# Patient Record
Sex: FEMALE | Race: WHITE | NOT HISPANIC OR LATINO | Employment: OTHER | URBAN - NONMETROPOLITAN AREA
[De-identification: names, ages, dates, MRNs, and addresses within clinical notes are randomized per-mention and may not be internally consistent; named-entity substitution may affect disease eponyms.]

---

## 2017-04-10 ENCOUNTER — HOSPITAL ENCOUNTER (EMERGENCY)
Facility: HOSPITAL | Age: 82
End: 2017-04-10

## 2017-04-10 ENCOUNTER — TRANSCRIBE ORDERS (OUTPATIENT)
Dept: ADMINISTRATIVE | Facility: HOSPITAL | Age: 82
End: 2017-04-10

## 2017-04-10 DIAGNOSIS — N63.10 BREAST MASS, RIGHT: Primary | ICD-10-CM

## 2017-04-14 ENCOUNTER — HOSPITAL ENCOUNTER (OUTPATIENT)
Dept: MAMMOGRAPHY | Facility: HOSPITAL | Age: 82
Discharge: HOME | End: 2017-04-14
Attending: SPECIALIST

## 2017-04-14 ENCOUNTER — HOSPITAL ENCOUNTER (OUTPATIENT)
Dept: ULTRASOUND IMAGING | Facility: HOSPITAL | Age: 82
Discharge: HOME | End: 2017-04-14
Attending: SPECIALIST | Admitting: SPECIALIST

## 2017-04-14 ENCOUNTER — LAB (OUTPATIENT)
Dept: LAB | Facility: HOSPITAL | Age: 82
End: 2017-04-14
Attending: SPECIALIST

## 2017-04-14 ENCOUNTER — TRANSCRIBE ORDERS (OUTPATIENT)
Dept: ADMINISTRATIVE | Facility: HOSPITAL | Age: 82
End: 2017-04-14

## 2017-04-14 DIAGNOSIS — N63.10 BREAST MASS, RIGHT: ICD-10-CM

## 2017-04-14 DIAGNOSIS — N63.10 BREAST MASS, RIGHT: Primary | ICD-10-CM

## 2017-04-14 LAB
INR PPP: 1 (ref 0.91–1.09)
PROTHROMBIN TIME: 11.9 SECONDS (ref 10–13.8)

## 2017-04-14 PROCEDURE — 85610 PROTHROMBIN TIME: CPT

## 2017-04-14 PROCEDURE — 88305 TISSUE EXAM BY PATHOLOGIST: CPT | Performed by: SPECIALIST

## 2017-04-14 PROCEDURE — 76642 ULTRASOUND BREAST LIMITED: CPT

## 2017-04-14 RX ORDER — LIDOCAINE HYDROCHLORIDE AND EPINEPHRINE 10; 10 MG/ML; UG/ML
10 INJECTION, SOLUTION INFILTRATION; PERINEURAL ONCE
Status: DISCONTINUED | OUTPATIENT
Start: 2017-04-14 | End: 2020-04-03

## 2017-04-14 RX ORDER — LIDOCAINE HYDROCHLORIDE 10 MG/ML
10 INJECTION, SOLUTION INFILTRATION; PERINEURAL ONCE
Status: DISCONTINUED | OUTPATIENT
Start: 2017-04-14 | End: 2020-04-03

## 2017-04-17 LAB
CYTO UR: NORMAL
LAB AP CASE REPORT: NORMAL
LAB AP CLINICAL INFORMATION: NORMAL
Lab: NORMAL
PATH REPORT.FINAL DX SPEC: NORMAL
PATH REPORT.GROSS SPEC: NORMAL

## 2018-06-22 ENCOUNTER — TRANSCRIBE ORDERS (OUTPATIENT)
Dept: ADMINISTRATIVE | Facility: HOSPITAL | Age: 83
End: 2018-06-22

## 2018-06-22 DIAGNOSIS — I38 HEART VALVE DISORDER: Primary | ICD-10-CM

## 2018-06-22 DIAGNOSIS — I65.23 BILATERAL CAROTID ARTERY OCCLUSION: ICD-10-CM

## 2018-06-22 DIAGNOSIS — R41.3 MEMORY LOSS: ICD-10-CM

## 2018-07-03 ENCOUNTER — HOSPITAL ENCOUNTER (OUTPATIENT)
Dept: CARDIOLOGY | Facility: HOSPITAL | Age: 83
Discharge: HOME | End: 2018-07-03
Attending: INTERNAL MEDICINE | Admitting: INTERNAL MEDICINE

## 2018-07-03 ENCOUNTER — HOSPITAL ENCOUNTER (OUTPATIENT)
Dept: MRI IMAGING | Facility: HOSPITAL | Age: 83
Discharge: HOME | End: 2018-07-03
Attending: INTERNAL MEDICINE

## 2018-07-03 ENCOUNTER — HOSPITAL ENCOUNTER (OUTPATIENT)
Dept: ULTRASOUND IMAGING | Facility: HOSPITAL | Age: 83
Discharge: HOME | End: 2018-07-03
Attending: INTERNAL MEDICINE

## 2018-07-03 VITALS
BODY MASS INDEX: 27.6 KG/M2 | HEIGHT: 62 IN | SYSTOLIC BLOOD PRESSURE: 148 MMHG | DIASTOLIC BLOOD PRESSURE: 84 MMHG | WEIGHT: 150 LBS

## 2018-07-03 DIAGNOSIS — R41.3 MEMORY LOSS: ICD-10-CM

## 2018-07-03 DIAGNOSIS — I65.23 BILATERAL CAROTID ARTERY OCCLUSION: ICD-10-CM

## 2018-07-03 DIAGNOSIS — I38 HEART VALVE DISORDER: ICD-10-CM

## 2018-07-03 PROCEDURE — 93880 EXTRACRANIAL BILAT STUDY: CPT

## 2018-07-03 PROCEDURE — 93306 TTE W/DOPPLER COMPLETE: CPT

## 2018-07-03 PROCEDURE — 93880 EXTRACRANIAL BILAT STUDY: CPT | Performed by: SURGERY

## 2018-07-03 PROCEDURE — 70551 MRI BRAIN STEM W/O DYE: CPT

## 2018-07-03 PROCEDURE — 93306 TTE W/DOPPLER COMPLETE: CPT | Performed by: INTERNAL MEDICINE

## 2018-07-05 ENCOUNTER — TRANSCRIBE ORDERS (OUTPATIENT)
Dept: ADMINISTRATIVE | Facility: HOSPITAL | Age: 83
End: 2018-07-05

## 2018-07-05 DIAGNOSIS — G93.89 BRAIN MASS: Primary | ICD-10-CM

## 2018-07-05 LAB
BH CV ECHO MEAS - AO MAX PG (FULL): 6.3 MMHG
BH CV ECHO MEAS - AO MAX PG: 14.9 MMHG
BH CV ECHO MEAS - AO MEAN PG (FULL): 4 MMHG
BH CV ECHO MEAS - AO MEAN PG: 9 MMHG
BH CV ECHO MEAS - AO ROOT AREA (BSA CORRECTED): 1.7
BH CV ECHO MEAS - AO ROOT AREA: 6.6 CM^2
BH CV ECHO MEAS - AO ROOT DIAM: 2.9 CM
BH CV ECHO MEAS - AO V2 MAX: 193 CM/SEC
BH CV ECHO MEAS - AO V2 MEAN: 137 CM/SEC
BH CV ECHO MEAS - AO V2 VTI: 50.3 CM
BH CV ECHO MEAS - AVA(I,A): 2.3 CM^2
BH CV ECHO MEAS - AVA(I,D): 2.3 CM^2
BH CV ECHO MEAS - AVA(V,A): 2.4 CM^2
BH CV ECHO MEAS - AVA(V,D): 2.4 CM^2
BH CV ECHO MEAS - BSA(HAYCOCK): 1.7 M^2
BH CV ECHO MEAS - BSA: 1.7 M^2
BH CV ECHO MEAS - BZI_BMI: 27.4 KILOGRAMS/M^2
BH CV ECHO MEAS - BZI_METRIC_HEIGHT: 157.5 CM
BH CV ECHO MEAS - BZI_METRIC_WEIGHT: 68 KG
BH CV ECHO MEAS - CONTRAST EF 4CH: 58.8 ML/M^2
BH CV ECHO MEAS - EDV(CUBED): 61.2 ML
BH CV ECHO MEAS - EDV(MOD-SP4): 47.3 ML
BH CV ECHO MEAS - EDV(TEICH): 67.5 ML
BH CV ECHO MEAS - EF(CUBED): 80.4 %
BH CV ECHO MEAS - EF(MOD-SP4): 58.8 %
BH CV ECHO MEAS - EF(TEICH): 73.5 %
BH CV ECHO MEAS - ESV(CUBED): 12 ML
BH CV ECHO MEAS - ESV(MOD-SP4): 19.5 ML
BH CV ECHO MEAS - ESV(TEICH): 17.9 ML
BH CV ECHO MEAS - FS: 41.9 %
BH CV ECHO MEAS - IVS/LVPW: 1.2
BH CV ECHO MEAS - IVSD: 1.1 CM
BH CV ECHO MEAS - LA DIMENSION: 3.7 CM
BH CV ECHO MEAS - LA/AO: 1.3
BH CV ECHO MEAS - LAT PEAK E' VEL: 8.6 CM/SEC
BH CV ECHO MEAS - LV DIASTOLIC VOL/BSA (35-75): 28 ML/M^2
BH CV ECHO MEAS - LV MASS(C)D: 125.9 GRAMS
BH CV ECHO MEAS - LV MASS(C)DI: 74.5 GRAMS/M^2
BH CV ECHO MEAS - LV MAX PG: 8.6 MMHG
BH CV ECHO MEAS - LV MEAN PG: 5 MMHG
BH CV ECHO MEAS - LV SYSTOLIC VOL/BSA (12-30): 11.5 ML/M^2
BH CV ECHO MEAS - LV V1 MAX: 147 CM/SEC
BH CV ECHO MEAS - LV V1 MEAN: 111 CM/SEC
BH CV ECHO MEAS - LV V1 VTI: 37.5 CM
BH CV ECHO MEAS - LVIDD: 3.9 CM
BH CV ECHO MEAS - LVIDS: 2.3 CM
BH CV ECHO MEAS - LVLD AP4: 7.6 CM
BH CV ECHO MEAS - LVLS AP4: 6.1 CM
BH CV ECHO MEAS - LVOT AREA (M): 3.1 CM^2
BH CV ECHO MEAS - LVOT AREA: 3.1 CM^2
BH CV ECHO MEAS - LVOT DIAM: 2 CM
BH CV ECHO MEAS - LVPWD: 0.92 CM
BH CV ECHO MEAS - MED PEAK E' VEL: 5.77 CM/SEC
BH CV ECHO MEAS - MV A MAX VEL: 83.4 CM/SEC
BH CV ECHO MEAS - MV DEC TIME: 0.2 SEC
BH CV ECHO MEAS - MV E MAX VEL: 93.6 CM/SEC
BH CV ECHO MEAS - MV E/A: 1.1
BH CV ECHO MEAS - PA MAX PG: 1.9 MMHG
BH CV ECHO MEAS - PA V2 MAX: 68.9 CM/SEC
BH CV ECHO MEAS - RAP SYSTOLE: 5 MMHG
BH CV ECHO MEAS - RVSP: 35.9 MMHG
BH CV ECHO MEAS - SI(AO): 196.4 ML/M^2
BH CV ECHO MEAS - SI(CUBED): 29.1 ML/M^2
BH CV ECHO MEAS - SI(LVOT): 69.6 ML/M^2
BH CV ECHO MEAS - SI(MOD-SP4): 16.4 ML/M^2
BH CV ECHO MEAS - SI(TEICH): 29.3 ML/M^2
BH CV ECHO MEAS - SV(AO): 332.2 ML
BH CV ECHO MEAS - SV(CUBED): 49.2 ML
BH CV ECHO MEAS - SV(LVOT): 117.8 ML
BH CV ECHO MEAS - SV(MOD-SP4): 27.8 ML
BH CV ECHO MEAS - SV(TEICH): 49.6 ML
BH CV ECHO MEAS - TR MAX VEL: 278 CM/SEC
BH CV ECHO MEASUREMENTS AVERAGE E/E' RATIO: 13.03
LEFT ATRIUM VOLUME INDEX: 42 ML/M2
LEFT ATRIUM VOLUME: 71 CM3
MAXIMAL PREDICTED HEART RATE: 137 BPM
STRESS TARGET HR: 116 BPM

## 2018-07-06 ENCOUNTER — APPOINTMENT (OUTPATIENT)
Dept: MRI IMAGING | Facility: HOSPITAL | Age: 83
End: 2018-07-06
Attending: INTERNAL MEDICINE

## 2018-07-09 ENCOUNTER — HOSPITAL ENCOUNTER (OUTPATIENT)
Dept: MRI IMAGING | Facility: HOSPITAL | Age: 83
Discharge: HOME | End: 2018-07-09
Attending: INTERNAL MEDICINE | Admitting: INTERNAL MEDICINE

## 2018-07-09 DIAGNOSIS — G93.89 BRAIN MASS: ICD-10-CM

## 2018-07-09 LAB — CREAT BLDA-MCNC: 1.2 MG/DL (ref 0.6–1.3)

## 2018-07-09 PROCEDURE — 70552 MRI BRAIN STEM W/DYE: CPT

## 2018-07-09 PROCEDURE — A9577 INJ MULTIHANCE: HCPCS | Performed by: INTERNAL MEDICINE

## 2018-07-09 PROCEDURE — 82565 ASSAY OF CREATININE: CPT

## 2018-07-09 PROCEDURE — 0 GADOBENATE DIMEGLUMINE 529 MG/ML SOLUTION: Performed by: INTERNAL MEDICINE

## 2018-07-09 RX ADMIN — GADOBENATE DIMEGLUMINE 14 ML: 529 INJECTION, SOLUTION INTRAVENOUS at 12:49

## 2018-07-23 ENCOUNTER — HOSPITAL ENCOUNTER (OUTPATIENT)
Dept: CT IMAGING | Facility: HOSPITAL | Age: 83
Discharge: HOME | End: 2018-07-23
Attending: INTERNAL MEDICINE

## 2018-07-23 ENCOUNTER — TRANSCRIBE ORDERS (OUTPATIENT)
Dept: LAB | Facility: HOSPITAL | Age: 83
End: 2018-07-23

## 2018-07-23 ENCOUNTER — HOSPITAL ENCOUNTER (OUTPATIENT)
Dept: CT IMAGING | Facility: HOSPITAL | Age: 83
Discharge: HOME | End: 2018-07-23
Attending: INTERNAL MEDICINE | Admitting: INTERNAL MEDICINE

## 2018-07-23 DIAGNOSIS — G93.89 MASS, BRAIN: Primary | ICD-10-CM

## 2018-07-23 DIAGNOSIS — G93.89 MASS, BRAIN: ICD-10-CM

## 2018-07-23 LAB — CREAT BLDA-MCNC: 1.4 MG/DL (ref 0.6–1.3)

## 2018-07-23 PROCEDURE — 74176 CT ABD & PELVIS W/O CONTRAST: CPT

## 2018-07-23 PROCEDURE — 82565 ASSAY OF CREATININE: CPT

## 2018-07-23 PROCEDURE — 71250 CT THORAX DX C-: CPT

## 2018-07-25 ENCOUNTER — TRANSCRIBE ORDERS (OUTPATIENT)
Dept: ADMINISTRATIVE | Facility: HOSPITAL | Age: 83
End: 2018-07-25

## 2018-07-25 ENCOUNTER — HOSPITAL ENCOUNTER (OUTPATIENT)
Dept: ULTRASOUND IMAGING | Facility: HOSPITAL | Age: 83
Discharge: HOME | End: 2018-07-25
Attending: INTERNAL MEDICINE | Admitting: INTERNAL MEDICINE

## 2018-07-25 DIAGNOSIS — N28.89 RENAL MASS: Primary | ICD-10-CM

## 2018-07-25 DIAGNOSIS — N28.89 RENAL MASS: ICD-10-CM

## 2018-07-25 PROCEDURE — 76775 US EXAM ABDO BACK WALL LIM: CPT

## 2018-07-26 NOTE — PROGRESS NOTES
Subjective    Ms. Perales is 83 y.o. female    Chief Complaint: Renal lesion    History of Present Illness     Renal Mass  Patient here for evaluation of renal mass.  The renal mass was found incidentally.  The location of the mass is the right kidney.  The mass has been present for1 week.  The mass is described as cystic.  The size of the mass is 7cm.  It would be classifed as a Bosniak I.  The mass was found on evaluation of memory loss.  Associated symptoms include denies flank pain/hematuria.    The following portions of the patient's history were reviewed and updated as appropriate: allergies, current medications, past family history, past medical history, past social history, past surgical history and problem list.    Review of Systems   Constitutional: Negative for appetite change, diaphoresis and fever.   HENT: Negative for facial swelling and sore throat.    Eyes: Negative for discharge and visual disturbance.   Respiratory: Negative for cough and shortness of breath.    Cardiovascular: Negative for chest pain and leg swelling.   Gastrointestinal: Negative for anal bleeding and vomiting.   Endocrine: Negative for cold intolerance and heat intolerance.   Genitourinary: Positive for frequency. Negative for decreased urine volume, difficulty urinating, dyspareunia, dysuria, enuresis, flank pain, genital sores, hematuria, menstrual problem, pelvic pain, urgency, vaginal bleeding, vaginal discharge and vaginal pain.   Musculoskeletal: Negative for back pain and gait problem.   Skin: Negative for pallor and rash.   Allergic/Immunologic: Negative for food allergies and immunocompromised state.   Neurological: Negative for seizures and headaches.   Hematological: Negative for adenopathy. Does not bruise/bleed easily.   Psychiatric/Behavioral: Negative for dysphoric mood, self-injury and suicidal ideas.         Current Outpatient Prescriptions:   •  aspirin 81 MG EC tablet, Take 81 mg by mouth., Disp: , Rfl:   •   "azelastine (ASTELIN) 0.1 % nasal spray, into the nostril(s) as directed by provider., Disp: , Rfl:     Current Facility-Administered Medications:   •  lidocaine (XYLOCAINE) 1 % injection 10 mL, 10 mL, Subcutaneous, Once, Christelle Winston MD  •  lidocaine-EPINEPHrine (XYLOCAINE W/EPI) 1 %-1:770435 injection 10 mL, 10 mL, Injection, Once, Christelle Winston MD  •  sodium bicarbonate injection 2.5 mEq, 5 mL, Injection, Once, Christelle Winston MD    Past Medical History:   Diagnosis Date   • Diabetes mellitus (CMS/HCC)    • High cholesterol    • Hypertension        Past Surgical History:   Procedure Laterality Date   • HYSTERECTOMY     • OOPHORECTOMY     • TONSILLECTOMY         Social History     Social History   • Marital status:      Social History Main Topics   • Smoking status: Never Smoker   • Smokeless tobacco: Never Used   • Alcohol use No   • Drug use: No     Other Topics Concern   • Not on file       Family History   Problem Relation Age of Onset   • No Known Problems Father    • Hypertension Mother        Objective    BP (!) 190/80   Temp 97.9 °F (36.6 °C)   Ht 157.5 cm (62\")   Wt 70.5 kg (155 lb 6.4 oz)   BMI 28.42 kg/m²     Physical Exam   Constitutional: She is oriented to person, place, and time. She appears well-developed and well-nourished. No distress.   HENT:   Head: Normocephalic and atraumatic.   Right Ear: External ear and ear canal normal.   Left Ear: External ear and ear canal normal.   Nose: No nasal deformity. No epistaxis.   Mouth/Throat: Oropharynx is clear and moist. Mucous membranes are not pale, not dry and not cyanotic. Normal dentition. No oropharyngeal exudate.   Neck: Trachea normal. No tracheal tenderness present. No tracheal deviation present. No thyroid mass and no thyromegaly present.   Pulmonary/Chest: Effort normal. No accessory muscle usage. No respiratory distress. Chest wall is not dull to percussion (No flatness or hyperresonance). She exhibits no mass and no " tenderness.   On palpation, no tactile fremitus. All movements are symmetric. No intercostal retraction noted.    Abdominal: Soft. Normal appearance. She exhibits no distension and no mass. There is no hepatosplenomegaly. There is no tenderness. No hernia.   Rectal examination or stool specimen is not indicated.    Musculoskeletal:   Normal gait and station. The spine, ribs, and pelvis are examined. No obvious misalignment or asymmetry. ROM is reasonable for age. No instability. No obvious atrophy, flaccidity or spasticity.    Lymphadenopathy:     She has no cervical adenopathy.        Right: No inguinal adenopathy present.        Left: No inguinal adenopathy present.   Neurological: She is alert and oriented to person, place, and time.   Skin: Skin is warm, dry and intact. No lesion and no rash noted. She is not diaphoretic. No cyanosis. No pallor. Nails show no clubbing.   On palpation, there were no induration, subcutaneous nodules, or tightening   Psychiatric: Her speech is normal and behavior is normal. Judgment and thought content normal. Her mood appears not anxious. Her affect is not labile. She does not exhibit a depressed mood.   Vitals reviewed.    CT independent review  The CT scan of the abdomen/pelvis done without contrast is available for me to review.  Treatment recommendations require an independent review.  First I scanned the liver, spleen, and bowel pattern.  The retroperitoneum including the major vessels and lymphatic packages are briefly reviewed.  This film as been reviewed by the radiologist to determine any non urologic abnormalities that are present.  The kidneys are closely inspected for size, symmetry, contour, parenchymal thickness, perinephric reaction, presence of calcifications, and intrarenal dilation of the collecting system.  The ureters are inspected for their course, caliber, and any calcifications.  The bladder is inspected for its thickness, size, and presence of any  calcifications.  This scan shows:    The right kidney appears 7 cm renal cyst    The left kidney appears normal on this non-contrasted CT scan.  The renal parenchymal is norml in thickness.  There are no solid masses or cysts.  There is no hydronephrosis.  There are no stones.      The bladder appears normal on this non-contrasted CT scan.  The bladder appears normal in thickness.  There no masses or stones seen on this exam.      Renal ultrasound independent review    The renal ultrasound is available for me to review.  Treatment recommendations require an independent review.  This film has been reviewed by the radiologist to determine any non urologic abnormalities that are presents.  However, I very closely inspected the kidneys for size, symmetry, contour, parenchymal thickness, perinephric reaction, presence of calcifications, and intrarenal dilation of the collecting system.       The right kidney appears 7 cm simple cyst    The left kidney appears normal on this ultrasound.  The renal parenchymal is norml in thickness.  There are no solid masses or cysts.  There is no hydronephrosis.  There are no stones.      The bladder appears normal on thisultrsaound.  The bladder appears normal in thickness.  There no masses or stones seen on this exam.       Results for orders placed or performed in visit on 07/27/18   POC Urinalysis Dipstick, Multipro   Result Value Ref Range    Color Yellow Yellow, Straw, Dark Yellow, Tiff    Clarity, UA Clear Clear    Glucose, UA >=1000 mg/dL (3+) (A) Negative, 1000 mg/dL (3+) mg/dL    Bilirubin Negative Negative    Ketones, UA Negative Negative    Specific Gravity  1.025 1.005 - 1.030    Blood, UA Negative Negative    pH, Urine 5.5 5.0 - 8.0    Protein,  mg/dL (A) Negative mg/dL    Urobilinogen, UA Normal Normal    Nitrite, UA Negative Negative    Leukocytes Trace (A) Negative     Assessment and Plan    Diagnoses and all orders for this visit:    Renal cyst  -     POC  Urinalysis Dipstick, Multipro          I reviewed her CT scan as well as renal ultrasound with the findings mentioned above.  This is a simple renal cystic Bosniak one lesion.  It is somewhat larger however is not causing any symptoms I would not recommend any therapy.  She is going to follow-up with me on a when necessary basis.

## 2018-07-27 ENCOUNTER — OFFICE VISIT (OUTPATIENT)
Dept: UROLOGY | Facility: CLINIC | Age: 83
End: 2018-07-27

## 2018-07-27 VITALS
BODY MASS INDEX: 28.6 KG/M2 | DIASTOLIC BLOOD PRESSURE: 80 MMHG | HEIGHT: 62 IN | WEIGHT: 155.4 LBS | SYSTOLIC BLOOD PRESSURE: 190 MMHG | TEMPERATURE: 97.9 F

## 2018-07-27 DIAGNOSIS — N28.1 RENAL CYST: Primary | ICD-10-CM

## 2018-07-27 LAB
BILIRUB BLD-MCNC: NEGATIVE MG/DL
CLARITY, POC: CLEAR
COLOR UR: YELLOW
GLUCOSE UR STRIP-MCNC: ABNORMAL MG/DL
KETONES UR QL: NEGATIVE
LEUKOCYTE EST, POC: ABNORMAL
NITRITE UR-MCNC: NEGATIVE MG/ML
PH UR: 5.5 [PH] (ref 5–8)
PROT UR STRIP-MCNC: ABNORMAL MG/DL
RBC # UR STRIP: NEGATIVE /UL
SP GR UR: 1.02 (ref 1–1.03)
UROBILINOGEN UR QL: NORMAL

## 2018-07-27 PROCEDURE — 99201 PR OFFICE OUTPATIENT NEW 10 MINUTES: CPT | Performed by: UROLOGY

## 2018-07-27 PROCEDURE — 81001 URINALYSIS AUTO W/SCOPE: CPT | Performed by: UROLOGY

## 2018-07-27 RX ORDER — ASPIRIN 81 MG/1
81 TABLET ORAL
COMMUNITY
End: 2020-04-03 | Stop reason: DRUGHIGH

## 2018-07-27 RX ORDER — AZELASTINE 1 MG/ML
SPRAY, METERED NASAL
COMMUNITY
End: 2020-06-24

## 2018-08-10 ENCOUNTER — TRANSCRIBE ORDERS (OUTPATIENT)
Dept: ADMINISTRATIVE | Facility: HOSPITAL | Age: 83
End: 2018-08-10

## 2018-08-10 DIAGNOSIS — G93.9 BRAIN LESION: Primary | ICD-10-CM

## 2018-08-17 ENCOUNTER — HOSPITAL ENCOUNTER (OUTPATIENT)
Dept: MRI IMAGING | Facility: HOSPITAL | Age: 83
Discharge: HOME | End: 2018-08-17
Admitting: PSYCHIATRY & NEUROLOGY

## 2018-08-17 DIAGNOSIS — G93.9 BRAIN LESION: ICD-10-CM

## 2018-08-17 LAB — CREAT BLDA-MCNC: 1.3 MG/DL (ref 0.6–1.3)

## 2018-08-17 PROCEDURE — 82565 ASSAY OF CREATININE: CPT

## 2018-08-17 PROCEDURE — 0 GADOBENATE DIMEGLUMINE 529 MG/ML SOLUTION: Performed by: INTERNAL MEDICINE

## 2018-08-17 PROCEDURE — 70553 MRI BRAIN STEM W/O & W/DYE: CPT

## 2018-08-17 PROCEDURE — A9577 INJ MULTIHANCE: HCPCS | Performed by: INTERNAL MEDICINE

## 2018-08-17 RX ADMIN — GADOBENATE DIMEGLUMINE 10 ML: 529 INJECTION, SOLUTION INTRAVENOUS at 13:35

## 2018-11-14 ENCOUNTER — TRANSCRIBE ORDERS (OUTPATIENT)
Dept: ADMINISTRATIVE | Facility: HOSPITAL | Age: 83
End: 2018-11-14

## 2018-11-14 DIAGNOSIS — I10 MALIGNANT HYPERTENSION: Primary | ICD-10-CM

## 2018-11-16 ENCOUNTER — HOSPITAL ENCOUNTER (OUTPATIENT)
Dept: CT IMAGING | Facility: HOSPITAL | Age: 83
Discharge: HOME | End: 2018-11-16
Attending: INTERNAL MEDICINE | Admitting: INTERNAL MEDICINE

## 2018-11-16 DIAGNOSIS — I10 MALIGNANT HYPERTENSION: ICD-10-CM

## 2018-11-16 LAB
CREAT BLD-MCNC: 1.15 MG/DL (ref 0.5–1.4)
CREAT BLDA-MCNC: 1.3 MG/DL (ref 0.6–1.3)
GFR SERPL CREATININE-BSD FRML MDRD: 45 ML/MIN/1.73

## 2018-11-16 PROCEDURE — 74175 CTA ABDOMEN W/CONTRAST: CPT

## 2018-11-16 PROCEDURE — 0 IOPAMIDOL PER 1 ML: Performed by: INTERNAL MEDICINE

## 2018-11-16 PROCEDURE — 82565 ASSAY OF CREATININE: CPT | Performed by: INTERNAL MEDICINE

## 2018-11-16 PROCEDURE — 82565 ASSAY OF CREATININE: CPT

## 2018-11-16 RX ADMIN — IOPAMIDOL 100 ML: 755 INJECTION, SOLUTION INTRAVENOUS at 10:42

## 2018-11-30 ENCOUNTER — TRANSCRIBE ORDERS (OUTPATIENT)
Dept: ADMINISTRATIVE | Facility: HOSPITAL | Age: 83
End: 2018-11-30

## 2018-11-30 DIAGNOSIS — G93.89 BRAIN MASS: Primary | ICD-10-CM

## 2018-12-04 ENCOUNTER — HOSPITAL ENCOUNTER (OUTPATIENT)
Dept: MRI IMAGING | Facility: HOSPITAL | Age: 83
Discharge: HOME | End: 2018-12-04
Attending: INTERNAL MEDICINE | Admitting: INTERNAL MEDICINE

## 2018-12-04 DIAGNOSIS — G93.89 BRAIN MASS: ICD-10-CM

## 2018-12-04 LAB
CREAT BLD-MCNC: 1.5 MG/DL (ref 0.5–1.4)
CREAT BLDA-MCNC: 1.7 MG/DL (ref 0.6–1.3)
GFR SERPL CREATININE-BSD FRML MDRD: 33 ML/MIN/1.73

## 2018-12-04 PROCEDURE — 82565 ASSAY OF CREATININE: CPT

## 2018-12-04 PROCEDURE — 70553 MRI BRAIN STEM W/O & W/DYE: CPT

## 2018-12-04 PROCEDURE — 0 GADOBENATE DIMEGLUMINE 529 MG/ML SOLUTION: Performed by: INTERNAL MEDICINE

## 2018-12-04 PROCEDURE — 82565 ASSAY OF CREATININE: CPT | Performed by: INTERNAL MEDICINE

## 2018-12-04 PROCEDURE — A9577 INJ MULTIHANCE: HCPCS | Performed by: INTERNAL MEDICINE

## 2018-12-04 RX ADMIN — GADOBENATE DIMEGLUMINE 13 ML: 529 INJECTION, SOLUTION INTRAVENOUS at 10:08

## 2019-03-21 ENCOUNTER — TRANSCRIBE ORDERS (OUTPATIENT)
Dept: ADMINISTRATIVE | Facility: HOSPITAL | Age: 84
End: 2019-03-21

## 2019-03-21 DIAGNOSIS — I63.9 CEREBRAL INFARCTION, UNSPECIFIED MECHANISM (HCC): ICD-10-CM

## 2019-03-21 DIAGNOSIS — G93.89 BRAIN MASS: Primary | ICD-10-CM

## 2019-03-29 ENCOUNTER — HOSPITAL ENCOUNTER (OUTPATIENT)
Dept: MRI IMAGING | Facility: HOSPITAL | Age: 84
Discharge: HOME | End: 2019-03-29
Admitting: INTERNAL MEDICINE

## 2019-03-29 DIAGNOSIS — I63.9 CEREBRAL INFARCTION, UNSPECIFIED MECHANISM (HCC): ICD-10-CM

## 2019-03-29 DIAGNOSIS — G93.89 BRAIN MASS: ICD-10-CM

## 2019-03-29 LAB
CREAT BLD-MCNC: 1.25 MG/DL (ref 0.5–1.4)
CREAT BLDA-MCNC: 1.4 MG/DL (ref 0.6–1.3)
GFR SERPL CREATININE-BSD FRML MDRD: 41 ML/MIN/1.73

## 2019-03-29 PROCEDURE — 70553 MRI BRAIN STEM W/O & W/DYE: CPT

## 2019-03-29 PROCEDURE — A9577 INJ MULTIHANCE: HCPCS

## 2019-03-29 PROCEDURE — 82565 ASSAY OF CREATININE: CPT

## 2019-03-29 PROCEDURE — 82565 ASSAY OF CREATININE: CPT | Performed by: INTERNAL MEDICINE

## 2019-03-29 PROCEDURE — 0 GADOBENATE DIMEGLUMINE 529 MG/ML SOLUTION

## 2020-01-22 RX ORDER — SIMVASTATIN 40 MG
1 TABLET ORAL NIGHTLY
COMMUNITY
End: 2020-01-22 | Stop reason: SDUPTHER

## 2020-01-22 RX ORDER — NEBIVOLOL 20 MG/1
20 TABLET ORAL DAILY
Qty: 30 TABLET | Refills: 11 | Status: SHIPPED | OUTPATIENT
Start: 2020-01-22 | End: 2020-02-24 | Stop reason: CLARIF

## 2020-01-22 RX ORDER — SIMVASTATIN 40 MG
40 TABLET ORAL NIGHTLY
Qty: 30 TABLET | Refills: 11 | Status: SHIPPED | OUTPATIENT
Start: 2020-01-22 | End: 2021-02-03

## 2020-01-22 RX ORDER — NEBIVOLOL 10 MG/1
TABLET ORAL
COMMUNITY
End: 2020-01-22 | Stop reason: DRUGHIGH

## 2020-01-22 NOTE — TELEPHONE ENCOUNTER
[facility] called to request refill      Zocor  Bystolic    [facility]    Per pharm meds were requested last week

## 2020-01-29 DIAGNOSIS — E11.9 TYPE 2 DIABETES MELLITUS WITHOUT COMPLICATION, UNSPECIFIED WHETHER LONG TERM INSULIN USE (HCC): Primary | ICD-10-CM

## 2020-02-18 ENCOUNTER — TELEPHONE (OUTPATIENT)
Dept: INTERNAL MEDICINE | Facility: CLINIC | Age: 85
End: 2020-02-18

## 2020-02-18 RX ORDER — LOSARTAN POTASSIUM 100 MG/1
100 TABLET ORAL DAILY
Qty: 90 TABLET | Refills: 1 | Status: SHIPPED | OUTPATIENT
Start: 2020-02-18 | End: 2020-08-18

## 2020-02-18 NOTE — TELEPHONE ENCOUNTER
Zev with [facility] called to see if Bystolic 20MG can be changed to generic Toprol XL. Caller stated with insurance pt can save $40 a month.     Please inform  [phone]

## 2020-02-19 NOTE — TELEPHONE ENCOUNTER
Looks like she has been on Bystolic for a long time and I don't see that she has been on Toprol in [employer] or that she was labeled as having a problem with it.

## 2020-02-19 NOTE — TELEPHONE ENCOUNTER
We can try her on 25 mg Toprol-XL and stop the Bystolic however she will need to come in and be seen 1 to 2 weeks later to make sure that she is tolerating that

## 2020-02-21 NOTE — TELEPHONE ENCOUNTER
Asha Brothers at [facility].  Left message informing patient of the medication change.  Patient is to call back Monday and make an appt.

## 2020-02-24 RX ORDER — METOPROLOL SUCCINATE 25 MG/1
25 TABLET, EXTENDED RELEASE ORAL DAILY
Qty: 30 TABLET | Refills: 2
Start: 2020-02-24 | End: 2020-07-01

## 2020-02-24 NOTE — TELEPHONE ENCOUNTER
It doesn't look like her medicine was changed in her chart.  Please make changes and then sign off.

## 2020-03-04 ENCOUNTER — TELEPHONE (OUTPATIENT)
Dept: INTERNAL MEDICINE | Facility: CLINIC | Age: 85
End: 2020-03-04

## 2020-03-04 NOTE — TELEPHONE ENCOUNTER
Son is requesting that a letter to be sent for his mother stating that she isn't able to participate in jury duty in [facility].

## 2020-03-10 ENCOUNTER — OFFICE VISIT (OUTPATIENT)
Dept: INTERNAL MEDICINE | Facility: CLINIC | Age: 85
End: 2020-03-10

## 2020-03-10 VITALS
SYSTOLIC BLOOD PRESSURE: 150 MMHG | DIASTOLIC BLOOD PRESSURE: 88 MMHG | HEART RATE: 74 BPM | HEIGHT: 62 IN | BODY MASS INDEX: 27.6 KG/M2 | WEIGHT: 150 LBS

## 2020-03-10 DIAGNOSIS — I63.531 CEREBRAL INFARCTION INVOLVING RIGHT POSTERIOR CEREBRAL ARTERY (HCC): ICD-10-CM

## 2020-03-10 DIAGNOSIS — I10 BENIGN ESSENTIAL HTN: ICD-10-CM

## 2020-03-10 DIAGNOSIS — E11.9 TYPE 2 DIABETES MELLITUS WITHOUT COMPLICATION, UNSPECIFIED WHETHER LONG TERM INSULIN USE (HCC): Primary | ICD-10-CM

## 2020-03-10 PROBLEM — E78.5 HYPERLIPIDEMIA: Status: ACTIVE | Noted: 2020-03-10

## 2020-03-10 PROBLEM — M17.0 PRIMARY OSTEOARTHRITIS OF BOTH KNEES: Status: ACTIVE | Noted: 2020-03-10

## 2020-03-10 PROBLEM — Z86.79 ATRIAL FIBRILLATION, CURRENTLY IN SINUS RHYTHM: Status: ACTIVE | Noted: 2020-03-10

## 2020-03-10 LAB — HBA1C MFR BLD: 9.4 %

## 2020-03-10 PROCEDURE — 83036 HEMOGLOBIN GLYCOSYLATED A1C: CPT | Performed by: INTERNAL MEDICINE

## 2020-03-10 PROCEDURE — 99213 OFFICE O/P EST LOW 20 MIN: CPT | Performed by: INTERNAL MEDICINE

## 2020-03-10 NOTE — PROGRESS NOTES
Subjective   Libia Perales is a 85 y.o. female.   Chief Complaint   Patient presents with   • Diabetes     3 mos. FU - A1C is 9.4.  Wanting a jury duty excuse       Patient is here for follow-up of diabetes and hypertension.  She continues to consume sugars despite multiple attempts to get them to discontinue those items.  She also is consuming significant amounts of caffeinated beverages or chocolates which have xanthine present.       The following portions of the patient's history were reviewed and updated as appropriate: allergies, current medications, past family history, past medical history, past social history, past surgical history and problem list.    Review of Systems   Constitutional: Negative for activity change, appetite change, fatigue, fever, unexpected weight gain and unexpected weight loss.   HENT: Negative for swollen glands, trouble swallowing and voice change.    Eyes: Negative for blurred vision and visual disturbance.   Respiratory: Negative for cough and shortness of breath.    Cardiovascular: Negative for chest pain, palpitations and leg swelling.   Gastrointestinal: Negative for abdominal pain, constipation, diarrhea, nausea, vomiting and indigestion.   Endocrine: Negative for cold intolerance, heat intolerance, polydipsia and polyphagia.   Genitourinary: Negative for dysuria and frequency.   Musculoskeletal: Negative for arthralgias, back pain, joint swelling and neck pain.   Skin: Negative for color change, rash and skin lesions.   Neurological: Negative for dizziness, weakness, headache, memory problem and confusion.   Hematological: Does not bruise/bleed easily.   Psychiatric/Behavioral: Negative for agitation, hallucinations and suicidal ideas. The patient is not nervous/anxious.        Objective   Past Medical History:   Diagnosis Date   • Diabetes mellitus (CMS/HCC)    • High cholesterol    • Hypertension       Past Surgical History:   Procedure Laterality Date   • HYSTERECTOMY      • OOPHORECTOMY     • TONSILLECTOMY          Current Outpatient Medications:   •  aspirin 81 MG EC tablet, Take 81 mg by mouth., Disp: , Rfl:   •  azelastine (ASTELIN) 0.1 % nasal spray, into the nostril(s) as directed by provider., Disp: , Rfl:   •  glucose blood test strip, Use as instructed, Disp: 100 each, Rfl: 12  •  losartan (COZAAR) 100 MG tablet, Take 1 tablet by mouth Daily., Disp: 90 tablet, Rfl: 1  •  metoprolol succinate XL (TOPROL XL) 25 MG 24 hr tablet, Take 1 tablet by mouth Daily., Disp: 30 tablet, Rfl: 2  •  simvastatin (ZOCOR) 40 MG tablet, Take 1 tablet by mouth Every Night., Disp: 30 tablet, Rfl: 11    Current Facility-Administered Medications:   •  lidocaine (XYLOCAINE) 1 % injection 10 mL, 10 mL, Subcutaneous, Once, Christelle Winston MD  •  lidocaine-EPINEPHrine (XYLOCAINE W/EPI) 1 %-1:930425 injection 10 mL, 10 mL, Injection, Once, Christelle Winston MD  •  sodium bicarbonate injection 2.5 mEq, 5 mL, Injection, Once, Christelle Winston MD     Vitals:    03/10/20 1450   BP: 150/88   Pulse: 74         03/10/20  1450   Weight: 68 kg (150 lb)     Patient's Body mass index is 27.44 kg/m². BMI is above normal parameters. Recommendations include: nutrition counseling.      Physical Exam   Constitutional: She is oriented to person, place, and time. She appears well-developed and well-nourished.   HENT:   Head: Normocephalic and atraumatic.   Right Ear: External ear normal.   Left Ear: External ear normal.   Nose: Nose normal.   Mouth/Throat: Oropharynx is clear and moist.   Eyes: Pupils are equal, round, and reactive to light. Conjunctivae and EOM are normal.   Neck: Normal range of motion. Neck supple. No thyromegaly present.   Cardiovascular: Normal rate, regular rhythm, normal heart sounds and intact distal pulses.   Pulmonary/Chest: Effort normal and breath sounds normal.   Abdominal: Soft. Bowel sounds are normal.   Lymphadenopathy:     She has no cervical adenopathy.   Neurological: She is alert  and oriented to person, place, and time.   Skin: Skin is warm and dry.   Psychiatric: She has a normal mood and affect. Her behavior is normal. Thought content normal.   Nursing note and vitals reviewed.            Assessment/Plan   Diagnoses and all orders for this visit:    1. Type 2 diabetes mellitus without complication, unspecified whether long term insulin use (CMS/HCC) (Primary)  -     POCT glycated hemoglobin, total    2. Benign essential HTN    3. 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

## 2020-03-25 RX ORDER — OMEPRAZOLE 20 MG/1
20 CAPSULE, DELAYED RELEASE ORAL DAILY
Qty: 30 CAPSULE | Refills: 11 | Status: SHIPPED | OUTPATIENT
Start: 2020-03-25 | End: 2020-11-10 | Stop reason: DRUGHIGH

## 2020-04-03 ENCOUNTER — OFFICE VISIT (OUTPATIENT)
Dept: INTERNAL MEDICINE | Facility: CLINIC | Age: 85
End: 2020-04-03

## 2020-04-03 VITALS — WEIGHT: 144 LBS | HEIGHT: 62 IN | BODY MASS INDEX: 26.5 KG/M2

## 2020-04-03 DIAGNOSIS — E11.9 TYPE 2 DIABETES MELLITUS WITHOUT COMPLICATION, UNSPECIFIED WHETHER LONG TERM INSULIN USE (HCC): Primary | ICD-10-CM

## 2020-04-03 PROCEDURE — 99213 OFFICE O/P EST LOW 20 MIN: CPT | Performed by: NURSE PRACTITIONER

## 2020-04-03 RX ORDER — POLYETHYLENE GLYCOL 3350 17 G/17G
POWDER, FOR SOLUTION ORAL
COMMUNITY
End: 2020-11-10 | Stop reason: ALTCHOICE

## 2020-04-03 RX ORDER — ASPIRIN 325 MG
TABLET ORAL DAILY
COMMUNITY
End: 2020-09-21

## 2020-04-03 RX ORDER — GLIPIZIDE 10 MG/1
10 TABLET ORAL 2 TIMES DAILY
COMMUNITY
Start: 2020-03-25 | End: 2020-10-27

## 2020-04-03 NOTE — PROGRESS NOTES
Subjective   Libia Perales is a 85 y.o. female.   Chief Complaint   Patient presents with   • Diabetes     elevated bs-does not remember number     Libia is present via telephone visit per her 's request to discuss her elevated A 1C level from her previous office visit.  On 3/10/20 her A1C in office was 9.4.  She currently takes Metformin and Glipizide.  Patient is unsure of what doses she takes but based on [employer] documentation they are listed as Metformin 100mg bid and Glipizide 10mg bid.  She admits to not following a diabetic diet, especially since moving her a few months ago.  She states she consumes starchy foods and sweets.  She is aware that the next step in blood sugar control is Insulin and states Dr. Machuca has mentioned this multiple times.     History of Present Illness     The following portions of the patient's history were reviewed and updated as appropriate: allergies, current medications, past family history, past medical history, past social history, past surgical history and problem list.    Review of Systems   Constitutional: Negative for activity change, appetite change, fatigue, fever, unexpected weight gain and unexpected weight loss.   HENT: Negative for swollen glands, trouble swallowing and voice change.    Eyes: Negative for blurred vision and visual disturbance.   Respiratory: Negative for cough and shortness of breath.    Cardiovascular: Negative for chest pain, palpitations and leg swelling.   Gastrointestinal: Negative for abdominal pain, constipation, diarrhea, nausea, vomiting and indigestion.   Endocrine: Negative for cold intolerance, heat intolerance, polydipsia and polyphagia.   Genitourinary: Negative for dysuria and frequency.   Musculoskeletal: Negative for arthralgias, back pain, joint swelling and neck pain.   Skin: Negative for color change, rash and skin lesions.   Neurological: Negative for dizziness, weakness, headache, memory problem and confusion.    Hematological: Does not bruise/bleed easily.   Psychiatric/Behavioral: Negative for agitation, hallucinations and suicidal ideas. The patient is not nervous/anxious.        Objective   Past Medical History:   Diagnosis Date   • Diabetes mellitus (CMS/HCC)    • High cholesterol    • Hypertension       Past Surgical History:   Procedure Laterality Date   • HYSTERECTOMY     • OOPHORECTOMY     • TONSILLECTOMY          Current Outpatient Medications:   •  aspirin 81 MG EC tablet, Take 81 mg by mouth., Disp: , Rfl:   •  glucose blood test strip, Use as instructed, Disp: 100 each, Rfl: 12  •  losartan (COZAAR) 100 MG tablet, Take 1 tablet by mouth Daily., Disp: 90 tablet, Rfl: 1  •  metoprolol succinate XL (TOPROL XL) 25 MG 24 hr tablet, Take 1 tablet by mouth Daily., Disp: 30 tablet, Rfl: 2  •  simvastatin (ZOCOR) 40 MG tablet, Take 1 tablet by mouth Every Night., Disp: 30 tablet, Rfl: 11  •  aspirin 325 MG tablet, Take  by mouth Daily., Disp: , Rfl:   •  azelastine (ASTELIN) 0.1 % nasal spray, into the nostril(s) as directed by provider., Disp: , Rfl:   •  glipizide (GLUCOTROL) 10 MG tablet, , Disp: , Rfl:   •  metFORMIN (GLUCOPHAGE) 1000 MG tablet, , Disp: , Rfl:   •  omeprazole (priLOSEC) 20 MG capsule, Take 1 capsule by mouth Daily., Disp: 30 capsule, Rfl: 11  •  polyethylene glycol (MiraLax) powder, Take  by mouth., Disp: , Rfl:     Current Facility-Administered Medications:   •  lidocaine (XYLOCAINE) 1 % injection 10 mL, 10 mL, Subcutaneous, Once, Christelle Winston MD  •  lidocaine-EPINEPHrine (XYLOCAINE W/EPI) 1 %-1:573844 injection 10 mL, 10 mL, Injection, Once, Christelle Winston MD  •  sodium bicarbonate injection 2.5 mEq, 5 mL, Injection, Once, Christelle Winston MD     There were no vitals filed for this visit.      04/03/20  1433   Weight: 65.3 kg (144 lb)           Physical Exam    No physical exam- Telephone visit    Assessment/Plan   Diagnoses and all orders for this visit:    1. Type 2 diabetes mellitus  "without complication, unspecified whether long term insulin use (CMS/[facility]) (Primary)    This visit has been rescheduled as a phone visit to comply with patient safety concerns in accordance with CDC recommendations. Total time of discussion was 11-20 minutes.      Patient is aware she needs to improve her diet. We discussed diet changes in detail.  She has met with a nutritionist before and states she remembers what she is supposed to be doing, \"Its just a matter of me actually doing it.\"  She needs to keep her follow up in June (pending improvement with COVID-19 pandemic) to see if A1C has improved.  I discussed with her risk factors to her health allowing her blood sugars to be elevated.  She verbalized understanding.        "

## 2020-04-06 RX ORDER — HYDROCHLOROTHIAZIDE 12.5 MG/1
12.5 CAPSULE, GELATIN COATED ORAL DAILY
COMMUNITY
Start: 2020-03-25 | End: 2020-12-23

## 2020-04-06 RX ORDER — FUROSEMIDE 20 MG/1
20 TABLET ORAL DAILY
COMMUNITY
Start: 2020-03-17 | End: 2020-11-19

## 2020-04-06 RX ORDER — DIGOXIN 125 MCG
125 TABLET ORAL EVERY OTHER DAY
COMMUNITY
Start: 2020-03-25 | End: 2020-05-18

## 2020-04-06 RX ORDER — FENOFIBRATE 145 MG/1
145 TABLET, COATED ORAL DAILY
COMMUNITY
Start: 2020-03-25 | End: 2020-06-17

## 2020-04-06 RX ORDER — HYDRALAZINE HYDROCHLORIDE 100 MG/1
100 TABLET, FILM COATED ORAL 3 TIMES DAILY
COMMUNITY
Start: 2020-03-31 | End: 2020-10-06

## 2020-04-06 RX ORDER — APIXABAN 5 MG/1
5 TABLET, FILM COATED ORAL 2 TIMES DAILY
COMMUNITY
Start: 2020-03-17 | End: 2020-06-17

## 2020-05-04 ENCOUNTER — TELEPHONE (OUTPATIENT)
Dept: INTERNAL MEDICINE | Facility: CLINIC | Age: 85
End: 2020-05-04

## 2020-05-04 NOTE — TELEPHONE ENCOUNTER
Patient had called the pharmacy requesting a cough medication be prescribed and sent to the [facility], [facility] - [address]

## 2020-05-05 NOTE — TELEPHONE ENCOUNTER
Pt called back and I informed her that she would need to be seen to evaluate the cough.  She states that she is fine she just coughs a little at bedtime and it was her  that thought she needed the medication.  She says they recently moved and maybe she would fine the [employer] that she has there at home.  Patient declined the appointment.

## 2020-05-06 ENCOUNTER — TELEPHONE (OUTPATIENT)
Dept: INTERNAL MEDICINE | Facility: CLINIC | Age: 85
End: 2020-05-06

## 2020-05-18 RX ORDER — DIGOXIN 125 MCG
TABLET ORAL
Qty: 16 TABLET | Refills: 2 | Status: SHIPPED | OUTPATIENT
Start: 2020-05-18 | End: 2020-07-22

## 2020-06-17 RX ORDER — APIXABAN 5 MG/1
TABLET, FILM COATED ORAL
Qty: 60 TABLET | Refills: 5 | Status: SHIPPED | OUTPATIENT
Start: 2020-06-17 | End: 2020-09-17

## 2020-06-17 RX ORDER — FENOFIBRATE 145 MG/1
TABLET, COATED ORAL
Qty: 30 TABLET | Refills: 5 | Status: SHIPPED | OUTPATIENT
Start: 2020-06-17 | End: 2020-12-23

## 2020-06-24 ENCOUNTER — OFFICE VISIT (OUTPATIENT)
Dept: INTERNAL MEDICINE | Facility: CLINIC | Age: 85
End: 2020-06-24

## 2020-06-24 VITALS
WEIGHT: 143.5 LBS | RESPIRATION RATE: 18 BRPM | SYSTOLIC BLOOD PRESSURE: 160 MMHG | BODY MASS INDEX: 26.41 KG/M2 | HEART RATE: 53 BPM | HEIGHT: 62 IN | DIASTOLIC BLOOD PRESSURE: 78 MMHG | OXYGEN SATURATION: 99 % | TEMPERATURE: 98.5 F

## 2020-06-24 DIAGNOSIS — E11.9 DIABETES MELLITUS TYPE 2, NONINSULIN DEPENDENT (HCC): Primary | ICD-10-CM

## 2020-06-24 DIAGNOSIS — Z86.79 ATRIAL FIBRILLATION, CURRENTLY IN SINUS RHYTHM: ICD-10-CM

## 2020-06-24 DIAGNOSIS — I10 BENIGN ESSENTIAL HTN: ICD-10-CM

## 2020-06-24 DIAGNOSIS — E11.9 TYPE 2 DIABETES MELLITUS WITHOUT COMPLICATION, UNSPECIFIED WHETHER LONG TERM INSULIN USE (HCC): ICD-10-CM

## 2020-06-24 PROBLEM — N28.89 RIGHT RENAL MASS: Status: ACTIVE | Noted: 2020-06-24

## 2020-06-24 PROBLEM — E83.42 HYPOMAGNESEMIA: Status: ACTIVE | Noted: 2020-06-24

## 2020-06-24 PROBLEM — N60.19 FIBROCYSTIC BREAST DISEASE: Status: ACTIVE | Noted: 2020-06-24

## 2020-06-24 PROBLEM — H81.10 VERTIGO, BENIGN PAROXYSMAL: Status: ACTIVE | Noted: 2020-06-24

## 2020-06-24 PROBLEM — E83.52 HIGH CALCIUM LEVELS: Status: ACTIVE | Noted: 2020-06-24

## 2020-06-24 PROBLEM — J45.909 ASTHMA: Status: ACTIVE | Noted: 2020-06-24

## 2020-06-24 PROBLEM — K21.9 GASTRO-ESOPHAGEAL REFLUX: Status: ACTIVE | Noted: 2020-06-24

## 2020-06-24 PROBLEM — M19.079 OA (OSTEOARTHRITIS) OF ANKLE: Status: ACTIVE | Noted: 2020-06-24

## 2020-06-24 PROBLEM — M19.90 OSTEOARTHRITIS: Status: ACTIVE | Noted: 2020-06-24

## 2020-06-24 LAB — HBA1C MFR BLD: 8.1 %

## 2020-06-24 PROCEDURE — 99213 OFFICE O/P EST LOW 20 MIN: CPT | Performed by: INTERNAL MEDICINE

## 2020-06-24 PROCEDURE — 83036 HEMOGLOBIN GLYCOSYLATED A1C: CPT | Performed by: INTERNAL MEDICINE

## 2020-06-24 NOTE — PROGRESS NOTES
Subjective   Libia Perales is a 85 y.o. female.   Chief Complaint   Patient presents with   • Diabetes     3 month follow-up  A1C - 8.1   • Rectal Bleeding     X1 week       Patient is here for follow-up of her diabetes she continues to be noncompliant with diet.  She understands the need for better diet in regards to control of her diabetes she is resistant.       The following portions of the patient's history were reviewed and updated as appropriate: allergies, current medications, past family history, past medical history, past social history, past surgical history and problem list.    Review of Systems   Constitutional: Negative for activity change, appetite change, fatigue, fever, unexpected weight gain and unexpected weight loss.   HENT: Negative for swollen glands, trouble swallowing and voice change.    Eyes: Negative for blurred vision and visual disturbance.   Respiratory: Negative for cough and shortness of breath.    Cardiovascular: Negative for chest pain, palpitations and leg swelling.   Gastrointestinal: Negative for abdominal pain, constipation, diarrhea, nausea, vomiting and indigestion.   Endocrine: Negative for cold intolerance, heat intolerance, polydipsia and polyphagia.   Genitourinary: Negative for dysuria and frequency.   Musculoskeletal: Negative for arthralgias, back pain, joint swelling and neck pain.   Skin: Negative for color change, rash and skin lesions.   Neurological: Negative for dizziness, weakness, headache, memory problem and confusion.   Hematological: Does not bruise/bleed easily.   Psychiatric/Behavioral: Negative for agitation, hallucinations and suicidal ideas. The patient is not nervous/anxious.        Objective   Past Medical History:   Diagnosis Date   • Diabetes mellitus (CMS/HCC)    • High cholesterol    • Hypertension       Past Surgical History:   Procedure Laterality Date   • HYSTERECTOMY     • OOPHORECTOMY     • TONSILLECTOMY          Current Outpatient  Medications:   •  aspirin 325 MG tablet, Take  by mouth Daily., Disp: , Rfl:   •  digoxin (LANOXIN) 125 MCG tablet, TAKE 1 TABLET BY MOUTH EVERY OTHER DAY (SUN,TUES,THU,SAT), Disp: 16 tablet, Rfl: 2  •  ELIQUIS 5 MG tablet tablet, TAKE 1 TABLET BY MOUTH TWICE DAILY, Disp: 60 tablet, Rfl: 5  •  fenofibrate (TRICOR) 145 MG tablet, TAKE 1 TABLET BY MOUTH EVERY DAY, Disp: 30 tablet, Rfl: 5  •  furosemide (LASIX) 20 MG tablet, Take 20 mg by mouth Daily. With HCTZ, Disp: , Rfl:   •  glipizide (GLUCOTROL) 10 MG tablet, 10 mg 2 (Two) Times a Day., Disp: , Rfl:   •  glucose blood test strip, Use as instructed, Disp: 100 each, Rfl: 12  •  hydrALAZINE (APRESOLINE) 100 MG tablet, Take 100 mg by mouth 3 (Three) Times a Day., Disp: , Rfl:   •  hydroCHLOROthiazide (MICROZIDE) 12.5 MG capsule, Take 12.5 mg by mouth Daily., Disp: , Rfl:   •  losartan (COZAAR) 100 MG tablet, Take 1 tablet by mouth Daily., Disp: 90 tablet, Rfl: 1  •  metFORMIN (GLUCOPHAGE) 1000 MG tablet, TAKE 1 TABLET BY MOUTH TWICE DAILY WITH FOOD, Disp: 60 tablet, Rfl: 5  •  metoprolol succinate XL (TOPROL XL) 25 MG 24 hr tablet, Take 1 tablet by mouth Daily., Disp: 30 tablet, Rfl: 2  •  omeprazole (priLOSEC) 20 MG capsule, Take 1 capsule by mouth Daily., Disp: 30 capsule, Rfl: 11  •  polyethylene glycol (MiraLax) powder, Take  by mouth., Disp: , Rfl:   •  simvastatin (ZOCOR) 40 MG tablet, Take 1 tablet by mouth Every Night., Disp: 30 tablet, Rfl: 11     Vitals:    06/24/20 1444   BP: 160/78   Pulse: 53   Resp: 18   Temp: 98.5 °F (36.9 °C)   SpO2: 99%         06/24/20  1444   Weight: 65.1 kg (143 lb 8 oz)     Patient's Body mass index is 26.25 kg/m². BMI is above normal parameters. Recommendations include: nutrition counseling.      Physical Exam   Constitutional: She is oriented to person, place, and time. She appears well-developed and well-nourished.   HENT:   Head: Normocephalic and atraumatic.   Right Ear: External ear normal.   Left Ear: External ear normal.    Nose: Nose normal.   Mouth/Throat: Oropharynx is clear and moist.   Eyes: Pupils are equal, round, and reactive to light. Conjunctivae and EOM are normal.   Neck: Normal range of motion. Neck supple. No thyromegaly present.   Cardiovascular: Normal rate, normal heart sounds and intact distal pulses. An irregularly irregular rhythm present.   Pulmonary/Chest: Effort normal and breath sounds normal.   Abdominal: Soft. Bowel sounds are normal.   Lymphadenopathy:     She has no cervical adenopathy.   Neurological: She is alert and oriented to person, place, and time.   Skin: Skin is warm and dry.   Psychiatric: She has a normal mood and affect. Her behavior is normal. Thought content normal.   Nursing note and vitals reviewed.            Assessment/Plan   Diagnoses and all orders for this visit:    1. Diabetes mellitus type 2, noninsulin dependent (CMS/HCC) (Primary)  -     POC Glycosylated Hemoglobin (Hb A1C)    2. Type 2 diabetes mellitus without complication, unspecified whether long term insulin use (CMS/HCC)  -     POC Glycosylated Hemoglobin (Hb A1C)    3. Benign essential HTN    4. Atrial fibrillation, currently in sinus rhythm      At this point we spent time talking about diet and the need for better diabetes control.  Her systolic blood pressure is 160 which is not acceptable however given her past history I am not changing her medication or simply asking for better diet including reduction of sodium in her diet.

## 2020-07-01 RX ORDER — METOPROLOL SUCCINATE 25 MG/1
TABLET, EXTENDED RELEASE ORAL
Qty: 30 TABLET | Refills: 5 | Status: SHIPPED | OUTPATIENT
Start: 2020-07-01 | End: 2021-01-04

## 2020-07-22 RX ORDER — DIGOXIN 125 MCG
TABLET ORAL
Qty: 16 TABLET | Refills: 2 | Status: SHIPPED | OUTPATIENT
Start: 2020-07-22 | End: 2020-10-27

## 2020-08-18 ENCOUNTER — TELEPHONE (OUTPATIENT)
Dept: INTERNAL MEDICINE | Facility: CLINIC | Age: 85
End: 2020-08-18

## 2020-08-18 RX ORDER — BENZONATATE 100 MG/1
100 CAPSULE ORAL 3 TIMES DAILY PRN
Qty: 30 CAPSULE | Refills: 1 | Status: SHIPPED | OUTPATIENT
Start: 2020-08-18 | End: 2020-11-10 | Stop reason: SDUPTHER

## 2020-08-18 RX ORDER — LOSARTAN POTASSIUM 100 MG/1
100 TABLET ORAL DAILY
Qty: 90 TABLET | Refills: 1 | Status: SHIPPED | OUTPATIENT
Start: 2020-08-18 | End: 2021-02-25

## 2020-08-18 NOTE — TELEPHONE ENCOUNTER
Wife here today with , asking for refill on Tessalon Perles.  Dr. Machuca ok'd refill and was sent to pharmacy.

## 2020-09-02 ENCOUNTER — TELEPHONE (OUTPATIENT)
Dept: INTERNAL MEDICINE | Facility: CLINIC | Age: 85
End: 2020-09-02

## 2020-09-02 ENCOUNTER — OFFICE VISIT (OUTPATIENT)
Dept: INTERNAL MEDICINE | Facility: CLINIC | Age: 85
End: 2020-09-02

## 2020-09-02 VITALS
HEART RATE: 65 BPM | HEIGHT: 62 IN | SYSTOLIC BLOOD PRESSURE: 140 MMHG | DIASTOLIC BLOOD PRESSURE: 90 MMHG | OXYGEN SATURATION: 99 % | BODY MASS INDEX: 26.72 KG/M2 | TEMPERATURE: 97.8 F | WEIGHT: 145.2 LBS

## 2020-09-02 DIAGNOSIS — I48.0 PAROXYSMAL ATRIAL FIBRILLATION (HCC): ICD-10-CM

## 2020-09-02 DIAGNOSIS — I10 BENIGN ESSENTIAL HTN: ICD-10-CM

## 2020-09-02 DIAGNOSIS — E11.9 TYPE 2 DIABETES MELLITUS WITHOUT COMPLICATION, UNSPECIFIED WHETHER LONG TERM INSULIN USE (HCC): Primary | ICD-10-CM

## 2020-09-02 LAB — HBA1C MFR BLD: 8.8 %

## 2020-09-02 PROCEDURE — 83036 HEMOGLOBIN GLYCOSYLATED A1C: CPT | Performed by: INTERNAL MEDICINE

## 2020-09-02 PROCEDURE — 99214 OFFICE O/P EST MOD 30 MIN: CPT | Performed by: INTERNAL MEDICINE

## 2020-09-02 NOTE — TELEPHONE ENCOUNTER
Called in to request referral to Cardiologist.     Would prefer seeing Dr. Coello    Callback Number confirmed - [phone]    Please Advise
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
## 2020-09-03 DIAGNOSIS — I48.91 ATRIAL FIBRILLATION, UNSPECIFIED TYPE (HCC): Primary | ICD-10-CM

## 2020-09-09 ENCOUNTER — TELEPHONE (OUTPATIENT)
Dept: INTERNAL MEDICINE | Facility: CLINIC | Age: 85
End: 2020-09-09

## 2020-09-09 NOTE — TELEPHONE ENCOUNTER
Cannot give Tussionex as it is controlled substance via phone.  I would suggest over-the-counter nonsugar-containing cough syrups

## 2020-09-09 NOTE — TELEPHONE ENCOUNTER
PATIENT IS STATING SHE COUGHS A LOT AT NIGHT AND IS REQUESTING TUSSINEX       GOOD CONTACT NUMBER   [phone] (H)        VERIFIED PHARMACY   [facility] - [address] - [phone]  - [phone] FX

## 2020-09-09 NOTE — TELEPHONE ENCOUNTER
Was given Tessalon Perles on 8/18, when she called asking for refill.  Has been seen in office since, for DM FU.  Has known A. Fib.  Was given Tussionex in 2018.  Unsure if etiology of cough has been determined.

## 2020-09-10 NOTE — TELEPHONE ENCOUNTER
Pt informed and says OTC syrups do not help and he has given her one bottle of Tussionex per year.  Explained will no longer give this Rx.

## 2020-09-16 DIAGNOSIS — R05.3 CHRONIC COUGH: Primary | ICD-10-CM

## 2020-09-17 ENCOUNTER — TELEPHONE (OUTPATIENT)
Dept: INTERNAL MEDICINE | Facility: CLINIC | Age: 85
End: 2020-09-17

## 2020-09-17 NOTE — TELEPHONE ENCOUNTER
Discussed with Dr. Machuca and he says her Eliquis dose should be 5mg once daily.  Communicated this to son, Speedy, as he sent a [facility] message, and have corrected Rx in her chart.

## 2020-09-17 NOTE — TELEPHONE ENCOUNTER
Pt is saying that when you started her on Trulicity recently, you told her to decrease Eliquis from 5mg bid to 5mg once daily.  You did note that her A. Fib seemed stable, but no notations about decreasing her dose.  Please advise.

## 2020-09-17 NOTE — TELEPHONE ENCOUNTER
PATIENTS STEP DAUGHTER CALLED ON PATIENTS BEHALF QUESTIONING HER ELIQUIS 5 MG tablet tablet.    PLEASE CALL BACK AND ADVISE  [phone]

## 2020-09-21 ENCOUNTER — OFFICE VISIT (OUTPATIENT)
Dept: CARDIOLOGY | Facility: CLINIC | Age: 85
End: 2020-09-21

## 2020-09-21 VITALS
BODY MASS INDEX: 25.76 KG/M2 | WEIGHT: 140 LBS | DIASTOLIC BLOOD PRESSURE: 78 MMHG | OXYGEN SATURATION: 99 % | HEIGHT: 62 IN | SYSTOLIC BLOOD PRESSURE: 160 MMHG | HEART RATE: 76 BPM

## 2020-09-21 DIAGNOSIS — I10 BENIGN ESSENTIAL HTN: ICD-10-CM

## 2020-09-21 DIAGNOSIS — I48.19 PERSISTENT ATRIAL FIBRILLATION (HCC): ICD-10-CM

## 2020-09-21 DIAGNOSIS — R06.09 DOE (DYSPNEA ON EXERTION): Primary | ICD-10-CM

## 2020-09-21 DIAGNOSIS — R53.82 CHRONIC FATIGUE: ICD-10-CM

## 2020-09-21 PROCEDURE — 93000 ELECTROCARDIOGRAM COMPLETE: CPT | Performed by: INTERNAL MEDICINE

## 2020-09-21 PROCEDURE — 99204 OFFICE O/P NEW MOD 45 MIN: CPT | Performed by: INTERNAL MEDICINE

## 2020-09-21 NOTE — PROGRESS NOTES
Subjective:     Encounter Date:09/21/2020      Patient ID: Libia Perales is a 85 y.o. female with a history of atrial fibrillation, hypertension, hyperlipidemia, type 2 diabetes mellitus who presents today to establish care.    Referring Provider: Darrion Machuca MD  Reason for Referral: Atrial fibrillation    Chief Complaint: Here to establish care - atrial fibrillation    Shortness of Breath  This is a chronic problem. The problem occurs daily. The problem has been gradually worsening. Pertinent negatives include no abdominal pain, chest pain, claudication, fever, headaches, hemoptysis, leg swelling, neck pain, orthopnea, PND, rash, syncope, vomiting or wheezing. The symptoms are aggravated by any activity. The patient has no known risk factors for DVT/PE. She has tried nothing for the symptoms. There is no history of CAD.   Atrial Fibrillation  Presents for initial visit. Symptoms include palpitations and shortness of breath. Symptoms are negative for chest pain, dizziness, hemodynamic instability and syncope. The symptoms have been stable. Past treatments include rate control and anticoagulant. Compliance with prior treatments has been good. Past medical history includes atrial fibrillation, HTN and hyperlipidemia. There is no history of CAD.      This is an 85-year-old female who presents today to establish care.  She has a history of atrial fibrillation and has been maintained on anticoagulation for quite some time.  She is tolerating this well.  She does have daily palpitations however these do not interfere with her activities of daily living.  No significant symptoms of atrial fibrillation other than palpitations.  She says that to her knowledge her heart rate has been well controlled.  She does have intermittent lightheadedness and dizziness but not related anything in particular.  No syncopal episodes.  She denies having any chest pain but does have shortness of breath and dyspnea on exertion as  well as chronic fatigue that have gotten progressively worse.  She says that after almost any exertion she is having to stop and sit down due to fatigue and shortness of breath.  This has gotten progressively worse over the past year.  She denies having any associated symptoms otherwise such as chest discomfort.  She denies orthopnea, PND or edema.  Her blood pressure waxes and wanes.  She has not had any problems with any of her medications.  She reports being on statin therapy and that her lipids have been well controlled in the past.  No side effects from her statin therapy.    The following portions of the patient's history were reviewed and updated as appropriate: allergies, current medications, past family history, past medical history, past social history, past surgical history and problem list.     Past Medical History:   Diagnosis Date   • Atrial fibrillation (CMS/HCC)    • Diabetes mellitus (CMS/HCC)    • GERD (gastroesophageal reflux disease)    • High cholesterol    • Hypertension    • OA (osteoarthritis)      Past Surgical History:   Procedure Laterality Date   • HYSTERECTOMY     • OOPHORECTOMY     • TONSILLECTOMY         Current Outpatient Medications:   •  apixaban (Eliquis) 5 MG tablet tablet, Take 1 tablet by mouth Every 12 (Twelve) Hours., Disp: 180 tablet, Rfl: 3  •  benzonatate (Tessalon Perles) 100 MG capsule, Take 1 capsule by mouth 3 (Three) Times a Day As Needed for Cough., Disp: 30 capsule, Rfl: 1  •  digoxin (LANOXIN) 125 MCG tablet, TAKE 1 TABLET BY MOUTH EVERY OTHER DAY (SUN,TUES,THU,SAT), Disp: 16 tablet, Rfl: 2  •  Dulaglutide 0.75 MG/0.5ML solution pen-injector, Inject 0.75 mg under the skin into the appropriate area as directed 1 (One) Time Per Week., Disp: 12 pen, Rfl: 3  •  fenofibrate (TRICOR) 145 MG tablet, TAKE 1 TABLET BY MOUTH EVERY DAY, Disp: 30 tablet, Rfl: 5  •  furosemide (LASIX) 20 MG tablet, Take 20 mg by mouth Daily. With HCTZ, Disp: , Rfl:   •  glipizide (GLUCOTROL) 10  MG tablet, 10 mg 2 (Two) Times a Day., Disp: , Rfl:   •  glucose blood test strip, Use as instructed, Disp: 100 each, Rfl: 12  •  hydrALAZINE (APRESOLINE) 100 MG tablet, Take 100 mg by mouth 3 (Three) Times a Day., Disp: , Rfl:   •  hydroCHLOROthiazide (MICROZIDE) 12.5 MG capsule, Take 12.5 mg by mouth Daily., Disp: , Rfl:   •  hydrocortisone 2.5 % cream, Apply  topically to the appropriate area as directed 2 (Two) Times a Day., Disp: 28 g, Rfl: 1  •  losartan (COZAAR) 100 MG tablet, TAKE 1 TABLET BY MOUTH DAILY. , Disp: 90 tablet, Rfl: 1  •  metFORMIN (GLUCOPHAGE) 1000 MG tablet, TAKE 1 TABLET BY MOUTH TWICE DAILY WITH FOOD, Disp: 60 tablet, Rfl: 5  •  metoprolol succinate XL (TOPROL-XL) 25 MG 24 hr tablet, TAKE 1 TABLET BY MOUTH EVERY DAY, Disp: 30 tablet, Rfl: 5  •  omeprazole (priLOSEC) 20 MG capsule, Take 1 capsule by mouth Daily., Disp: 30 capsule, Rfl: 11  •  polyethylene glycol (MiraLax) powder, Take  by mouth., Disp: , Rfl:   •  simvastatin (ZOCOR) 40 MG tablet, Take 1 tablet by mouth Every Night., Disp: 30 tablet, Rfl: 11    Allergies   Allergen Reactions   • Ace Inhibitors Cough     Social History     Tobacco Use   • Smoking status: Never Smoker   • Smokeless tobacco: Never Used   Substance Use Topics   • Alcohol use: No     Family History   Problem Relation Age of Onset   • No Known Problems Father    • Hypertension Mother    • Colon cancer Neg Hx      Review of Systems   Constitution: Positive for malaise/fatigue. Negative for chills, fever, night sweats and weight loss.   HENT: Negative for congestion and hearing loss.    Eyes: Negative for blurred vision and pain.   Cardiovascular: Positive for dyspnea on exertion and palpitations. Negative for chest pain, claudication, leg swelling, orthopnea, paroxysmal nocturnal dyspnea and syncope.   Respiratory: Positive for shortness of breath. Negative for cough, hemoptysis and wheezing.    Endocrine: Negative for cold intolerance and heat intolerance.    Hematologic/Lymphatic: Negative for adenopathy and bleeding problem. Does not bruise/bleed easily.   Skin: Negative for color change, poor wound healing and rash.   Musculoskeletal: Positive for arthritis. Negative for myalgias and neck pain.   Gastrointestinal: Negative for abdominal pain, change in bowel habit, constipation, diarrhea, heartburn, hematochezia, melena, nausea and vomiting.   Genitourinary: Negative for dysuria, frequency, hematuria and nocturia.   Neurological: Negative for dizziness, focal weakness, headaches, light-headedness, loss of balance and numbness.   Psychiatric/Behavioral: Negative for altered mental status and memory loss.   Allergic/Immunologic: Negative for hives and persistent infections.       ECG 12 Lead    Date/Time: 9/21/2020 9:11 AM  Performed by: Tone Coello MD  Authorized by: Tone Coello MD   Comparison: compared with previous ECG from 2/5/2016  Comparison to previous ECG: Atrial fibrillation has replaced sinus rhythm  Rhythm: atrial fibrillation  Rate: normal  BPM: 75  Conduction: non-specific intraventricular conduction delay  QRS axis: left  Other findings: non-specific ST-T wave changes, left ventricular hypertrophy and poor R wave progression    Clinical impression: abnormal EKG             Objective:     Vitals signs reviewed.   Constitutional:       General: Not in acute distress.     Appearance: Normal and healthy appearance. Well-developed. Not toxic-appearing or diaphoretic.   Eyes:      General: Lids are normal.      Extraocular Movements: Extraocular movements intact.      Pupils: Pupils are equal, round, and reactive to light.   HENT:      Head: Normocephalic and atraumatic.      Right Ear: External ear normal.      Left Ear: External ear normal.      Nose: Nose normal.    Mouth/Throat:      Mouth: Mucous membranes are not pale, not dry and not cyanotic.      Pharynx: Uvula midline. No oropharyngeal exudate.   Neck:      Musculoskeletal:  "Normal range of motion and neck supple. No edema.      Thyroid: No thyroid mass or thyromegaly.      Vascular: No carotid bruit, hepatojugular reflux or JVD.      Trachea: No tracheal deviation.      Lymphadenopathy: No cervical adenopathy.   Pulmonary:      Effort: Pulmonary effort is normal. No accessory muscle usage or respiratory distress.      Breath sounds: Normal breath sounds. No wheezing. No rhonchi. No rales.   Chest:      Chest wall: Not tender to palpatation.   Cardiovascular:      Normal rate. Irregularly irregular rhythm.      Murmurs: There is a high frequency blowing holosystolic murmur at the apex.      No gallop.   Pulses:     Intact distal pulses.   Edema:     Peripheral edema absent.   Abdominal:      General: Bowel sounds are normal. There is no distension or abdominal bruit.      Palpations: Abdomen is soft. There is no pulsatile midline mass.      Tenderness: There is no abdominal tenderness.   Musculoskeletal: Normal range of motion.         General: No tenderness or deformity.      Extremities: No clubbing present.  Skin:     General: Skin is warm and dry. There is no cyanosis.      Findings: No erythema or rash.   Neurological:      General: No focal deficit present.      Mental Status: Oriented to person, place, and time and oriented to person, place and time.      Cranial Nerves: No cranial nerve deficit.   Psychiatric:         Attention and Perception: Attention normal.         Mood and Affect: Mood normal.         Speech: Speech normal.         Behavior: Behavior normal. Behavior is cooperative.         Thought Content: Thought content normal.       /78   Pulse 76   Ht 157.5 cm (62\")   Wt 63.5 kg (140 lb)   SpO2 99%   BMI 25.61 kg/m²     Data/Lab Review:     Echocardiogram 7/3/18:  · Left ventricular systolic function is normal. Estimated EF appears to be in the range of 56 - 60%.  · Left ventricular diastolic dysfunction.  · Trace aortic valve regurgitation is " present.  · Trace mitral valve regurgitation is present.  · Trace tricuspid valve regurgitation is present. Estimated right ventricular systolic pressure from tricuspid regurgitation is mildly elevated (35-45 mmHg).      Assessment:          Diagnosis Plan   1. HARDING (dyspnea on exertion)  Stress Test With Myocardial Perfusion (1 Day)   2. Chronic fatigue  Stress Test With Myocardial Perfusion (1 Day)   3. Persistent atrial fibrillation (CMS/HCC)  apixaban (Eliquis) 5 MG tablet tablet    ECG 12 Lead   4. Benign essential HTN            Plan:       1.  Dyspnea on exertion: Patient describes chronic shortness of breath, dyspnea exertion and fatigue that have gotten progressively worse.  While this may be a product of age, atrial fibrillation, amongst other factors, I do think it is reasonable to further investigate for ischemia.  We will order a nuclear stress test to further evaluate.    2.  Chronic fatigue: As stated, the patient has fatigue that has been progressively worse and is associated with shortness of breath and has been on exertion, now happening with minimal activities.  Further work-up with a stress test as noted above.    3.  Persistent atrial fibrillation: The patient is rate controlled and essentially asymptomatic with exception of her complaint of intermittent palpitations.  I did ask about whether palpitations are particularly bothersome to the patient and she says that they are not.  Therefore, with the patient being rate controlled and otherwise stable, I see no reason for attempting anything like a cardioversion.  The patient has been on aspirin 325 mg daily for a number of years.  The fact that she is anticoagulated makes aspirin less necessary, therefore I would recommend that she discontinue this medication.  Also, she is on 5 mg daily of Eliquis.  Currently, she is over 80 years of age but not less than 60 kg or with a creatinine greater than 1.5, therefore I will increase her dose to 5 mg  twice daily.  She is currently tolerating anticoagulation well.    4.  Hyperlipidemia: The patient's blood pressure seems to wax and wane according to her report.  Today's reading is somewhat elevated but given her advanced age this may be reasonable if this is as high as the blood pressure gets.  Recommend to continue monitor blood pressure and changes may be made over time if needed.    Patient's Body mass index is 25.61 kg/m². BMI is above normal parameters. Recommendations include: exercise counseling and nutrition counseling.    Follow-up will be pending the results of the patient's stress test.

## 2020-10-01 ENCOUNTER — OFFICE VISIT (OUTPATIENT)
Dept: INTERNAL MEDICINE | Facility: CLINIC | Age: 85
End: 2020-10-01

## 2020-10-01 VITALS
OXYGEN SATURATION: 99 % | SYSTOLIC BLOOD PRESSURE: 156 MMHG | HEIGHT: 62 IN | BODY MASS INDEX: 25.95 KG/M2 | TEMPERATURE: 97.5 F | WEIGHT: 141 LBS | DIASTOLIC BLOOD PRESSURE: 84 MMHG | HEART RATE: 71 BPM

## 2020-10-01 DIAGNOSIS — I10 BENIGN ESSENTIAL HTN: ICD-10-CM

## 2020-10-01 DIAGNOSIS — E11.9 DIABETES MELLITUS TYPE 2, NONINSULIN DEPENDENT (HCC): ICD-10-CM

## 2020-10-01 DIAGNOSIS — I48.19 PERSISTENT ATRIAL FIBRILLATION (HCC): ICD-10-CM

## 2020-10-01 DIAGNOSIS — M94.0 COSTOCHONDRITIS: Primary | ICD-10-CM

## 2020-10-01 PROCEDURE — 99213 OFFICE O/P EST LOW 20 MIN: CPT | Performed by: INTERNAL MEDICINE

## 2020-10-01 NOTE — PROGRESS NOTES
Subjective   Libia Perales is a 85 y.o. female.   Chief Complaint   Patient presents with   • Chest Pain     rib pain, right under left breast. heard a pop last friday evening when truing over in bed        Patient presents with pain in her left chest/epigastric area.  She apparently was  rolling over in bed and felt a pop in her lower chest area.  The area corresponds to her costochondral region.  There are no other complaints or symptoms she is a known diabetic with heart disease specifically atrial fibrillation.       The following portions of the patient's history were reviewed and updated as appropriate: allergies, current medications, past family history, past medical history, past social history, past surgical history and problem list.    Review of Systems   Constitutional: Negative for activity change, appetite change, fatigue, fever, unexpected weight gain and unexpected weight loss.   HENT: Negative for swollen glands, trouble swallowing and voice change.    Eyes: Negative for blurred vision and visual disturbance.   Respiratory: Negative for cough and shortness of breath.    Cardiovascular: Negative for chest pain, palpitations and leg swelling.   Gastrointestinal: Negative for abdominal pain, constipation, diarrhea, nausea, vomiting and indigestion.   Endocrine: Negative for cold intolerance, heat intolerance, polydipsia and polyphagia.   Genitourinary: Negative for dysuria and frequency.   Musculoskeletal: Negative for arthralgias, back pain, joint swelling and neck pain.   Skin: Negative for color change, rash and skin lesions.   Neurological: Negative for dizziness, weakness, headache, memory problem and confusion.   Hematological: Does not bruise/bleed easily.   Psychiatric/Behavioral: Negative for agitation, hallucinations and suicidal ideas. The patient is not nervous/anxious.        Objective   Past Medical History:   Diagnosis Date   • Atrial fibrillation (CMS/HCC)    • Diabetes mellitus  (CMS/HCC)    • GERD (gastroesophageal reflux disease)    • High cholesterol    • Hypertension    • OA (osteoarthritis)       Past Surgical History:   Procedure Laterality Date   • HYSTERECTOMY     • OOPHORECTOMY     • TONSILLECTOMY          Current Outpatient Medications:   •  apixaban (Eliquis) 5 MG tablet tablet, Take 1 tablet by mouth Every 12 (Twelve) Hours., Disp: 180 tablet, Rfl: 3  •  benzonatate (Tessalon Perles) 100 MG capsule, Take 1 capsule by mouth 3 (Three) Times a Day As Needed for Cough., Disp: 30 capsule, Rfl: 1  •  digoxin (LANOXIN) 125 MCG tablet, TAKE 1 TABLET BY MOUTH EVERY OTHER DAY (SUN,TUES,THU,SAT), Disp: 16 tablet, Rfl: 2  •  Dulaglutide 0.75 MG/0.5ML solution pen-injector, Inject 0.75 mg under the skin into the appropriate area as directed 1 (One) Time Per Week., Disp: 12 pen, Rfl: 3  •  fenofibrate (TRICOR) 145 MG tablet, TAKE 1 TABLET BY MOUTH EVERY DAY, Disp: 30 tablet, Rfl: 5  •  furosemide (LASIX) 20 MG tablet, Take 20 mg by mouth Daily. With HCTZ, Disp: , Rfl:   •  glipizide (GLUCOTROL) 10 MG tablet, 10 mg 2 (Two) Times a Day., Disp: , Rfl:   •  glucose blood test strip, Use as instructed, Disp: 100 each, Rfl: 12  •  hydrALAZINE (APRESOLINE) 100 MG tablet, Take 100 mg by mouth 3 (Three) Times a Day., Disp: , Rfl:   •  hydroCHLOROthiazide (MICROZIDE) 12.5 MG capsule, Take 12.5 mg by mouth Daily., Disp: , Rfl:   •  hydrocortisone 2.5 % cream, Apply  topically to the appropriate area as directed 2 (Two) Times a Day., Disp: 28 g, Rfl: 1  •  losartan (COZAAR) 100 MG tablet, TAKE 1 TABLET BY MOUTH DAILY. , Disp: 90 tablet, Rfl: 1  •  metFORMIN (GLUCOPHAGE) 1000 MG tablet, TAKE 1 TABLET BY MOUTH TWICE DAILY WITH FOOD, Disp: 60 tablet, Rfl: 5  •  metoprolol succinate XL (TOPROL-XL) 25 MG 24 hr tablet, TAKE 1 TABLET BY MOUTH EVERY DAY, Disp: 30 tablet, Rfl: 5  •  omeprazole (priLOSEC) 20 MG capsule, Take 1 capsule by mouth Daily., Disp: 30 capsule, Rfl: 11  •  polyethylene glycol (MiraLax)  powder, Take  by mouth., Disp: , Rfl:   •  simvastatin (ZOCOR) 40 MG tablet, Take 1 tablet by mouth Every Night., Disp: 30 tablet, Rfl: 11     Vitals:    10/01/20 0902   BP: 156/84   Pulse: 71   Temp: 97.5 °F (36.4 °C)   SpO2: 99%         10/01/20  0902   Weight: 64 kg (141 lb)     Patient's Body mass index is 25.79 kg/m². BMI is .      Physical Exam  Constitutional:       Appearance: Normal appearance. She is well-developed.   HENT:      Head: Normocephalic and atraumatic.      Right Ear: External ear normal.      Left Ear: External ear normal.   Eyes:      Extraocular Movements: Extraocular movements intact.      Conjunctiva/sclera: Conjunctivae normal.      Pupils: Pupils are equal, round, and reactive to light.   Neck:      Musculoskeletal: Normal range of motion and neck supple. No neck rigidity.      Vascular: No carotid bruit.   Cardiovascular:      Rate and Rhythm: Normal rate and regular rhythm.      Pulses: Normal pulses.      Heart sounds: Normal heart sounds. No murmur. No gallop.    Pulmonary:      Effort: Pulmonary effort is normal.      Breath sounds: Normal breath sounds.   Abdominal:      General: Bowel sounds are normal.      Palpations: Abdomen is soft.   Musculoskeletal: Normal range of motion.         General: No swelling or tenderness.   Skin:     General: Skin is warm and dry.   Neurological:      General: No focal deficit present.      Mental Status: She is alert and oriented to person, place, and time.   Psychiatric:         Mood and Affect: Mood normal.         Behavior: Behavior normal.               Assessment/Plan   Diagnoses and all orders for this visit:    1. Costochondritis (Primary)    2. Benign essential HTN    3. Persistent atrial fibrillation (CMS/HCC)    4. Diabetes mellitus type 2, noninsulin dependent (CMS/HCC)        At this point her primary complaint centers around the chest discomfort which is likely movement of the costochondral area of her left rib cage.  I doubt see any  thing else going on her lungs are clear her abdomen soft I think she simply moved a rib joint and heard a pop she is not particular tender at this point no further treatment is warranted.

## 2020-10-06 RX ORDER — HYDRALAZINE HYDROCHLORIDE 100 MG/1
TABLET, FILM COATED ORAL
Qty: 90 TABLET | Refills: 11 | Status: SHIPPED | OUTPATIENT
Start: 2020-10-06 | End: 2021-09-23

## 2020-10-13 ENCOUNTER — HOSPITAL ENCOUNTER (OUTPATIENT)
Dept: CARDIOLOGY | Facility: HOSPITAL | Age: 85
Discharge: HOME | End: 2020-10-13

## 2020-10-13 VITALS
BODY MASS INDEX: 25.96 KG/M2 | WEIGHT: 141.09 LBS | DIASTOLIC BLOOD PRESSURE: 96 MMHG | HEIGHT: 62 IN | HEART RATE: 73 BPM | SYSTOLIC BLOOD PRESSURE: 166 MMHG

## 2020-10-13 DIAGNOSIS — R53.82 CHRONIC FATIGUE: ICD-10-CM

## 2020-10-13 DIAGNOSIS — R06.09 DOE (DYSPNEA ON EXERTION): ICD-10-CM

## 2020-10-13 PROCEDURE — 25010000002 AMINOPHYLLINE PER 250 MG: Performed by: INTERNAL MEDICINE

## 2020-10-13 PROCEDURE — A9500 TC99M SESTAMIBI: HCPCS | Performed by: INTERNAL MEDICINE

## 2020-10-13 PROCEDURE — 25010000002 REGADENOSON 0.4 MG/5ML SOLUTION: Performed by: INTERNAL MEDICINE

## 2020-10-13 PROCEDURE — 93018 CV STRESS TEST I&R ONLY: CPT | Performed by: INTERNAL MEDICINE

## 2020-10-13 PROCEDURE — 0 TECHNETIUM SESTAMIBI: Performed by: INTERNAL MEDICINE

## 2020-10-13 PROCEDURE — 78452 HT MUSCLE IMAGE SPECT MULT: CPT | Performed by: INTERNAL MEDICINE

## 2020-10-13 PROCEDURE — 78452 HT MUSCLE IMAGE SPECT MULT: CPT

## 2020-10-13 PROCEDURE — 93017 CV STRESS TEST TRACING ONLY: CPT

## 2020-10-13 RX ORDER — AMINOPHYLLINE DIHYDRATE 25 MG/ML
100 INJECTION, SOLUTION INTRAVENOUS ONCE
Status: COMPLETED | OUTPATIENT
Start: 2020-10-13 | End: 2020-10-13

## 2020-10-13 RX ADMIN — TECHNETIUM TC 99M SESTAMIBI 1 DOSE: 1 INJECTION INTRAVENOUS at 10:20

## 2020-10-13 RX ADMIN — REGADENOSON 0.4 MG: 0.08 INJECTION, SOLUTION INTRAVENOUS at 11:57

## 2020-10-13 RX ADMIN — AMINOPHYLLINE 100 MG: 25 INJECTION, SOLUTION INTRAVENOUS at 12:06

## 2020-10-13 RX ADMIN — TECHNETIUM TC 99M SESTAMIBI 1 DOSE: 1 INJECTION INTRAVENOUS at 12:10

## 2020-10-19 LAB
BH CV STRESS BP STAGE 1: NORMAL
BH CV STRESS COMMENTS STAGE 1: NORMAL
BH CV STRESS DOSE REGADENOSON STAGE 1: 0.4
BH CV STRESS DURATION MIN STAGE 1: 0
BH CV STRESS DURATION SEC STAGE 1: 10
BH CV STRESS HR STAGE 1: 75
BH CV STRESS PROTOCOL 1: NORMAL
BH CV STRESS RECOVERY BP: NORMAL MMHG
BH CV STRESS RECOVERY HR: 72 BPM
BH CV STRESS STAGE 1: 1
LV EF NUC BP: 82 %
MAXIMAL PREDICTED HEART RATE: 135 BPM
PERCENT MAX PREDICTED HR: 55.56 %
STRESS BASELINE BP: NORMAL MMHG
STRESS BASELINE HR: 73 BPM
STRESS PERCENT HR: 65 %
STRESS POST EXERCISE DUR SEC: 10 SEC
STRESS POST PEAK BP: NORMAL MMHG
STRESS POST PEAK HR: 75 BPM
STRESS TARGET HR: 115 BPM

## 2020-10-26 ENCOUNTER — TRANSCRIBE ORDERS (OUTPATIENT)
Dept: ADMINISTRATIVE | Facility: HOSPITAL | Age: 85
End: 2020-10-26

## 2020-10-26 DIAGNOSIS — Z01.818 PREOPERATIVE TESTING: Primary | ICD-10-CM

## 2020-10-27 RX ORDER — DIGOXIN 125 MCG
TABLET ORAL
Qty: 16 TABLET | Refills: 5 | Status: SHIPPED | OUTPATIENT
Start: 2020-10-27 | End: 2021-04-28

## 2020-10-27 RX ORDER — GLIPIZIDE 10 MG/1
TABLET ORAL
Qty: 60 TABLET | Refills: 11 | Status: SHIPPED | OUTPATIENT
Start: 2020-10-27 | End: 2020-11-24

## 2020-10-30 ENCOUNTER — LAB (OUTPATIENT)
Dept: LAB | Facility: HOSPITAL | Age: 85
End: 2020-10-30

## 2020-10-30 DIAGNOSIS — Z01.818 PREOPERATIVE TESTING: ICD-10-CM

## 2020-10-30 PROCEDURE — C9803 HOPD COVID-19 SPEC COLLECT: HCPCS

## 2020-10-30 PROCEDURE — U0003 INFECTIOUS AGENT DETECTION BY NUCLEIC ACID (DNA OR RNA); SEVERE ACUTE RESPIRATORY SYNDROME CORONAVIRUS 2 (SARS-COV-2) (CORONAVIRUS DISEASE [COVID-19]), AMPLIFIED PROBE TECHNIQUE, MAKING USE OF HIGH THROUGHPUT TECHNOLOGIES AS DESCRIBED BY CMS-2020-01-R: HCPCS | Performed by: INTERNAL MEDICINE

## 2020-10-31 LAB
COVID LABCORP PRIORITY: NORMAL
SARS-COV-2 RNA RESP QL NAA+PROBE: NOT DETECTED

## 2020-11-02 ENCOUNTER — HOSPITAL ENCOUNTER (OUTPATIENT)
Dept: PULMONOLOGY | Facility: HOSPITAL | Age: 85
Discharge: HOME | End: 2020-11-02
Admitting: INTERNAL MEDICINE

## 2020-11-02 DIAGNOSIS — R05.3 CHRONIC COUGH: ICD-10-CM

## 2020-11-02 LAB
ARTERIAL PATENCY WRIST A: ABNORMAL
ATMOSPHERIC PRESS: 764 MMHG
BASE EXCESS BLDA CALC-SCNC: -0.8 MMOL/L (ref 0–2)
BDY SITE: ABNORMAL
BODY TEMPERATURE: 37 C
HCO3 BLDA-SCNC: 23.8 MMOL/L (ref 20–26)
Lab: ABNORMAL
MODALITY: ABNORMAL
PCO2 BLDA: 37.8 MM HG (ref 35–45)
PCO2 TEMP ADJ BLD: 37.8 MM HG (ref 35–45)
PH BLDA: 7.41 PH UNITS (ref 7.35–7.45)
PH, TEMP CORRECTED: 7.41 PH UNITS (ref 7.35–7.45)
PO2 BLDA: 99.9 MM HG (ref 83–108)
PO2 TEMP ADJ BLD: 99.9 MM HG (ref 83–108)
SAO2 % BLDCOA: 98.5 % (ref 94–99)
VENTILATOR MODE: ABNORMAL

## 2020-11-02 PROCEDURE — 94726 PLETHYSMOGRAPHY LUNG VOLUMES: CPT

## 2020-11-02 PROCEDURE — 94060 EVALUATION OF WHEEZING: CPT

## 2020-11-02 PROCEDURE — 94060 EVALUATION OF WHEEZING: CPT | Performed by: INTERNAL MEDICINE

## 2020-11-02 PROCEDURE — 94729 DIFFUSING CAPACITY: CPT | Performed by: INTERNAL MEDICINE

## 2020-11-02 PROCEDURE — 82803 BLOOD GASES ANY COMBINATION: CPT

## 2020-11-02 PROCEDURE — 94729 DIFFUSING CAPACITY: CPT

## 2020-11-02 PROCEDURE — 36600 WITHDRAWAL OF ARTERIAL BLOOD: CPT

## 2020-11-02 PROCEDURE — 94726 PLETHYSMOGRAPHY LUNG VOLUMES: CPT | Performed by: INTERNAL MEDICINE

## 2020-11-02 RX ORDER — ALBUTEROL SULFATE 2.5 MG/3ML
2.5 SOLUTION RESPIRATORY (INHALATION) ONCE
Status: COMPLETED | OUTPATIENT
Start: 2020-11-02 | End: 2020-11-02

## 2020-11-02 RX ADMIN — ALBUTEROL SULFATE 2.5 MG: 2.5 SOLUTION RESPIRATORY (INHALATION) at 09:56

## 2020-11-10 ENCOUNTER — TRANSCRIBE ORDERS (OUTPATIENT)
Dept: ADMINISTRATIVE | Facility: HOSPITAL | Age: 85
End: 2020-11-10

## 2020-11-10 ENCOUNTER — LAB (OUTPATIENT)
Dept: LAB | Facility: HOSPITAL | Age: 85
End: 2020-11-10

## 2020-11-10 ENCOUNTER — HOSPITAL ENCOUNTER (OUTPATIENT)
Dept: GENERAL RADIOLOGY | Facility: HOSPITAL | Age: 85
Discharge: HOME | End: 2020-11-10

## 2020-11-10 ENCOUNTER — OFFICE VISIT (OUTPATIENT)
Dept: PULMONOLOGY | Facility: CLINIC | Age: 85
End: 2020-11-10

## 2020-11-10 VITALS
SYSTOLIC BLOOD PRESSURE: 158 MMHG | TEMPERATURE: 99 F | WEIGHT: 139 LBS | BODY MASS INDEX: 25.58 KG/M2 | HEIGHT: 62 IN | OXYGEN SATURATION: 98 % | HEART RATE: 64 BPM | DIASTOLIC BLOOD PRESSURE: 88 MMHG

## 2020-11-10 DIAGNOSIS — R05.3 CHRONIC COUGH: Primary | ICD-10-CM

## 2020-11-10 DIAGNOSIS — J30.9 ALLERGIC RHINITIS, UNSPECIFIED SEASONALITY, UNSPECIFIED TRIGGER: ICD-10-CM

## 2020-11-10 DIAGNOSIS — J30.9 ALLERGIC RHINITIS, UNSPECIFIED SEASONALITY, UNSPECIFIED TRIGGER: Primary | ICD-10-CM

## 2020-11-10 DIAGNOSIS — R05.3 CHRONIC COUGH: ICD-10-CM

## 2020-11-10 DIAGNOSIS — K21.9 GERD WITHOUT ESOPHAGITIS: ICD-10-CM

## 2020-11-10 PROCEDURE — 86003 ALLG SPEC IGE CRUDE XTRC EA: CPT

## 2020-11-10 PROCEDURE — 71046 X-RAY EXAM CHEST 2 VIEWS: CPT

## 2020-11-10 PROCEDURE — 36415 COLL VENOUS BLD VENIPUNCTURE: CPT

## 2020-11-10 PROCEDURE — 82785 ASSAY OF IGE: CPT

## 2020-11-10 PROCEDURE — 99214 OFFICE O/P EST MOD 30 MIN: CPT | Performed by: NURSE PRACTITIONER

## 2020-11-10 RX ORDER — OMEPRAZOLE 40 MG/1
40 CAPSULE, DELAYED RELEASE ORAL DAILY
Qty: 90 CAPSULE | Refills: 0 | Status: SHIPPED | OUTPATIENT
Start: 2020-11-10 | End: 2021-01-06

## 2020-11-10 RX ORDER — BENZONATATE 100 MG/1
200 CAPSULE ORAL 3 TIMES DAILY PRN
Qty: 30 CAPSULE | Refills: 1 | Status: SHIPPED | OUTPATIENT
Start: 2020-11-10 | End: 2021-03-22 | Stop reason: SDUPTHER

## 2020-11-10 NOTE — PROGRESS NOTES
YOBANY Yang  [facility]   [facility]  [address] [place] [address]  Phone: [phone]  Fax: [phone]     Libia Perales is a 85 y.o. female.   CC:   Chief Complaint   Patient presents with   • Cough     No hospitalizations; no exposure; tested and negative        HPI: This is a new patient referral from Dr. Neo Machuca with a chief complaint of cough that is worse at night.  The patient and her son report that she has had this problem for years but it has gotten worse over the last few months.  She had pulmonary function testing done at [facility] earlier this month that revealed normal spirometry and lung volumes with a moderate diffusion impairment.  She had a negative stress test in September.  She has a history of GERD and has been on 20 mg of omeprazole for many years.  She denies any history of sleep apnea or sleep problems.  She does have osteoarthritis but denies any history of rheumatoid arthritis.  She does not recall having a recent chest x-ray but is agreeable to get one today.  She does report issues with seasonal allergies and I recommend that we do labs to check IgE level and allergens. She has already had a flu shot for the season.    The following portions of the patient's history were reviewed and updated as appropriate: allergies, current medications, past family history, past medical history, past social history, past surgical history and problem list.    Past Medical History:   Diagnosis Date   • Atrial fibrillation (CMS/HCC)    • Diabetes mellitus (CMS/HCC)    • GERD (gastroesophageal reflux disease)    • High cholesterol    • Hypertension    • OA (osteoarthritis)        Family History   Problem Relation Age of Onset   • No Known Problems Father    • Hypertension Mother    • Colon cancer Neg Hx        Social History     Socioeconomic History   • Marital status:      Spouse name: Not on file   • Number of children: Not on  "file   • Years of education: Not on file   • Highest education level: Not on file   Tobacco Use   • Smoking status: Never Smoker   • Smokeless tobacco: Never Used   Substance and Sexual Activity   • Alcohol use: No   • Drug use: No   • Sexual activity: Defer       Review of Systems   Constitutional: Negative for appetite change, chills, fatigue and fever.   HENT: Negative for swollen glands, trouble swallowing and voice change.    Eyes: Negative for visual disturbance.   Respiratory: Positive for cough (Worse at night). Negative for wheezing.    Cardiovascular: Negative for chest pain and leg swelling.   Gastrointestinal: Negative for abdominal distention, abdominal pain, nausea and vomiting.   Genitourinary: Negative.    Musculoskeletal: Negative for gait problem and myalgias.   Skin: Negative.    Neurological: Negative for weakness.   Hematological: Negative.    Psychiatric/Behavioral: Negative for sleep disturbance. The patient is not nervous/anxious.        /88   Pulse 64   Temp 99 °F (37.2 °C)   Ht 157.5 cm (62\")   Wt 63 kg (139 lb)   SpO2 98% Comment: RA  Breastfeeding No   BMI 25.42 kg/m²     Physical Exam  Vitals signs reviewed.   Constitutional:       General: She is not in acute distress.     Appearance: Normal appearance.   HENT:      Head: Normocephalic and atraumatic.      Comments: Wearing a mask  Eyes:      Pupils: Pupils are equal, round, and reactive to light.   Neck:      Musculoskeletal: Normal range of motion.   Cardiovascular:      Rate and Rhythm: Normal rate and regular rhythm.   Pulmonary:      Effort: Pulmonary effort is normal.      Breath sounds: Normal breath sounds.   Abdominal:      General: Bowel sounds are normal.   Musculoskeletal:         General: Deformity (Arthritic changes to hands and fingers) present.   Skin:     General: Skin is warm and dry.   Neurological:      Mental Status: She is alert and oriented to person, place, and time.   Psychiatric:         Mood and " Affect: Mood normal.         Behavior: Behavior normal.           Diagnoses and all orders for this visit:    1. Chronic cough (Primary)  -     benzonatate (Tessalon Perles) 100 MG capsule; Take 2 capsules by mouth 3 (Three) Times a Day As Needed for Cough.  Dispense: 30 capsule; Refill: 1  -     XR Chest 2 View; Future    2. GERD without esophagitis  -     omeprazole (priLOSEC) 40 MG capsule; Take 1 capsule by mouth Daily.  Dispense: 90 capsule; Refill: 0    3. Allergic rhinitis, unspecified seasonality, unspecified trigger  -     Cancel: IgE + Allergens (22)  -     IgE + Allergens (22); Future      Patient's Body mass index is 25.42 kg/m². BMI is within normal parameters. No follow-up required..      Follow-up/Plan: Increase omeprazole to 40 mg daily.  Increase Tessalon to 200 mg 3 times daily as needed (the patient is currently only utilizing at night).  Get a [facility] at [facility] today.  We will check IgE and allergens and call the patient with those results.  We will plan for a 3-month follow-up; sooner as needed depending on her response to increasing the PPI.    Redd Dumont, YOBANY  11/10/2020  13:20 CST

## 2020-11-10 NOTE — PATIENT INSTRUCTIONS
Increase Prilosec to 40 mg daily. Increase Tessalon to 200 mg three times a day as needed for cough. We will call you with the results of your CXR and blood work. Return to clinic in 3 months; sooner if needed.         Gastroesophageal Reflux Disease, Adult  Gastroesophageal reflux (DUNG) happens when acid from the stomach flows up into the tube that connects the mouth and the stomach (esophagus). Normally, food travels down the esophagus and stays in the stomach to be digested. With DUNG, food and stomach acid sometimes move back up into the esophagus. You may have a disease called gastroesophageal reflux disease (GERD) if the reflux:  · Happens often.  · Causes frequent or very bad symptoms.  · Causes problems such as damage to the esophagus.  When this happens, the esophagus becomes sore and swollen (inflamed). Over time, GERD can make small holes (ulcers) in the lining of the esophagus.  What are the causes?  This condition is caused by a problem with the muscle between the esophagus and the stomach. When this muscle is weak or not normal, it does not close properly to keep food and acid from coming back up from the stomach. The muscle can be weak because of:  · Tobacco use.  · Pregnancy.  · Having a certain type of hernia (hiatal hernia).  · Alcohol use.  · Certain foods and drinks, such as coffee, chocolate, onions, and peppermint.  What increases the risk?  You are more likely to develop this condition if you:  · Are overweight.  · Have a disease that affects your connective tissue.  · Use NSAID medicines.  What are the signs or symptoms?  Symptoms of this condition include:  · Heartburn.  · Difficult or painful swallowing.  · The feeling of having a lump in the throat.  · A bitter taste in the mouth.  · Bad breath.  · Having a lot of saliva.  · Having an upset or bloated stomach.  · Belching.  · Chest pain. Different conditions can cause chest pain. Make sure you see your doctor if you have chest  pain.  · Shortness of breath or noisy breathing (wheezing).  · Ongoing (chronic) cough or a cough at night.  · Wearing away of the surface of teeth (tooth enamel).  · Weight loss.  How is this treated?  Treatment will depend on how bad your symptoms are. Your doctor may suggest:  · Changes to your diet.  · Medicine.  · Surgery.  Follow these instructions at home:  Eating and drinking    · Follow a diet as told by your doctor. You may need to avoid foods and drinks such as:  ? Coffee and tea (with or without caffeine).  ? Drinks that contain alcohol.  ? Energy drinks and sports drinks.  ? Bubbly (carbonated) drinks or sodas.  ? Chocolate and cocoa.  ? Peppermint and mint flavorings.  ? Garlic and onions.  ? Horseradish.  ? Spicy and acidic foods. These include peppers, chili powder, chandra powder, vinegar, hot sauces, and BBQ sauce.  ? Citrus fruit juices and citrus fruits, such as oranges, argenis, and limes.  ? Tomato-based foods. These include red sauce, chili, salsa, and pizza with red sauce.  ? Fried and fatty foods. These include donuts, french fries, potato chips, and high-fat dressings.  ? High-fat meats. These include hot dogs, rib eye steak, sausage, ham, and chiu.  ? High-fat dairy items, such as whole milk, butter, and cream cheese.  · Eat small meals often. Avoid eating large meals.  · Avoid drinking large amounts of liquid with your meals.  · Avoid eating meals during the 2-3 hours before bedtime.  · Avoid lying down right after you eat.  · Do not exercise right after you eat.  Lifestyle    · Do not use any products that contain nicotine or tobacco. These include cigarettes, e-cigarettes, and chewing tobacco. If you need help quitting, ask your doctor.  · Try to lower your stress. If you need help doing this, ask your doctor.  · If you are overweight, lose an amount of weight that is healthy for you. Ask your doctor about a safe weight loss goal.  General instructions  · Pay attention to any changes in  your symptoms.  · Take over-the-counter and prescription medicines only as told by your doctor. Do not take aspirin, ibuprofen, or other NSAIDs unless your doctor says it is okay.  · Wear loose clothes. Do not wear anything tight around your waist.  · Raise (elevate) the head of your bed about 6 inches (15 cm).  · Avoid bending over if this makes your symptoms worse.  · Keep all follow-up visits as told by your doctor. This is important.  Contact a doctor if:  · You have new symptoms.  · You lose weight and you do not know why.  · You have trouble swallowing or it hurts to swallow.  · You have wheezing or a cough that keeps happening.  · Your symptoms do not get better with treatment.  · You have a hoarse voice.  Get help right away if:  · You have pain in your arms, neck, jaw, teeth, or back.  · You feel sweaty, dizzy, or light-headed.  · You have chest pain or shortness of breath.  · You throw up (vomit) and your throw-up looks like blood or coffee grounds.  · You pass out (faint).  · Your poop (stool) is bloody or black.  · You cannot swallow, drink, or eat.  Summary  · If a person has gastroesophageal reflux disease (GERD), food and stomach acid move back up into the esophagus and cause symptoms or problems such as damage to the esophagus.  · Treatment will depend on how bad your symptoms are.  · Follow a diet as told by your doctor.  · Take all medicines only as told by your doctor.  This information is not intended to replace advice given to you by your health care provider. Make sure you discuss any questions you have with your health care provider.  Document Released: 06/05/2009 Document Revised: 06/26/2019 Document Reviewed: 06/26/2019  Elsevier Patient Education © 2020 [employer]

## 2020-11-11 ENCOUNTER — TELEPHONE (OUTPATIENT)
Dept: PULMONOLOGY | Facility: CLINIC | Age: 85
End: 2020-11-11

## 2020-11-11 DIAGNOSIS — R05.3 CHRONIC COUGH: ICD-10-CM

## 2020-11-11 RX ORDER — BENZONATATE 200 MG/1
200 CAPSULE ORAL 3 TIMES DAILY PRN
Qty: 90 CAPSULE | Refills: 3 | Status: SHIPPED | OUTPATIENT
Start: 2020-11-11 | End: 2021-09-29

## 2020-11-11 RX ORDER — CEFDINIR 300 MG/1
300 CAPSULE ORAL 2 TIMES DAILY
Qty: 20 CAPSULE | Refills: 0 | Status: SHIPPED | OUTPATIENT
Start: 2020-11-11 | End: 2020-11-21

## 2020-11-11 NOTE — PROGRESS NOTES
Spoke with patient and relayed test results. Patient voiced understanding. She states that she has not been on any antibiotics, will send message to [facility].

## 2020-11-11 NOTE — TELEPHONE ENCOUNTER
----- Message from YOBANY Yang sent at 11/10/2020  1:35 PM CST -----  Please call the patient and tell her that I reviewed her chest x-ray and it shows that she likely has some bronchitis.  Ask her if Dr. Machuca has had her on any recent antibiotics.  Also, her son was with her for today's appointment and I do not see him listed on her chart as somebody we would be allowed to talk to.  Please tell her that in case she wants to make changes.  She has her  listed and she told me that he is very ill and she basically takes care of him so maybe her son would be a better choice?  Thank you.

## 2020-11-18 ENCOUNTER — LAB (OUTPATIENT)
Dept: LAB | Facility: HOSPITAL | Age: 85
End: 2020-11-18

## 2020-11-18 PROCEDURE — 86003 ALLG SPEC IGE CRUDE XTRC EA: CPT

## 2020-11-18 PROCEDURE — 36415 COLL VENOUS BLD VENIPUNCTURE: CPT

## 2020-11-18 PROCEDURE — 82785 ASSAY OF IGE: CPT

## 2020-11-19 RX ORDER — FUROSEMIDE 20 MG/1
TABLET ORAL
Qty: 90 TABLET | Refills: 3 | Status: SHIPPED | OUTPATIENT
Start: 2020-11-19 | End: 2021-12-01

## 2020-11-20 LAB
A ALTERNATA IGE QN: <0.1 KU/L
A FUMIGATUS IGE QN: <0.1 KU/L
BERMUDA GRASS IGE QN: <0.1 KU/L
BOXELDER IGE QN: <0.1 KU/L
C HERBARUM IGE QN: <0.1 KU/L
CAT DANDER IGE QN: <0.1 KU/L
COMMON RAGWEED IGE QN: 1.22 KU/L
CONV CLASS DESCRIPTION: ABNORMAL
COTTONWOOD IGE QN: <0.1 KU/L
D FARINAE IGE QN: 0.52 KU/L
D PTERONYSS IGE QN: 0.38 KU/L
DOG DANDER IGE QN: <0.1 KU/L
IGE SERPL-ACNC: 52 IU/ML (ref 6–495)
MT JUNIPER IGE QN: 0.12 KU/L
NETTLE IGE QN: <0.1 KU/L
P NOTATUM IGE QN: <0.1 KU/L
ROACH IGE QN: <0.1 KU/L
SALTWORT IGE QN: <0.1 KU/L
SHEEP SORREL IGE QN: <0.1 KU/L
TIMOTHY IGE QN: <0.1 KU/L
WHITE ASH IGE QN: <0.1 KU/L
WHITE ELM IGE QN: <0.1 KU/L
WHITE MULBERRY IGE QN: <0.1 KU/L
WHITE OAK IGE QN: <0.1 KU/L

## 2020-11-24 RX ORDER — GLIPIZIDE 10 MG/1
TABLET ORAL
Qty: 60 TABLET | Refills: 11 | Status: SHIPPED | OUTPATIENT
Start: 2020-11-24 | End: 2021-12-01

## 2020-12-23 RX ORDER — FENOFIBRATE 145 MG/1
TABLET, COATED ORAL
Qty: 30 TABLET | Refills: 5 | Status: SHIPPED | OUTPATIENT
Start: 2020-12-23 | End: 2021-06-23

## 2020-12-23 RX ORDER — HYDROCHLOROTHIAZIDE 12.5 MG/1
CAPSULE, GELATIN COATED ORAL
Qty: 30 CAPSULE | Refills: 5 | Status: SHIPPED | OUTPATIENT
Start: 2020-12-23 | End: 2021-06-23

## 2020-12-30 DIAGNOSIS — I48.19 PERSISTENT ATRIAL FIBRILLATION (HCC): ICD-10-CM

## 2021-01-04 ENCOUNTER — LAB (OUTPATIENT)
Dept: INTERNAL MEDICINE | Facility: CLINIC | Age: 86
End: 2021-01-04

## 2021-01-04 DIAGNOSIS — E11.9 TYPE 2 DIABETES MELLITUS WITHOUT COMPLICATION, UNSPECIFIED WHETHER LONG TERM INSULIN USE (HCC): Primary | ICD-10-CM

## 2021-01-04 DIAGNOSIS — E11.9 DIABETES MELLITUS TYPE 2, NONINSULIN DEPENDENT (HCC): ICD-10-CM

## 2021-01-04 DIAGNOSIS — E78.5 HYPERLIPIDEMIA, UNSPECIFIED HYPERLIPIDEMIA TYPE: ICD-10-CM

## 2021-01-04 DIAGNOSIS — I10 BENIGN ESSENTIAL HTN: Primary | ICD-10-CM

## 2021-01-04 DIAGNOSIS — I10 BENIGN ESSENTIAL HTN: ICD-10-CM

## 2021-01-04 RX ORDER — METOPROLOL SUCCINATE 25 MG/1
TABLET, EXTENDED RELEASE ORAL
Qty: 30 TABLET | Refills: 5 | Status: SHIPPED | OUTPATIENT
Start: 2021-01-04 | End: 2021-06-23

## 2021-01-04 RX ORDER — APIXABAN 5 MG/1
TABLET, FILM COATED ORAL
Qty: 60 TABLET | Refills: 5 | Status: SHIPPED | OUTPATIENT
Start: 2021-01-04 | End: 2021-06-23

## 2021-01-05 LAB
ALBUMIN SERPL-MCNC: 4.1 G/DL (ref 3.5–5.2)
ALBUMIN/GLOB SERPL: 1.7 G/DL
ALP SERPL-CCNC: 39 U/L (ref 39–117)
ALT SERPL-CCNC: 10 U/L (ref 1–33)
APPEARANCE UR: CLEAR
AST SERPL-CCNC: 14 U/L (ref 1–32)
BACTERIA #/AREA URNS HPF: NORMAL /HPF
BASOPHILS # BLD AUTO: 0.04 10*3/MM3 (ref 0–0.2)
BASOPHILS NFR BLD AUTO: 0.7 % (ref 0–1.5)
BILIRUB SERPL-MCNC: 0.3 MG/DL (ref 0–1.2)
BILIRUB UR QL STRIP: NEGATIVE
BUN SERPL-MCNC: 37 MG/DL (ref 8–23)
BUN/CREAT SERPL: 27 (ref 7–25)
CALCIUM SERPL-MCNC: 9.3 MG/DL (ref 8.6–10.5)
CASTS URNS MICRO: NORMAL
CHLORIDE SERPL-SCNC: 100 MMOL/L (ref 98–107)
CHOLEST SERPL-MCNC: 181 MG/DL (ref 0–200)
CO2 SERPL-SCNC: 26.8 MMOL/L (ref 22–29)
COLOR UR: YELLOW
CREAT SERPL-MCNC: 1.37 MG/DL (ref 0.57–1)
EOSINOPHIL # BLD AUTO: 0.25 10*3/MM3 (ref 0–0.4)
EOSINOPHIL NFR BLD AUTO: 4.3 % (ref 0.3–6.2)
EPI CELLS #/AREA URNS HPF: NORMAL /HPF
ERYTHROCYTE [DISTWIDTH] IN BLOOD BY AUTOMATED COUNT: 13 % (ref 12.3–15.4)
GLOBULIN SER CALC-MCNC: 2.4 GM/DL
GLUCOSE SERPL-MCNC: 110 MG/DL (ref 65–99)
GLUCOSE UR QL: NEGATIVE
HBA1C MFR BLD: 7.3 % (ref 4.8–5.6)
HCT VFR BLD AUTO: 33.4 % (ref 34–46.6)
HDLC SERPL-MCNC: 38 MG/DL (ref 40–60)
HGB BLD-MCNC: 10.5 G/DL (ref 12–15.9)
HGB UR QL STRIP: NEGATIVE
IMM GRANULOCYTES # BLD AUTO: 0.03 10*3/MM3 (ref 0–0.05)
IMM GRANULOCYTES NFR BLD AUTO: 0.5 % (ref 0–0.5)
KETONES UR QL STRIP: NEGATIVE
LDLC SERPL CALC-MCNC: 104 MG/DL (ref 0–100)
LEUKOCYTE ESTERASE UR QL STRIP: NEGATIVE
LYMPHOCYTES # BLD AUTO: 1.24 10*3/MM3 (ref 0.7–3.1)
LYMPHOCYTES NFR BLD AUTO: 21.5 % (ref 19.6–45.3)
MCH RBC QN AUTO: 27.1 PG (ref 26.6–33)
MCHC RBC AUTO-ENTMCNC: 31.4 G/DL (ref 31.5–35.7)
MCV RBC AUTO: 86.3 FL (ref 79–97)
MICROALBUMIN UR-MCNC: 896.9 UG/ML
MONOCYTES # BLD AUTO: 0.44 10*3/MM3 (ref 0.1–0.9)
MONOCYTES NFR BLD AUTO: 7.6 % (ref 5–12)
NEUTROPHILS # BLD AUTO: 3.77 10*3/MM3 (ref 1.7–7)
NEUTROPHILS NFR BLD AUTO: 65.4 % (ref 42.7–76)
NITRITE UR QL STRIP: NEGATIVE
NRBC BLD AUTO-RTO: 0 /100 WBC (ref 0–0.2)
PH UR STRIP: 6.5 [PH] (ref 5–8)
PLATELET # BLD AUTO: 275 10*3/MM3 (ref 140–450)
POTASSIUM SERPL-SCNC: 4.1 MMOL/L (ref 3.5–5.2)
PROT SERPL-MCNC: 6.5 G/DL (ref 6–8.5)
PROT UR QL STRIP: ABNORMAL
RBC # BLD AUTO: 3.87 10*6/MM3 (ref 3.77–5.28)
RBC #/AREA URNS HPF: NORMAL /HPF
SODIUM SERPL-SCNC: 138 MMOL/L (ref 136–145)
SP GR UR: 1.02 (ref 1–1.03)
TRIGL SERPL-MCNC: 228 MG/DL (ref 0–150)
TSH SERPL DL<=0.005 MIU/L-ACNC: 3.65 UIU/ML (ref 0.27–4.2)
URATE SERPL-MCNC: 6.8 MG/DL (ref 2.4–5.7)
UROBILINOGEN UR STRIP-MCNC: ABNORMAL MG/DL
VLDLC SERPL CALC-MCNC: 39 MG/DL (ref 5–40)
WBC # BLD AUTO: 5.77 10*3/MM3 (ref 3.4–10.8)
WBC #/AREA URNS HPF: NORMAL /HPF

## 2021-01-06 DIAGNOSIS — K21.9 GERD WITHOUT ESOPHAGITIS: ICD-10-CM

## 2021-01-06 RX ORDER — OMEPRAZOLE 40 MG/1
CAPSULE, DELAYED RELEASE ORAL
Qty: 90 CAPSULE | Refills: 5 | Status: SHIPPED | OUTPATIENT
Start: 2021-01-06 | End: 2022-01-17

## 2021-01-06 NOTE — TELEPHONE ENCOUNTER
Pharmacy sent a request for refills on Omeprazole. Patient was last seen on 11/10/2020 and scheduled to come back on 2/16/21.  Refill request sent to [facility].

## 2021-01-28 ENCOUNTER — OFFICE VISIT (OUTPATIENT)
Dept: INTERNAL MEDICINE | Facility: CLINIC | Age: 86
End: 2021-01-28

## 2021-01-28 VITALS
BODY MASS INDEX: 26.17 KG/M2 | HEIGHT: 62 IN | SYSTOLIC BLOOD PRESSURE: 126 MMHG | WEIGHT: 142.2 LBS | DIASTOLIC BLOOD PRESSURE: 68 MMHG | HEART RATE: 70 BPM | OXYGEN SATURATION: 98 % | TEMPERATURE: 97.1 F

## 2021-01-28 DIAGNOSIS — I10 BENIGN ESSENTIAL HTN: ICD-10-CM

## 2021-01-28 DIAGNOSIS — I48.19 PERSISTENT ATRIAL FIBRILLATION (HCC): ICD-10-CM

## 2021-01-28 DIAGNOSIS — Z78.0 POSTMENOPAUSE: ICD-10-CM

## 2021-01-28 DIAGNOSIS — Z00.00 INITIAL MEDICARE ANNUAL WELLNESS VISIT: Primary | ICD-10-CM

## 2021-01-28 DIAGNOSIS — E78.5 HYPERLIPIDEMIA, UNSPECIFIED HYPERLIPIDEMIA TYPE: ICD-10-CM

## 2021-01-28 DIAGNOSIS — I65.22 STENOSIS OF LEFT CAROTID ARTERY: ICD-10-CM

## 2021-01-28 DIAGNOSIS — Z12.31 BREAST CANCER SCREENING BY MAMMOGRAM: ICD-10-CM

## 2021-01-28 PROCEDURE — G0438 PPPS, INITIAL VISIT: HCPCS | Performed by: NURSE PRACTITIONER

## 2021-01-28 NOTE — PROGRESS NOTES
The ABCs of the Annual Wellness Visit  Initial Medicare Wellness Visit    Chief Complaint   Patient presents with   • Medicare Wellness-Initial Visit       Subjective   History of Present Illness:  Libia Perales is a 86 y.o. female who presents for an Initial Medicare Wellness Visit.    Libia is due for her annual physical.  At her previous visit she was started on Trulicity in September 2020 which she is tolerating well.  She does not check blood sugars regularly at home.  A1C in her labs drawn January 4th 2021 is 7.3 which is a significant improvement from her previous check of 8.8.  She states she is not following a diabetic diet but knows what she should be eating.      She has a history of afib and continues with cardiology for follow ups.   She continues on Eliquis and is rate controlled.     She denies concerns to address today and is due for her Mammogram and Dexascan.     HEALTH RISK ASSESSMENT    Recent Hospitalizations:  No hospitalization(s) within the last year.    Current Medical Providers:  Patient Care Team:  Darrion Machuca MD as PCP - General  Darrion Machuca MD as PCP - Family Medicine  Tone Ceballos MD as Consulting Physician (Urology)  Tone Coello MD as Cardiologist (Cardiology)  Darrion Machuca MD as Referring Physician (Internal Medicine)    Smoking Status:  Social History     Tobacco Use   Smoking Status Never Smoker   Smokeless Tobacco Never Used       Alcohol Consumption:  Social History     Substance and Sexual Activity   Alcohol Use No       Depression Screen:   PHQ-2/PHQ-9 Depression Screening 1/28/2021   Little interest or pleasure in doing things 0   Feeling down, depressed, or hopeless 0   Total Score 0       Fall Risk Screen:  STEADI Fall Risk Assessment was completed, and patient is at LOW risk for falls.Assessment completed on:1/28/2021    Health Habits and Functional and Cognitive Screening:  Functional & Cognitive Status 1/28/2021   Do you have  difficulty preparing food and eating? No   Do you have difficulty bathing yourself, getting dressed or grooming yourself? No   Do you have difficulty using the toilet? No   Do you have difficulty moving around from place to place? No   Do you have trouble with steps or getting out of a bed or a chair? No   Current Diet Well Balanced Diet   Dental Exam Up to date   Eye Exam Up to date   Exercise (times per week) 0 times per week   Current Exercise Activities Include None   Do you need help using the phone?  No   Are you deaf or do you have serious difficulty hearing?  No   Do you need help with transportation? No   Do you need help shopping? No   Do you need help preparing meals?  No   Do you need help with housework?  No   Do you need help with laundry? No   Do you need help taking your medications? No   Do you need help managing money? No   Do you ever drive or ride in a car without wearing a seat belt? No   Have you felt unusual stress, anger or loneliness in the last month? No   Who do you live with? Alone   If you need help, do you have trouble finding someone available to you? No   Have you been bothered in the last four weeks by sexual problems? No   Do you have difficulty concentrating, remembering or making decisions? Yes         Does the patient have evidence of cognitive impairment? No    Asprin use counseling:Contraindicated from taking ASA    Age-appropriate Screening Schedule:  Refer to the list below for future screening recommendations based on patient's age, sex and/or medical conditions. Orders for these recommended tests are listed in the plan section. The patient has been provided with a written plan.    Health Maintenance   Topic Date Due   • ZOSTER VACCINE (2 of 3) 12/01/2015   • DIABETIC EYE EXAM  02/15/2021   • HEMOGLOBIN A1C  07/04/2021   • LIPID PANEL  01/04/2022   • URINE MICROALBUMIN  01/04/2022   • TDAP/TD VACCINES (2 - Td) 11/11/2026   • INFLUENZA VACCINE  Completed          The  following portions of the patient's history were reviewed and updated as appropriate: allergies, current medications, past family history, past medical history, past social history, past surgical history and problem list.    Outpatient Medications Prior to Visit   Medication Sig Dispense Refill   • benzonatate (Tessalon Perles) 100 MG capsule Take 2 capsules by mouth 3 (Three) Times a Day As Needed for Cough. 30 capsule 1   • benzonatate (TESSALON) 200 MG capsule Take 1 capsule by mouth 3 (Three) Times a Day As Needed for Cough. 90 capsule 3   • digoxin (LANOXIN) 125 MCG tablet TAKE 1 TABLET BY MOUTH EVERY OTHER DAY (SUN TUES THU SAT)  16 tablet 5   • Dulaglutide 0.75 MG/0.5ML solution pen-injector Inject 0.75 mg under the skin into the appropriate area as directed 1 (One) Time Per Week. 12 pen 3   • Eliquis 5 MG tablet tablet TAKE 1 TABLET BY MOUTH TWICE DAILY  60 tablet 5   • fenofibrate (TRICOR) 145 MG tablet TAKE 1 TABLET BY MOUTH EVERY DAY  30 tablet 5   • furosemide (LASIX) 20 MG tablet TAKE 1 TABLET BY MOUTH EVERY DAY WITH HCTZ  90 tablet 3   • glipizide (GLUCOTROL) 10 MG tablet TAKE 1 TABLET BY MOUTH TWICE DAILY WITH FOOD  60 tablet 11   • glucose blood test strip Use as instructed 100 each 12   • hydrALAZINE (APRESOLINE) 100 MG tablet TAKE 1 TABLET BY MOUTH THREE TIMES A DAY  90 tablet 11   • hydroCHLOROthiazide (MICROZIDE) 12.5 MG capsule TAKE 1 CAPSULE BY MOUTH EVERY DAY  30 capsule 5   • losartan (COZAAR) 100 MG tablet TAKE 1 TABLET BY MOUTH DAILY.  90 tablet 1   • metFORMIN (GLUCOPHAGE) 1000 MG tablet TAKE 1 TABLET BY MOUTH TWICE DAILY WITH FOOD  60 tablet 5   • metoprolol succinate XL (TOPROL-XL) 25 MG 24 hr tablet TAKE 1 TABLET BY MOUTH EVERY DAY  30 tablet 5   • omeprazole (priLOSEC) 40 MG capsule TAKE 1 CAPSULE BY MOUTH EVERY DAY  90 capsule 5   • simvastatin (ZOCOR) 40 MG tablet Take 1 tablet by mouth Every Night. 30 tablet 11   • Diclofenac Sodium (VOLTAREN) 1 % gel gel Apply 2 g topically to the  appropriate area as directed Daily As Needed.     • hydrocortisone 2.5 % cream Apply  topically to the appropriate area as directed 2 (Two) Times a Day. 28 g 1     No facility-administered medications prior to visit.        Patient Active Problem List   Diagnosis   • Type 2 diabetes mellitus without complication (CMS/HCC)   • Diabetes mellitus type 2, noninsulin dependent (CMS/HCC)   • Primary osteoarthritis of both knees   • Hyperlipidemia   • Cerebral infarction involving right posterior cerebral artery (CMS/HCC)   • Benign essential HTN   • Persistent atrial fibrillation (CMS/HCC)   • Accelerated hypertension   • Asthma   • Fibrocystic breast disease   • Gastro-esophageal reflux   • High calcium levels   • Hypomagnesemia   • OA (osteoarthritis) of ankle   • Osteoarthritis   • Right renal mass   • Uncontrolled hypertension   • Vertigo, benign paroxysmal   • Chronic fatigue   • HARDING (dyspnea on exertion)       Advanced Care Planning:  ACP discussion was held with the patient during this visit. Patient has an advance directive in EMR which is still valid.     Review of Systems   Constitutional: Negative for activity change, appetite change, fatigue, fever and unexpected weight change.   HENT: Negative for trouble swallowing and voice change.    Eyes: Negative for visual disturbance.   Respiratory: Negative for cough and shortness of breath.    Cardiovascular: Negative for chest pain, palpitations and leg swelling.   Gastrointestinal: Negative for abdominal pain, constipation, diarrhea, nausea and vomiting.   Endocrine: Negative for cold intolerance, heat intolerance, polydipsia and polyphagia.   Genitourinary: Negative for dysuria and frequency.   Musculoskeletal: Negative for arthralgias, back pain, joint swelling and neck pain.   Skin: Negative for color change and rash.   Neurological: Negative for dizziness and headaches.   Hematological: Does not bruise/bleed easily.   Psychiatric/Behavioral: Negative for  "agitation, hallucinations and suicidal ideas. The patient is not nervous/anxious.        Compared to one year ago, the patient feels her physical health is the same.  Compared to one year ago, the patient feels her mental health is the same.    Reviewed chart for potential of high risk medication in the elderly: yes  Reviewed chart for potential of harmful drug interactions in the elderly:yes    Objective         Vitals:    01/28/21 1000   BP: 126/68   BP Location: Right arm   Patient Position: Sitting   Cuff Size: Adult   Pulse: 70   Temp: 97.1 °F (36.2 °C)   TempSrc: Temporal   SpO2: 98%   Weight: 64.5 kg (142 lb 3.2 oz)   Height: 157.5 cm (62\")   PainSc: 0-No pain       Body mass index is 26.01 kg/m².  Discussed the patient's BMI with her. The BMI is above average; BMI management plan is completed.    Physical Exam  Constitutional:       Appearance: She is well-developed.   HENT:      Head: Normocephalic.      Right Ear: Tympanic membrane and external ear normal.      Left Ear: Tympanic membrane and external ear normal.      Nose: Nose normal.      Mouth/Throat:      Mouth: Mucous membranes are moist. No oral lesions.      Pharynx: Oropharynx is clear.   Eyes:      Conjunctiva/sclera: Conjunctivae normal.      Pupils: Pupils are equal, round, and reactive to light.   Neck:      Musculoskeletal: Normal range of motion.      Thyroid: No thyromegaly.      Vascular: Carotid bruit present.      Comments: Bilateral bruits possibly radiating murmur  Cardiovascular:      Rate and Rhythm: Normal rate. Rhythm irregular.      Pulses: Normal pulses.           Radial pulses are 2+ on the right side and 2+ on the left side.      Heart sounds: Murmur present. Systolic murmur present with a grade of 2/6.   Pulmonary:      Effort: Pulmonary effort is normal.      Breath sounds: Normal breath sounds.   Abdominal:      General: Bowel sounds are normal.      Palpations: Abdomen is soft.      Tenderness: There is no abdominal " tenderness.   Musculoskeletal:      Right lower leg: No edema.      Left lower leg: No edema.   Skin:     General: Skin is warm and dry.   Neurological:      Mental Status: She is alert and oriented to person, place, and time.      Gait: Gait normal.   Psychiatric:         Mood and Affect: Mood normal.         Speech: Speech normal.         Behavior: Behavior normal.         Thought Content: Thought content normal.         Lab Results   Component Value Date     (H) 01/04/2021    CHLPL 181 01/04/2021    TRIG 228 (H) 01/04/2021    HDL 38 (L) 01/04/2021     (H) 01/04/2021    VLDL 39 01/04/2021    HGBA1C 7.30 (H) 01/04/2021        Assessment/Plan   Medicare Risks and Personalized Health Plan  CMS Preventative Services Quick Reference  Advance Directive Discussion  Breast Cancer/Mammogram Screening  Cardiovascular risk  Chronic Pain   Dementia/Memory   Depression/Dysphoria  Diabetic Lab Screening   Fall Risk  Immunizations Discussed/Encouraged (specific immunizations; Influenza, Pneumococcal 23, Prevnar and Shingrix )  Inadequate Social Support, Isolation, Loneliness, Lack of Transportation, Financial Difficulties, or Caregiver Stress   Inactivity/Sedentary  Osteoprorosis Risk  Urinary Incontinence    The above risks/problems have been discussed with the patient.  Pertinent information has been shared with the patient in the After Visit Summary.  Follow up plans and orders are seen below in the Assessment/Plan Section.    Diagnoses and all orders for this visit:    1. Initial Medicare annual wellness visit (Primary)    2. Benign essential HTN    3. Hyperlipidemia, unspecified hyperlipidemia type    4. Persistent atrial fibrillation (CMS/[facility])  -     Digoxin level    5. Breast cancer screening by mammogram  -     Mammo Screening Digital Tomosynthesis Bilateral With CAD; Future    6. Postmenopause  -     DEXA Bone Density Axial; Future      Follow Up:  Return in about 3 months (around 4/28/2021) for Recheck  A1C with Dr. Machuca.     An After Visit Summary and PPPS were given to the patient.     Orders for mammogram and Dexa scan have been placed in chart for scheduling.  She declines breast exam today.   She is UTD on immunizations.  We did discuss Shingrix today and she will discuss this with her insurance.   We have spent a lot of time talking about diet changes and ways she can better reduce her A1C and prevent additional medication.  We will check A1C in 3 months and may need to adjust Trulicity if appropriate.   I cannot see a recent Digoxin level.  Will get this checked today.  I do not see Carotid U/S since 2018. I do not see any upcoming appointment with vascular.  Will recheck this for stability.

## 2021-01-29 ENCOUNTER — TELEPHONE (OUTPATIENT)
Dept: INTERNAL MEDICINE | Facility: CLINIC | Age: 86
End: 2021-01-29

## 2021-01-29 LAB — DIGOXIN SERPL-MCNC: 1.5 NG/ML (ref 0.6–1.2)

## 2021-01-29 NOTE — PROGRESS NOTES
Patient takes every other day.  Took dose prior to her lab draw.  Will have her return to the office on Tuesday morning to recheck.

## 2021-02-02 ENCOUNTER — LAB (OUTPATIENT)
Dept: INTERNAL MEDICINE | Facility: CLINIC | Age: 86
End: 2021-02-02

## 2021-02-02 DIAGNOSIS — I48.19 PERSISTENT ATRIAL FIBRILLATION (HCC): Primary | ICD-10-CM

## 2021-02-03 LAB — DIGOXIN SERPL-MCNC: 1.6 NG/ML (ref 0.6–1.2)

## 2021-02-03 RX ORDER — SIMVASTATIN 40 MG
40 TABLET ORAL NIGHTLY
Qty: 30 TABLET | Refills: 11 | Status: SHIPPED | OUTPATIENT
Start: 2021-02-03 | End: 2022-02-21

## 2021-02-03 NOTE — PROGRESS NOTES
Please call her son, who manages her medication and verify again that she is only taking her Dig 125mcg every other day.  If so, will need to start taking every 3rd day and recheck in 1 week after taking her dose the previous day.

## 2021-02-04 DIAGNOSIS — I48.19 PERSISTENT ATRIAL FIBRILLATION (HCC): Primary | ICD-10-CM

## 2021-02-04 NOTE — PROGRESS NOTES
If that is true, then she may have had her medicine that Tuesday morning before her lab draw. Please see if she took her medicine that day as this will change how we adjust her DIG.

## 2021-02-22 ENCOUNTER — IMMUNIZATION (OUTPATIENT)
Dept: VACCINE CLINIC | Facility: HOSPITAL | Age: 86
End: 2021-02-22

## 2021-02-22 PROCEDURE — 91301 HC SARSCO02 VAC 100MCG/0.5ML IM: CPT | Performed by: OBSTETRICS & GYNECOLOGY

## 2021-02-22 PROCEDURE — 0011A: CPT | Performed by: OBSTETRICS & GYNECOLOGY

## 2021-02-23 ENCOUNTER — HOSPITAL ENCOUNTER (OUTPATIENT)
Dept: BONE DENSITY | Facility: HOSPITAL | Age: 86
Discharge: HOME | End: 2021-02-23

## 2021-02-23 ENCOUNTER — HOSPITAL ENCOUNTER (OUTPATIENT)
Dept: MAMMOGRAPHY | Facility: HOSPITAL | Age: 86
Discharge: HOME | End: 2021-02-23

## 2021-02-23 ENCOUNTER — HOSPITAL ENCOUNTER (OUTPATIENT)
Dept: ULTRASOUND IMAGING | Facility: HOSPITAL | Age: 86
Discharge: HOME | End: 2021-02-23

## 2021-02-23 DIAGNOSIS — Z78.0 POSTMENOPAUSE: ICD-10-CM

## 2021-02-23 DIAGNOSIS — I65.22 STENOSIS OF LEFT CAROTID ARTERY: ICD-10-CM

## 2021-02-23 DIAGNOSIS — Z12.31 BREAST CANCER SCREENING BY MAMMOGRAM: ICD-10-CM

## 2021-02-23 PROCEDURE — 77080 DXA BONE DENSITY AXIAL: CPT

## 2021-02-23 PROCEDURE — 93880 EXTRACRANIAL BILAT STUDY: CPT

## 2021-02-23 PROCEDURE — 93880 EXTRACRANIAL BILAT STUDY: CPT | Performed by: SURGERY

## 2021-02-25 RX ORDER — LOSARTAN POTASSIUM 100 MG/1
100 TABLET ORAL DAILY
Qty: 90 TABLET | Refills: 3 | Status: SHIPPED | OUTPATIENT
Start: 2021-02-25 | End: 2022-02-21

## 2021-03-01 ENCOUNTER — NURSE TRIAGE (OUTPATIENT)
Dept: CALL CENTER | Facility: HOSPITAL | Age: 86
End: 2021-03-01

## 2021-03-01 ENCOUNTER — HOSPITAL ENCOUNTER (EMERGENCY)
Facility: HOSPITAL | Age: 86
Discharge: HOME | End: 2021-03-02
Attending: INTERNAL MEDICINE | Admitting: INTERNAL MEDICINE

## 2021-03-01 ENCOUNTER — APPOINTMENT (OUTPATIENT)
Dept: CT IMAGING | Facility: HOSPITAL | Age: 86
End: 2021-03-01

## 2021-03-01 DIAGNOSIS — S00.03XA CONTUSION OF LEFT TEMPOROFRONTAL SCALP, INITIAL ENCOUNTER: Primary | ICD-10-CM

## 2021-03-01 PROCEDURE — 70450 CT HEAD/BRAIN W/O DYE: CPT

## 2021-03-01 PROCEDURE — 72125 CT NECK SPINE W/O DYE: CPT

## 2021-03-01 PROCEDURE — 99283 EMERGENCY DEPT VISIT LOW MDM: CPT

## 2021-03-02 VITALS
WEIGHT: 148 LBS | SYSTOLIC BLOOD PRESSURE: 151 MMHG | TEMPERATURE: 98.9 F | RESPIRATION RATE: 16 BRPM | BODY MASS INDEX: 27.23 KG/M2 | HEIGHT: 62 IN | DIASTOLIC BLOOD PRESSURE: 83 MMHG | HEART RATE: 76 BPM | OXYGEN SATURATION: 98 %

## 2021-03-02 NOTE — ED PROVIDER NOTES
Subjective   Ms. Plata is a very pleasant 86-year-old female who states that after she got out of the shower she was trying to draw off at which point time she bent over to draw off her feet and fell forward hitting her left frontal scalp on the floor she states that she did not pass out blackout or lose consciousness but she immediately had head pain and neck pain for which she became concerned given the fact that she states that she is on blood thinners.  She states that she has been compliant with her medications and has no reason to think that the results of very but this gave her extra concern related to the head injury.          Review of Systems   Constitutional: Negative for chills, fatigue and fever.   HENT: Negative for congestion and facial swelling.    Eyes: Negative for photophobia, discharge and visual disturbance.   Respiratory: Negative for cough, shortness of breath and wheezing.    Cardiovascular: Negative for chest pain, palpitations and leg swelling.   Gastrointestinal: Negative for abdominal pain, diarrhea, nausea and vomiting.   Endocrine: Negative for cold intolerance and heat intolerance.   Genitourinary: Negative for difficulty urinating and urgency.   Musculoskeletal: Positive for neck pain. Negative for arthralgias, joint swelling and myalgias.   Skin: Negative for color change and pallor.   Neurological: Positive for headaches. Negative for dizziness and light-headedness.   Hematological: Negative for adenopathy. Does not bruise/bleed easily.   Psychiatric/Behavioral: Negative for agitation, behavioral problems and confusion.       Past Medical History:   Diagnosis Date   • Atrial fibrillation (CMS/HCC)    • Diabetes mellitus (CMS/HCC)    • GERD (gastroesophageal reflux disease)    • High cholesterol    • Hypertension    • OA (osteoarthritis)        Allergies   Allergen Reactions   • Ace Inhibitors Cough       Past Surgical History:   Procedure Laterality Date   • BREAST CYST ASPIRATION      • HYSTERECTOMY     • OOPHORECTOMY     • TONSILLECTOMY         Family History   Problem Relation Age of Onset   • No Known Problems Father    • Hypertension Mother    • No Known Problems Sister    • No Known Problems Brother    • No Known Problems Daughter    • No Known Problems Son    • No Known Problems Maternal Grandmother    • No Known Problems Paternal Grandmother    • No Known Problems Maternal Aunt    • No Known Problems Paternal Aunt    • No Known Problems Other    • Colon cancer Neg Hx    • BRCA 1/2 Neg Hx    • Breast cancer Neg Hx    • Endometrial cancer Neg Hx    • Ovarian cancer Neg Hx        Social History     Socioeconomic History   • Marital status:      Spouse name: Not on file   • Number of children: Not on file   • Years of education: Not on file   • Highest education level: Not on file   Tobacco Use   • Smoking status: Never Smoker   • Smokeless tobacco: Never Used   Substance and Sexual Activity   • Alcohol use: No   • Drug use: No   • Sexual activity: Defer           Objective   Physical Exam  Vitals signs and nursing note reviewed.   Constitutional:       Appearance: Normal appearance. She is well-developed.   HENT:      Head: Normocephalic and atraumatic.   Eyes:      Extraocular Movements: Extraocular movements intact.      Conjunctiva/sclera: Conjunctivae normal.      Pupils: Pupils are equal, round, and reactive to light.   Neck:      Musculoskeletal: Normal range of motion and neck supple.   Cardiovascular:      Rate and Rhythm: Normal rate and regular rhythm.      Heart sounds: Normal heart sounds.   Pulmonary:      Effort: Pulmonary effort is normal.      Breath sounds: Normal breath sounds.   Abdominal:      General: Bowel sounds are normal. There is no distension.      Palpations: Abdomen is soft.   Musculoskeletal: Normal range of motion.   Skin:     General: Skin is warm and dry.      Comments: Pain in her forehead upon palpation along with some ecchymosis noted more on the  left than on the right side of her frontal scalp.   Neurological:      General: No focal deficit present.      Mental Status: She is alert and oriented to person, place, and time.      Cranial Nerves: No cranial nerve deficit.   Psychiatric:         Behavior: Behavior normal.         Thought Content: Thought content normal.         Procedures           ED Course  ED Course as of Mar 02 0415   Tue Mar 02, 2021   0412 Patient states that she is doing well and that she can tolerate conservative therapies and stay at home follow-up with her regular doctor.    [RW]      ED Course User Index  [RW] Nikos Machuca MD                                           [facility]    Final diagnoses:   Contusion of left temporofrontal scalp, initial encounter            Nikos Machuca MD  03/02/21 6780

## 2021-03-02 NOTE — TELEPHONE ENCOUNTER
"States she fell getting out of the shower and hit her forehead on hard tile. States she scraped forehead. States she had a pump knot approximately half dollar sized and put ice on it and it went down. Denies any pain, headache, dizziness, blurred vision, neck pain, weakness. She takes eliquis BID. States she is eating, walking, etc.     Reason for Disposition  • [1] Age over 65 years AND [2] swelling or bruise  • Taking Coumadin (warfarin) or other strong blood thinner, or known bleeding disorder (e.g., thrombocytopenia)    Additional Information  • Negative: [1] ACUTE NEURO SYMPTOM AND [2] present now  (DEFINITION: difficult to awaken OR confused thinking and talking OR slurred speech OR weakness of arms OR unsteady walking)  • Negative: Knocked out (unconscious) > 1 minute  • Negative: Seizure (convulsion) occurred  (Exception: prior history of seizures and now alert and without Acute Neuro Symptoms)  • Negative: Penetrating head injury (e.g., knife, gun shot wound, metal object)  • Negative: [1] Major bleeding (e.g., actively dripping or spurting) AND [2] can't be stopped  • Negative: [1] Dangerous mechanism of injury (e.g., MVA, diving, trampoline, contact sports, fall > 10 feet or 3 meters) AND [2] NECK pain AND [3] began < 1 hour after injury  • Negative: Sounds like a life-threatening emergency to the triager  • Negative: [1] Diagnosed with concussion AND [2] within last 14 days  • Negative: [1] Traumatic brain injury (mTBI; concussion) AND [2] more than 14 days since head injury  • Negative: Can't remember what happened (amnesia)  • Negative: Vomiting once or more  • Negative: [1] Loss of vision or double vision AND [2] present now  • Negative: Watery or blood-tinged fluid dripping from the NOSE or EARS now  (Exception: tears from crying or nosebleed from nasal trauma)  • Negative: [1] One or two \"black eyes\" (bruising, purple color of eyelids) AND [2] onset within 24 hours of head injury  • Negative: Large " "swelling or bruise > 2 inches (5 cm)  • Negative: Skin is split open or gaping  (or length > 1/2 inch or 12 mm)  • Negative: [1] Bleeding AND [2] won't stop after 10 minutes of direct pressure (using correct technique)  • Negative: Sounds like a serious injury to the triager  • Negative: [1] ACUTE NEURO SYMPTOM AND [2] now fine  (DEFINITION: difficult to awaken OR confused thinking and talking OR slurred speech OR weakness of arms OR unsteady walking)  • Negative: [1] Knocked out (unconscious) < 1 minute AND [2] now fine  • Negative: [1] SEVERE headache AND [2] not improved 2 hours after pain medicine/ice packs  • Negative: Dangerous injury (e.g., MVA, diving, trampoline, contact sports, fall > 10 feet or 3 meters) or severe blow from hard object (e.g., golf club or baseball bat)  • Negative: Suspicious history for the injury  • Negative: Patient is confused or is an unreliable provider of information (e.g., dementia, severe intellectual disability, alcohol intoxication)    Answer Assessment - Initial Assessment Questions  1. MECHANISM: \"How did the injury happen?\" For falls, ask: \"What height did you fall from?\" and \"What surface did you fall against?\"       standing  2. ONSET: \"When did the injury happen?\" (Minutes or hours ago)       Hour ago  3. NEUROLOGIC SYMPTOMS: \"Was there any loss of consciousness?\" \"Are there any other neurological symptoms?\"       no  4. MENTAL STATUS: \"Does the person know who he is, who you are, and where he is?\"       WNL  5. LOCATION: \"What part of the head was hit?\"       forehead  6. SCALP APPEARANCE: \"What does the scalp look like? Is it bleeding now?\" If so, ask: \"Is it difficult to stop?\"       Scrap but no bleeding  7. SIZE: For cuts, bruises, or swelling, ask: \"How large is it?\" (e.g., inches or centimeters)       See note  8. PAIN: \"Is there any pain?\" If so, ask: \"How bad is it?\"  (e.g., Scale 1-10; or mild, moderate, severe)      none  9. TETANUS: For any breaks in the skin, " "ask: \"When was the last tetanus booster?\"      unsure  10. OTHER SYMPTOMS: \"Do you have any other symptoms?\" (e.g., neck pain, vomiting)        no  11. PREGNANCY: \"Is there any chance you are pregnant?\" \"When was your last menstrual period?\"        n/a    Protocols used: HEAD INJURY-ADULT-AH      "

## 2021-03-04 ENCOUNTER — OFFICE VISIT (OUTPATIENT)
Dept: PULMONOLOGY | Facility: CLINIC | Age: 86
End: 2021-03-04

## 2021-03-04 ENCOUNTER — OFFICE VISIT (OUTPATIENT)
Dept: INTERNAL MEDICINE | Facility: CLINIC | Age: 86
End: 2021-03-04

## 2021-03-04 VITALS
OXYGEN SATURATION: 98 % | DIASTOLIC BLOOD PRESSURE: 88 MMHG | HEART RATE: 70 BPM | HEIGHT: 62 IN | WEIGHT: 146 LBS | SYSTOLIC BLOOD PRESSURE: 148 MMHG | TEMPERATURE: 97.7 F | BODY MASS INDEX: 26.87 KG/M2

## 2021-03-04 VITALS
HEART RATE: 72 BPM | BODY MASS INDEX: 26.68 KG/M2 | SYSTOLIC BLOOD PRESSURE: 154 MMHG | TEMPERATURE: 98.7 F | DIASTOLIC BLOOD PRESSURE: 92 MMHG | OXYGEN SATURATION: 98 % | HEIGHT: 62 IN | WEIGHT: 145 LBS

## 2021-03-04 DIAGNOSIS — E78.5 HYPERLIPIDEMIA, UNSPECIFIED HYPERLIPIDEMIA TYPE: ICD-10-CM

## 2021-03-04 DIAGNOSIS — R05.3 CHRONIC COUGH: Primary | Chronic | ICD-10-CM

## 2021-03-04 DIAGNOSIS — K21.9 GERD WITHOUT ESOPHAGITIS: Chronic | ICD-10-CM

## 2021-03-04 DIAGNOSIS — F07.81 POSTCONCUSSION SYNDROME: Primary | ICD-10-CM

## 2021-03-04 DIAGNOSIS — I10 BENIGN ESSENTIAL HTN: ICD-10-CM

## 2021-03-04 DIAGNOSIS — J30.9 ALLERGIC RHINITIS, UNSPECIFIED SEASONALITY, UNSPECIFIED TRIGGER: ICD-10-CM

## 2021-03-04 PROCEDURE — 99214 OFFICE O/P EST MOD 30 MIN: CPT | Performed by: INTERNAL MEDICINE

## 2021-03-04 PROCEDURE — 99214 OFFICE O/P EST MOD 30 MIN: CPT | Performed by: NURSE PRACTITIONER

## 2021-03-04 RX ORDER — FEXOFENADINE HYDROCHLORIDE 60 MG/1
60 TABLET, FILM COATED ORAL DAILY
COMMUNITY
End: 2022-04-04

## 2021-03-04 RX ORDER — MONTELUKAST SODIUM 10 MG/1
10 TABLET ORAL NIGHTLY
Qty: 90 TABLET | Refills: 3 | Status: SHIPPED | OUTPATIENT
Start: 2021-03-04 | End: 2021-03-22

## 2021-03-04 NOTE — PROGRESS NOTES
"Arian Perales is a 86 y.o. female.   Chief Complaint   Patient presents with   • Fall     Went to  ER on 3/1.   • Eye Problem     Patient states \"having a funny feeling\" in her head since falling.        History of Present Illness this is a follow-up visit from a recent trip to the ER where patient had fallen and hit her head.  She apparently had a scalp hematoma there is no evidence of brain bleeds or strokes.  Since that fall she has been having a funny feeling in her head.  She tells me that she did not lose consciousness and remembers the event.    The following portions of the patient's history were reviewed and updated as appropriate: allergies, current medications, past family history, past medical history, past social history, past surgical history and problem list.    Review of Systems   Constitutional: Negative for activity change, appetite change, fatigue, fever, unexpected weight gain and unexpected weight loss.   HENT: Negative for swollen glands, trouble swallowing and voice change.    Eyes: Negative for blurred vision and visual disturbance.   Respiratory: Negative for cough and shortness of breath.    Cardiovascular: Negative for chest pain, palpitations and leg swelling.   Gastrointestinal: Negative for abdominal pain, constipation, diarrhea, nausea, vomiting and indigestion.   Endocrine: Negative for cold intolerance, heat intolerance, polydipsia and polyphagia.   Genitourinary: Negative for dysuria and frequency.   Musculoskeletal: Negative for arthralgias, back pain, joint swelling and neck pain.   Skin: Negative for color change, rash and skin lesions.   Neurological: Negative for dizziness, weakness, headache, memory problem and confusion.   Hematological: Does not bruise/bleed easily.   Psychiatric/Behavioral: Negative for agitation, hallucinations and suicidal ideas. The patient is not nervous/anxious.        Objective   Past Medical History:   Diagnosis Date   • Atrial " fibrillation (CMS/HCC)    • Diabetes mellitus (CMS/HCC)    • GERD (gastroesophageal reflux disease)    • High cholesterol    • Hypertension    • OA (osteoarthritis)       Past Surgical History:   Procedure Laterality Date   • BREAST CYST ASPIRATION     • HYSTERECTOMY     • OOPHORECTOMY     • TONSILLECTOMY          Current Outpatient Medications:   •  benzonatate (TESSALON) 200 MG capsule, Take 1 capsule by mouth 3 (Three) Times a Day As Needed for Cough., Disp: 90 capsule, Rfl: 3  •  Diclofenac Sodium (VOLTAREN) 1 % gel gel, Apply 2 g topically to the appropriate area as directed Daily As Needed., Disp: , Rfl:   •  digoxin (LANOXIN) 125 MCG tablet, TAKE 1 TABLET BY MOUTH EVERY OTHER DAY (SUN TUES THU SAT) , Disp: 16 tablet, Rfl: 5  •  Dulaglutide 0.75 MG/0.5ML solution pen-injector, Inject 0.75 mg under the skin into the appropriate area as directed 1 (One) Time Per Week., Disp: 12 pen, Rfl: 3  •  Eliquis 5 MG tablet tablet, TAKE 1 TABLET BY MOUTH TWICE DAILY , Disp: 60 tablet, Rfl: 5  •  fenofibrate (TRICOR) 145 MG tablet, TAKE 1 TABLET BY MOUTH EVERY DAY , Disp: 30 tablet, Rfl: 5  •  fexofenadine (ALLEGRA) 60 MG tablet, Take 60 mg by mouth Daily., Disp: , Rfl:   •  furosemide (LASIX) 20 MG tablet, TAKE 1 TABLET BY MOUTH EVERY DAY WITH HCTZ , Disp: 90 tablet, Rfl: 3  •  glipizide (GLUCOTROL) 10 MG tablet, TAKE 1 TABLET BY MOUTH TWICE DAILY WITH FOOD , Disp: 60 tablet, Rfl: 11  •  glucose blood test strip, Use as instructed, Disp: 100 each, Rfl: 12  •  hydrALAZINE (APRESOLINE) 100 MG tablet, TAKE 1 TABLET BY MOUTH THREE TIMES A DAY , Disp: 90 tablet, Rfl: 11  •  hydroCHLOROthiazide (MICROZIDE) 12.5 MG capsule, TAKE 1 CAPSULE BY MOUTH EVERY DAY , Disp: 30 capsule, Rfl: 5  •  hydrocortisone 2.5 % cream, Apply  topically to the appropriate area as directed 2 (Two) Times a Day., Disp: 28 g, Rfl: 1  •  losartan (COZAAR) 100 MG tablet, TAKE 1 TABLET BY MOUTH DAILY. , Disp: 90 tablet, Rfl: 3  •  metFORMIN (GLUCOPHAGE) 1000  MG tablet, TAKE 1 TABLET BY MOUTH TWICE DAILY WITH FOOD , Disp: 60 tablet, Rfl: 5  •  metoprolol succinate XL (TOPROL-XL) 25 MG 24 hr tablet, TAKE 1 TABLET BY MOUTH EVERY DAY , Disp: 30 tablet, Rfl: 5  •  montelukast (SINGULAIR) 10 MG tablet, Take 1 tablet by mouth Every Night., Disp: 90 tablet, Rfl: 3  •  omeprazole (priLOSEC) 40 MG capsule, TAKE 1 CAPSULE BY MOUTH EVERY DAY , Disp: 90 capsule, Rfl: 5  •  simvastatin (ZOCOR) 40 MG tablet, TAKE 1 TABLET BY MOUTH EVERY NIGHT. , Disp: 30 tablet, Rfl: 11  •  benzonatate (Tessalon Perles) 100 MG capsule, Take 2 capsules by mouth 3 (Three) Times a Day As Needed for Cough., Disp: 30 capsule, Rfl: 1     Vitals:    03/04/21 1526   BP: 148/88   Pulse:    Temp:    SpO2:          03/04/21  1509   Weight: 66.2 kg (146 lb)     Patient's Body mass index is 26.7 kg/m². BMI is .      Physical Exam  Constitutional:       Appearance: Normal appearance. She is well-developed.   HENT:      Head: Normocephalic and atraumatic.      Right Ear: External ear normal.      Left Ear: External ear normal.   Eyes:      Extraocular Movements: Extraocular movements intact.      Conjunctiva/sclera: Conjunctivae normal.      Pupils: Pupils are equal, round, and reactive to light.   Neck:      Musculoskeletal: Normal range of motion and neck supple. No neck rigidity.      Vascular: No carotid bruit.   Cardiovascular:      Rate and Rhythm: Normal rate and regular rhythm.      Pulses: Normal pulses.      Heart sounds: Normal heart sounds. No murmur. No gallop.    Pulmonary:      Effort: Pulmonary effort is normal.      Breath sounds: Normal breath sounds.   Musculoskeletal: Normal range of motion.         General: No swelling or tenderness.   Skin:     General: Skin is warm and dry.   Neurological:      General: No focal deficit present.      Mental Status: She is alert and oriented to person, place, and time.   Psychiatric:         Mood and Affect: Mood normal.         Behavior: Behavior normal.                Assessment/Plan   Diagnoses and all orders for this visit:    1. Postconcussion syndrome (Primary)    2. Hyperlipidemia, unspecified hyperlipidemia type    3. Benign essential HTN      This point I think patient likely had a concussion and that she is experiencing some postconcussion syndrome.  Its been long enough at this point I do not think any further restrictions are warranted.  In regard to her high blood pressure today's blood pressure is slightly elevated but a lot of her problem with blood pressure is that she has very rigid arteries and we will tend to get a higher systolic reading I think the reading today is going to have to be acceptable at this time.  I did review patient's CAT scan as well as her carotid studies done in the ER.

## 2021-03-04 NOTE — PROGRESS NOTES
"Chief Complaint  Cough (NO HOSPITALIZATIONS OTHER THAN THE ER; NO EXPOSURE; TESTED AND NEGATIVE)    Subjective    History of Present Illness      Libia Perales presents to [facility] for:    4-month follow-up with chronic cough, GERD and allergic rhinitis.  Allergy testing last fall showed sensitivity to dust mites cedar and ragweed.  The patient is on daily Allegra.  She reports that her cough is \"somewhat better\".  She continues to use Tessalon at night which she finds helpful.  She continues on full-strength Prilosec to control GERD.  I recommend that she start on Singulair and she is agreeable.  She is up-to-date on flu and pneumonia vaccines and received her first dose of Covid vaccine recently.       Objective   Vital Signs:   /92   Pulse 72   Temp 98.7 °F (37.1 °C)   Ht 157.5 cm (62\")   Wt 65.8 kg (145 lb)   SpO2 98% Comment: RA  BMI 26.52 kg/m²     Physical Exam  Vitals signs reviewed.   Constitutional:       General: She is not in acute distress.     Appearance: Normal appearance.   HENT:      Head: Normocephalic and atraumatic.      Comments: Wearing a mask  Neck:      Musculoskeletal: Normal range of motion.   Cardiovascular:      Rate and Rhythm: Normal rate and regular rhythm.   Pulmonary:      Effort: Pulmonary effort is normal.      Breath sounds: Normal breath sounds.   Skin:     General: Skin is warm and dry.   Neurological:      Mental Status: She is alert and oriented to person, place, and time.      Comments: In a wheelchair   Psychiatric:         Mood and Affect: Mood normal.         Behavior: Behavior normal.        Result Review :   The following data was reviewed by: YOBANY Yang on 03/04/2021:  IgE + Allergens (22) (11/18/2020 14:31)        Results for orders placed during the hospital encounter of 11/02/20   Full Pulmonary Function Test With Bronchodilator & ABG    N[facility]ative [facility] " [facility]    [facility]  [place]  [place]  [address]  [phone]    Patient : Libia Perales   MRN : [id number]  YOB: 1934   :  Donis Terrell MD   Date : 11/5/2020    ______________________________________________________________________    Interpretation :  1. Spirometry is normal  2. Following bronchodilator there is no change  3. Mid flow is normal  4. Lung volume measurements are normal  5. Diffusion capacity is reduced and when corrected for alveolar volume is   lower limits of normal  6. Airways resistance is mildly increased and conductance is decreased      Donis Terrell MD  11/05/20 20:52 CST                                Assessment and Plan      Diagnoses and all orders for this visit:    1. Chronic cough (Primary)  Comments:  Cough is stable with some improvement.  Continue on reflux medication.  Use Tessalon at night and as needed during the day.    2. Allergic rhinitis, unspecified seasonality, unspecified trigger  Comments:  Continue on antihistamine with Allegra.  Add Singulair 1 pill at night.  Orders:  -     montelukast (SINGULAIR) 10 MG tablet; Take 1 tablet by mouth Every Night.  Dispense: 90 tablet; Refill: 3    3. GERD without esophagitis  Comments:  Continue on Prilosec.        Patient's Body mass index is 26.52 kg/m². BMI is above normal parameters. Recommendations include: referral to primary care.        YOBANY Yang  3/4/2021  12:04 CST    Follow Up   Return in about 1 year (around 3/4/2022).    Patient was given instructions and counseling regarding her condition or for health maintenance advice. Please see specific information pulled into the AVS if appropriate.

## 2021-03-22 ENCOUNTER — IMMUNIZATION (OUTPATIENT)
Dept: VACCINE CLINIC | Facility: HOSPITAL | Age: 86
End: 2021-03-22

## 2021-03-22 ENCOUNTER — OFFICE VISIT (OUTPATIENT)
Dept: CARDIOLOGY | Facility: CLINIC | Age: 86
End: 2021-03-22

## 2021-03-22 VITALS
HEART RATE: 72 BPM | WEIGHT: 145 LBS | DIASTOLIC BLOOD PRESSURE: 74 MMHG | BODY MASS INDEX: 26.68 KG/M2 | SYSTOLIC BLOOD PRESSURE: 170 MMHG | OXYGEN SATURATION: 99 % | HEIGHT: 62 IN
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
## 2021-03-22 DIAGNOSIS — I48.19 PERSISTENT ATRIAL FIBRILLATION (HCC): Primary | ICD-10-CM

## 2021-03-22 DIAGNOSIS — I10 BENIGN ESSENTIAL HTN: ICD-10-CM

## 2021-03-22 PROCEDURE — 0012A: CPT | Performed by: OBSTETRICS & GYNECOLOGY

## 2021-03-22 PROCEDURE — 91301 HC SARSCO02 VAC 100MCG/0.5ML IM: CPT | Performed by: OBSTETRICS & GYNECOLOGY

## 2021-03-22 PROCEDURE — 93000 ELECTROCARDIOGRAM COMPLETE: CPT | Performed by: INTERNAL MEDICINE

## 2021-03-22 PROCEDURE — 99214 OFFICE O/P EST MOD 30 MIN: CPT | Performed by: INTERNAL MEDICINE

## 2021-03-22 NOTE — PROGRESS NOTES
Subjective:     Encounter Date:03/22/2021      Patient ID: Libia Perales is a 86 y.o. female with a history of atrial fibrillation, hypertension, hyperlipidemia, type 2 diabetes mellitus who presents today for routine follow-up.    Chief Complaint: Routine follow-up    This patient presents today for routine follow-up.  She has a history of persistent atrial fibrillation and is chronically anticoagulated.  She did fall recently and still has some residual effects.  It was thought that she might have had a concussion, however she did not have an intracranial bleed.  She has not fallen down since then but says that some days she does feel foggy headed.  She has not experienced any significant bleeding difficulties on her blood thinner at this time.  As stated, she has only had one significant fall.  She denies any lightheadedness or dizziness.  She says that she does not recall being lightheaded or dizzy prior to her fall but simply bent over and could not stop and fell onto her face.  She has not noticed any change in baseline shortness of breath and dyspnea on exertion.  No significant edema, orthopnea, PND.  No chest discomfort.  Blood pressure waxes and wanes.  Today's reading is higher than baseline readings.  No side effects from medications.    Atrial Fibrillation  Presents for follow-up visit. Symptoms include shortness of breath. Symptoms are negative for chest pain, dizziness, hemodynamic instability, palpitations, syncope and weakness. The symptoms have been stable. Past medical history includes atrial fibrillation. There are no medication compliance problems.       Current Outpatient Medications:   •  benzonatate (TESSALON) 200 MG capsule, Take 1 capsule by mouth 3 (Three) Times a Day As Needed for Cough., Disp: 90 capsule, Rfl: 3  •  Diclofenac Sodium (VOLTAREN) 1 % gel gel, Apply 2 g topically to the appropriate area as directed Daily As Needed., Disp: , Rfl:   •  digoxin (LANOXIN) 125 MCG tablet,  TAKE 1 TABLET BY MOUTH EVERY OTHER DAY (SUN TUES THU SAT) , Disp: 16 tablet, Rfl: 5  •  Dulaglutide 0.75 MG/0.5ML solution pen-injector, Inject 0.75 mg under the skin into the appropriate area as directed 1 (One) Time Per Week., Disp: 12 pen, Rfl: 3  •  Eliquis 5 MG tablet tablet, TAKE 1 TABLET BY MOUTH TWICE DAILY , Disp: 60 tablet, Rfl: 5  •  fenofibrate (TRICOR) 145 MG tablet, TAKE 1 TABLET BY MOUTH EVERY DAY , Disp: 30 tablet, Rfl: 5  •  fexofenadine (ALLEGRA) 60 MG tablet, Take 60 mg by mouth Daily., Disp: , Rfl:   •  furosemide (LASIX) 20 MG tablet, TAKE 1 TABLET BY MOUTH EVERY DAY WITH HCTZ , Disp: 90 tablet, Rfl: 3  •  glipizide (GLUCOTROL) 10 MG tablet, TAKE 1 TABLET BY MOUTH TWICE DAILY WITH FOOD , Disp: 60 tablet, Rfl: 11  •  glucose blood test strip, Use as instructed, Disp: 100 each, Rfl: 12  •  hydrALAZINE (APRESOLINE) 100 MG tablet, TAKE 1 TABLET BY MOUTH THREE TIMES A DAY , Disp: 90 tablet, Rfl: 11  •  hydroCHLOROthiazide (MICROZIDE) 12.5 MG capsule, TAKE 1 CAPSULE BY MOUTH EVERY DAY , Disp: 30 capsule, Rfl: 5  •  hydrocortisone 2.5 % cream, Apply  topically to the appropriate area as directed 2 (Two) Times a Day., Disp: 28 g, Rfl: 1  •  losartan (COZAAR) 100 MG tablet, TAKE 1 TABLET BY MOUTH DAILY. , Disp: 90 tablet, Rfl: 3  •  metFORMIN (GLUCOPHAGE) 1000 MG tablet, TAKE 1 TABLET BY MOUTH TWICE DAILY WITH FOOD , Disp: 60 tablet, Rfl: 5  •  metoprolol succinate XL (TOPROL-XL) 25 MG 24 hr tablet, TAKE 1 TABLET BY MOUTH EVERY DAY , Disp: 30 tablet, Rfl: 5  •  omeprazole (priLOSEC) 40 MG capsule, TAKE 1 CAPSULE BY MOUTH EVERY DAY  (Patient taking differently: Take 20 mg by mouth Daily.), Disp: 90 capsule, Rfl: 5  •  simvastatin (ZOCOR) 40 MG tablet, TAKE 1 TABLET BY MOUTH EVERY NIGHT. , Disp: 30 tablet, Rfl: 11    Allergies   Allergen Reactions   • Singulair [Montelukast] Cough   • Ace Inhibitors Cough     Social History     Tobacco Use   • Smoking status: Never Smoker   • Smokeless tobacco: Never Used    Substance Use Topics   • Alcohol use: No     Review of Systems   Constitutional: Negative for fever and weight loss.   Cardiovascular: Positive for dyspnea on exertion. Negative for chest pain, leg swelling, orthopnea, palpitations, paroxysmal nocturnal dyspnea and syncope.   Respiratory: Positive for shortness of breath. Negative for cough and wheezing.    Hematologic/Lymphatic: Negative for bleeding problem. Does not bruise/bleed easily.   Musculoskeletal: Positive for arthritis and falls (once recently).   Gastrointestinal: Negative for abdominal pain, hematemesis, hematochezia, melena, nausea and vomiting.   Neurological: Negative for dizziness, headaches, loss of balance and weakness.       ECG 12 Lead    Date/Time: 3/22/2021 10:02 AM  Performed by: Tone Coello MD  Authorized by: Tone Coello MD   Comparison: compared with previous ECG from 9/21/2020  Rhythm: atrial fibrillation  Rate: normal  BPM: 77  Conduction: incomplete right bundle branch block  QRS axis: left  Other findings: non-specific ST-T wave changes, left ventricular hypertrophy and poor R wave progression    Clinical impression: abnormal EKG             Objective:     Vitals reviewed.   Constitutional:       General: Not in acute distress.     Appearance: Healthy appearance. Well-developed.   Eyes:      Extraocular Movements: Extraocular movements intact.      Pupils: Pupils are equal, round, and reactive to light.   HENT:      Head: Normocephalic and atraumatic.   Neck:      Thyroid: No thyroid mass.      Vascular: No JVD.   Pulmonary:      Effort: Pulmonary effort is normal.      Breath sounds: Normal breath sounds. No wheezing. No rhonchi. No rales.   Cardiovascular:      Normal rate. Irregularly irregular rhythm.      Murmurs: There is no murmur.      No gallop.   Pulses:     Intact distal pulses.   Edema:     Peripheral edema absent.   Abdominal:      General: Bowel sounds are normal. There is no distension.       "Palpations: Abdomen is soft.      Tenderness: There is no abdominal tenderness.   Musculoskeletal: Normal range of motion.      Extremities: No clubbing present.     Cervical back: Normal range of motion and neck supple. Skin:     General: Skin is warm and dry. There is no cyanosis.      Findings: No erythema or rash.   Neurological:      Mental Status: Alert and oriented to person, place, and time.      Cranial Nerves: No cranial nerve deficit.       /74   Pulse 72   Ht 157.5 cm (62\")   Wt 65.8 kg (145 lb)   SpO2 99%   BMI 26.52 kg/m²     Data/Lab Review:     Lab Results   Component Value Date    WBC 5.77 01/04/2021    HGB 10.5 (L) 01/04/2021    HCT 33.4 (L) 01/04/2021    MCV 86.3 01/04/2021     01/04/2021     Lab Results   Component Value Date    GLUCOSE 266 (H) 02/05/2016    CALCIUM 9.3 01/04/2021     01/04/2021    K 4.1 01/04/2021    CO2 26.8 01/04/2021     01/04/2021    BUN 37 (H) 01/04/2021    CREATININE 1.37 (H) 01/04/2021    EGFRIFAFRI 44 (L) 01/04/2021    EGFRIFNONA 37 (L) 01/04/2021    BCR 27.0 (H) 01/04/2021    ANIONGAP 10 02/05/2016     Lab Results   Component Value Date    CHLPL 181 01/04/2021    TRIG 228 (H) 01/04/2021    HDL 38 (L) 01/04/2021     (H) 01/04/2021     Lab Results   Component Value Date    DIGOXIN 0.40 (L) 02/05/2021     CT head on 3/2/2021:  1. Mild cerebral and cerebellar volume loss with chronic microvascular  disease but no evidence of acute intracranial process.  2. Left frontal scalp hematoma with no associated calvarial injury.  3. Remote right PCA territory infarct.    ================================================    Nuclear stress 10/13/20:  · Myocardial perfusion imaging indicates a normal myocardial perfusion study with no evidence of ischemia.  · Left ventricular ejection fraction is hyperdynamic (Calculated EF > 70%). .  · Impressions are consistent with a low risk study.    Echocardiogram  7/3/18:  · Left ventricular systolic function " is normal. Estimated EF appears to be in the range of 56 - 60%.  · Left ventricular diastolic dysfunction.  · Trace aortic valve regurgitation is present.  · Trace mitral valve regurgitation is present.  · Trace tricuspid valve regurgitation is present. Estimated right ventricular systolic pressure from tricuspid regurgitation is mildly elevated (35-45 mmHg).        Assessment:          Diagnosis Plan   1. Persistent atrial fibrillation (CMS/HCC)  ECG 12 Lead   2. Benign essential HTN     3. Fall, initial encounter            Plan:       1.  Persistent atrial fibrillation: The patient remains in atrial fibrillation at this time but is asymptomatic with heart rate well controlled.  She does remain on chronic anticoagulation.  She is a fall risk and has fallen once recently for unexplained reasons.  Fortunately for her, she did not sustain any long-term issues arising from her fall, however she may be at an increased risk of adverse outcomes on long-term use of anticoagulant therapy.  For this reason, we did discuss left atrial appendage exclusion today.  At this point, she will discuss further with her son, as we did discuss this in the presence of her daughter today.  She will call us if she has any further questions and we will otherwise plan to discuss this again at her next visit.     2.  Hypertension: The patient's blood pressure is elevated today, higher than normal readings.  Given her advanced age, most of her readings, which were described to me today, would be fairly reasonable, in an effort to avoid dropping her blood pressure too low and potentially having adverse events such as lightheadedness, dizziness or falls related to low blood pressure.  Continue current antihypertensives at this time.     3.  Fall: The patient had a recent fall for unexplained reasons.  She does not describe lightheadedness or dizziness.  She also does not feel that she tripped over anything.  She says that she simply bend over  and could not stop herself from falling.  We discussed the fall today and the implications of falls on chronic anticoagulant therapy.  For this reason, we did discuss left atrial appendage exclusion and she will think further about this and let us know if she would like to proceed.    Patient's Body mass index is 26.52 kg/m². BMI is above normal parameters. Recommendations include: exercise counseling and nutrition counseling.    The patient will call us back if she is interested in pursuing left atrial appendage exclusion, otherwise we will plan to see her back in 6 months unless otherwise needed sooner

## 2021-04-28 RX ORDER — DIGOXIN 125 MCG
TABLET ORAL
Qty: 16 TABLET | Refills: 5 | Status: SHIPPED | OUTPATIENT
Start: 2021-04-28 | End: 2021-11-11

## 2021-04-29 ENCOUNTER — OFFICE VISIT (OUTPATIENT)
Dept: INTERNAL MEDICINE | Facility: CLINIC | Age: 86
End: 2021-04-29

## 2021-04-29 VITALS
BODY MASS INDEX: 26.31 KG/M2 | HEART RATE: 54 BPM | TEMPERATURE: 97.8 F | WEIGHT: 143 LBS | HEIGHT: 62 IN | OXYGEN SATURATION: 100 % | DIASTOLIC BLOOD PRESSURE: 72 MMHG | SYSTOLIC BLOOD PRESSURE: 164 MMHG

## 2021-04-29 DIAGNOSIS — E11.9 ENCOUNTER FOR DIABETIC FOOT EXAM (HCC): Primary | ICD-10-CM

## 2021-04-29 DIAGNOSIS — E11.9 TYPE 2 DIABETES MELLITUS WITHOUT COMPLICATION, WITHOUT LONG-TERM CURRENT USE OF INSULIN (HCC): ICD-10-CM

## 2021-04-29 DIAGNOSIS — I10 BENIGN ESSENTIAL HTN: ICD-10-CM

## 2021-04-29 LAB — HBA1C MFR BLD: 7.3 %

## 2021-04-29 PROCEDURE — 99213 OFFICE O/P EST LOW 20 MIN: CPT | Performed by: INTERNAL MEDICINE

## 2021-04-29 PROCEDURE — 83036 HEMOGLOBIN GLYCOSYLATED A1C: CPT | Performed by: INTERNAL MEDICINE

## 2021-04-29 NOTE — PROGRESS NOTES
Subjective   Libia Perales is a 86 y.o. female.   Chief Complaint   Patient presents with   • Hypertension     3 month follow up    • Diabetes     a1c: 7.3       History of Present Illness this is patient's 3-month follow-up for diabetes    The following portions of the patient's history were reviewed and updated as appropriate: allergies, current medications, past family history, past medical history, past social history, past surgical history and problem list.    Review of Systems   Constitutional: Negative for activity change, appetite change, fatigue, fever, unexpected weight gain and unexpected weight loss.   HENT: Negative for swollen glands, trouble swallowing and voice change.    Eyes: Negative for blurred vision and visual disturbance.   Respiratory: Negative for cough and shortness of breath.    Cardiovascular: Negative for chest pain, palpitations and leg swelling.   Gastrointestinal: Negative for abdominal pain, constipation, diarrhea, nausea, vomiting and indigestion.   Endocrine: Negative for cold intolerance, heat intolerance, polydipsia and polyphagia.   Genitourinary: Negative for dysuria and frequency.   Musculoskeletal: Negative for arthralgias, back pain, joint swelling and neck pain.   Skin: Negative for color change, rash and skin lesions.   Neurological: Negative for dizziness, weakness, headache, memory problem and confusion.   Hematological: Does not bruise/bleed easily.   Psychiatric/Behavioral: Negative for agitation, hallucinations and suicidal ideas. The patient is not nervous/anxious.        Objective   Past Medical History:   Diagnosis Date   • Atrial fibrillation (CMS/HCC)    • Diabetes mellitus (CMS/HCC)    • GERD (gastroesophageal reflux disease)    • High cholesterol    • Hypertension    • OA (osteoarthritis)       Past Surgical History:   Procedure Laterality Date   • BREAST CYST ASPIRATION     • HYSTERECTOMY     • OOPHORECTOMY     • TONSILLECTOMY          Current Outpatient  Medications:   •  benzonatate (TESSALON) 200 MG capsule, Take 1 capsule by mouth 3 (Three) Times a Day As Needed for Cough., Disp: 90 capsule, Rfl: 3  •  digoxin (LANOXIN) 125 MCG tablet, TAKE 1 TABLET BY MOUTH EVERY OTHER DAY (SUN TUES THU SAT) , Disp: 16 tablet, Rfl: 5  •  Dulaglutide 0.75 MG/0.5ML solution pen-injector, Inject 0.75 mg under the skin into the appropriate area as directed 1 (One) Time Per Week., Disp: 12 pen, Rfl: 3  •  Eliquis 5 MG tablet tablet, TAKE 1 TABLET BY MOUTH TWICE DAILY , Disp: 60 tablet, Rfl: 5  •  fenofibrate (TRICOR) 145 MG tablet, TAKE 1 TABLET BY MOUTH EVERY DAY , Disp: 30 tablet, Rfl: 5  •  fexofenadine (ALLEGRA) 60 MG tablet, Take 60 mg by mouth Daily., Disp: , Rfl:   •  furosemide (LASIX) 20 MG tablet, TAKE 1 TABLET BY MOUTH EVERY DAY WITH HCTZ , Disp: 90 tablet, Rfl: 3  •  glipizide (GLUCOTROL) 10 MG tablet, TAKE 1 TABLET BY MOUTH TWICE DAILY WITH FOOD , Disp: 60 tablet, Rfl: 11  •  glucose blood test strip, Use as instructed, Disp: 100 each, Rfl: 12  •  hydrALAZINE (APRESOLINE) 100 MG tablet, TAKE 1 TABLET BY MOUTH THREE TIMES A DAY , Disp: 90 tablet, Rfl: 11  •  hydroCHLOROthiazide (MICROZIDE) 12.5 MG capsule, TAKE 1 CAPSULE BY MOUTH EVERY DAY , Disp: 30 capsule, Rfl: 5  •  losartan (COZAAR) 100 MG tablet, TAKE 1 TABLET BY MOUTH DAILY. , Disp: 90 tablet, Rfl: 3  •  metFORMIN (GLUCOPHAGE) 1000 MG tablet, TAKE 1 TABLET BY MOUTH TWICE DAILY WITH FOOD , Disp: 60 tablet, Rfl: 5  •  metoprolol succinate XL (TOPROL-XL) 25 MG 24 hr tablet, TAKE 1 TABLET BY MOUTH EVERY DAY , Disp: 30 tablet, Rfl: 5  •  omeprazole (priLOSEC) 40 MG capsule, TAKE 1 CAPSULE BY MOUTH EVERY DAY  (Patient taking differently: Take 20 mg by mouth Daily.), Disp: 90 capsule, Rfl: 5  •  simvastatin (ZOCOR) 40 MG tablet, TAKE 1 TABLET BY MOUTH EVERY NIGHT. , Disp: 30 tablet, Rfl: 11     Vitals:    04/29/21 1016   BP: 164/72   Pulse: 54   Temp: 97.8 °F (36.6 °C)   SpO2: 100%         04/29/21  1016   Weight: 64.9 kg  (143 lb)     Patient's Body mass index is 26.16 kg/m². BMI is .      Physical Exam  Constitutional:       Appearance: Normal appearance. She is well-developed.   HENT:      Head: Normocephalic and atraumatic.      Right Ear: External ear normal.      Left Ear: External ear normal.   Eyes:      Extraocular Movements: Extraocular movements intact.      Conjunctiva/sclera: Conjunctivae normal.      Pupils: Pupils are equal, round, and reactive to light.   Neck:      Vascular: No carotid bruit.   Cardiovascular:      Rate and Rhythm: Normal rate and regular rhythm.      Pulses: Normal pulses.           Dorsalis pedis pulses are 2+ on the right side and 2+ on the left side.        Posterior tibial pulses are 2+ on the right side and 2+ on the left side.      Heart sounds: Normal heart sounds. No murmur heard.   No gallop.    Pulmonary:      Effort: Pulmonary effort is normal.      Breath sounds: Normal breath sounds.   Musculoskeletal:         General: No swelling or tenderness. Normal range of motion.      Cervical back: Normal range of motion and neck supple. No rigidity.   Feet:      Right foot:      Protective Sensation: 2 sites tested. 2 sites sensed.      Left foot:      Protective Sensation: 2 sites tested. 2 sites sensed.   Skin:     General: Skin is warm and dry.   Neurological:      General: No focal deficit present.      Mental Status: She is alert and oriented to person, place, and time.   Psychiatric:         Mood and Affect: Mood normal.         Behavior: Behavior normal.               Assessment/Plan   Diagnoses and all orders for this visit:    1. Encounter for diabetic foot exam (CMS/HCC) (Primary)  -     POC Glycosylated Hemoglobin (Hb A1C)    2. Type 2 diabetes mellitus without complication, without long-term current use of insulin (CMS/[facility])    3. Benign essential HTN      Patient has reasonably well-controlled diabetes at this point blood pressure is high from a systolic standpoint we are going to  check her back in 6 months for her routine follow-up no changes in medications were made today.

## 2021-05-04 ENCOUNTER — HOSPITAL ENCOUNTER (OUTPATIENT)
Dept: MAMMOGRAPHY | Facility: HOSPITAL | Age: 86
Discharge: HOME | End: 2021-05-04
Admitting: NURSE PRACTITIONER

## 2021-05-04 PROCEDURE — 77063 BREAST TOMOSYNTHESIS BI: CPT

## 2021-05-04 PROCEDURE — 77067 SCR MAMMO BI INCL CAD: CPT

## 2021-06-23 DIAGNOSIS — I48.19 PERSISTENT ATRIAL FIBRILLATION (HCC): ICD-10-CM

## 2021-06-23 RX ORDER — METOPROLOL SUCCINATE 25 MG/1
TABLET, EXTENDED RELEASE ORAL
Qty: 90 TABLET | Refills: 3 | Status: SHIPPED | OUTPATIENT
Start: 2021-06-23 | End: 2022-03-08

## 2021-06-23 RX ORDER — HYDROCHLOROTHIAZIDE 12.5 MG/1
CAPSULE, GELATIN COATED ORAL
Qty: 30 CAPSULE | Refills: 5 | Status: CANCELLED | OUTPATIENT
Start: 2021-06-23

## 2021-06-23 RX ORDER — HYDROCHLOROTHIAZIDE 12.5 MG/1
CAPSULE, GELATIN COATED ORAL
Qty: 90 CAPSULE | Refills: 3 | Status: SHIPPED | OUTPATIENT
Start: 2021-06-23 | End: 2022-07-05

## 2021-06-23 RX ORDER — APIXABAN 5 MG/1
TABLET, FILM COATED ORAL
Qty: 180 TABLET | Refills: 3 | Status: SHIPPED | OUTPATIENT
Start: 2021-06-23 | End: 2022-07-20

## 2021-06-23 RX ORDER — FENOFIBRATE 145 MG/1
TABLET, COATED ORAL
Qty: 90 TABLET | Refills: 3 | Status: SHIPPED | OUTPATIENT
Start: 2021-06-23 | End: 2022-07-05

## 2021-07-01 RX ORDER — DULAGLUTIDE 0.75 MG/.5ML
INJECTION, SOLUTION SUBCUTANEOUS
Qty: 6 ML | Refills: 5 | Status: SHIPPED | OUTPATIENT
Start: 2021-07-01 | End: 2021-11-03 | Stop reason: DRUGHIGH

## 2021-09-23 ENCOUNTER — OFFICE VISIT (OUTPATIENT)
Dept: CARDIOLOGY | Facility: CLINIC | Age: 86
End: 2021-09-23

## 2021-09-23 VITALS
OXYGEN SATURATION: 99 % | SYSTOLIC BLOOD PRESSURE: 134 MMHG | WEIGHT: 144 LBS | DIASTOLIC BLOOD PRESSURE: 70 MMHG | BODY MASS INDEX: 26.5 KG/M2 | HEART RATE: 80 BPM | HEIGHT: 62 IN

## 2021-09-23 DIAGNOSIS — I10 BENIGN ESSENTIAL HTN: ICD-10-CM

## 2021-09-23 DIAGNOSIS — E78.2 MIXED HYPERLIPIDEMIA: ICD-10-CM

## 2021-09-23 DIAGNOSIS — I48.19 PERSISTENT ATRIAL FIBRILLATION (HCC): Primary | ICD-10-CM

## 2021-09-23 PROCEDURE — 93000 ELECTROCARDIOGRAM COMPLETE: CPT | Performed by: INTERNAL MEDICINE

## 2021-09-23 PROCEDURE — 99214 OFFICE O/P EST MOD 30 MIN: CPT | Performed by: INTERNAL MEDICINE

## 2021-09-23 RX ORDER — HYDRALAZINE HYDROCHLORIDE 100 MG/1
TABLET, FILM COATED ORAL
Qty: 90 TABLET | Refills: 11 | Status: SHIPPED | OUTPATIENT
Start: 2021-09-23 | End: 2022-10-19 | Stop reason: SDUPTHER

## 2021-09-23 NOTE — PROGRESS NOTES
Subjective:     Encounter Date:09/23/2021      Patient ID: Libia Perales is a 86 y.o. female with a history of persistent atrial fibrillation, hypertension, hyperlipidemia, type 2 diabetes mellitus who presents today for routine follow-up.    Chief Complaint: Here today for routine here today for routine follow-up    This patient presents today change in palpitations.  She experiences some palpitations occasionally.  Generally, symptoms are short-lived.  She denies having any associated symptoms such as lightheadedness, dizziness or syncope, chest pain, shortness of breath.  The patient says that she has somewhat of an unstable gait but uses a cane for extra support.  She did fall twice earlier this year and did not injure herself necessarily.  She has not had any recurrent falls since using her cane more regularly.  She denies having any bleeding issues on Eliquis.  No side effects from any of her other medications.  She reports good control of her blood pressure on her current medications.  No lower extremity edema, orthopnea, PND.  Weight has been stable.  Overall, she says that she is doing well from a cardiac standpoint.    Atrial Fibrillation  Presents for follow-up visit. Symptoms include palpitations (occasionally). Symptoms are negative for chest pain, dizziness, hemodynamic instability, shortness of breath and syncope. The symptoms have been stable. Past medical history includes atrial fibrillation. There are no medication compliance problems.   Hypertension  This is a chronic problem. The problem is unchanged. The problem is controlled. Associated symptoms include malaise/fatigue and palpitations (occasionally). Pertinent negatives include no chest pain, headaches, orthopnea, peripheral edema, PND or shortness of breath. There are no associated agents to hypertension. Risk factors for coronary artery disease include dyslipidemia, diabetes mellitus and post-menopausal state. Current antihypertension  treatment includes diuretics, direct vasodilators, beta blockers and angiotensin blockers. The current treatment provides significant improvement. There are no compliance problems.        Current Outpatient Medications:   •  benzonatate (TESSALON) 200 MG capsule, Take 1 capsule by mouth 3 (Three) Times a Day As Needed for Cough., Disp: 90 capsule, Rfl: 3  •  digoxin (LANOXIN) 125 MCG tablet, TAKE 1 TABLET BY MOUTH EVERY OTHER DAY (SUN TUES THU SAT) , Disp: 16 tablet, Rfl: 5  •  Eliquis 5 MG tablet tablet, TAKE 1 TABLET BY MOUTH TWICE DAILY  , Disp: 180 tablet, Rfl: 3  •  fenofibrate (TRICOR) 145 MG tablet, TAKE 1 TABLET BY MOUTH EVERY DAY , Disp: 90 tablet, Rfl: 3  •  fexofenadine (ALLEGRA) 60 MG tablet, Take 60 mg by mouth Daily., Disp: , Rfl:   •  furosemide (LASIX) 20 MG tablet, TAKE 1 TABLET BY MOUTH EVERY DAY WITH HCTZ , Disp: 90 tablet, Rfl: 3  •  glipizide (GLUCOTROL) 10 MG tablet, TAKE 1 TABLET BY MOUTH TWICE DAILY WITH FOOD , Disp: 60 tablet, Rfl: 11  •  glucose blood test strip, Use as instructed, Disp: 100 each, Rfl: 12  •  hydrALAZINE (APRESOLINE) 100 MG tablet, TAKE 1 TABLET BY MOUTH THREE TIMES A DAY , Disp: 90 tablet, Rfl: 11  •  hydroCHLOROthiazide (MICROZIDE) 12.5 MG capsule, TAKE 1 CAPSULE BY MOUTH EVERY DAY , Disp: 90 capsule, Rfl: 3  •  losartan (COZAAR) 100 MG tablet, TAKE 1 TABLET BY MOUTH DAILY. , Disp: 90 tablet, Rfl: 3  •  metFORMIN (GLUCOPHAGE) 1000 MG tablet, TAKE 1 TABLET BY MOUTH TWICE DAILY WITH FOOD , Disp: 180 tablet, Rfl: 3  •  metoprolol succinate XL (TOPROL-XL) 25 MG 24 hr tablet, TAKE 1 TABLET BY MOUTH EVERY DAY , Disp: 90 tablet, Rfl: 3  •  omeprazole (priLOSEC) 40 MG capsule, TAKE 1 CAPSULE BY MOUTH EVERY DAY  (Patient taking differently: Take 20 mg by mouth Daily.), Disp: 90 capsule, Rfl: 5  •  simvastatin (ZOCOR) 40 MG tablet, TAKE 1 TABLET BY MOUTH EVERY NIGHT. , Disp: 30 tablet, Rfl: 11  •  Trulicity 0.75 MG/0.5ML solution pen-injector, INJECT 0.75 MG UNDER THE SKIN INTO THE  APPROPRIATE AREA AS DIRECTED 1 (ONE) TIME PER WEEK. , Disp: 6 mL, Rfl: 5    Allergies   Allergen Reactions   • Singulair [Montelukast] Cough   • Ace Inhibitors Cough     Social History     Tobacco Use   • Smoking status: Never Smoker   • Smokeless tobacco: Never Used   Substance Use Topics   • Alcohol use: No     Review of Systems   Constitutional: Positive for malaise/fatigue. Negative for fever and weight loss.   Cardiovascular: Positive for palpitations (occasionally). Negative for chest pain, dyspnea on exertion, leg swelling, orthopnea, paroxysmal nocturnal dyspnea and syncope.   Respiratory: Negative for cough, shortness of breath and wheezing.    Hematologic/Lymphatic: Negative for adenopathy and bleeding problem.   Musculoskeletal: Positive for arthritis, falls (none recently but a few in the past year) and joint pain.   Gastrointestinal: Negative for abdominal pain, hematemesis, hematochezia, melena, nausea and vomiting.   Neurological: Negative for dizziness, headaches and loss of balance.       ECG 12 Lead    Date/Time: 9/23/2021 10:32 AM  Performed by: Tone Coello MD  Authorized by: Tone Coello MD   Comparison: compared with previous ECG from 3/22/2022  Similar to previous ECG  Rhythm: atrial fibrillation  Rate: normal  BPM: 76  Conduction: left anterior fascicular block  QRS axis: left  Other findings: non-specific ST-T wave changes and left ventricular hypertrophy    Clinical impression: abnormal EKG               Objective:     Vitals reviewed.   Constitutional:       General: Not in acute distress.     Appearance: Well-developed. Chronically ill-appearing.   Eyes:      Extraocular Movements: Extraocular movements intact.      Pupils: Pupils are equal, round, and reactive to light.   HENT:      Head: Normocephalic and atraumatic.    Mouth/Throat:      Pharynx: Oropharynx is clear.   Neck:      Thyroid: No thyroid mass.      Vascular: No JVD.   Pulmonary:      Effort: Pulmonary  "effort is normal.      Breath sounds: Normal breath sounds. No wheezing. No rhonchi. No rales.   Cardiovascular:      Normal rate. Irregularly irregular rhythm.      Murmurs: There is no murmur.      No gallop.   Pulses:     Intact distal pulses.   Edema:     Peripheral edema absent.   Abdominal:      General: Bowel sounds are normal. There is no distension.      Palpations: Abdomen is soft.      Tenderness: There is no abdominal tenderness.   Musculoskeletal: Normal range of motion.      Extremities: No clubbing present.     Cervical back: Normal range of motion and neck supple. Skin:     General: Skin is warm and dry. There is no cyanosis.      Findings: No erythema or rash.   Neurological:      Mental Status: Alert and oriented to person, place, and time.      Cranial Nerves: No cranial nerve deficit.       /70   Pulse 80   Ht 157.5 cm (62\")   Wt 65.3 kg (144 lb)   SpO2 99%   BMI 26.34 kg/m²     Data/Lab Review:     Lab Results   Component Value Date    GLUCOSE 266 (H) 02/05/2016    CALCIUM 9.3 01/04/2021     01/04/2021    K 4.1 01/04/2021    CO2 26.8 01/04/2021     01/04/2021    BUN 37 (H) 01/04/2021    CREATININE 1.37 (H) 01/04/2021    EGFRIFAFRI 44 (L) 01/04/2021    EGFRIFNONA 37 (L) 01/04/2021    BCR 27.0 (H) 01/04/2021    ANIONGAP 10 02/05/2016     Lab Results   Component Value Date    WBC 5.77 01/04/2021    HGB 10.5 (L) 01/04/2021    HCT 33.4 (L) 01/04/2021    MCV 86.3 01/04/2021     01/04/2021     Lab Results   Component Value Date    DIGOXIN 0.40 (L) 02/05/2021     Lab Results   Component Value Date    CHLPL 181 01/04/2021    TRIG 228 (H) 01/04/2021    HDL 38 (L) 01/04/2021     (H) 01/04/2021     Nuclear stress test on 10/13/2020: Low risk for ischemia with normal systolic function noted.        Assessment:          Diagnosis Plan   1. Persistent atrial fibrillation (CMS/HCC)  ECG 12 Lead   2. Benign essential HTN     3. Mixed hyperlipidemia            Plan:       1.  " Persistent atrial fibrillation: Heart rate is well controlled and the patient is minimally symptomatic at this time.  She remains on digoxin with most recent digoxin level noted above.  She also remains chronically anticoagulated.  While she is greater than 80 years of age, her weight is over 60 kg and her creatinine is less than 1.5 on the last check, therefore 5 mg twice daily would be the appropriate dose of Eliquis for now.  We did discuss that this may change at some point in future if her weight changes or creatinine increases.  Otherwise, continue current medications.    2.  Essential hypertension: Blood pressure remains well controlled.  Continue current medications.    3.  Mixed hyperlipidemia: LDL cholesterol is reasonably well controlled, especially given this patient's advanced age.  She is tolerating statin therapy well.  Would not recommend any significant changes at this time.    We will see the patient back in 12 months unless otherwise needed sooner.

## 2021-09-29 RX ORDER — BENZONATATE 200 MG/1
CAPSULE ORAL
Qty: 90 CAPSULE | Refills: 3 | Status: SHIPPED | OUTPATIENT
Start: 2021-09-29 | End: 2022-02-21

## 2021-09-29 NOTE — TELEPHONE ENCOUNTER
Rx Refill Note  Requested Prescriptions     Pending Prescriptions Disp Refills   • benzonatate (TESSALON) 200 MG capsule [Pharmacy Med Name: BENZONATATE 200MG CAPSULE] 90 capsule 3     Sig: TAKE 1 CAPSULE BY MOUTH THREE TIMES A DAY AS NEEDED FOR COUGH      Last office visit with prescribing clinician: 3/4/2021      Next office visit with prescribing clinician: 3/10/2022            Maren Dunn [facility] Rep  09/29/21, 13:55 CDT

## 2021-11-03 ENCOUNTER — OFFICE VISIT (OUTPATIENT)
Dept: INTERNAL MEDICINE | Facility: CLINIC | Age: 86
End: 2021-11-03

## 2021-11-03 VITALS
TEMPERATURE: 97.9 F | DIASTOLIC BLOOD PRESSURE: 80 MMHG | HEIGHT: 62 IN | SYSTOLIC BLOOD PRESSURE: 140 MMHG | BODY MASS INDEX: 26.5 KG/M2 | HEART RATE: 67 BPM | WEIGHT: 144 LBS | OXYGEN SATURATION: 99 %

## 2021-11-03 DIAGNOSIS — Z23 FLU VACCINE NEED: ICD-10-CM

## 2021-11-03 DIAGNOSIS — I10 BENIGN ESSENTIAL HTN: ICD-10-CM

## 2021-11-03 DIAGNOSIS — I48.19 PERSISTENT ATRIAL FIBRILLATION (HCC): ICD-10-CM

## 2021-11-03 DIAGNOSIS — E11.65 TYPE 2 DIABETES MELLITUS WITH HYPERGLYCEMIA, WITHOUT LONG-TERM CURRENT USE OF INSULIN (HCC): ICD-10-CM

## 2021-11-03 DIAGNOSIS — E78.2 MIXED HYPERLIPIDEMIA: ICD-10-CM

## 2021-11-03 LAB — HBA1C MFR BLD: 9.1 %

## 2021-11-03 PROCEDURE — 83036 HEMOGLOBIN GLYCOSYLATED A1C: CPT | Performed by: INTERNAL MEDICINE

## 2021-11-03 PROCEDURE — 99214 OFFICE O/P EST MOD 30 MIN: CPT | Performed by: INTERNAL MEDICINE

## 2021-11-03 PROCEDURE — 3046F HEMOGLOBIN A1C LEVEL >9.0%: CPT | Performed by: INTERNAL MEDICINE

## 2021-11-03 PROCEDURE — 90662 IIV NO PRSV INCREASED AG IM: CPT | Performed by: INTERNAL MEDICINE

## 2021-11-03 PROCEDURE — G0008 ADMIN INFLUENZA VIRUS VAC: HCPCS | Performed by: INTERNAL MEDICINE

## 2021-11-03 RX ORDER — DULAGLUTIDE 1.5 MG/.5ML
1.5 INJECTION, SOLUTION SUBCUTANEOUS WEEKLY
Qty: 4 PEN | Refills: 2 | Status: SHIPPED | OUTPATIENT
Start: 2021-11-03 | End: 2022-06-06

## 2021-11-03 NOTE — PROGRESS NOTES
Subjective   Libia Perales is a 86 y.o. female.   Chief Complaint   Patient presents with   • Hypertension   • Diabetes     a1c: 9.1       History of Present Illness this is a follow-up for hypertension and diabetes.  Patient admits to not adhering to diabetic diet she brings in readings that are excellent on 1 week and then the next week should be running in the 200s.  She has not had any of her medications this morning.    The following portions of the patient's history were reviewed and updated as appropriate: allergies, current medications, past family history, past medical history, past social history, past surgical history and problem list.    Review of Systems   Constitutional: Negative for activity change, appetite change, fatigue, fever, unexpected weight gain and unexpected weight loss.   HENT: Negative for swollen glands, trouble swallowing and voice change.    Eyes: Negative for blurred vision and visual disturbance.   Respiratory: Negative for cough and shortness of breath.    Cardiovascular: Negative for chest pain, palpitations and leg swelling.   Gastrointestinal: Negative for abdominal pain, constipation, diarrhea, nausea, vomiting and indigestion.   Endocrine: Negative for cold intolerance, heat intolerance, polydipsia and polyphagia.   Genitourinary: Negative for dysuria and frequency.   Musculoskeletal: Positive for arthralgias. Negative for back pain, joint swelling and neck pain.   Skin: Negative for color change, rash and skin lesions.   Neurological: Negative for dizziness, weakness, headache, memory problem and confusion.   Hematological: Does not bruise/bleed easily.   Psychiatric/Behavioral: Negative for agitation, hallucinations and suicidal ideas. The patient is not nervous/anxious.        Objective   Past Medical History:   Diagnosis Date   • Atrial fibrillation (HCC)    • Diabetes mellitus (HCC)    • GERD (gastroesophageal reflux disease)    • High cholesterol    • Hypertension    •  OA (osteoarthritis)       Past Surgical History:   Procedure Laterality Date   • BREAST BIOPSY Right    • BREAST CYST ASPIRATION     • HYSTERECTOMY     • OOPHORECTOMY     • TONSILLECTOMY          Current Outpatient Medications:   •  benzonatate (TESSALON) 200 MG capsule, TAKE 1 CAPSULE BY MOUTH THREE TIMES A DAY AS NEEDED FOR COUGH , Disp: 90 capsule, Rfl: 3  •  digoxin (LANOXIN) 125 MCG tablet, TAKE 1 TABLET BY MOUTH EVERY OTHER DAY (SUN TUES THU SAT) , Disp: 16 tablet, Rfl: 5  •  Eliquis 5 MG tablet tablet, TAKE 1 TABLET BY MOUTH TWICE DAILY  , Disp: 180 tablet, Rfl: 3  •  fenofibrate (TRICOR) 145 MG tablet, TAKE 1 TABLET BY MOUTH EVERY DAY , Disp: 90 tablet, Rfl: 3  •  fexofenadine (ALLEGRA) 60 MG tablet, Take 60 mg by mouth Daily., Disp: , Rfl:   •  furosemide (LASIX) 20 MG tablet, TAKE 1 TABLET BY MOUTH EVERY DAY WITH HCTZ , Disp: 90 tablet, Rfl: 3  •  glipizide (GLUCOTROL) 10 MG tablet, TAKE 1 TABLET BY MOUTH TWICE DAILY WITH FOOD , Disp: 60 tablet, Rfl: 11  •  glucose blood test strip, Use as instructed, Disp: 100 each, Rfl: 12  •  hydrALAZINE (APRESOLINE) 100 MG tablet, TAKE 1 TABLET BY MOUTH THREE TIMES A DAY , Disp: 90 tablet, Rfl: 11  •  hydroCHLOROthiazide (MICROZIDE) 12.5 MG capsule, TAKE 1 CAPSULE BY MOUTH EVERY DAY , Disp: 90 capsule, Rfl: 3  •  losartan (COZAAR) 100 MG tablet, TAKE 1 TABLET BY MOUTH DAILY. , Disp: 90 tablet, Rfl: 3  •  metFORMIN (GLUCOPHAGE) 1000 MG tablet, TAKE 1 TABLET BY MOUTH TWICE DAILY WITH FOOD , Disp: 180 tablet, Rfl: 3  •  metoprolol succinate XL (TOPROL-XL) 25 MG 24 hr tablet, TAKE 1 TABLET BY MOUTH EVERY DAY , Disp: 90 tablet, Rfl: 3  •  omeprazole (priLOSEC) 40 MG capsule, TAKE 1 CAPSULE BY MOUTH EVERY DAY  (Patient taking differently: Take 20 mg by mouth Daily.), Disp: 90 capsule, Rfl: 5  •  simvastatin (ZOCOR) 40 MG tablet, TAKE 1 TABLET BY MOUTH EVERY NIGHT. , Disp: 30 tablet, Rfl: 11  •  Trulicity 0.75 MG/0.5ML solution pen-injector, INJECT 0.75 MG UNDER THE SKIN INTO  THE APPROPRIATE AREA AS DIRECTED 1 (ONE) TIME PER WEEK. , Disp: 6 mL, Rfl: 5      Vitals:    11/03/21 1019   BP: 140/80   Pulse:    Temp:    SpO2:          11/03/21  1007   Weight: 65.3 kg (144 lb)       Body mass index is 26.34 kg/m².    Physical Exam  Constitutional:       Appearance: She is well-developed.   HENT:      Head: Normocephalic and atraumatic.   Eyes:      Pupils: Pupils are equal, round, and reactive to light.   Cardiovascular:      Rate and Rhythm: Normal rate. Rhythm irregular.   Pulmonary:      Effort: Pulmonary effort is normal.      Breath sounds: Normal breath sounds.   Abdominal:      General: Bowel sounds are normal.      Palpations: Abdomen is soft.   Musculoskeletal:         General: Normal range of motion.      Cervical back: Normal range of motion and neck supple.   Skin:     General: Skin is warm and dry.   Neurological:      Mental Status: She is alert and oriented to person, place, and time.   Psychiatric:         Behavior: Behavior normal.               Assessment/Plan   Diagnoses and all orders for this visit:    1. Mixed hyperlipidemia (Primary)  -     ALT  -     AST  -     Lipid Panel    2. Benign essential HTN  -     Basic Metabolic Panel    3. Persistent atrial fibrillation (CMS/HCC)  -     Digoxin level; Future    4. Type 2 diabetes mellitus with hyperglycemia, without long-term current use of insulin (HCC)  -     POC Glycated Hemoglobin, Total      Patient is here for follow-up of hypertension diabetes.  She has persistent atrial fibrillation and needs a dig level.  Patient A1c indicates poor control of her diabetes related to dietary inconsistencies.  In addition to that we are going to go ahead and increase her Trulicity to 1.5.

## 2021-11-04 LAB
ALT SERPL-CCNC: 10 U/L (ref 1–33)
AST SERPL-CCNC: 17 U/L (ref 1–32)
BUN SERPL-MCNC: 32 MG/DL (ref 8–23)
BUN/CREAT SERPL: 22.2 (ref 7–25)
CALCIUM SERPL-MCNC: 9.8 MG/DL (ref 8.6–10.5)
CHLORIDE SERPL-SCNC: 99 MMOL/L (ref 98–107)
CHOLEST SERPL-MCNC: 185 MG/DL (ref 0–200)
CO2 SERPL-SCNC: 25.9 MMOL/L (ref 22–29)
CREAT SERPL-MCNC: 1.44 MG/DL (ref 0.57–1)
GLUCOSE SERPL-MCNC: 197 MG/DL (ref 65–99)
HDLC SERPL-MCNC: 34 MG/DL (ref 40–60)
LDLC SERPL CALC-MCNC: 100 MG/DL (ref 0–100)
POTASSIUM SERPL-SCNC: 4.2 MMOL/L (ref 3.5–5.2)
SODIUM SERPL-SCNC: 134 MMOL/L (ref 136–145)
TRIGL SERPL-MCNC: 300 MG/DL (ref 0–150)
VLDLC SERPL CALC-MCNC: 51 MG/DL (ref 5–40)

## 2021-11-11 RX ORDER — DIGOXIN 125 MCG
TABLET ORAL
Qty: 16 TABLET | Refills: 5 | Status: SHIPPED | OUTPATIENT
Start: 2021-11-11 | End: 2022-05-11

## 2021-12-01 RX ORDER — GLIPIZIDE 10 MG/1
TABLET ORAL
Qty: 60 TABLET | Refills: 2 | Status: SHIPPED | OUTPATIENT
Start: 2021-12-01 | End: 2022-02-21

## 2021-12-01 RX ORDER — FUROSEMIDE 20 MG/1
TABLET ORAL
Qty: 30 TABLET | Refills: 2 | Status: SHIPPED | OUTPATIENT
Start: 2021-12-01 | End: 2022-02-21

## 2021-12-15 ENCOUNTER — OFFICE VISIT (OUTPATIENT)
Dept: INTERNAL MEDICINE | Facility: CLINIC | Age: 86
End: 2021-12-15

## 2021-12-15 ENCOUNTER — HOSPITAL ENCOUNTER (OUTPATIENT)
Dept: ULTRASOUND IMAGING | Facility: HOSPITAL | Age: 86
Discharge: HOME | End: 2021-12-15
Admitting: INTERNAL MEDICINE

## 2021-12-15 VITALS
BODY MASS INDEX: 26.31 KG/M2 | OXYGEN SATURATION: 98 % | HEART RATE: 68 BPM | DIASTOLIC BLOOD PRESSURE: 72 MMHG | TEMPERATURE: 96.8 F | SYSTOLIC BLOOD PRESSURE: 150 MMHG | WEIGHT: 143 LBS | HEIGHT: 62 IN

## 2021-12-15 DIAGNOSIS — F01.50 VASCULAR DEMENTIA WITHOUT BEHAVIORAL DISTURBANCE (HCC): ICD-10-CM

## 2021-12-15 DIAGNOSIS — R09.89 OTHER SPECIFIED SYMPTOMS AND SIGNS INVOLVING THE CIRCULATORY AND RESPIRATORY SYSTEMS: ICD-10-CM

## 2021-12-15 DIAGNOSIS — F01.50 VASCULAR DEMENTIA WITHOUT BEHAVIORAL DISTURBANCE (HCC): Primary | ICD-10-CM

## 2021-12-15 DIAGNOSIS — E78.5 HYPERLIPIDEMIA, UNSPECIFIED HYPERLIPIDEMIA TYPE: ICD-10-CM

## 2021-12-15 DIAGNOSIS — E11.65 TYPE 2 DIABETES MELLITUS WITH HYPERGLYCEMIA, WITHOUT LONG-TERM CURRENT USE OF INSULIN (HCC): ICD-10-CM

## 2021-12-15 DIAGNOSIS — F03.90 DEMENTIA WITHOUT BEHAVIORAL DISTURBANCE, UNSPECIFIED DEMENTIA TYPE: ICD-10-CM

## 2021-12-15 LAB — HBA1C MFR BLD: 8.8 %

## 2021-12-15 PROCEDURE — 93880 EXTRACRANIAL BILAT STUDY: CPT

## 2021-12-15 PROCEDURE — 99214 OFFICE O/P EST MOD 30 MIN: CPT | Performed by: INTERNAL MEDICINE

## 2021-12-15 PROCEDURE — 83036 HEMOGLOBIN GLYCOSYLATED A1C: CPT | Performed by: INTERNAL MEDICINE

## 2021-12-15 PROCEDURE — 3052F HG A1C>EQUAL 8.0%<EQUAL 9.0%: CPT | Performed by: INTERNAL MEDICINE

## 2021-12-15 NOTE — PROGRESS NOTES
Subjective   Libia Perales is a 86 y.o. female.   Chief Complaint   Patient presents with   • Memory Loss     son states he has noticed in the last 3 weeks pt memory seems to have decreased . things that she has been doing for an extened period of time seem to be diffuclt for her to remeber as occasion. as well as someitmes conversations that were had a couple days befrore are not remembered.    • Diabetes     A1C - 8.8       History of Present Illness patient is here with her son for follow-up son is giving me a typewritten message about memory disorder which seems to be progressively worse.  She is now forgetting how the TV works specifically she uses [employer] and cannot remember how that works.  In addition to that the son tells me that they find candy scattered throughout the house in addition to that she has quite a bit of sugary fruits.    The following portions of the patient's history were reviewed and updated as appropriate: allergies, current medications, past family history, past medical history, past social history, past surgical history and problem list.    Review of Systems   Constitutional: Negative for activity change, appetite change, fatigue, fever, unexpected weight gain and unexpected weight loss.   HENT: Negative for swollen glands, trouble swallowing and voice change.    Eyes: Negative for blurred vision and visual disturbance.   Respiratory: Negative for cough and shortness of breath.    Cardiovascular: Negative for chest pain, palpitations and leg swelling.   Gastrointestinal: Negative for abdominal pain, constipation, diarrhea, nausea, vomiting and indigestion.   Endocrine: Negative for cold intolerance, heat intolerance, polydipsia and polyphagia.   Genitourinary: Negative for dysuria and frequency.   Musculoskeletal: Negative for arthralgias, back pain, joint swelling and neck pain.   Skin: Negative for color change, rash and skin lesions.   Neurological: Positive for memory problem and  confusion. Negative for dizziness, weakness and headache.   Hematological: Does not bruise/bleed easily.   Psychiatric/Behavioral: Negative for agitation, hallucinations and suicidal ideas. The patient is not nervous/anxious.        Objective   Past Medical History:   Diagnosis Date   • Atrial fibrillation (HCC)    • Diabetes mellitus (HCC)    • GERD (gastroesophageal reflux disease)    • High cholesterol    • Hypertension    • OA (osteoarthritis)       Past Surgical History:   Procedure Laterality Date   • BREAST BIOPSY Right    • BREAST CYST ASPIRATION     • HYSTERECTOMY     • OOPHORECTOMY     • TONSILLECTOMY          Current Outpatient Medications:   •  benzonatate (TESSALON) 200 MG capsule, TAKE 1 CAPSULE BY MOUTH THREE TIMES A DAY AS NEEDED FOR COUGH , Disp: 90 capsule, Rfl: 3  •  digoxin (LANOXIN) 125 MCG tablet, TAKE 1 TABLET BY MOUTH EVERY OTHER DAY (SUN TUES THU SAT), Disp: 16 tablet, Rfl: 5  •  Dulaglutide (Trulicity) 1.5 MG/0.5ML solution pen-injector, Inject 1.5 mg under the skin into the appropriate area as directed 1 (One) Time Per Week., Disp: 4 pen, Rfl: 2  •  Eliquis 5 MG tablet tablet, TAKE 1 TABLET BY MOUTH TWICE DAILY  , Disp: 180 tablet, Rfl: 3  •  fenofibrate (TRICOR) 145 MG tablet, TAKE 1 TABLET BY MOUTH EVERY DAY , Disp: 90 tablet, Rfl: 3  •  fexofenadine (ALLEGRA) 60 MG tablet, Take 60 mg by mouth Daily., Disp: , Rfl:   •  furosemide (LASIX) 20 MG tablet, TAKE 1 TABLET BY MOUTH EVERY DAY WITH HCTZ, Disp: 30 tablet, Rfl: 2  •  glipizide (GLUCOTROL) 10 MG tablet, TAKE 1 TABLET BY MOUTH TWICE DAILY WITH FOOD, Disp: 60 tablet, Rfl: 2  •  glucose blood test strip, Use as instructed, Disp: 100 each, Rfl: 12  •  hydrALAZINE (APRESOLINE) 100 MG tablet, TAKE 1 TABLET BY MOUTH THREE TIMES A DAY , Disp: 90 tablet, Rfl: 11  •  hydroCHLOROthiazide (MICROZIDE) 12.5 MG capsule, TAKE 1 CAPSULE BY MOUTH EVERY DAY , Disp: 90 capsule, Rfl: 3  •  losartan (COZAAR) 100 MG tablet, TAKE 1 TABLET BY MOUTH DAILY. ,  Disp: 90 tablet, Rfl: 3  •  metFORMIN (GLUCOPHAGE) 1000 MG tablet, TAKE 1 TABLET BY MOUTH TWICE DAILY WITH FOOD , Disp: 180 tablet, Rfl: 3  •  metoprolol succinate XL (TOPROL-XL) 25 MG 24 hr tablet, TAKE 1 TABLET BY MOUTH EVERY DAY , Disp: 90 tablet, Rfl: 3  •  omeprazole (priLOSEC) 40 MG capsule, TAKE 1 CAPSULE BY MOUTH EVERY DAY  (Patient taking differently: Take 20 mg by mouth Daily.), Disp: 90 capsule, Rfl: 5  •  simvastatin (ZOCOR) 40 MG tablet, TAKE 1 TABLET BY MOUTH EVERY NIGHT. , Disp: 30 tablet, Rfl: 11      Vitals:    12/15/21 0843   BP: 150/72   Pulse: 68   Temp: 96.8 °F (36 °C)   SpO2: 98%         12/15/21  0843   Weight: 64.9 kg (143 lb)       Body mass index is 26.16 kg/m².    Physical Exam  Constitutional:       Appearance: She is well-developed.   HENT:      Head: Normocephalic and atraumatic.   Eyes:      Pupils: Pupils are equal, round, and reactive to light.   Cardiovascular:      Rate and Rhythm: Normal rate and regular rhythm.   Pulmonary:      Effort: Pulmonary effort is normal.      Breath sounds: Normal breath sounds.   Abdominal:      General: Bowel sounds are normal.      Palpations: Abdomen is soft.   Musculoskeletal:         General: Normal range of motion.      Cervical back: Normal range of motion and neck supple.   Skin:     General: Skin is warm and dry.   Neurological:      Mental Status: She is alert and oriented to person, place, and time.   Psychiatric:         Behavior: Behavior normal.               Assessment/Plan   Diagnoses and all orders for this visit:    1. Vascular dementia without behavioral disturbance (HCC) (Primary)  -     Vitamin B12  -     TSH  -     T4, Free  -     T3, Free  -     MRI Brain With & Without Contrast; Future  -     US Carotid Bilateral; Future    2. Hyperlipidemia, unspecified hyperlipidemia type   -     TSH    3. Dementia without behavioral disturbance, unspecified dementia type (HCC)   -     T4, Free    4. Other specified symptoms and signs  involving the circulatory and respiratory systems   -     US Carotid Bilateral; Future    5. Type 2 diabetes mellitus with hyperglycemia, without long-term current use of insulin (HCC)      Patient has poorly controlled diabetes largely due to noncompliance with dietary regimen.  Son is concerned there is something new going on he seen at change in her memory recently.  Also she is planning on a small trip she has been to her sons before I did caution them about change in environment can sometimes exacerbate memory disorder particularly in people with chronic dementia.  Patient's diabetes is not under good control I am concerned about adding more medication at this point due to her dementia think severe hypoglycemic event with the more catastrophic I have encouraged the son to remove all candies sugar-containing objects from the house I have encouraged him to put fruits and cut carrots and celery in the refrigerator so she has those to much on.

## 2021-12-16 LAB
DIGOXIN SERPL-MCNC: 1.1 NG/ML (ref 0.6–1.2)
T3FREE SERPL-MCNC: 2.2 PG/ML (ref 2–4.4)
T4 FREE SERPL-MCNC: 1.45 NG/DL (ref 0.93–1.7)
TSH SERPL DL<=0.005 MIU/L-ACNC: 2.75 UIU/ML (ref 0.27–4.2)
VIT B12 SERPL-MCNC: 230 PG/ML (ref 211–946)

## 2021-12-17 ENCOUNTER — TELEPHONE (OUTPATIENT)
Dept: INTERNAL MEDICINE | Facility: CLINIC | Age: 86
End: 2021-12-17

## 2021-12-17 NOTE — TELEPHONE ENCOUNTER
----- Message from Darrion Machuca MD sent at 12/16/2021  7:42 AM CST -----  B12 is low start weekly B12 injections x3 and then monthly this could be contributing to some of her change in memory.

## 2021-12-20 DIAGNOSIS — D51.0 PERNICIOUS ANEMIA: Primary | ICD-10-CM

## 2021-12-20 RX ORDER — SYRINGE W-NEEDLE,DISPOSAB,3 ML 25GX5/8"
SYRINGE, EMPTY DISPOSABLE MISCELLANEOUS
Qty: 15 EACH | Refills: 0 | Status: SHIPPED | OUTPATIENT
Start: 2021-12-20

## 2021-12-20 RX ORDER — CYANOCOBALAMIN 1000 UG/ML
INJECTION, SOLUTION INTRAMUSCULAR; SUBCUTANEOUS
Qty: 10 ML | Refills: 1 | Status: SHIPPED | OUTPATIENT
Start: 2021-12-20 | End: 2023-01-16

## 2021-12-29 ENCOUNTER — TELEPHONE (OUTPATIENT)
Dept: INTERNAL MEDICINE | Facility: CLINIC | Age: 86
End: 2021-12-29

## 2021-12-29 DIAGNOSIS — F41.8 TEST ANXIETY: Primary | ICD-10-CM

## 2022-01-04 RX ORDER — ALPRAZOLAM 0.25 MG/1
TABLET ORAL
Qty: 1 TABLET | Refills: 0 | Status: SHIPPED | OUTPATIENT
Start: 2022-01-04 | End: 2022-01-17

## 2022-01-07 ENCOUNTER — HOSPITAL ENCOUNTER (OUTPATIENT)
Dept: MRI IMAGING | Facility: HOSPITAL | Age: 87
Discharge: HOME | End: 2022-01-07
Admitting: INTERNAL MEDICINE

## 2022-01-07 DIAGNOSIS — F01.50 VASCULAR DEMENTIA WITHOUT BEHAVIORAL DISTURBANCE: ICD-10-CM

## 2022-01-07 LAB — CREAT BLDA-MCNC: 1.6 MG/DL (ref 0.6–1.3)

## 2022-01-07 PROCEDURE — A9577 INJ MULTIHANCE: HCPCS | Performed by: INTERNAL MEDICINE

## 2022-01-07 PROCEDURE — 0 GADOBENATE DIMEGLUMINE 529 MG/ML SOLUTION: Performed by: INTERNAL MEDICINE

## 2022-01-07 PROCEDURE — 82565 ASSAY OF CREATININE: CPT

## 2022-01-07 PROCEDURE — 70553 MRI BRAIN STEM W/O & W/DYE: CPT

## 2022-01-07 RX ADMIN — GADOBENATE DIMEGLUMINE 13 ML: 529 INJECTION, SOLUTION INTRAVENOUS at 13:51

## 2022-01-17 ENCOUNTER — OFFICE VISIT (OUTPATIENT)
Dept: INTERNAL MEDICINE | Facility: CLINIC | Age: 87
End: 2022-01-17

## 2022-01-17 VITALS
WEIGHT: 144 LBS | OXYGEN SATURATION: 99 % | DIASTOLIC BLOOD PRESSURE: 74 MMHG | BODY MASS INDEX: 26.5 KG/M2 | HEART RATE: 74 BPM | TEMPERATURE: 96.9 F | SYSTOLIC BLOOD PRESSURE: 160 MMHG | HEIGHT: 62 IN

## 2022-01-17 DIAGNOSIS — E11.65 TYPE 2 DIABETES MELLITUS WITH HYPERGLYCEMIA, WITHOUT LONG-TERM CURRENT USE OF INSULIN: ICD-10-CM

## 2022-01-17 DIAGNOSIS — I10 BENIGN ESSENTIAL HTN: Primary | ICD-10-CM

## 2022-01-17 DIAGNOSIS — I63.531: ICD-10-CM

## 2022-01-17 DIAGNOSIS — K21.9 GERD WITHOUT ESOPHAGITIS: ICD-10-CM

## 2022-01-17 PROCEDURE — 99214 OFFICE O/P EST MOD 30 MIN: CPT | Performed by: INTERNAL MEDICINE

## 2022-01-17 RX ORDER — OMEPRAZOLE 40 MG/1
CAPSULE, DELAYED RELEASE ORAL
Qty: 90 CAPSULE | Refills: 5 | Status: SHIPPED | OUTPATIENT
Start: 2022-01-17 | End: 2023-02-01

## 2022-01-17 NOTE — PROGRESS NOTES
Arian Perales is a 87 y.o. female.   Chief Complaint   Patient presents with   • Follow-up     4 week follow up last seen on 12/15/2021 due to memory issues and diabetic issues    • review mri       History of Present Illness patient is here to follow-up on a recent MRI as well as her memory disorder.  Patient has poorly controlled diabetes and continues to eat sugars and bad carbs.  Her son is accompanying her this morning.    The following portions of the patient's history were reviewed and updated as appropriate: allergies, current medications, past family history, past medical history, past social history, past surgical history and problem list.    Review of Systems   Constitutional: Negative for activity change, appetite change, fatigue, fever, unexpected weight gain and unexpected weight loss.   HENT: Negative for swollen glands, trouble swallowing and voice change.    Eyes: Negative for blurred vision and visual disturbance.   Respiratory: Negative for cough and shortness of breath.    Cardiovascular: Negative for chest pain, palpitations and leg swelling.   Gastrointestinal: Negative for abdominal pain, constipation, diarrhea, nausea, vomiting and indigestion.   Endocrine: Negative for cold intolerance, heat intolerance, polydipsia and polyphagia.   Genitourinary: Negative for dysuria and frequency.   Musculoskeletal: Negative for arthralgias, back pain, joint swelling and neck pain.   Skin: Negative for color change, rash and skin lesions.   Neurological: Positive for memory problem. Negative for dizziness, weakness, headache and confusion.   Hematological: Does not bruise/bleed easily.   Psychiatric/Behavioral: Negative for agitation, hallucinations and suicidal ideas. The patient is not nervous/anxious.        Objective   Past Medical History:   Diagnosis Date   • Atrial fibrillation (HCC)    • Diabetes mellitus (HCC)    • GERD (gastroesophageal reflux disease)    • High cholesterol    •  Hypertension    • OA (osteoarthritis)       Past Surgical History:   Procedure Laterality Date   • BREAST BIOPSY Right    • BREAST CYST ASPIRATION     • HYSTERECTOMY     • OOPHORECTOMY     • TONSILLECTOMY          Current Outpatient Medications:   •  benzonatate (TESSALON) 200 MG capsule, TAKE 1 CAPSULE BY MOUTH THREE TIMES A DAY AS NEEDED FOR COUGH , Disp: 90 capsule, Rfl: 3  •  cyanocobalamin 1000 MCG/ML injection, 1cc IM weekly x 3, then monthly thereafter, Disp: 10 mL, Rfl: 1  •  digoxin (LANOXIN) 125 MCG tablet, TAKE 1 TABLET BY MOUTH EVERY OTHER DAY (SUN TUES THU SAT), Disp: 16 tablet, Rfl: 5  •  Dulaglutide (Trulicity) 1.5 MG/0.5ML solution pen-injector, Inject 1.5 mg under the skin into the appropriate area as directed 1 (One) Time Per Week., Disp: 4 pen, Rfl: 2  •  Eliquis 5 MG tablet tablet, TAKE 1 TABLET BY MOUTH TWICE DAILY  , Disp: 180 tablet, Rfl: 3  •  fenofibrate (TRICOR) 145 MG tablet, TAKE 1 TABLET BY MOUTH EVERY DAY , Disp: 90 tablet, Rfl: 3  •  fexofenadine (ALLEGRA) 60 MG tablet, Take 60 mg by mouth Daily., Disp: , Rfl:   •  furosemide (LASIX) 20 MG tablet, TAKE 1 TABLET BY MOUTH EVERY DAY WITH HCTZ, Disp: 30 tablet, Rfl: 2  •  glipizide (GLUCOTROL) 10 MG tablet, TAKE 1 TABLET BY MOUTH TWICE DAILY WITH FOOD, Disp: 60 tablet, Rfl: 2  •  glucose blood test strip, Use as instructed, Disp: 100 each, Rfl: 12  •  hydrALAZINE (APRESOLINE) 100 MG tablet, TAKE 1 TABLET BY MOUTH THREE TIMES A DAY , Disp: 90 tablet, Rfl: 11  •  hydroCHLOROthiazide (MICROZIDE) 12.5 MG capsule, TAKE 1 CAPSULE BY MOUTH EVERY DAY , Disp: 90 capsule, Rfl: 3  •  losartan (COZAAR) 100 MG tablet, TAKE 1 TABLET BY MOUTH DAILY. , Disp: 90 tablet, Rfl: 3  •  metFORMIN (GLUCOPHAGE) 1000 MG tablet, TAKE 1 TABLET BY MOUTH TWICE DAILY WITH FOOD , Disp: 180 tablet, Rfl: 3  •  metoprolol succinate XL (TOPROL-XL) 25 MG 24 hr tablet, TAKE 1 TABLET BY MOUTH EVERY DAY , Disp: 90 tablet, Rfl: 3  •  simvastatin (ZOCOR) 40 MG tablet, TAKE 1  "TABLET BY MOUTH EVERY NIGHT. , Disp: 30 tablet, Rfl: 11  •  Syringe 25G X 1\" 3 ML misc, Weekly injection x 3, then monthly thereafter, Disp: 15 each, Rfl: 0  •  omeprazole (priLOSEC) 40 MG capsule, TAKE 1 CAPSULE BY MOUTH EVERY DAY, Disp: 90 capsule, Rfl: 5      Vitals:    01/17/22 0955   BP: 160/74   Pulse:    Temp:    SpO2:          01/17/22  0943   Weight: 65.3 kg (144 lb)       Body mass index is 26.34 kg/m².    Physical Exam  Constitutional:       Appearance: She is well-developed.   HENT:      Head: Normocephalic and atraumatic.   Eyes:      Pupils: Pupils are equal, round, and reactive to light.   Cardiovascular:      Rate and Rhythm: Normal rate and regular rhythm.   Pulmonary:      Effort: Pulmonary effort is normal.      Breath sounds: Normal breath sounds.   Abdominal:      General: Bowel sounds are normal.      Palpations: Abdomen is soft.   Musculoskeletal:         General: Normal range of motion.      Cervical back: Normal range of motion and neck supple.   Skin:     General: Skin is warm and dry.   Neurological:      Mental Status: She is alert and oriented to person, place, and time.   Psychiatric:         Behavior: Behavior normal.               Assessment/Plan   Diagnoses and all orders for this visit:    1. Benign essential HTN (Primary)    2. 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 Type 2 diabetes mellitus with hyperglycemia, without long-term current use of insulin (HCC)        Patient is here for follow-up of an MRI that was done recently for memory disorder the current MRI shows three strokes none of these are new.  Were primarily making sure that she is taking her medications and that she is well cared for.  Her son watches after her on a regular basis and he is fairly certain she is taking her medications as prescribed.         "

## 2022-02-01 ENCOUNTER — CLINICAL SUPPORT (OUTPATIENT)
Dept: INTERNAL MEDICINE | Facility: CLINIC | Age: 87
End: 2022-02-01

## 2022-02-01 DIAGNOSIS — I10 BENIGN ESSENTIAL HTN: Primary | ICD-10-CM

## 2022-02-01 PROCEDURE — 99211 OFF/OP EST MAY X REQ PHY/QHP: CPT | Performed by: NURSE PRACTITIONER

## 2022-02-01 NOTE — PROGRESS NOTES
Needs to be seen this or next week for elevated blood pressures. Cardiac symptoms(worse than baseline)? Chest pain, shortness of breath, palpitations, dizziness, lower extremity swelling. Does she have clonidine to take for elevated blood pressures?

## 2022-02-01 NOTE — PROGRESS NOTES
Patient presented today for a BP check.  172/78 pulse 68  States this has been the norm over the last couple of weeks but this morning it was 208/101  Pt currently takes:  Metoprolol 25  HCTZ 12.5  Losartan 100  Lasix 20

## 2022-02-07 ENCOUNTER — OFFICE VISIT (OUTPATIENT)
Dept: INTERNAL MEDICINE | Facility: CLINIC | Age: 87
End: 2022-02-07

## 2022-02-07 VITALS
TEMPERATURE: 97.1 F | DIASTOLIC BLOOD PRESSURE: 84 MMHG | SYSTOLIC BLOOD PRESSURE: 174 MMHG | HEART RATE: 63 BPM | OXYGEN SATURATION: 99 % | BODY MASS INDEX: 26.31 KG/M2 | HEIGHT: 62 IN | WEIGHT: 143 LBS

## 2022-02-07 DIAGNOSIS — I10 UNCONTROLLED HYPERTENSION: Primary | ICD-10-CM

## 2022-02-07 DIAGNOSIS — I48.19 PERSISTENT ATRIAL FIBRILLATION: ICD-10-CM

## 2022-02-07 PROCEDURE — 99213 OFFICE O/P EST LOW 20 MIN: CPT | Performed by: NURSE PRACTITIONER

## 2022-02-07 RX ORDER — DOCUSATE SODIUM 100 MG/1
100 CAPSULE, LIQUID FILLED ORAL 2 TIMES DAILY
COMMUNITY

## 2022-02-07 NOTE — PROGRESS NOTES
Subjective   Libia Perales is a 87 y.o. female.   Chief Complaint   Patient presents with   • Hypertension     brought in bp readings       Ms. Perales is a pleasant 87-year-old female who presents the office related to persistent hypertension.  Patient had called the office Wednesday of last week and reported elevated blood pressure reading of 170/80.  Patient is maxed out on many of her blood pressure medications however since her heart rate was greater than 70 at the time of the call went ahead and increase to metoprolol from 25-50.  Patient brought in blood pressure readings today and they vary quite a bit.  Patient's range at home is 150's-200/'s. Patient denies chest pain, palpitations, exercise intolerance, or lower extremity swelling. She reports a headache on the day her blood pressure was > 200/100. Patient does have a history of stroke several years ago. She denies acute changes in memory (validated by daughter in the office today), vision changes, weakness, or speech issues. Patient is oriented x 3 and following commands well.     Hypertension  Pertinent negatives include no blurred vision, chest pain, neck pain, palpitations or shortness of breath.        The following portions of the patient's history were reviewed and updated as appropriate: allergies, current medications, past family history, past medical history, past social history, past surgical history and problem list.    Review of Systems   Constitutional: Negative for activity change, appetite change, fatigue, fever, unexpected weight gain and unexpected weight loss.   HENT: Negative for congestion, postnasal drip, rhinorrhea, swollen glands, trouble swallowing and voice change.    Eyes: Negative for blurred vision and visual disturbance.   Respiratory: Negative for cough, chest tightness and shortness of breath.    Cardiovascular: Negative for chest pain, palpitations and leg swelling.   Gastrointestinal: Negative for abdominal pain,  constipation, diarrhea, nausea, vomiting and indigestion.   Endocrine: Negative for cold intolerance, heat intolerance, polydipsia and polyphagia.   Genitourinary: Negative for dysuria and frequency.   Musculoskeletal: Negative for arthralgias, back pain, joint swelling and neck pain.   Skin: Negative for color change, rash and skin lesions.   Allergic/Immunologic: Negative for environmental allergies.   Neurological: Positive for headache. Negative for dizziness, weakness, memory problem and confusion.        Intermittently with elevated blood pressure   Hematological: Does not bruise/bleed easily.   Psychiatric/Behavioral: Negative for agitation, hallucinations and suicidal ideas. The patient is not nervous/anxious.        Objective   Past Medical History:   Diagnosis Date   • Atrial fibrillation (HCC)    • Diabetes mellitus (HCC)    • GERD (gastroesophageal reflux disease)    • High cholesterol    • Hypertension    • OA (osteoarthritis)       Past Surgical History:   Procedure Laterality Date   • BREAST BIOPSY Right    • BREAST CYST ASPIRATION     • HYSTERECTOMY     • OOPHORECTOMY     • TONSILLECTOMY          Current Outpatient Medications:   •  benzonatate (TESSALON) 200 MG capsule, TAKE 1 CAPSULE BY MOUTH THREE TIMES A DAY AS NEEDED FOR COUGH , Disp: 90 capsule, Rfl: 3  •  cyanocobalamin 1000 MCG/ML injection, 1cc IM weekly x 3, then monthly thereafter, Disp: 10 mL, Rfl: 1  •  digoxin (LANOXIN) 125 MCG tablet, TAKE 1 TABLET BY MOUTH EVERY OTHER DAY (SUN TUES THU SAT), Disp: 16 tablet, Rfl: 5  •  docusate sodium (COLACE) 100 MG capsule, Take 100 mg by mouth 2 (Two) Times a Day., Disp: , Rfl:   •  Dulaglutide (Trulicity) 1.5 MG/0.5ML solution pen-injector, Inject 1.5 mg under the skin into the appropriate area as directed 1 (One) Time Per Week., Disp: 4 pen, Rfl: 2  •  Eliquis 5 MG tablet tablet, TAKE 1 TABLET BY MOUTH TWICE DAILY  , Disp: 180 tablet, Rfl: 3  •  fenofibrate (TRICOR) 145 MG tablet, TAKE 1 TABLET  "BY MOUTH EVERY DAY , Disp: 90 tablet, Rfl: 3  •  fexofenadine (ALLEGRA) 60 MG tablet, Take 60 mg by mouth Daily., Disp: , Rfl:   •  furosemide (LASIX) 20 MG tablet, TAKE 1 TABLET BY MOUTH EVERY DAY WITH HCTZ, Disp: 30 tablet, Rfl: 2  •  glipizide (GLUCOTROL) 10 MG tablet, TAKE 1 TABLET BY MOUTH TWICE DAILY WITH FOOD, Disp: 60 tablet, Rfl: 2  •  glucose blood test strip, Use as instructed, Disp: 100 each, Rfl: 12  •  hydrALAZINE (APRESOLINE) 100 MG tablet, TAKE 1 TABLET BY MOUTH THREE TIMES A DAY , Disp: 90 tablet, Rfl: 11  •  hydroCHLOROthiazide (MICROZIDE) 12.5 MG capsule, TAKE 1 CAPSULE BY MOUTH EVERY DAY , Disp: 90 capsule, Rfl: 3  •  losartan (COZAAR) 100 MG tablet, TAKE 1 TABLET BY MOUTH DAILY. , Disp: 90 tablet, Rfl: 3  •  magnesium oxide (MAGOX) 400 (241.3 Mg) MG tablet tablet, Take 400 mg by mouth Daily., Disp: , Rfl:   •  metFORMIN (GLUCOPHAGE) 1000 MG tablet, TAKE 1 TABLET BY MOUTH TWICE DAILY WITH FOOD , Disp: 180 tablet, Rfl: 3  •  metoprolol succinate XL (TOPROL-XL) 25 MG 24 hr tablet, TAKE 1 TABLET BY MOUTH EVERY DAY  (Patient taking differently: Take 25 mg by mouth 2 (Two) Times a Day. Changed on 02/01/2022 due to elevated BP issues at BP check), Disp: 90 tablet, Rfl: 3  •  omeprazole (priLOSEC) 40 MG capsule, TAKE 1 CAPSULE BY MOUTH EVERY DAY, Disp: 90 capsule, Rfl: 5  •  simvastatin (ZOCOR) 40 MG tablet, TAKE 1 TABLET BY MOUTH EVERY NIGHT. , Disp: 30 tablet, Rfl: 11  •  Syringe 25G X 1\" 3 ML misc, Weekly injection x 3, then monthly thereafter, Disp: 15 each, Rfl: 0  •  cloNIDine (Catapres) 0.1 MG tablet, Take 1 tablet by mouth 2 (Two) Times a Day As Needed for High Blood Pressure (For blood pressure > 160/90)., Disp: 30 tablet, Rfl: 1      Vitals:    02/07/22 1304   BP: 174/84   Pulse: 63   Temp: 97.1 °F (36.2 °C)   SpO2: 99%         02/07/22  1304   Weight: 64.9 kg (143 lb)       Body mass index is 26.16 kg/m².    Physical Exam  Vitals and nursing note reviewed.   Constitutional:       Appearance: " Normal appearance. She is well-developed, well-groomed and normal weight.   HENT:      Head: Normocephalic and atraumatic.      Right Ear: External ear normal. Decreased hearing noted.      Left Ear: External ear normal. Decreased hearing noted.      Nose: Nose normal.      Mouth/Throat:      Lips: Pink.      Mouth: Mucous membranes are moist.      Pharynx: Oropharynx is clear. Uvula midline.   Eyes:      Conjunctiva/sclera: Conjunctivae normal.      Pupils: Pupils are equal, round, and reactive to light.   Neck:      Thyroid: No thyromegaly.   Cardiovascular:      Rate and Rhythm: Normal rate. Rhythm irregularly irregular.      Pulses: Normal pulses.      Heart sounds: Murmur heard.    Systolic murmur is present with a grade of 2/6.  No friction rub. No gallop.    Pulmonary:      Effort: Pulmonary effort is normal.      Breath sounds: Normal breath sounds. No decreased breath sounds or wheezing.   Abdominal:      General: Bowel sounds are normal.      Palpations: Abdomen is soft.   Musculoskeletal:      Cervical back: Normal range of motion and neck supple.      Right lower leg: No edema.      Left lower leg: No edema.   Lymphadenopathy:      Cervical: No cervical adenopathy.   Skin:     General: Skin is warm and dry.   Neurological:      General: No focal deficit present.      Mental Status: She is alert and oriented to person, place, and time.      Motor: No weakness or tremor.      Coordination: Coordination is intact.      Gait: Gait is intact.      Deep Tendon Reflexes: Reflexes are normal and symmetric.   Psychiatric:         Attention and Perception: Attention normal.         Mood and Affect: Mood normal.         Speech: Speech normal.         Behavior: Behavior normal. Behavior is cooperative.         Thought Content: Thought content normal.         Cognition and Memory: Cognition normal.         ECG 12 Lead    Date/Time: 2/8/2022 5:41 AM  Performed by: Lauren Donald APRN  Authorized by:  Lauren Donald APRN   Comparison: compared with previous ECG from 9/27/2021  Similar to previous ECG  Rhythm: atrial fibrillation  Conduction: left posterior fascicular block  QRS axis: left  Other findings: non-specific ST-T wave changes and left ventricular hypertrophy    Clinical impression: abnormal EKG                Assessment/Plan   Diagnoses and all orders for this visit:    1. Uncontrolled hypertension (Primary)  -     ECG 12 Lead  -     cloNIDine (Catapres) 0.1 MG tablet; Take 1 tablet by mouth 2 (Two) Times a Day As Needed for High Blood Pressure (For blood pressure > 160/90).  Dispense: 30 tablet; Refill: 1    2. Persistent atrial fibrillation (CMS/HCC)  -     ECG 12 Lead      Atrial fibrillation is rate controlled with repeat EKG in the office today and compared to last EKG with Dr. Coello September last year. Patient is maxed out of a lot of her medicines today, will need to consult with Dr. Coello prior to medicine adjustment or possibly needs to see Mode Corcoran sooner than September this year.     Spoke with  and indicated okay to add clonidine as needed for blood pressure control. Add clonidine to keep bp <160/90

## 2022-02-08 PROCEDURE — 93000 ELECTROCARDIOGRAM COMPLETE: CPT | Performed by: NURSE PRACTITIONER

## 2022-02-08 RX ORDER — CLONIDINE HYDROCHLORIDE 0.1 MG/1
0.1 TABLET ORAL 2 TIMES DAILY PRN
Qty: 30 TABLET | Refills: 1 | Status: SHIPPED | OUTPATIENT
Start: 2022-02-08

## 2022-02-08 NOTE — ADDENDUM NOTE
Addended by: MEL GONZALEZ on: 2/8/2022 11:54 AM     Modules accepted: Orders     DISPLAY PLAN FREE TEXT

## 2022-02-21 DIAGNOSIS — R05.3 CHRONIC COUGH: Primary | ICD-10-CM

## 2022-02-21 RX ORDER — SIMVASTATIN 40 MG
40 TABLET ORAL NIGHTLY
Qty: 90 TABLET | Refills: 1 | Status: SHIPPED | OUTPATIENT
Start: 2022-02-21 | End: 2022-09-06

## 2022-02-21 RX ORDER — FUROSEMIDE 20 MG/1
TABLET ORAL
Qty: 90 TABLET | Refills: 1 | Status: SHIPPED | OUTPATIENT
Start: 2022-02-21 | End: 2022-03-14 | Stop reason: SDUPTHER

## 2022-02-21 RX ORDER — GLIPIZIDE 10 MG/1
TABLET ORAL
Qty: 180 TABLET | Refills: 1 | Status: SHIPPED | OUTPATIENT
Start: 2022-02-21 | End: 2022-08-30

## 2022-02-21 RX ORDER — LOSARTAN POTASSIUM 100 MG/1
TABLET ORAL
Qty: 90 TABLET | Refills: 1 | Status: SHIPPED | OUTPATIENT
Start: 2022-02-21 | End: 2022-09-06

## 2022-02-21 RX ORDER — BENZONATATE 200 MG/1
CAPSULE ORAL
Qty: 90 CAPSULE | Refills: 11 | Status: SHIPPED | OUTPATIENT
Start: 2022-02-21 | End: 2022-09-16

## 2022-02-21 NOTE — TELEPHONE ENCOUNTER
Pharmacy sent a request for refills on Tessalon perles.  Rx Refill Note  Requested Prescriptions     Pending Prescriptions Disp Refills   • benzonatate (TESSALON) 200 MG capsule [Pharmacy Med Name: BENZONATATE 200MG CAPSULE] 90 capsule 11     Sig: TAKE 1 CAPSULE BY MOUTH THREE TIMES A DAY AS NEEDED FOR COUGH      Last office visit with prescribing clinician: 3/4/2021      Next office visit with prescribing clinician: 3/17/2022            Maxim Mcgraw CMA  02/21/22, 12:20 CST

## 2022-03-08 RX ORDER — METOPROLOL SUCCINATE 25 MG/1
TABLET, EXTENDED RELEASE ORAL
Qty: 60 TABLET | Refills: 5 | Status: SHIPPED | OUTPATIENT
Start: 2022-03-08 | End: 2022-09-06

## 2022-03-14 ENCOUNTER — HOSPITAL ENCOUNTER (EMERGENCY)
Facility: HOSPITAL | Age: 87
Discharge: HOME | End: 2022-03-14
Attending: EMERGENCY MEDICINE | Admitting: EMERGENCY MEDICINE

## 2022-03-14 ENCOUNTER — APPOINTMENT (OUTPATIENT)
Dept: GENERAL RADIOLOGY | Facility: HOSPITAL | Age: 87
End: 2022-03-14

## 2022-03-14 VITALS
HEIGHT: 62 IN | WEIGHT: 140 LBS | DIASTOLIC BLOOD PRESSURE: 72 MMHG | RESPIRATION RATE: 14 BRPM | OXYGEN SATURATION: 100 % | HEART RATE: 74 BPM | BODY MASS INDEX: 25.76 KG/M2 | SYSTOLIC BLOOD PRESSURE: 168 MMHG | TEMPERATURE: 97.8 F

## 2022-03-14 DIAGNOSIS — N18.9 CHRONIC KIDNEY DISEASE, UNSPECIFIED CKD STAGE: ICD-10-CM

## 2022-03-14 DIAGNOSIS — I10 PRIMARY HYPERTENSION: ICD-10-CM

## 2022-03-14 DIAGNOSIS — I50.9 CONGESTIVE HEART FAILURE, UNSPECIFIED HF CHRONICITY, UNSPECIFIED HEART FAILURE TYPE: Primary | ICD-10-CM

## 2022-03-14 LAB
ALBUMIN SERPL-MCNC: 4.3 G/DL (ref 3.5–5.2)
ALBUMIN/GLOB SERPL: 1.2 G/DL
ALP SERPL-CCNC: 59 U/L (ref 39–117)
ALT SERPL W P-5'-P-CCNC: 17 U/L (ref 1–33)
ANION GAP SERPL CALCULATED.3IONS-SCNC: 13 MMOL/L (ref 5–15)
APTT PPP: 47.4 SECONDS (ref 24.1–35)
ARTERIAL PATENCY WRIST A: POSITIVE
AST SERPL-CCNC: 26 U/L (ref 1–32)
ATMOSPHERIC PRESS: 756 MMHG
BASE EXCESS BLDA CALC-SCNC: -0.8 MMOL/L (ref 0–2)
BASOPHILS # BLD AUTO: 0.03 10*3/MM3 (ref 0–0.2)
BASOPHILS NFR BLD AUTO: 0.5 % (ref 0–1.5)
BDY SITE: ABNORMAL
BILIRUB SERPL-MCNC: 0.4 MG/DL (ref 0–1.2)
BODY TEMPERATURE: 37 C
BUN SERPL-MCNC: 30 MG/DL (ref 8–23)
BUN/CREAT SERPL: 20 (ref 7–25)
CALCIUM SPEC-SCNC: 10 MG/DL (ref 8.6–10.5)
CHLORIDE SERPL-SCNC: 100 MMOL/L (ref 98–107)
CO2 SERPL-SCNC: 23 MMOL/L (ref 22–29)
CREAT SERPL-MCNC: 1.5 MG/DL (ref 0.57–1)
D DIMER PPP FEU-MCNC: 0.53 MG/L (FEU) (ref 0–0.5)
DEPRECATED RDW RBC AUTO: 47.9 FL (ref 37–54)
EGFRCR SERPLBLD CKD-EPI 2021: 33.6 ML/MIN/1.73
EOSINOPHIL # BLD AUTO: 0.06 10*3/MM3 (ref 0–0.4)
EOSINOPHIL NFR BLD AUTO: 1 % (ref 0.3–6.2)
ERYTHROCYTE [DISTWIDTH] IN BLOOD BY AUTOMATED COUNT: 15.6 % (ref 12.3–15.4)
GLOBULIN UR ELPH-MCNC: 3.6 GM/DL
GLUCOSE SERPL-MCNC: 283 MG/DL (ref 65–99)
HCO3 BLDA-SCNC: 23.5 MMOL/L (ref 20–26)
HCT VFR BLD AUTO: 31 % (ref 34–46.6)
HGB BLD-MCNC: 9.4 G/DL (ref 12–15.9)
HOLD SPECIMEN: NORMAL
IMM GRANULOCYTES # BLD AUTO: 0.04 10*3/MM3 (ref 0–0.05)
IMM GRANULOCYTES NFR BLD AUTO: 0.7 % (ref 0–0.5)
INR PPP: 1.3 (ref 0.91–1.09)
LYMPHOCYTES # BLD AUTO: 0.93 10*3/MM3 (ref 0.7–3.1)
LYMPHOCYTES NFR BLD AUTO: 15.2 % (ref 19.6–45.3)
Lab: ABNORMAL
MCH RBC QN AUTO: 25.5 PG (ref 26.6–33)
MCHC RBC AUTO-ENTMCNC: 30.3 G/DL (ref 31.5–35.7)
MCV RBC AUTO: 84 FL (ref 79–97)
MODALITY: ABNORMAL
MONOCYTES # BLD AUTO: 0.42 10*3/MM3 (ref 0.1–0.9)
MONOCYTES NFR BLD AUTO: 6.9 % (ref 5–12)
NEUTROPHILS NFR BLD AUTO: 4.62 10*3/MM3 (ref 1.7–7)
NEUTROPHILS NFR BLD AUTO: 75.7 % (ref 42.7–76)
NOTIFIED BY: 44
NRBC BLD AUTO-RTO: 0 /100 WBC (ref 0–0.2)
NT-PROBNP SERPL-MCNC: 4933 PG/ML (ref 0–1800)
PCO2 BLDA: 36.4 MM HG (ref 35–45)
PCO2 TEMP ADJ BLD: 36.4 MM HG (ref 35–45)
PH BLDA: 7.42 PH UNITS (ref 7.35–7.45)
PH, TEMP CORRECTED: 7.42 PH UNITS (ref 7.35–7.45)
PLATELET # BLD AUTO: 315 10*3/MM3 (ref 140–450)
PMV BLD AUTO: 11.1 FL (ref 6–12)
PO2 BLDA: 82.8 MM HG (ref 83–108)
PO2 TEMP ADJ BLD: 82.8 MM HG (ref 83–108)
POTASSIUM SERPL-SCNC: 4.2 MMOL/L (ref 3.5–5.2)
PROT SERPL-MCNC: 7.9 G/DL (ref 6–8.5)
PROTHROMBIN TIME: 15.7 SECONDS (ref 11.9–14.6)
RBC # BLD AUTO: 3.69 10*6/MM3 (ref 3.77–5.28)
SAO2 % BLDCOA: 97.3 % (ref 94–99)
SODIUM SERPL-SCNC: 136 MMOL/L (ref 136–145)
TROPONIN T SERPL-MCNC: 0.01 NG/ML (ref 0–0.03)
VENTILATOR MODE: ABNORMAL
WBC NRBC COR # BLD: 6.1 10*3/MM3 (ref 3.4–10.8)
WHOLE BLOOD HOLD SPECIMEN: NORMAL
WHOLE BLOOD HOLD SPECIMEN: NORMAL

## 2022-03-14 PROCEDURE — 85730 THROMBOPLASTIN TIME PARTIAL: CPT | Performed by: EMERGENCY MEDICINE

## 2022-03-14 PROCEDURE — 94640 AIRWAY INHALATION TREATMENT: CPT

## 2022-03-14 PROCEDURE — 25010000002 FUROSEMIDE PER 20 MG: Performed by: EMERGENCY MEDICINE

## 2022-03-14 PROCEDURE — 84484 ASSAY OF TROPONIN QUANT: CPT | Performed by: EMERGENCY MEDICINE

## 2022-03-14 PROCEDURE — 85610 PROTHROMBIN TIME: CPT | Performed by: EMERGENCY MEDICINE

## 2022-03-14 PROCEDURE — 99283 EMERGENCY DEPT VISIT LOW MDM: CPT

## 2022-03-14 PROCEDURE — 36600 WITHDRAWAL OF ARTERIAL BLOOD: CPT

## 2022-03-14 PROCEDURE — 82803 BLOOD GASES ANY COMBINATION: CPT

## 2022-03-14 PROCEDURE — 93010 ELECTROCARDIOGRAM REPORT: CPT | Performed by: INTERNAL MEDICINE

## 2022-03-14 PROCEDURE — 93005 ELECTROCARDIOGRAM TRACING: CPT

## 2022-03-14 PROCEDURE — 96374 THER/PROPH/DIAG INJ IV PUSH: CPT

## 2022-03-14 PROCEDURE — 80053 COMPREHEN METABOLIC PANEL: CPT

## 2022-03-14 PROCEDURE — 85379 FIBRIN DEGRADATION QUANT: CPT | Performed by: EMERGENCY MEDICINE

## 2022-03-14 PROCEDURE — 36415 COLL VENOUS BLD VENIPUNCTURE: CPT

## 2022-03-14 PROCEDURE — 93005 ELECTROCARDIOGRAM TRACING: CPT | Performed by: EMERGENCY MEDICINE

## 2022-03-14 PROCEDURE — 85025 COMPLETE CBC W/AUTO DIFF WBC: CPT

## 2022-03-14 PROCEDURE — 83880 ASSAY OF NATRIURETIC PEPTIDE: CPT | Performed by: EMERGENCY MEDICINE

## 2022-03-14 PROCEDURE — 71045 X-RAY EXAM CHEST 1 VIEW: CPT

## 2022-03-14 PROCEDURE — 94799 UNLISTED PULMONARY SVC/PX: CPT

## 2022-03-14 RX ORDER — NIFEDIPINE 10 MG/1
10 CAPSULE ORAL ONCE
Status: COMPLETED | OUTPATIENT
Start: 2022-03-14 | End: 2022-03-14

## 2022-03-14 RX ORDER — IPRATROPIUM BROMIDE AND ALBUTEROL SULFATE 2.5; .5 MG/3ML; MG/3ML
3 SOLUTION RESPIRATORY (INHALATION) ONCE
Status: COMPLETED | OUTPATIENT
Start: 2022-03-14 | End: 2022-03-14

## 2022-03-14 RX ORDER — FUROSEMIDE 20 MG/1
20 TABLET ORAL 2 TIMES DAILY
Qty: 14 TABLET | Refills: 0 | Status: SHIPPED | OUTPATIENT
Start: 2022-03-14 | End: 2022-07-20

## 2022-03-14 RX ORDER — FUROSEMIDE 10 MG/ML
60 INJECTION INTRAMUSCULAR; INTRAVENOUS ONCE
Status: COMPLETED | OUTPATIENT
Start: 2022-03-14 | End: 2022-03-14

## 2022-03-14 RX ADMIN — FUROSEMIDE 60 MG: 10 INJECTION INTRAMUSCULAR; INTRAVENOUS at 14:55

## 2022-03-14 RX ADMIN — NIFEDIPINE 10 MG: 10 CAPSULE ORAL at 13:38

## 2022-03-14 RX ADMIN — IPRATROPIUM BROMIDE AND ALBUTEROL SULFATE 3 ML: .5; 3 SOLUTION RESPIRATORY (INHALATION) at 13:55

## 2022-03-14 NOTE — ED PROVIDER NOTES
Subjective   Came in with shortness of breath elevated blood pressure.      Shortness of Breath  Severity:  Moderate  Onset quality:  Gradual  Timing:  Constant  Progression:  Worsening  Chronicity:  New  Context: activity    Context: not animal exposure, not emotional upset, not fumes, not occupational exposure, not pollens and not URI    Relieved by:  Nothing  Worsened by:  Exertion  Ineffective treatments:  None tried  Associated symptoms: cough, PND and wheezing    Associated symptoms: no abdominal pain, no chest pain, no claudication, no diaphoresis, no ear pain, no fever, no headaches, no hemoptysis, no neck pain, no rash, no sore throat, no syncope, no swollen glands and no vomiting    Risk factors: no recent alcohol use, no family hx of DVT, no obesity and no recent surgery        Review of Systems   Constitutional: Negative.  Negative for activity change, appetite change, chills, diaphoresis, fatigue and fever.   HENT: Negative for congestion, drooling, ear pain, facial swelling, hearing loss, sinus pressure and sore throat.    Eyes: Negative.  Negative for discharge.   Respiratory: Positive for cough, shortness of breath and wheezing. Negative for hemoptysis.    Cardiovascular: Positive for PND. Negative for chest pain, claudication and syncope.   Gastrointestinal: Negative for abdominal distention, abdominal pain, blood in stool, diarrhea, nausea and vomiting.   Endocrine: Negative.  Negative for cold intolerance, heat intolerance, polydipsia, polyphagia and polyuria.   Genitourinary: Negative.  Negative for dysuria, flank pain and urgency.   Musculoskeletal: Negative.  Negative for arthralgias, back pain, myalgias, neck pain and neck stiffness.   Skin: Negative.  Negative for color change, pallor and rash.   Allergic/Immunologic: Negative.    Neurological: Negative.  Negative for dizziness, seizures, speech difficulty, weakness, numbness and headaches.   Hematological: Negative.  Negative for adenopathy.    All other systems reviewed and are negative.      Past Medical History:   Diagnosis Date   • Atrial fibrillation (HCC)    • Diabetes mellitus (HCC)    • GERD (gastroesophageal reflux disease)    • High cholesterol    • Hypertension    • OA (osteoarthritis)        Allergies   Allergen Reactions   • Singulair [Montelukast] Cough   • Ace Inhibitors Cough       Past Surgical History:   Procedure Laterality Date   • BREAST BIOPSY Right    • BREAST CYST ASPIRATION     • HYSTERECTOMY     • OOPHORECTOMY     • TONSILLECTOMY         Family History   Problem Relation Age of Onset   • No Known Problems Father    • Hypertension Mother    • No Known Problems Sister    • No Known Problems Brother    • No Known Problems Daughter    • No Known Problems Son    • No Known Problems Maternal Grandmother    • No Known Problems Paternal Grandmother    • No Known Problems Maternal Aunt    • No Known Problems Paternal Aunt    • No Known Problems Other    • Colon cancer Neg Hx    • BRCA 1/2 Neg Hx    • Breast cancer Neg Hx    • Endometrial cancer Neg Hx    • Ovarian cancer Neg Hx        Social History     Socioeconomic History   • Marital status:    Tobacco Use   • Smoking status: Never Smoker   • Smokeless tobacco: Never Used   Vaping Use   • Vaping Use: Never used   Substance and Sexual Activity   • Alcohol use: No   • Drug use: No   • Sexual activity: Not Currently     Partners: Female     Birth control/protection: Surgical           Objective   Physical Exam  Vitals and nursing note reviewed. Exam conducted with a chaperone present.   Constitutional:       General: She is not in acute distress.     Appearance: Normal appearance. She is well-developed. She is not toxic-appearing or diaphoretic.   HENT:      Head: Normocephalic and atraumatic.      Right Ear: External ear normal.      Nose: Nose normal.      Mouth/Throat:      Mouth: Mucous membranes are moist.   Eyes:      Conjunctiva/sclera: Conjunctivae normal.      Pupils:  Pupils are equal, round, and reactive to light.   Cardiovascular:      Rate and Rhythm: Normal rate and regular rhythm.      Chest Wall: PMI is not displaced.      Pulses: Normal pulses. No decreased pulses.      Heart sounds: Normal heart sounds. No murmur heard.  Pulmonary:      Effort: Pulmonary effort is normal. No tachypnea, accessory muscle usage or respiratory distress.      Breath sounds: No stridor. Examination of the right-lower field reveals rhonchi and rales. Examination of the left-lower field reveals rhonchi and rales. Rhonchi and rales present. No decreased breath sounds or wheezing.   Chest:      Chest wall: No tenderness or crepitus.   Abdominal:      General: Bowel sounds are normal. There is no distension.      Palpations: Abdomen is soft.      Tenderness: There is no abdominal tenderness.   Musculoskeletal:         General: No swelling or tenderness. Normal range of motion.      Cervical back: Normal range of motion and neck supple. No rigidity.      Right lower leg: Edema present.      Left lower leg: Edema present.      Comments: Lower extremity exam bilaterally is unremarkable.  There is no right or left calf tenderness .  There is no palpable venous cord.  No obvious difference in the size of the legs.  No pitting edema.  The dorsalis pedis and posterior tibial femoral and popliteal pulses are palpable and +2 bilaterally.  Homans sign is negative   Skin:     General: Skin is warm.      Capillary Refill: Capillary refill takes less than 2 seconds.      Coloration: Skin is not jaundiced.      Findings: No erythema or rash.   Neurological:      General: No focal deficit present.      Mental Status: She is alert and oriented to person, place, and time. Mental status is at baseline.      Cranial Nerves: No cranial nerve deficit.      Motor: No weakness.      Coordination: Coordination normal.      Deep Tendon Reflexes: Reflexes are normal and symmetric. Reflexes normal.   Psychiatric:         Mood  and Affect: Mood normal.         Procedures           ED Course  ED Course as of 03/14/22 1535   Mon Mar 14, 2022   1321 · Myocardial perfusion imaging indicates a normal myocardial perfusion study with no evidence of ischemia.  · Left ventricular ejection fraction is hyperdynamic (Calculated EF > 70%). .  · Impressions are consistent with a low risk study.      [TS]   1328 EKG shows atrial fibrillation nonspecific ST-T wave changes this is chronic [TS]   1426 Age adjusted D-dimer is negative  Years Algorithm for Pulmonary Embolus  To be used in hemodynamically stable patient >18 years old    No signs of DVT are present, there is no hemoptysis, PE is not the most likely diagnosis and the D-dimer is not greater or equal to 1000 ng/mL. Therefore PE is excluded . The Years algorithm rules out PE (0.43 % with symptomatic VTE during 3-month follow-up) [TS]   1529 BUN/creatinine MA baseline elevated.  There is no hypoxia BNP is minimally elevated.  Hemoglobin is baseline she is anticoagulated. [TS]   1529 Pressure has improved she has diuresed oxygen saturation 90 to 94% on room air she is feeling much better at this time I have offered her admission but she wants to go home and I think it would be appropriate keeping consideration the recent myocardial perfusion study had a EF of 70% and consistent with low risk study. [TS]   1530 Patient already is on diuretics at home and will adjust the doses for couple days with that.  And I will follow the primary MD will give a blood pressure diary and return there for any worsening symptoms patient denies any chest pain. [TS]   1530 This patient presents with shortness of breath patient arrived hemodynamically stable was placed on the monitor and IV access obtained EKG was obtained and did not reveal any malignant/unstable dysrhythmias any acute ST elevation, no evidence of Brugada, or significantly prolonged QT .  Presentation not consistent with other acute, emergent cause of  shortness of breath at this time.  Low suspicion for acute PE is low risk per Wells and Years criteria and no evidence of DVT such as calf swelling, tenderness, palpable tortuous lower extremity vein, or Homans' sign.  Low suspicion for pneumothorax as the patient is saturating well and has no radiographic evidence of a pneumothorax.  Low suspicion for dissection there is no widened mediastinum, hypotension, pulses deficit, and no tearing back/abdominal pain.  Low suspicion for tamponade as there is no JVD, muffled heart sounds, electrical alternans on EKG, and no hypotension.  Low suspicion for pericarditis as there is no diffuse ST elevation or LA depression and the patient is afebrile.  No evidence of GI bleed per patient's history. .  Low suspicion for pneumonia since the patient has no fever no productive cough no chest x-ray findings of pneumonia and no leukocytosis.   Patient does have mild to moderate CHF chest x-ray is negative no hypoxia [TS]   1531 Risks and benefits of treatments given and alternative treatment options discussed with patient/family. I answered all the questions in simple, plain language, and there was voiced understanding and agreement with plan of care. There were no further questions. Differential diagnosis discussed. Patient/family was advised that the practice of medicine is not always an exact science, and sometimes tests, physical exam, or history may not show the underlying conditions with certainty. Additionally, the condition may change or show itself later after initial presentation. There was also expressed understanding and agreement with this limitation of emergency medicine practice. Patient/family was asked to return to ED if any problem or issues or if condition worsens or does not improved. Patient/family agreed to follow up with PCP/specialist as advised, or return to ED if unable to see a provider in a timely fashion for continued symptoms.  [TS]      ED Course User  Index  [TS] Ernesto Hunt MD                                                 MDM  Number of Diagnoses or Management Options  Diagnosis management comments: Differential Diagnosis:  I considered pulmonary etiology, asthma, chronic obstructive pulmonary disease, pneumonia, pulmonary embolism, adult respiratory distress syndrome, pneumothorax, pleural effusion, pulmonary fibrosis, cardiac etiology, congestive heart failure, myocardial infarction, metabolic etiology, diabetic ketoacidosis, uremia, acidosis, sepsis, anemia, drug related etiology, hyperventilation and CNS disease as a possible cause of dyspnea in this patient. This is a partial list of diagnoses considered.            Amount and/or Complexity of Data Reviewed  Clinical lab tests: ordered and reviewed  Tests in the radiology section of CPT®: ordered and reviewed  Tests in the medicine section of CPT®: ordered and reviewed    Risk of Complications, Morbidity, and/or Mortality  Presenting problems: moderate  Diagnostic procedures: moderate  Management options: moderate        Final diagnoses:   Congestive heart failure, unspecified HF chronicity, unspecified heart failure type (HCC)   Primary hypertension   Chronic kidney disease, unspecified CKD stage       ED Disposition  ED Disposition     ED Disposition   Discharge    Condition   Stable    Comment   --             Allyson Do DO  [address] [id number] [phone]    Schedule an appointment as soon as possible for a visit in 2 days           Medication List      Changed    furosemide 20 MG tablet  Commonly known as: LASIX  Take 1 tablet by mouth 2 (Two) Times a Day for 7 days.  What changed: See the new instructions.           Where to Get Your Medications      These medications were sent to [facility] - [facility] - [address] [place] [address] [address] - [phone]  - [phone] [address] [place] [address]    Phone: [phone]   · furosemide 20 MG tablet           Ernesto Hunt MD  03/14/22 153

## 2022-03-16 LAB
QT INTERVAL: 378 MS
QTC INTERVAL: 430 MS

## 2022-03-17 ENCOUNTER — OFFICE VISIT (OUTPATIENT)
Dept: PULMONOLOGY | Facility: CLINIC | Age: 87
End: 2022-03-17

## 2022-03-17 VITALS
OXYGEN SATURATION: 96 % | BODY MASS INDEX: 24.84 KG/M2 | SYSTOLIC BLOOD PRESSURE: 132 MMHG | HEIGHT: 62 IN | HEART RATE: 84 BPM | WEIGHT: 135 LBS | DIASTOLIC BLOOD PRESSURE: 80 MMHG

## 2022-03-17 DIAGNOSIS — I50.9 CONGESTIVE HEART FAILURE, UNSPECIFIED HF CHRONICITY, UNSPECIFIED HEART FAILURE TYPE: ICD-10-CM

## 2022-03-17 DIAGNOSIS — R05.3 CHRONIC COUGH: Primary | Chronic | ICD-10-CM

## 2022-03-17 DIAGNOSIS — J30.9 ALLERGIC RHINITIS, UNSPECIFIED SEASONALITY, UNSPECIFIED TRIGGER: ICD-10-CM

## 2022-03-17 DIAGNOSIS — K21.9 GERD WITHOUT ESOPHAGITIS: Chronic | ICD-10-CM

## 2022-03-17 PROCEDURE — 99214 OFFICE O/P EST MOD 30 MIN: CPT | Performed by: NURSE PRACTITIONER

## 2022-03-17 RX ORDER — ALBUTEROL SULFATE 90 UG/1
2 AEROSOL, METERED RESPIRATORY (INHALATION) EVERY 4 HOURS PRN
Qty: 18 G | Refills: 3 | Status: SHIPPED | OUTPATIENT
Start: 2022-03-17 | End: 2022-04-16

## 2022-03-17 NOTE — PROGRESS NOTES
"Chief Complaint  Cough    Subjective    History of Present Illness      Libia Perales presents to [facility] for:    History of Present Illness   Annual visit for management of chronic cough, GERD and allergic rhinitis.  Her last spirometry in 2020 was normal.  She is a never smoker.  She was in the emergency room on March 14 with congestive heart failure and a BNP greater than 4900.  I reviewed a chest x-ray with no acute changes.  We have never found anything that has helped her cough; she tried Singulair last year but it actually made the cough worse.  She continues on daily Allegra and Prilosec.  I recommend she have a rescue inhaler to use as needed and she is agreeable.  I further recommend an overnight pulse ox study since she has been having some issues with her heart.  She will be getting established with Dr. Do as her new PCP and I have encouraged her to keep track of her daily weights since she recently had an issue with CHF.  She is accompanied by her daughter-in-law today.  She is up-to-date on vaccines.  Objective   Vital Signs:   /80   Pulse 84   Ht 157.5 cm (62\")   Wt 61.2 kg (135 lb)   SpO2 96% Comment: RA  BMI 24.69 kg/m²     Physical Exam  Vitals reviewed.   Constitutional:       General: She is not in acute distress.     Appearance: Normal appearance.   HENT:      Head: Normocephalic and atraumatic.      Comments: Wearing a mask     Right Ear: Decreased hearing noted.      Left Ear: Decreased hearing noted.   Cardiovascular:      Rate and Rhythm: Normal rate and regular rhythm.   Pulmonary:      Effort: Pulmonary effort is normal.      Breath sounds: Normal breath sounds.   Musculoskeletal:      Cervical back: Normal range of motion.   Skin:     General: Skin is warm and dry.   Neurological:      Mental Status: She is alert and oriented to person, place, and time.      Comments: Using a cane for ambulation   Psychiatric:        "  Mood and Affect: Mood normal.         Behavior: Behavior normal.        Result Review :   The following data was reviewed by: YOBANY Yang on 03/17/2022:  XR Chest 1 View (03/14/2022 13:46)    Results for orders placed during the hospital encounter of 11/02/20    Full Pulmonary Function Test With Bronchodilator & ABG    Narrative  [facility] - Pulmonary Function Test    [address] [facility]  [address]  [phone]    Patient : Libia Perales  MRN : [id number]  YOB: 1934  :  Donis Terrell MD  Date : 11/5/2020    ______________________________________________________________________    Interpretation :  1. Spirometry is normal  2. Following bronchodilator there is no change  3. Mid flow is normal  4. Lung volume measurements are normal  5. Diffusion capacity is reduced and when corrected for alveolar volume is lower limits of normal  6. Airways resistance is mildly increased and conductance is decreased      Donis Terrell MD  11/05/20 20:52 CST               Assessment and Plan      Diagnoses and all orders for this visit:    1. Chronic cough (Primary)  Comments:  Stable.  Continue treating GERD with Prilosec.  Continue treating underlying allergies with Allegra.  Add albuterol HFA rescue inhaler to use as needed.  Orders:  -     albuterol sulfate  (90 Base) MCG/ACT inhaler; Inhale 2 puffs Every 4 (Four) Hours As Needed for Wheezing or Shortness of Air for up to 30 days.  Dispense: 18 g; Refill: 3    2. Congestive heart failure, unspecified HF chronicity, unspecified heart failure type (HCC)  Comments:  Get established with new PCP.  Recommend daily weights.  Will obtain an overnight pulse ox study to rule out any issues with hypoxia.  Orders:  -     Overnight Sleep Oximetry Study; Future    3. GERD without esophagitis  Comments:  Stable.  Continue Prilosec.    4. Allergic rhinitis, unspecified seasonality, unspecified  trigger  Comments:  Stable.  Continue daily antihistamine.        Redd Dumont, APRN  3/17/2022  10:31 CDT    Follow Up   Return in about 1 year (around 3/17/2023) for overnight pulse ox study now and FVL on return.    Patient was given instructions and counseling regarding her condition or for health maintenance advice. Please see specific information pulled into the AVS if appropriate.

## 2022-03-21 ENCOUNTER — OFFICE VISIT (OUTPATIENT)
Dept: INTERNAL MEDICINE | Facility: CLINIC | Age: 87
End: 2022-03-21

## 2022-03-21 VITALS
HEIGHT: 62 IN | DIASTOLIC BLOOD PRESSURE: 70 MMHG | WEIGHT: 134 LBS | HEART RATE: 69 BPM | SYSTOLIC BLOOD PRESSURE: 138 MMHG | TEMPERATURE: 96.8 F | BODY MASS INDEX: 24.66 KG/M2 | OXYGEN SATURATION: 99 %

## 2022-03-21 DIAGNOSIS — E11.9 TYPE 2 DIABETES MELLITUS WITHOUT COMPLICATION, WITHOUT LONG-TERM CURRENT USE OF INSULIN: ICD-10-CM

## 2022-03-21 DIAGNOSIS — I50.9 CONGESTIVE HEART FAILURE, UNSPECIFIED HF CHRONICITY, UNSPECIFIED HEART FAILURE TYPE: ICD-10-CM

## 2022-03-21 DIAGNOSIS — I10 BENIGN ESSENTIAL HTN: Primary | ICD-10-CM

## 2022-03-21 DIAGNOSIS — I48.19 PERSISTENT ATRIAL FIBRILLATION: ICD-10-CM

## 2022-03-21 PROCEDURE — 99213 OFFICE O/P EST LOW 20 MIN: CPT | Performed by: NURSE PRACTITIONER

## 2022-03-21 NOTE — PROGRESS NOTES
Subjective   Naoma Neli Perales is a 87 y.o. female.   Chief Complaint   Patient presents with   • Hospital Follow Up Visit     Congestive heart failure       Neli presents today ER follow up that occurred on 3/14/22 for shortness of breath and elevated blood pressure.  She was found to have some CHF and Lasix was increased to 20mg daily x 7 days. She is now back on her usual dosage.  She states she is feeling better.  Breathing is stable and denies chest pain.  She monitors her weights daily and not seeing fluctuations.  She does bring a list of BP readings with her today showing readings primarily between 160s-180s/80s.  She is on several blood pressure medications and has PRN clonidine.  She has not been using the clonidine.  Also brings some recent blood sugar readings to the office today.  The days following the Er her sugar was up in the 400s.  She states she had a piece of strawberry pie and that's why it was elevated.  Other readings between 150-200s.         The following portions of the patient's history were reviewed and updated as appropriate: allergies, current medications, past family history, past medical history, past social history, past surgical history and problem list.    Review of Systems   Constitutional: Negative for activity change, appetite change, fatigue, fever, unexpected weight gain and unexpected weight loss.   HENT: Negative for swollen glands, trouble swallowing and voice change.    Eyes: Negative for blurred vision and visual disturbance.   Respiratory: Positive for shortness of breath (lying flat at night). Negative for cough.    Cardiovascular: Negative for chest pain, palpitations and leg swelling.   Gastrointestinal: Negative for abdominal pain, constipation, diarrhea, nausea, vomiting and indigestion.   Endocrine: Negative for cold intolerance, heat intolerance, polydipsia and polyphagia.   Genitourinary: Negative for dysuria and frequency.   Musculoskeletal: Negative for  arthralgias, back pain, joint swelling and neck pain.   Skin: Negative for color change, rash and skin lesions.   Neurological: Negative for dizziness, weakness, headache, memory problem and confusion.   Hematological: Does not bruise/bleed easily.   Psychiatric/Behavioral: Negative for agitation, hallucinations and suicidal ideas. The patient is not nervous/anxious.        Objective   Past Medical History:   Diagnosis Date   • Atrial fibrillation (HCC)    • Diabetes mellitus (HCC)    • GERD (gastroesophageal reflux disease)    • High cholesterol    • Hypertension    • OA (osteoarthritis)       Past Surgical History:   Procedure Laterality Date   • BREAST BIOPSY Right    • BREAST CYST ASPIRATION     • HYSTERECTOMY     • OOPHORECTOMY     • TONSILLECTOMY          Current Outpatient Medications:   •  albuterol sulfate  (90 Base) MCG/ACT inhaler, Inhale 2 puffs Every 4 (Four) Hours As Needed for Wheezing or Shortness of Air for up to 30 days., Disp: 18 g, Rfl: 3  •  benzonatate (TESSALON) 200 MG capsule, TAKE 1 CAPSULE BY MOUTH THREE TIMES A DAY AS NEEDED FOR COUGH, Disp: 90 capsule, Rfl: 11  •  cloNIDine (Catapres) 0.1 MG tablet, Take 1 tablet by mouth 2 (Two) Times a Day As Needed for High Blood Pressure (For blood pressure > 160/90)., Disp: 30 tablet, Rfl: 1  •  cyanocobalamin 1000 MCG/ML injection, 1cc IM weekly x 3, then monthly thereafter, Disp: 10 mL, Rfl: 1  •  digoxin (LANOXIN) 125 MCG tablet, TAKE 1 TABLET BY MOUTH EVERY OTHER DAY (SUN TUES THU SAT), Disp: 16 tablet, Rfl: 5  •  docusate sodium (COLACE) 100 MG capsule, Take 100 mg by mouth 2 (Two) Times a Day., Disp: , Rfl:   •  Dulaglutide (Trulicity) 1.5 MG/0.5ML solution pen-injector, Inject 1.5 mg under the skin into the appropriate area as directed 1 (One) Time Per Week., Disp: 4 pen, Rfl: 2  •  Eliquis 5 MG tablet tablet, TAKE 1 TABLET BY MOUTH TWICE DAILY  , Disp: 180 tablet, Rfl: 3  •  fenofibrate (TRICOR) 145 MG tablet, TAKE 1 TABLET BY MOUTH  "EVERY DAY , Disp: 90 tablet, Rfl: 3  •  fexofenadine (ALLEGRA) 60 MG tablet, Take 60 mg by mouth Daily., Disp: , Rfl:   •  furosemide (LASIX) 20 MG tablet, Take 1 tablet by mouth 2 (Two) Times a Day for 7 days., Disp: 14 tablet, Rfl: 0  •  glipizide (GLUCOTROL) 10 MG tablet, TAKE 1 TABLET BY MOUTH TWICE DAILY WITH FOOD, Disp: 180 tablet, Rfl: 1  •  glucose blood test strip, Use as instructed, Disp: 100 each, Rfl: 12  •  hydrALAZINE (APRESOLINE) 100 MG tablet, TAKE 1 TABLET BY MOUTH THREE TIMES A DAY , Disp: 90 tablet, Rfl: 11  •  hydroCHLOROthiazide (MICROZIDE) 12.5 MG capsule, TAKE 1 CAPSULE BY MOUTH EVERY DAY , Disp: 90 capsule, Rfl: 3  •  losartan (COZAAR) 100 MG tablet, TAKE 1 TABLET BY MOUTH EVERY DAY, Disp: 90 tablet, Rfl: 1  •  metFORMIN (GLUCOPHAGE) 1000 MG tablet, TAKE 1 TABLET BY MOUTH TWICE DAILY WITH FOOD , Disp: 180 tablet, Rfl: 3  •  metoprolol succinate XL (TOPROL-XL) 25 MG 24 hr tablet, TAKE 1 TABLET BY MOUTH TWICE DAILY, Disp: 60 tablet, Rfl: 5  •  omeprazole (priLOSEC) 40 MG capsule, TAKE 1 CAPSULE BY MOUTH EVERY DAY, Disp: 90 capsule, Rfl: 5  •  simvastatin (ZOCOR) 40 MG tablet, TAKE 1 TABLET BY MOUTH EVERY NIGHT, Disp: 90 tablet, Rfl: 1  •  Syringe 25G X 1\" 3 ML misc, Weekly injection x 3, then monthly thereafter, Disp: 15 each, Rfl: 0  •  magnesium oxide (MAGOX) 400 (241.3 Mg) MG tablet tablet, Take 400 mg by mouth Daily., Disp: , Rfl:       Vitals:    03/21/22 1016   BP: 138/70   Pulse: 69   Temp: 96.8 °F (36 °C)   SpO2: 99%         03/21/22  1016   Weight: 60.8 kg (134 lb)       Body mass index is 24.51 kg/m².    Physical Exam  Constitutional:       General: She is not in acute distress.     Appearance: She is well-developed.   HENT:      Head: Normocephalic.      Right Ear: Tympanic membrane and external ear normal.      Left Ear: Tympanic membrane and external ear normal.      Mouth/Throat:      Mouth: Mucous membranes are moist. No oral lesions.      Pharynx: Oropharynx is clear.   Eyes:    "   Conjunctiva/sclera: Conjunctivae normal.   Cardiovascular:      Rate and Rhythm: Normal rate. Rhythm irregular.      Pulses: Normal pulses.      Heart sounds: Murmur heard.   Pulmonary:      Effort: Pulmonary effort is normal.      Breath sounds: Normal breath sounds.   Skin:     General: Skin is warm and dry.   Neurological:      Mental Status: She is alert and oriented to person, place, and time.      Gait: Gait normal.   Psychiatric:         Mood and Affect: Mood normal.         Speech: Speech normal.         Behavior: Behavior normal.         Thought Content: Thought content normal.               Assessment/Plan   Diagnoses and all orders for this visit:    1. Benign essential HTN (Primary)  -     Basic Metabolic Panel; Future    2. Persistent atrial fibrillation (CMS/HCC)    3. Type 2 diabetes mellitus without complication, without long-term current use of insulin (HCC)      BP is ok in the office today but home readings are elevated.  She is not using Clonidine.  I have instructed to start Clonidine daily and can still use as needed in the afternoon for BP > 160/90.  I requested we recheck bmp today but patient declines.  She already has follow up with Dr. Do in 2 weeks for which time blood pressure can be rechecked and she agrees to have bmp rechecked if appropriate.    Discussed diet improvements for diabetes.  Can discuss further at next scheduled follow up in 2 weeks.

## 2022-03-22 DIAGNOSIS — E11.9 TYPE 2 DIABETES MELLITUS WITHOUT COMPLICATION, UNSPECIFIED WHETHER LONG TERM INSULIN USE: ICD-10-CM

## 2022-03-22 RX ORDER — CALCIUM CITRATE/VITAMIN D3 200MG-6.25
TABLET ORAL
Qty: 100 EACH | Refills: 12 | Status: SHIPPED | OUTPATIENT
Start: 2022-03-22

## 2022-03-28 ENCOUNTER — PATIENT OUTREACH (OUTPATIENT)
Dept: CASE MANAGEMENT | Facility: OTHER | Age: 87
End: 2022-03-28

## 2022-03-28 NOTE — OUTREACH NOTE
AMBULATORY CASE MANAGEMENT NOTE    Name and Relationship of Patient/Support Person: Libia Perales - Self        Patient Outreach    ACO patient seen at [facility] ED for CHF and CKD. Post ED follow up completed with PCP. She denied needs today and agreeable to additional HRCM outreach.     SANDHYA CARPENTER  Ambulatory Case Management    3/28/2022, 14:32 CDT

## 2022-04-04 ENCOUNTER — OFFICE VISIT (OUTPATIENT)
Dept: INTERNAL MEDICINE | Facility: CLINIC | Age: 87
End: 2022-04-04

## 2022-04-04 VITALS
DIASTOLIC BLOOD PRESSURE: 80 MMHG | HEART RATE: 76 BPM | SYSTOLIC BLOOD PRESSURE: 140 MMHG | BODY MASS INDEX: 24.99 KG/M2 | WEIGHT: 135.8 LBS | TEMPERATURE: 98 F | RESPIRATION RATE: 20 BRPM | OXYGEN SATURATION: 96 % | HEIGHT: 62 IN

## 2022-04-04 DIAGNOSIS — R05.9 COUGH: ICD-10-CM

## 2022-04-04 DIAGNOSIS — I10 PRIMARY HYPERTENSION: Primary | ICD-10-CM

## 2022-04-04 PROCEDURE — 99213 OFFICE O/P EST LOW 20 MIN: CPT

## 2022-04-04 NOTE — PROGRESS NOTES
Subjective     Chief Complaint   Patient presents with   • Hypertension     Follow up, pt has been in the hospital recently.        History of Present Illness   Patient here for hypertension follow up. States she has overall been feeling good. Sleeping better. Feels that BP is more controlled. Brought BP log in today. BP appears to still be running high. 150-170's systolic and does get as high as 100 diastolic. However, some reading were good at 130-140/65-70.  Denies any chest pain. Reports she does not have good eating habits, but it doing better then she was at making healthier eating habits. BP today in office is 140/80. Denies any chest pain or recurrent SOA.     Patient concerned with recurrent cough. States is owrse at night. Tessalon pearls are not working. Cough has been going on for years. Sees pulmonology, shilpa newton RRT, but states that she has not  Had any relief. Reviewed Shilpa newton APRN note, advised that patient had spirometry test that was normal in 2020. Patient was never a smoker. Does have albuterol inhaler. Reports using twice a night.   Patient's PMR from outside medical facility reviewed and noted.    Review of Systems   Constitutional: Negative for activity change, fatigue and unexpected weight change.   HENT: Negative for congestion, ear pain, mouth sores, postnasal drip, sore throat and trouble swallowing.    Eyes: Negative for discharge and visual disturbance.   Respiratory: Positive for cough. Negative for shortness of breath.    Cardiovascular: Negative for chest pain and leg swelling.   Gastrointestinal: Negative for abdominal pain, constipation, diarrhea and nausea.   Genitourinary: Negative for decreased urine volume, difficulty urinating and hematuria.   Musculoskeletal: Negative for back pain and gait problem.   Skin: Negative for color change and rash.   Allergic/Immunologic: Negative for environmental allergies and immunocompromised state.   Neurological: Negative  for weakness and headaches.   Psychiatric/Behavioral: Negative for confusion and sleep disturbance.        Otherwise complete ROS reviewed and negative except as mentioned in the HPI.    Past Medical History:   Past Medical History:   Diagnosis Date   • Atrial fibrillation (HCC)    • Diabetes mellitus (HCC)    • GERD (gastroesophageal reflux disease)    • High cholesterol    • Hypertension    • OA (osteoarthritis)      Past Surgical History:  Past Surgical History:   Procedure Laterality Date   • BREAST BIOPSY Right    • BREAST CYST ASPIRATION     • HYSTERECTOMY     • OOPHORECTOMY     • TONSILLECTOMY       Social History:  reports that she has never smoked. She has never used smokeless tobacco. She reports that she does not drink alcohol and does not use drugs.    Family History: family history includes Hypertension in her mother; No Known Problems in her brother, daughter, father, maternal aunt, maternal grandmother, paternal aunt, paternal grandmother, sister, son, and another family member.      Allergies:  Allergies   Allergen Reactions   • Singulair [Montelukast] Cough   • Ace Inhibitors Cough     Medications:  Prior to Admission medications    Medication Sig Start Date End Date Taking? Authorizing Provider   albuterol sulfate  (90 Base) MCG/ACT inhaler Inhale 2 puffs Every 4 (Four) Hours As Needed for Wheezing or Shortness of Air for up to 30 days. 3/17/22 4/16/22 Yes Redd Dumont APRN   cloNIDine (Catapres) 0.1 MG tablet Take 1 tablet by mouth 2 (Two) Times a Day As Needed for High Blood Pressure (For blood pressure > 160/90). 2/8/22  Yes Lauren Donald APRN   cyanocobalamin 1000 MCG/ML injection 1cc IM weekly x 3, then monthly thereafter 12/20/21  Yes Susanna Monique APRN   digoxin (LANOXIN) 125 MCG tablet TAKE 1 TABLET BY MOUTH EVERY OTHER DAY (SUN TUES THU SAT) 11/11/21  Yes Susanna Monique APRN   docusate sodium (COLACE) 100 MG capsule Take 100 mg by mouth 2 (Two) Times a  "Day.   Yes Provider, MD Raheel   Dulaglutide (Trulicity) 1.5 MG/0.5ML solution pen-injector Inject 1.5 mg under the skin into the appropriate area as directed 1 (One) Time Per Week. 11/3/21  Yes Darrion Machuca MD   Eliquis 5 MG tablet tablet TAKE 1 TABLET BY MOUTH TWICE DAILY   6/23/21  Yes Lauren Donald APRN   fenofibrate (TRICOR) 145 MG tablet TAKE 1 TABLET BY MOUTH EVERY DAY  6/23/21  Yes Lauren Donald APRN   furosemide (LASIX) 20 MG tablet Take 1 tablet by mouth 2 (Two) Times a Day for 7 days. 3/14/22 3/21/22 Yes Ernesto Hunt MD   hydrALAZINE (APRESOLINE) 100 MG tablet TAKE 1 TABLET BY MOUTH THREE TIMES A DAY  9/23/21  Yes Susanna Monique APRN   hydroCHLOROthiazide (MICROZIDE) 12.5 MG capsule TAKE 1 CAPSULE BY MOUTH EVERY DAY  6/23/21  Yes Lauren Donald APRN   losartan (COZAAR) 100 MG tablet TAKE 1 TABLET BY MOUTH EVERY DAY 2/21/22  Yes Allyson Do DO   metFORMIN (GLUCOPHAGE) 1000 MG tablet TAKE 1 TABLET BY MOUTH TWICE DAILY WITH FOOD  6/23/21  Yes Lauren Donald APRN   metoprolol succinate XL (TOPROL-XL) 25 MG 24 hr tablet TAKE 1 TABLET BY MOUTH TWICE DAILY 3/8/22  Yes Lauren Donald APRN   omeprazole (priLOSEC) 40 MG capsule TAKE 1 CAPSULE BY MOUTH EVERY DAY 1/17/22  Yes Redd Dumont APRN   simvastatin (ZOCOR) 40 MG tablet TAKE 1 TABLET BY MOUTH EVERY NIGHT 2/21/22  Yes Allyson Do DO   Syringe 25G X 1\" 3 ML misc Weekly injection x 3, then monthly thereafter 12/20/21  Yes Susanna Monique APRN   True Metrix Blood Glucose Test test strip TEST AS DIRECTED 3/22/22  Yes Allyson Do DO   benzonatate (TESSALON) 200 MG capsule TAKE 1 CAPSULE BY MOUTH THREE TIMES A DAY AS NEEDED FOR COUGH 2/21/22   Redd Dumont APRN   fexofenadine (ALLEGRA) 60 MG tablet Take 60 mg by mouth Daily.    Provider, MD Raheel   glipizide (GLUCOTROL) 10 MG tablet TAKE 1 TABLET BY MOUTH TWICE DAILY WITH FOOD " "2/21/22   ErnestinaAllyson DO   magnesium oxide (MAGOX) 400 (241.3 Mg) MG tablet tablet Take 400 mg by mouth Daily.    Provider, MD Raheel         Objective     Vital Signs: /80 (BP Location: Left arm, Patient Position: Sitting, Cuff Size: Adult)   Pulse 76   Temp 98 °F (36.7 °C) (Infrared)   Resp 20   Ht 157.5 cm (62\") Comment: pt reported.  Wt 61.6 kg (135 lb 12.8 oz)   LMP  (LMP Unknown)   SpO2 96%   Breastfeeding No   BMI 24.84 kg/m²   Physical Exam  Constitutional:       General: She is not in acute distress.     Appearance: Normal appearance. She is normal weight. She is not ill-appearing.   HENT:      Head: Normocephalic and atraumatic.      Right Ear: External ear normal.      Left Ear: External ear normal.      Nose: Nose normal. No congestion or rhinorrhea.      Mouth/Throat:      Mouth: Mucous membranes are moist.      Pharynx: No posterior oropharyngeal erythema.   Eyes:      General: No scleral icterus.     Extraocular Movements: Extraocular movements intact.      Conjunctiva/sclera: Conjunctivae normal.      Pupils: Pupils are equal, round, and reactive to light.   Cardiovascular:      Rate and Rhythm: Normal rate and regular rhythm.      Pulses: Normal pulses.      Heart sounds: Normal heart sounds.   Pulmonary:      Effort: Pulmonary effort is normal. No respiratory distress.      Breath sounds: Normal breath sounds. No wheezing.   Abdominal:      General: Abdomen is flat. Bowel sounds are normal.      Palpations: Abdomen is soft.      Tenderness: There is no abdominal tenderness.   Musculoskeletal:         General: Normal range of motion.      Cervical back: Normal range of motion.      Right lower leg: No edema.      Left lower leg: No edema.      Comments: Pt walks with cane   Skin:     General: Skin is warm and dry.      Findings: No erythema or rash.   Neurological:      General: No focal deficit present.      Mental Status: She is alert and oriented to person, place, and " time. Mental status is at baseline.      Motor: No weakness.   Psychiatric:         Mood and Affect: Mood normal.         Behavior: Behavior normal.         Thought Content: Thought content normal.         Judgment: Judgment normal.       Patient's Body mass index is 24.84 kg/m². indicating that she is within normal range (BMI 18.5-24.9). No BMI management plan needed..      Advance Care Planning   ACP discussion was declined by the patient. Patient does not have an advance directive, declines further assistance.         Results Reviewed:  Glucose   Date Value Ref Range Status   03/14/2022 283 (H) 65 - 99 mg/dL Final     BUN   Date Value Ref Range Status   03/14/2022 30 (H) 8 - 23 mg/dL Final     Creatinine   Date Value Ref Range Status   03/14/2022 1.50 (H) 0.57 - 1.00 mg/dL Final   01/07/2022 1.60 (H) 0.60 - 1.30 mg/dL Final     Comment:     Serial Number: [id number]:  [id number]     Sodium   Date Value Ref Range Status   03/14/2022 136 136 - 145 mmol/L Final     Potassium   Date Value Ref Range Status   03/14/2022 4.2 3.5 - 5.2 mmol/L Final     Chloride   Date Value Ref Range Status   03/14/2022 100 98 - 107 mmol/L Final     CO2   Date Value Ref Range Status   03/14/2022 23.0 22.0 - 29.0 mmol/L Final     Calcium   Date Value Ref Range Status   03/14/2022 10.0 8.6 - 10.5 mg/dL Final     ALT (SGPT)   Date Value Ref Range Status   03/14/2022 17 1 - 33 U/L Final     AST (SGOT)   Date Value Ref Range Status   03/14/2022 26 1 - 32 U/L Final     WBC   Date Value Ref Range Status   03/14/2022 6.10 3.40 - 10.80 10*3/mm3 Final   01/04/2021 5.77 3.40 - 10.80 10*3/mm3 Final     Hematocrit   Date Value Ref Range Status   03/14/2022 31.0 (L) 34.0 - 46.6 % Final     Platelets   Date Value Ref Range Status   03/14/2022 315 140 - 450 10*3/mm3 Final     Triglycerides   Date Value Ref Range Status   11/03/2021 300 (H) 0 - 150 mg/dL Final     HDL Cholesterol   Date Value Ref Range Status   11/03/2021 34 (L) 40 - 60 mg/dL Final      LDL Chol Calc ([facility])   Date Value Ref Range Status   11/03/2021 100 0 - 100 mg/dL Final     Hemoglobin A1C   Date Value Ref Range Status   12/15/2021 8.8 % Final         Assessment / Plan     Assessment/Plan:  1. Primary hypertension  -take clonidine as ordered.  -reeducated on parameters for medication written by Lauren HAYWOOD  -Maintain BP log and bring to next appointment   2. Cough  -start taking OTC Xyzal 5mg daily        Return in about 1 month (around 5/4/2022) for Follow up with Dr. Do. unless patient needs to be seen sooner or acute issues arise.    Discussed patient taking prescribed clonidine and reiterated clonidine parameters as ordered by Lauren HAYWOOD.   I have discussed the patient results/orders and and plan/recommendation with them at today's visit.      Ana Ureña, YOBANY   04/04/2022

## 2022-04-06 ENCOUNTER — TRANSCRIBE ORDERS (OUTPATIENT)
Dept: ADMINISTRATIVE | Facility: HOSPITAL | Age: 87
End: 2022-04-06

## 2022-04-06 ENCOUNTER — PATIENT OUTREACH (OUTPATIENT)
Dept: CASE MANAGEMENT | Facility: OTHER | Age: 87
End: 2022-04-06

## 2022-04-06 DIAGNOSIS — Z12.31 ENCOUNTER FOR SCREENING MAMMOGRAM FOR MALIGNANT NEOPLASM OF BREAST: Primary | ICD-10-CM

## 2022-04-06 NOTE — OUTREACH NOTE
AMBULATORY CASE MANAGEMENT NOTE    Name and Relationship of Patient/Support Person: BhargaviLibia urban - Self    Patient Outreach    Medicare ACO patient seen at [facility] ED for CHF and hypertension. Provided explanation and benefits of HRCM program. Patient agreeable to enrollment.     Adult Patient Profile  Questions/Answers    Flowsheet Row Most Recent Value   Symptoms/Conditions Managed at Home diabetes, type 2, cardiovascular   Barriers to Managing Health none   Cardiovascular Symptoms/Conditions hypertension   Cardiovascular Management Strategies medication therapy, weight management, diet modification, exercise   Cardiovascular Comment monitors BP daily   Diabetes Management Strategies blood glucose testing, medication therapy, weight management, diet modification, exercise   Frequency of Blood Glucose Testing other (see comments)  [2 times/day]   A1C Target 8.1   Usual Blood Glucose 90's   Last A1C Result 9.1 on 11.3.21 and 7.3 on 4.29.21   Diabetes Comment aware of foods to avoid   Identifying Life Goals lower Hgb A1C   Taking Medications Not Prescribed no   Missed Doses of Prescribed Medications During Past Week no  [medications are delivered by pharmacy]   Taken Prescribed Medications at Different Time or Schedule During Past Week no   Taken More or Less Medication Than Prescribed no   Barriers to Taking Medication as Prescribed none   Hearing Difficulty or Deaf no   Wear Glasses or Blind yes   Vision Management has routine eye exams yearly   Concentrating, Remembering or Making Decisions Difficulty no   Difficulty Communicating no   Difficulty Eating/Swallowing no   Walking or Climbing Stairs Difficulty no   Dressing/Bathing Difficulty no   Doing Errands Independently Difficulty (such as shopping) no   Equipment Currently Used at Home scales, bp cuff, glucometer   People in Home alone   Current Living Arrangements apartment        Adult Wellness Assessment  Questions/Answers    Flowsheet Row Most Recent Value    Regular Exercise yes   Type of Exercise walking  [walks with neighbors ]        Social Work Assessment  Questions/Answers    Flowsheet Row Most Recent Value   People in Home alone   Current Living Arrangements apartment   Primary Care Provided by self   Provides Primary Care For no one   Medications independent   Meal Preparation independent   Housekeeping assistive person  [has cleaning lady 1 time/month]   Shopping assistive person  [no longer drives but is able to go with family]   IADL Comments no longer drives and transportation provided by family   Equipment Currently Used at Home scales, bp cuff, glucometer        Send Education  Questions/Answers    Flowsheet Row Most Recent Value   Annual Wellness Visit:  Patient Has Completed  [completed 1.28.21]   Other Patient Education/Resources  --  [provided ACM contact information]        SDOH updated and reviewed with the patient during this program:    Sent via [employer].    SANDHYA CARPENTER  Ambulatory Case Management    4/6/2022, 13:36 CDT

## 2022-05-09 ENCOUNTER — HOSPITAL ENCOUNTER (OUTPATIENT)
Dept: MAMMOGRAPHY | Facility: HOSPITAL | Age: 87
Discharge: HOME | End: 2022-05-09
Admitting: FAMILY MEDICINE

## 2022-05-09 DIAGNOSIS — Z12.31 ENCOUNTER FOR SCREENING MAMMOGRAM FOR MALIGNANT NEOPLASM OF BREAST: ICD-10-CM

## 2022-05-09 PROCEDURE — 77063 BREAST TOMOSYNTHESIS BI: CPT

## 2022-05-09 PROCEDURE — 77067 SCR MAMMO BI INCL CAD: CPT

## 2022-05-11 ENCOUNTER — OFFICE VISIT (OUTPATIENT)
Dept: INTERNAL MEDICINE | Facility: CLINIC | Age: 87
End: 2022-05-11

## 2022-05-11 VITALS
OXYGEN SATURATION: 98 % | DIASTOLIC BLOOD PRESSURE: 70 MMHG | BODY MASS INDEX: 25.21 KG/M2 | HEIGHT: 62 IN | SYSTOLIC BLOOD PRESSURE: 138 MMHG | WEIGHT: 137 LBS | HEART RATE: 78 BPM | TEMPERATURE: 97.3 F

## 2022-05-11 DIAGNOSIS — R79.89 ABNORMAL TSH: ICD-10-CM

## 2022-05-11 DIAGNOSIS — R82.71 BACTERIURIA: ICD-10-CM

## 2022-05-11 DIAGNOSIS — R94.6 ABNORMAL RESULTS OF THYROID FUNCTION STUDIES: ICD-10-CM

## 2022-05-11 DIAGNOSIS — D64.9 ANEMIA, UNSPECIFIED TYPE: Primary | ICD-10-CM

## 2022-05-11 PROCEDURE — 1159F MED LIST DOCD IN RCRD: CPT | Performed by: FAMILY MEDICINE

## 2022-05-11 PROCEDURE — 1170F FXNL STATUS ASSESSED: CPT | Performed by: FAMILY MEDICINE

## 2022-05-11 PROCEDURE — 1126F AMNT PAIN NOTED NONE PRSNT: CPT | Performed by: FAMILY MEDICINE

## 2022-05-11 PROCEDURE — G0439 PPPS, SUBSEQ VISIT: HCPCS | Performed by: FAMILY MEDICINE

## 2022-05-11 PROCEDURE — 99214 OFFICE O/P EST MOD 30 MIN: CPT | Performed by: FAMILY MEDICINE

## 2022-05-11 RX ORDER — LEVOCETIRIZINE DIHYDROCHLORIDE 5 MG/1
5 TABLET, FILM COATED ORAL EVERY EVENING
COMMUNITY

## 2022-05-11 RX ORDER — DIGOXIN 125 MCG
TABLET ORAL
Qty: 16 TABLET | Refills: 5 | Status: SHIPPED | OUTPATIENT
Start: 2022-05-11 | End: 2022-10-18

## 2022-05-11 NOTE — PROGRESS NOTES
"The ABCs of the Annual Wellness Visit  Subsequent Medicare Wellness Visit    Chief Complaint   Patient presents with   • Medicare Wellness-subsequent      Subjective    History of Present Illness:  Libia Perales is a 87 y.o. female who presents for a Subsequent Medicare Wellness Visit. She has an adult male accompanying her who contributes to her care.    She reports she feels worse physically and the same mentally this year as opposed to last year. She denies any overnight hospitalization in the previous year. The patient notes she has a living will on file at [facility]. She states she feels her memory is stable; however, acknowledges some short-term memory loss. She denies taking medication for memory loss. The adult male states her memory loss is not extreme, but notes it occurs more than a minute amount of time. He adds this has been occurring with the patient for quite some time. She denies wanting medication for her memory, as she believes she is already taking enough medication.    She states she lives in an apartment. She notes she ambulates with a cane and a walker. She reports she has someone that comes in to help with cleaning. The patient states she tries to keep her phone with her in case she falls at home. She reports she fell out of her shower previously and did not have her phone near. She reports she ambulates outside for exercise daily while it is warm. She states she tries to get out and go places when she can.    The patient denies any issues with eating. She denies bowel issues, but notes she does have some incontinence in the form of \"bladder leakage.\" She notes she wears Depends. She denies having any urinary symptoms when she had her last urinalysis performed.  She denies ever seeing a nephrologist and inquires when her metformin is discontinued if there is a replacement medication that will not damage her kidneys. She notes Trulicity is expensive and asks if it could be changed to " "cheaper medication. The adult male inquires what time Jardiance should be taken.    She denies excessive pain from her arthritis. She notes she has an ultrasound in 12/2021 of her carotid arteries. The adult male states she has a cardiac murmur as well. She reports if she rushes she does experience shortness of breath, which is not often. She states she did have anemia previously and acknowledges she eats red meat; however, not daily. She notes she eats leafy, green vegetables at times. She acknowledges she is still taking her thyroid medication and denies symptoms of gout.    The following portions of the patient's history were reviewed and   updated as appropriate: allergies, current medications, past family history, past medical history, past social history, past surgical history and problem list.    Compared to one year ago, the patient feels her physical   health is worse.    Compared to one year ago, the patient feels her mental   health is the same.    Recent Hospitalizations:  She was not admitted to the hospital during the last year.       Current Medical Providers:  Patient Care Team:  Allyson Do DO as PCP - General (Family Medicine)  Tone Ceballos MD as Consulting Physician (Urology)  Tone Coello MD as Cardiologist (Cardiology)  Redd Dumont APRN as Nurse Practitioner (Pulmonary Disease)  Gloria Milan RN as Ambulatory  (Population Health)    Outpatient Medications Prior to Visit   Medication Sig Dispense Refill   • cloNIDine (Catapres) 0.1 MG tablet Take 1 tablet by mouth 2 (Two) Times a Day As Needed for High Blood Pressure (For blood pressure > 160/90). 30 tablet 1   • cyanocobalamin 1000 MCG/ML injection 1cc IM weekly x 3, then monthly thereafter 10 mL 1   • levocetirizine (XYZAL) 5 MG tablet Take 5 mg by mouth Every Evening.     • Syringe 25G X 1\" 3 ML misc Weekly injection x 3, then monthly thereafter 15 each 0   • True Metrix Blood Glucose " Test test strip TEST AS DIRECTED 100 each 12   • digoxin (LANOXIN) 125 MCG tablet TAKE 1 TABLET BY MOUTH EVERY OTHER DAY (SUN TUES THU SAT) 16 tablet 5   • benzonatate (TESSALON) 200 MG capsule TAKE 1 CAPSULE BY MOUTH THREE TIMES A DAY AS NEEDED FOR COUGH 90 capsule 11   • docusate sodium (COLACE) 100 MG capsule Take 100 mg by mouth 2 (Two) Times a Day.     • Dulaglutide (Trulicity) 1.5 MG/0.5ML solution pen-injector Inject 1.5 mg under the skin into the appropriate area as directed 1 (One) Time Per Week. 4 pen 2   • Eliquis 5 MG tablet tablet TAKE 1 TABLET BY MOUTH TWICE DAILY   180 tablet 3   • fenofibrate (TRICOR) 145 MG tablet TAKE 1 TABLET BY MOUTH EVERY DAY  90 tablet 3   • furosemide (LASIX) 20 MG tablet Take 1 tablet by mouth 2 (Two) Times a Day for 7 days. 14 tablet 0   • glipizide (GLUCOTROL) 10 MG tablet TAKE 1 TABLET BY MOUTH TWICE DAILY WITH FOOD 180 tablet 1   • hydrALAZINE (APRESOLINE) 100 MG tablet TAKE 1 TABLET BY MOUTH THREE TIMES A DAY  90 tablet 11   • hydroCHLOROthiazide (MICROZIDE) 12.5 MG capsule TAKE 1 CAPSULE BY MOUTH EVERY DAY  90 capsule 3   • losartan (COZAAR) 100 MG tablet TAKE 1 TABLET BY MOUTH EVERY DAY 90 tablet 1   • magnesium oxide (MAGOX) 400 (241.3 Mg) MG tablet tablet Take 400 mg by mouth Daily.     • metFORMIN (GLUCOPHAGE) 1000 MG tablet TAKE 1 TABLET BY MOUTH TWICE DAILY WITH FOOD  180 tablet 3   • metoprolol succinate XL (TOPROL-XL) 25 MG 24 hr tablet TAKE 1 TABLET BY MOUTH TWICE DAILY 60 tablet 5   • omeprazole (priLOSEC) 40 MG capsule TAKE 1 CAPSULE BY MOUTH EVERY DAY 90 capsule 5   • simvastatin (ZOCOR) 40 MG tablet TAKE 1 TABLET BY MOUTH EVERY NIGHT 90 tablet 1     No facility-administered medications prior to visit.       No opioid medication identified on active medication list. I have reviewed chart for other potential  high risk medication/s and harmful drug interactions in the elderly.          Aspirin is not on active medication list.  Aspirin use is not indicated  "based on review of current medical condition/s. Risk of harm outweighs potential benefits.  .    Patient Active Problem List   Diagnosis   • Type 2 diabetes mellitus without complication (HCC)   • Diabetes mellitus type 2, noninsulin dependent (HCC)   • Primary osteoarthritis of both knees   • Hyperlipidemia   • Cerebral infarction involving right posterior cerebral artery (HCC)   • Benign essential HTN   • Persistent atrial fibrillation (CMS/HCC)   • Accelerated hypertension   • Asthma   • Fibrocystic breast disease   • Gastro-esophageal reflux   • High calcium levels   • Hypomagnesemia   • OA (osteoarthritis) of ankle   • Osteoarthritis   • Right renal mass   • Uncontrolled hypertension   • Vertigo, benign paroxysmal   • Chronic fatigue   • HARDING (dyspnea on exertion)   • Chronic cough   • Allergic rhinitis   • Postconcussion syndrome     Advance Care Planning  Advance Directive is not on file.  ACP discussion was held with the patient during this visit. Patient has an advance directive (not in EMR), copy requested.          Objective    Vitals:    05/11/22 0932   BP: 138/70   BP Location: Left arm   Patient Position: Sitting   Cuff Size: Adult   Pulse: 78   Temp: 97.3 °F (36.3 °C)   SpO2: 98%   Weight: 62.1 kg (137 lb)   Height: 157.5 cm (62\")   PainSc: 0-No pain     BMI Readings from Last 1 Encounters:   05/11/22 25.06 kg/m²   BMI is above normal parameters. Recommendations include: nutrition counseling    Does the patient have evidence of cognitive impairment? Yes    Physical Exam  Vitals and nursing note reviewed.   Constitutional:       General: She is not in acute distress.     Appearance: Normal appearance. She is well-developed. She is not ill-appearing, toxic-appearing or diaphoretic.   HENT:      Head: Normocephalic and atraumatic.      Right Ear: External ear normal.      Left Ear: External ear normal.   Eyes:      Conjunctiva/sclera: Conjunctivae normal.      Pupils: Pupils are equal, round, and reactive " to light.   Neck:      Thyroid: No thyromegaly.      Vascular: No carotid bruit or JVD.   Cardiovascular:      Rate and Rhythm: Normal rate and regular rhythm.      Pulses: Normal pulses.      Heart sounds: Normal heart sounds. No murmur heard.  Pulmonary:      Effort: Pulmonary effort is normal. No respiratory distress.      Breath sounds: Normal breath sounds.   Abdominal:      General: Bowel sounds are normal.      Palpations: Abdomen is soft. There is no mass.      Tenderness: There is no abdominal tenderness.   Musculoskeletal:         General: No swelling. Normal range of motion.      Cervical back: Normal range of motion and neck supple.   Lymphadenopathy:      Cervical: No cervical adenopathy.   Skin:     General: Skin is warm and dry.      Findings: No lesion or rash.   Neurological:      Mental Status: She is alert and oriented to person, place, and time.      Cranial Nerves: No cranial nerve deficit.      Sensory: No sensory deficit.      Motor: No weakness.      Coordination: Coordination normal.      Gait: Gait normal.      Deep Tendon Reflexes: Reflexes are normal and symmetric.   Psychiatric:         Mood and Affect: Mood normal.         Behavior: Behavior normal.         Thought Content: Thought content normal.         Judgment: Judgment normal.       Lab Results   Component Value Date    CHLPL 146 05/04/2022    TRIG 195 (H) 05/04/2022    HDL 34 (L) 05/04/2022    LDL 79 05/04/2022    VLDL 33 05/04/2022    HGBA1C 6.70 (H) 05/04/2022            HEALTH RISK ASSESSMENT    Smoking Status:  Social History     Tobacco Use   Smoking Status Never Smoker   Smokeless Tobacco Never Used     Alcohol Consumption:  Social History     Substance and Sexual Activity   Alcohol Use No     Fall Risk Screen:    STEADI Fall Risk Assessment was completed, and patient is at MODERATE risk for falls. Assessment completed on:5/11/2022    Depression Screening:  PHQ-2/PHQ-9 Depression Screening 5/11/2022   Retired PHQ-9 Total  Score -   Retired Total Score -   Little Interest or Pleasure in Doing Things 0-->not at all   Feeling Down, Depressed or Hopeless 0-->not at all   PHQ-9: Brief Depression Severity Measure Score 0       Health Habits and Functional and Cognitive Screening:  Functional & Cognitive Status 5/11/2022   Do you have difficulty preparing food and eating? No   Do you have difficulty bathing yourself, getting dressed or grooming yourself? No   Do you have difficulty using the toilet? No   Do you have difficulty moving around from place to place? No   Do you have trouble with steps or getting out of a bed or a chair? Yes   Current Diet Frequent Junk Food   Dental Exam Not up to date   Eye Exam Up to date   Exercise (times per week) 7 times per week        Exercise Frequency Comment on warmer days   Current Exercises Include Walking   Current Exercise Activities Include -   Do you need help using the phone?  No   Are you deaf or do you have serious difficulty hearing?  No   Do you need help with transportation? Yes   Do you need help shopping? Yes   Do you need help preparing meals?  No   Do you need help with housework?  Yes   Do you need help with laundry? No   Do you need help taking your medications? No   Do you need help managing money? No   Do you ever drive or ride in a car without wearing a seat belt? No   Have you felt unusual stress, anger or loneliness in the last month? No   Who do you live with? Alone   If you need help, do you have trouble finding someone available to you? No   Have you been bothered in the last four weeks by sexual problems? No   Do you have difficulty concentrating, remembering or making decisions? Yes       Age-appropriate Screening Schedule:  Refer to the list below for future screening recommendations based on patient's age, sex and/or medical conditions. Orders for these recommended tests are listed in the plan section. The patient has been provided with a written plan.    Health  Maintenance   Topic Date Due   • ZOSTER VACCINE (2 of 3) 12/01/2015   • INFLUENZA VACCINE  08/01/2022   • HEMOGLOBIN A1C  11/04/2022   • DXA SCAN  02/23/2023   • DIABETIC EYE EXAM  05/02/2023   • LIPID PANEL  05/04/2023   • URINE MICROALBUMIN  05/04/2023   • TDAP/TD VACCINES (3 - Td or Tdap) 11/11/2026              Assessment & Plan   CMS Preventative Services Quick Reference  Risk Factors Identified During Encounter  Fall Risk-High or Moderate  Obesity/Overweight   The above risks/problems have been discussed with the patient.  Follow up actions/plans if indicated are seen below in the Assessment/Plan Section.  Pertinent information has been shared with the patient in the After Visit Summary.    Diagnoses and all orders for this visit:    1. Anemia, unspecified type (Primary)  -     Iron and TIBC    2. Abnormal TSH  -     T3, free  -     T4    3. Bacteriuria  -     Cancel: Urinalysis With Culture If Indicated -  -     Urinalysis With Culture If Indicated -    4. Abnormal results of thyroid function studies   -     T4    5. Diabetes mellitus type 2    6. Stage IV chronic renal failure    7. Hypertension    8. Hyperlipidemia    9. Atrial fibrillation  ,  10. Short-term memory dysfunction  Other orders  -     empagliflozin (Jardiance) 10 MG tablet tablet; Take 1 tablet by mouth Daily.  Dispense: 30 tablet; Refill: 3    Plan  Because the patient has stage IV chronic kidney failure, we need to stop the metformin. We will replace this with Jardiance 10 mg daily and she will continue to monitor her blood sugars. She will follow up in 3 months for a CMP and an A1c at her next office visit. Additionally, a total iron-binding capacity and an iron level will be drawn to assess her anemia. A repeat urinalysis will be performed today, as her previous urinalysis had some bacteria. The patient is not having any symptoms, so we will see if this is something that she continues to have and if she does, we will culture her urine. She  will continue the remainder of her medications. We did discuss the use of Aricept or an Aricept-like medication for her short-term memory dysfunction. Also, we discussed the use of getting Lifeline or something similar so in case she falls that she has access to help immediately. Follow-up will be in 3 months. At that time, a CMP and an A1c will be performed.      Follow Up:   Return in about 3 months (around 8/11/2022) for Non-Fasting Labs before appt  cmp a1c.     An After Visit Summary and PPPS were made available to the patient.               Transcribed from ambient dictation for Allyson Do DO by Britany Burkett.  05/11/22   14:40 CDT    Patient verbalized consent to the visit recording.

## 2022-05-12 LAB
APPEARANCE UR: CLEAR
BACTERIA #/AREA URNS HPF: NORMAL /HPF
BILIRUB UR QL STRIP: NEGATIVE
CASTS URNS QL MICRO: NORMAL /LPF
COLOR UR: YELLOW
EPI CELLS #/AREA URNS HPF: NORMAL /HPF (ref 0–10)
GLUCOSE UR QL STRIP: NEGATIVE
HGB UR QL STRIP: NEGATIVE
IRON SATN MFR SERPL: 5 % (ref 20–50)
IRON SERPL-MCNC: 36 MCG/DL (ref 37–145)
KETONES UR QL STRIP: NEGATIVE
LEUKOCYTE ESTERASE UR QL STRIP: NEGATIVE
MICRO URNS: ABNORMAL
NITRITE UR QL STRIP: NEGATIVE
PH UR STRIP: 5.5 [PH] (ref 5–7.5)
PROT UR QL STRIP: ABNORMAL
RBC #/AREA URNS HPF: NORMAL /HPF (ref 0–2)
SP GR UR STRIP: 1.01 (ref 1–1.03)
T3FREE SERPL-MCNC: 2.2 PG/ML (ref 2–4.4)
T4 SERPL-MCNC: 8.7 UG/DL (ref 4.5–12)
TIBC SERPL-MCNC: 756 MCG/DL
UIBC SERPL-MCNC: 720 MCG/DL (ref 112–346)
URINALYSIS REFLEX: ABNORMAL
UROBILINOGEN UR STRIP-MCNC: 0.2 MG/DL (ref 0.2–1)
WBC #/AREA URNS HPF: NORMAL /HPF (ref 0–5)

## 2022-05-17 ENCOUNTER — PATIENT OUTREACH (OUTPATIENT)
Dept: CASE MANAGEMENT | Facility: OTHER | Age: 87
End: 2022-05-17

## 2022-05-17 RX ORDER — ASCORBIC ACID 500 MG
500 TABLET ORAL DAILY
Qty: 90 TABLET | Refills: 3
Start: 2022-05-17

## 2022-05-17 NOTE — OUTREACH NOTE
AMBULATORY CASE MANAGEMENT NOTE    Name and Relationship of Patient/Support Person: Libia Perales - Self    Patient Outreach    HRCM follow up and care plan updated.         SANDHYA CARPENTER  Ambulatory Case Management    5/17/2022, 16:27 CDT

## 2022-06-06 DIAGNOSIS — E11.9 TYPE 2 DIABETES MELLITUS WITHOUT COMPLICATION, UNSPECIFIED WHETHER LONG TERM INSULIN USE: Primary | ICD-10-CM

## 2022-06-06 RX ORDER — DULAGLUTIDE 1.5 MG/.5ML
1.5 INJECTION, SOLUTION SUBCUTANEOUS WEEKLY
Qty: 4 PEN | Refills: 5 | Status: SHIPPED | OUTPATIENT
Start: 2022-06-06 | End: 2022-11-08

## 2022-06-13 ENCOUNTER — TELEPHONE (OUTPATIENT)
Dept: INTERNAL MEDICINE | Facility: CLINIC | Age: 87
End: 2022-06-13

## 2022-06-13 NOTE — TELEPHONE ENCOUNTER
Caller: Libia Perales    Relationship: Self    Best call back number:[phone]    What is the best time to reach you: ANYTIME     Who are you requesting to speak with (clinical staff, provider,  specific staff member): PROVIDER OR NURSE         What was the call regarding: PATIENT REQUESTING A CALL BACK TO DISCUSS THE NEW MEDICATION FOR HER DIABETES CAUSING HER TO ITCH LIKE CRAZY     Do you require a callback: YES

## 2022-06-15 ENCOUNTER — OFFICE VISIT (OUTPATIENT)
Dept: INTERNAL MEDICINE | Facility: CLINIC | Age: 87
End: 2022-06-15

## 2022-06-15 VITALS
DIASTOLIC BLOOD PRESSURE: 80 MMHG | WEIGHT: 134 LBS | BODY MASS INDEX: 24.66 KG/M2 | TEMPERATURE: 97.8 F | OXYGEN SATURATION: 99 % | HEIGHT: 62 IN | SYSTOLIC BLOOD PRESSURE: 150 MMHG | HEART RATE: 50 BPM

## 2022-06-15 DIAGNOSIS — E11.9 TYPE 2 DIABETES MELLITUS WITHOUT COMPLICATION, WITHOUT LONG-TERM CURRENT USE OF INSULIN: ICD-10-CM

## 2022-06-15 DIAGNOSIS — N18.32 CHRONIC RENAL FAILURE, STAGE 3B: ICD-10-CM

## 2022-06-15 DIAGNOSIS — L29.9 PRURITUS: Primary | ICD-10-CM

## 2022-06-15 DIAGNOSIS — I10 BENIGN ESSENTIAL HTN: ICD-10-CM

## 2022-06-15 PROCEDURE — 99214 OFFICE O/P EST MOD 30 MIN: CPT | Performed by: FAMILY MEDICINE

## 2022-06-15 RX ORDER — HYDROXYZINE HYDROCHLORIDE 10 MG/1
10 TABLET, FILM COATED ORAL EVERY 6 HOURS PRN
Qty: 60 TABLET | Refills: 1 | Status: SHIPPED | OUTPATIENT
Start: 2022-06-15

## 2022-06-15 NOTE — PROGRESS NOTES
Subjective     Chief Complaint   Patient presents with   • itching     Pt has been itching from feet all the way to vagina area, noticed about 2 weeks ago, wonders if it has anything to do with the jardiance she was started on a few weeks ago; denies having any rash to go along with this        History of Present Illness    The patient is accompanied by an adult male.    Itching  The patient reports that she has been experiencing itching in her feet, legs, and arms for approximately 2 weeks. She denies any changes in her house or soaps. She states that she noticed the itching approximately 1 week after she started Jardiance. She notes that she has itching in her torso every once and a while. She reports that she has been experiencing swelling in her feet and legs. She  states that she was told to take a Lasix if she gained 2 to 5 pounds which she has been doing. The patient weighed herself this morning. She notes that she did not have Lasix to take yesterday, but she was brought more medication last night. She reports that she has not had any rashes with it. She states that she is still taking Trulicity injection and glipizide. She notes that she was taken off of metformin because her kidney function is not good. She states that she has fractured her wrist in the past.    The patient denies fracturing her wrist in the past. She notes she has arthritis.     Patient's PMR from outside medical facility reviewed and noted.    Review of Systems     Otherwise complete ROS reviewed and negative except as mentioned in the HPI.    Past Medical History:   Past Medical History:   Diagnosis Date   • Atrial fibrillation (HCC)    • Diabetes mellitus (HCC)    • GERD (gastroesophageal reflux disease)    • High cholesterol    • Hypertension    • OA (osteoarthritis)      Past Surgical History:  Past Surgical History:   Procedure Laterality Date   • BREAST BIOPSY Right    • BREAST CYST ASPIRATION     • HYSTERECTOMY     •  OOPHORECTOMY     • TONSILLECTOMY       Social History:  reports that she has never smoked. She has never used smokeless tobacco. She reports that she does not drink alcohol and does not use drugs.    Family History: family history includes Hypertension in her mother; No Known Problems in her brother, daughter, father, maternal aunt, maternal grandmother, paternal aunt, paternal grandmother, sister, son, and another family member.      Allergies:  Allergies   Allergen Reactions   • Singulair [Montelukast] Cough   • Ace Inhibitors Cough     Medications:  Prior to Admission medications    Medication Sig Start Date End Date Taking? Authorizing Provider   benzonatate (TESSALON) 200 MG capsule TAKE 1 CAPSULE BY MOUTH THREE TIMES A DAY AS NEEDED FOR COUGH 2/21/22  Yes Redd Dumont APRN   cloNIDine (Catapres) 0.1 MG tablet Take 1 tablet by mouth 2 (Two) Times a Day As Needed for High Blood Pressure (For blood pressure > 160/90). 2/8/22  Yes Lauren Donald APRN   cyanocobalamin 1000 MCG/ML injection 1cc IM weekly x 3, then monthly thereafter 12/20/21  Yes Susanna Monique APRN   digoxin (LANOXIN) 125 MCG tablet TAKE 1 TABLET BY MOUTH EVERY OTHER DAY (SUN TUES THU SAT) 5/11/22  Yes Allyson Do DO   docusate sodium (COLACE) 100 MG capsule Take 100 mg by mouth 2 (Two) Times a Day.   Yes Provider, MD Raheel   Eliquis 5 MG tablet tablet TAKE 1 TABLET BY MOUTH TWICE DAILY   6/23/21  Yes Lauren Donald APRN   empagliflozin (Jardiance) 10 MG tablet tablet Take 1 tablet by mouth Daily. 5/11/22  Yes Allyson Do DO   fenofibrate (TRICOR) 145 MG tablet TAKE 1 TABLET BY MOUTH EVERY DAY  6/23/21  Yes Lauren Donald APRN   glipizide (GLUCOTROL) 10 MG tablet TAKE 1 TABLET BY MOUTH TWICE DAILY WITH FOOD 2/21/22  Yes Allyson Do DO   hydrALAZINE (APRESOLINE) 100 MG tablet TAKE 1 TABLET BY MOUTH THREE TIMES A DAY  9/23/21  Yes Susanna Monique APRN  "  hydroCHLOROthiazide (MICROZIDE) 12.5 MG capsule TAKE 1 CAPSULE BY MOUTH EVERY DAY  6/23/21  Yes Lauren Donald APRN   Iron, Ferrous Sulfate, 325 (65 Fe) MG tablet Take 1 each by mouth Daily. 5/17/22  Yes Allyson Do DO   levocetirizine (XYZAL) 5 MG tablet Take 5 mg by mouth Every Evening.   Yes ProviderRaheel MD   losartan (COZAAR) 100 MG tablet TAKE 1 TABLET BY MOUTH EVERY DAY 2/21/22  Yes Allyson Do DO   magnesium oxide (MAGOX) 400 (241.3 Mg) MG tablet tablet Take 400 mg by mouth Daily.   Yes ProviderRaheel MD   metFORMIN (GLUCOPHAGE) 1000 MG tablet TAKE 1 TABLET BY MOUTH TWICE DAILY WITH FOOD  6/23/21  Yes Lauren Donald APRN   metoprolol succinate XL (TOPROL-XL) 25 MG 24 hr tablet TAKE 1 TABLET BY MOUTH TWICE DAILY 3/8/22  Yes Lauren Donald APRN   omeprazole (priLOSEC) 40 MG capsule TAKE 1 CAPSULE BY MOUTH EVERY DAY 1/17/22  Yes Redd Dumont APRN   simvastatin (ZOCOR) 40 MG tablet TAKE 1 TABLET BY MOUTH EVERY NIGHT 2/21/22  Yes Allyson Do DO   Syringe 25G X 1\" 3 ML misc Weekly injection x 3, then monthly thereafter 12/20/21  Yes Susanna Monique APRN   True Metrix Blood Glucose Test test strip TEST AS DIRECTED 3/22/22  Yes Allyson Do DO   Trulicity 1.5 MG/0.5ML solution pen-injector INJECT 1.5 MG UNDER THE SKIN INTO THE APPROPRIATE AREA AS DIRECTED 1 (ONE) TIME PER WEEK. 6/6/22  Yes Vito, [place] CYOBANY   vitamin C (ASCORBIC ACID) 500 MG tablet Take 1 tablet by mouth Daily. 5/17/22  Yes Allyson Do DO   furosemide (LASIX) 20 MG tablet Take 1 tablet by mouth 2 (Two) Times a Day for 7 days. 3/14/22 3/21/22  Ernesto Hunt MD       Objective     Vital Signs: /80 (BP Location: Left arm, Patient Position: Sitting, Cuff Size: Adult)   Pulse 50   Temp 97.8 °F (36.6 °C) (Temporal)   Ht 157.5 cm (62\")   Wt 60.8 kg (134 lb)   LMP  (LMP Unknown)   SpO2 99%   Breastfeeding No   BMI 24.51 " kg/m²   Physical Exam  Vitals and nursing note reviewed.   Constitutional:       Appearance: Normal appearance. She is well-developed.   HENT:      Head: Normocephalic and atraumatic.      Right Ear: External ear normal.      Left Ear: External ear normal.      Nose: Nose normal.      Mouth/Throat:      Mouth: Mucous membranes are moist.   Eyes:      Extraocular Movements: Extraocular movements intact.      Conjunctiva/sclera: Conjunctivae normal.      Pupils: Pupils are equal, round, and reactive to light.   Neck:      Thyroid: No thyromegaly.      Vascular: No JVD.      Trachea: No tracheal deviation.   Cardiovascular:      Rate and Rhythm: Normal rate and regular rhythm.      Pulses: Normal pulses.      Heart sounds: Normal heart sounds. No murmur heard.    No friction rub. No gallop.   Pulmonary:      Effort: Pulmonary effort is normal.      Breath sounds: Normal breath sounds.   Abdominal:      General: Bowel sounds are normal. There is no distension.      Palpations: Abdomen is soft.      Tenderness: There is no abdominal tenderness.   Musculoskeletal:         General: Normal range of motion.      Cervical back: Normal range of motion and neck supple.   Lymphadenopathy:      Cervical: No cervical adenopathy.   Skin:     General: Skin is warm and dry.      Capillary Refill: Capillary refill takes less than 2 seconds.   Neurological:      Mental Status: She is alert and oriented to person, place, and time.      Cranial Nerves: No cranial nerve deficit.      Coordination: Coordination normal.   Psychiatric:         Mood and Affect: Mood normal.         Behavior: Behavior normal.         BMI is within normal parameters. No other follow-up for BMI required.      Results Reviewed:  Glucose   Date Value Ref Range Status   05/04/2022 130 (H) 65 - 99 mg/dL Final   03/14/2022 283 (H) 65 - 99 mg/dL Final     BUN   Date Value Ref Range Status   05/04/2022 43 (H) 8 - 23 mg/dL Final   03/14/2022 30 (H) 8 - 23 mg/dL Final      Creatinine   Date Value Ref Range Status   05/04/2022 1.72 (H) 0.57 - 1.00 mg/dL Final   03/14/2022 1.50 (H) 0.57 - 1.00 mg/dL Final   01/07/2022 1.60 (H) 0.60 - 1.30 mg/dL Final     Comment:     Serial Number: [id number]:  [id number]     Sodium   Date Value Ref Range Status   05/04/2022 139 136 - 145 mmol/L Final   03/14/2022 136 136 - 145 mmol/L Final     Potassium   Date Value Ref Range Status   05/04/2022 4.3 3.5 - 5.2 mmol/L Final   03/14/2022 4.2 3.5 - 5.2 mmol/L Final     Chloride   Date Value Ref Range Status   05/04/2022 103 98 - 107 mmol/L Final   03/14/2022 100 98 - 107 mmol/L Final     CO2   Date Value Ref Range Status   03/14/2022 23.0 22.0 - 29.0 mmol/L Final     Total CO2   Date Value Ref Range Status   05/04/2022 24.2 22.0 - 29.0 mmol/L Final     Calcium   Date Value Ref Range Status   05/04/2022 9.8 8.6 - 10.5 mg/dL Final   03/14/2022 10.0 8.6 - 10.5 mg/dL Final     ALT (SGPT)   Date Value Ref Range Status   05/04/2022 16 1 - 33 U/L Final   03/14/2022 17 1 - 33 U/L Final     AST (SGOT)   Date Value Ref Range Status   05/04/2022 23 1 - 32 U/L Final   03/14/2022 26 1 - 32 U/L Final     WBC   Date Value Ref Range Status   05/04/2022 5.29 3.40 - 10.80 10*3/mm3 Final     Hematocrit   Date Value Ref Range Status   05/04/2022 31.1 (L) 34.0 - 46.6 % Final   03/14/2022 31.0 (L) 34.0 - 46.6 % Final     Platelets   Date Value Ref Range Status   05/04/2022 292 140 - 450 10*3/mm3 Final   03/14/2022 315 140 - 450 10*3/mm3 Final     Triglycerides   Date Value Ref Range Status   05/04/2022 195 (H) 0 - 150 mg/dL Final     HDL Cholesterol   Date Value Ref Range Status   05/04/2022 34 (L) 40 - 60 mg/dL Final     LDL Chol Calc (NIH)   Date Value Ref Range Status   05/04/2022 79 0 - 100 mg/dL Final     Hemoglobin A1C   Date Value Ref Range Status   05/04/2022 6.70 (H) 4.80 - 5.60 % Final     Comment:     Hemoglobin A1C Ranges:  Increased Risk for Diabetes  5.7% to 6.4%  Diabetes                     >= 6.5%  Diabetic  Goal                < 7.0%     12/15/2021 8.8 % Final         Assessment / Plan     Assessment/Plan:  1. Pruritus  Stop Jardiance monitor for improvement in pruritus  Atarax 10 mg every 6 hours.    2. Type 2 diabetes mellitus without complication, without long-term current use of insulin (HCC)  Monitor blood sugar before meals and at bedtime  If staying above 250 please call    3. Chronic renal failure, stage 3b (HCC)  Monitor CMP routinely    4. Benign essential HTN  Current medication  Goal less than 130/80          The patient will stop her Jardiance. She is also off the metformin secondary to renal issues. She will take Atarax 10 mg every 6 hours as needed for itching. If the itching is related to the Jardiance, this should resolve as the medication is removed from her system. She is to monitor her sugar routinely. If it is staying above 250, they need to call and come in. Otherwise, she should follow up in 3 months if we need additional medicines. At this time, probably the best route will be to go with insulin secondary to the cost of the remaining oral diabetes medicines being too expensive.      Return in about 3 months (around 9/15/2022). unless patient needs to be seen sooner or acute issues arise.      I have discussed the patient results/orders and and plan/recommendation with them at today's visit.      Allyson Do DO   06/15/2022      Transcribed from ambient dictation for Allyson Do DO by Cristian Leonardo.  06/15/22   11:46 CDT    Patient verbalized consent to the visit recording.      Statement Selected

## 2022-06-22 ENCOUNTER — PATIENT OUTREACH (OUTPATIENT)
Dept: CASE MANAGEMENT | Facility: OTHER | Age: 87
End: 2022-06-22

## 2022-06-22 NOTE — OUTREACH NOTE
AMBULATORY CASE MANAGEMENT NOTE    Name and Relationship of Patient/Support Person: Libia Perales - Self    Patient Outreach    HRCM follow up with care plan update.         SANDHYA CARPENTER  Ambulatory Case Management    6/22/2022, 14:52 CDT

## 2022-07-05 RX ORDER — HYDROCHLOROTHIAZIDE 12.5 MG/1
CAPSULE, GELATIN COATED ORAL
Qty: 90 CAPSULE | Refills: 3 | Status: SHIPPED | OUTPATIENT
Start: 2022-07-05

## 2022-07-05 RX ORDER — FENOFIBRATE 145 MG/1
TABLET, COATED ORAL
Qty: 90 TABLET | Refills: 3 | Status: SHIPPED | OUTPATIENT
Start: 2022-07-05

## 2022-07-19 DIAGNOSIS — I48.19 PERSISTENT ATRIAL FIBRILLATION: ICD-10-CM

## 2022-07-20 RX ORDER — FUROSEMIDE 20 MG/1
TABLET ORAL
Qty: 30 TABLET | Refills: 0 | Status: SHIPPED | OUTPATIENT
Start: 2022-07-20 | End: 2022-09-06

## 2022-07-20 RX ORDER — APIXABAN 5 MG/1
TABLET, FILM COATED ORAL
Qty: 180 TABLET | Refills: 11 | Status: SHIPPED | OUTPATIENT
Start: 2022-07-20 | End: 2022-09-26 | Stop reason: SDUPTHER

## 2022-08-17 ENCOUNTER — OFFICE VISIT (OUTPATIENT)
Dept: INTERNAL MEDICINE | Facility: CLINIC | Age: 87
End: 2022-08-17

## 2022-08-17 DIAGNOSIS — U07.1 COVID-19: Primary | ICD-10-CM

## 2022-08-17 PROCEDURE — 99442 PR PHYS/QHP TELEPHONE EVALUATION 11-20 MIN: CPT | Performed by: NURSE PRACTITIONER

## 2022-08-17 RX ORDER — DEXTROMETHORPHAN POLISTIREX 30 MG/5ML
60 SUSPENSION ORAL EVERY 12 HOURS PRN
Qty: 280 ML | Refills: 0 | Status: SHIPPED | OUTPATIENT
Start: 2022-08-17 | End: 2022-12-13

## 2022-08-17 RX ORDER — FLUTICASONE PROPIONATE 50 MCG
2 SPRAY, SUSPENSION (ML) NASAL DAILY
Qty: 9.9 ML | Refills: 0 | Status: SHIPPED | OUTPATIENT
Start: 2022-08-17 | End: 2022-09-26

## 2022-08-17 NOTE — PROGRESS NOTES
"        Subjective     Chief Complaint:  COVID-positive    HPI:  You have chosen to receive care through a telephone visit. Do you consent to use a telephone visit for your medical care today? Yes    Patient presents via telephone visit as she tested positive for COVID at home yesterday.  Her symptoms started yesterday as well.  She was exposed to her grandson last week who was COVID-positive.  She has had a cough described as dry and nonproductive.  She has had a sore throat early this morning, which has improved.  She has had a runny nose as well.  She denies any ear pain or pressure.  She denies chest pain or shortness of breath.  She denies any muscle or joint pain.  She has had no nausea, vomiting, or diarrhea but does describe a poor appetite.  She denies a headache but states she did feel like she had some \"pressure in my head\" yesterday.  She denies any dizziness or lightheadedness.  She has had chills and feels as though she may have had a fever but did not take her temperature yesterday.  She has been taking Tylenol for her symptoms and does take Xyzal daily.  She has not been checking her blood pressure at home and does not have a pulse oximeter.    Patient's PMR from outside medical facility reviewed and noted.    Past Medical History:   Past Medical History:   Diagnosis Date   • Atrial fibrillation (HCC)    • Diabetes mellitus (HCC)    • GERD (gastroesophageal reflux disease)    • High cholesterol    • Hypertension    • OA (osteoarthritis)      Past Surgical History:  Past Surgical History:   Procedure Laterality Date   • BREAST BIOPSY Right    • BREAST CYST ASPIRATION     • HYSTERECTOMY     • OOPHORECTOMY     • TONSILLECTOMY       Social History:  reports that she has never smoked. She has never used smokeless tobacco. She reports that she does not drink alcohol and does not use drugs.    Family History: family history includes Hypertension in her mother; No Known Problems in her brother, daughter, " father, maternal aunt, maternal grandmother, paternal aunt, paternal grandmother, sister, son, and another family member.      Allergies:  Allergies   Allergen Reactions   • Singulair [Montelukast] Cough   • Ace Inhibitors Cough     Medications:  Prior to Admission medications    Medication Sig Start Date End Date Taking? Authorizing Provider   benzonatate (TESSALON) 200 MG capsule TAKE 1 CAPSULE BY MOUTH THREE TIMES A DAY AS NEEDED FOR COUGH 2/21/22  Yes Redd Dumont APRN   cloNIDine (Catapres) 0.1 MG tablet Take 1 tablet by mouth 2 (Two) Times a Day As Needed for High Blood Pressure (For blood pressure > 160/90). 2/8/22  Yes Lauren Donald APRN   cyanocobalamin 1000 MCG/ML injection 1cc IM weekly x 3, then monthly thereafter 12/20/21  Yes Susanna Monique APRN   digoxin (LANOXIN) 125 MCG tablet TAKE 1 TABLET BY MOUTH EVERY OTHER DAY (SUN TUES THU SAT) 5/11/22  Yes Allyson Do DO   docusate sodium (COLACE) 100 MG capsule Take 100 mg by mouth 2 (Two) Times a Day.   Yes Provider, MD Raheel   Eliquis 5 MG tablet tablet TAKE 1 TABLET BY MOUTH TWICE DAILY 7/20/22  Yes Allyson Do DO   fenofibrate (TRICOR) 145 MG tablet TAKE 1 TABLET BY MOUTH EVERY DAY 7/5/22  Yes Allyson Do DO   furosemide (LASIX) 20 MG tablet One dose daily as needed for weight gain or fluid retention 7/20/22  Yes Allyson Do DO   glipizide (GLUCOTROL) 10 MG tablet TAKE 1 TABLET BY MOUTH TWICE DAILY WITH FOOD 2/21/22  Yes Allyson Do DO   hydrALAZINE (APRESOLINE) 100 MG tablet TAKE 1 TABLET BY MOUTH THREE TIMES A DAY  9/23/21  Yes Susanna Monique APRN   hydroCHLOROthiazide (MICROZIDE) 12.5 MG capsule TAKE 1 CAPSULE BY MOUTH EVERY DAY 7/5/22  Yes Allyson Do DO   hydrOXYzine (ATARAX) 10 MG tablet Take 1 tablet by mouth Every 6 (Six) Hours As Needed for Itching. 6/15/22  Yes Horn, Allyson Keren, DO   Iron, Ferrous Sulfate, 325 (65 Fe) MG tablet Take 1 each by mouth Daily.  "5/17/22  Yes Allyson Do DO   levocetirizine (XYZAL) 5 MG tablet Take 5 mg by mouth Every Evening.   Yes ProviderRaheel MD   losartan (COZAAR) 100 MG tablet TAKE 1 TABLET BY MOUTH EVERY DAY 2/21/22  Yes Allyson Do DO   magnesium oxide (MAGOX) 400 (241.3 Mg) MG tablet tablet Take 400 mg by mouth Daily.   Yes Provider, MD Raheel   metoprolol succinate XL (TOPROL-XL) 25 MG 24 hr tablet TAKE 1 TABLET BY MOUTH TWICE DAILY 3/8/22  Yes Lauren Donald APRN   omeprazole (priLOSEC) 40 MG capsule TAKE 1 CAPSULE BY MOUTH EVERY DAY 1/17/22  Yes Redd Dumont APRN   simvastatin (ZOCOR) 40 MG tablet TAKE 1 TABLET BY MOUTH EVERY NIGHT 2/21/22  Yes Allyson Do DO   Syringe 25G X 1\" 3 ML misc Weekly injection x 3, then monthly thereafter 12/20/21  Yes Susanna Monique APRN   True Metrix Blood Glucose Test test strip TEST AS DIRECTED 3/22/22  Yes Allyson Do DO   Trulicity 1.5 MG/0.5ML solution pen-injector INJECT 1.5 MG UNDER THE SKIN INTO THE APPROPRIATE AREA AS DIRECTED 1 (ONE) TIME PER WEEK. 6/6/22  Yes Albina Padron APRN   vitamin C (ASCORBIC ACID) 500 MG tablet Take 1 tablet by mouth Daily. 5/17/22  Yes Allyson Do DO   dextromethorphan polistirex ER (Delsym) 30 MG/5ML Suspension Extended Release oral suspension Take 10 mL by mouth Every 12 (Twelve) Hours As Needed (Cough). 8/17/22   Sharon Corley APRN   fluticasone (Flonase) 50 MCG/ACT nasal spray 2 sprays into the nostril(s) as directed by provider Daily. 8/17/22   Sharon Corley APRN       Objective     Vital Signs: LMP  (LMP Unknown)   Physical Exam  No physical exam-telephone visit      Assessment / Plan     Assessment/Plan:  Diagnoses and all orders for this visit:    1. COVID-19 (Primary)    Other orders  -     dextromethorphan polistirex ER (Delsym) 30 MG/5ML Suspension Extended Release oral suspension; Take 10 mL by mouth Every 12 (Twelve) Hours As Needed (Cough).  " Dispense: 280 mL; Refill: 0  -     fluticasone (Flonase) 50 MCG/ACT nasal spray; 2 sprays into the nostril(s) as directed by provider Daily.  Dispense: 9.9 mL; Refill: 0       Patient presents via telephone visit with at home positive COVID test yesterday with symptoms starting yesterday as well.  She has had a cough, dry and nonproductive.  She has also had a runny nose and sore throat.  I did discuss medication Paxlovid at length with the patient and the pharmacist at [facility]. The patient's last laboratory studies assessed in May did show a GFR less than 30 and this medication is not indicated for GFR less than 30.  Prior to this her GFR was slightly higher or around 30.  The patient also takes Eliquis for history of paroxysmal atrial fibrillation, and this dosage would need to be adjusted while on this medication.  However, she may qualify for reduced dosing at any rate given her age and renal function.  This can be discussed with cardiology at her upcoming appointment in September.  The patient's simvastatin would need to be held while on this medication as well.  After further assessment, I feel the risk outweigh the benefits of this medication at this time.  I did inform the patient that she does have a window of 5 days from the onset of symptoms to start this medication, if she feels up to coming to the office to have her renal function reassessed before that time.  She states she does not feel like coming to the office for labs today but may call if she changes her mind.  For her symptoms, I would recommend that she continue home medications eyes all and will prescribe over-the-counter medications Flonase and Delsym.  For sore throat, would advise warm salt water gargles and warm liquids such as chicken broth or hot tea as needed.  Patient can continue to take Tylenol.  She takes vitamin C as well.    Return if symptoms worsen or fail to improve.  Patient has a scheduled office visit  with Dr. Do in September, unless patient needs to be seen sooner or acute issues arise.    I have discussed the patient results/orders and and plan/recommendation with them at today's visit.  Total time spent advising patient on the phone was 14 minutes and 23 seconds.    Sharon Corley, APRN   08/17/2022

## 2022-08-30 RX ORDER — GLIPIZIDE 10 MG/1
TABLET ORAL
Qty: 180 TABLET | Refills: 3 | Status: SHIPPED | OUTPATIENT
Start: 2022-08-30

## 2022-09-06 RX ORDER — FUROSEMIDE 20 MG/1
TABLET ORAL
Qty: 30 TABLET | Refills: 11 | Status: SHIPPED | OUTPATIENT
Start: 2022-09-06

## 2022-09-06 RX ORDER — METOPROLOL SUCCINATE 25 MG/1
TABLET, EXTENDED RELEASE ORAL
Qty: 60 TABLET | Refills: 11 | Status: SHIPPED | OUTPATIENT
Start: 2022-09-06 | End: 2023-03-20 | Stop reason: ALTCHOICE

## 2022-09-06 RX ORDER — LOSARTAN POTASSIUM 100 MG/1
TABLET ORAL
Qty: 90 TABLET | Refills: 11 | Status: SHIPPED | OUTPATIENT
Start: 2022-09-06

## 2022-09-06 RX ORDER — SIMVASTATIN 40 MG
40 TABLET ORAL NIGHTLY
Qty: 90 TABLET | Refills: 11 | Status: SHIPPED | OUTPATIENT
Start: 2022-09-06

## 2022-09-16 ENCOUNTER — OFFICE VISIT (OUTPATIENT)
Dept: INTERNAL MEDICINE | Facility: CLINIC | Age: 87
End: 2022-09-16

## 2022-09-16 VITALS
TEMPERATURE: 96.8 F | OXYGEN SATURATION: 99 % | HEIGHT: 62 IN | BODY MASS INDEX: 25.21 KG/M2 | SYSTOLIC BLOOD PRESSURE: 160 MMHG | WEIGHT: 137 LBS | HEART RATE: 70 BPM | DIASTOLIC BLOOD PRESSURE: 62 MMHG

## 2022-09-16 DIAGNOSIS — E78.2 MIXED HYPERLIPIDEMIA: ICD-10-CM

## 2022-09-16 DIAGNOSIS — E11.9 TYPE 2 DIABETES MELLITUS WITHOUT COMPLICATION, WITHOUT LONG-TERM CURRENT USE OF INSULIN: Primary | ICD-10-CM

## 2022-09-16 DIAGNOSIS — N18.4 CHRONIC RENAL FAILURE, STAGE 4 (SEVERE): ICD-10-CM

## 2022-09-16 DIAGNOSIS — I10 UNCONTROLLED HYPERTENSION: ICD-10-CM

## 2022-09-16 PROCEDURE — 99214 OFFICE O/P EST MOD 30 MIN: CPT | Performed by: FAMILY MEDICINE

## 2022-09-16 RX ORDER — AMLODIPINE BESYLATE 5 MG/1
2.5 TABLET ORAL DAILY
Qty: 30 TABLET | Refills: 2 | Status: SHIPPED | OUTPATIENT
Start: 2022-09-16 | End: 2022-12-13 | Stop reason: SDUPTHER

## 2022-09-16 NOTE — PROGRESS NOTES
Subjective     Chief Complaint   Patient presents with   • Diabetes     3 mo f/u, review labs, brought BG readings   • Hyperlipidemia   • Hypertension     Brought BP readings   • Cough     Has had a cough since she had COVID 8/17/22       History of Present Illness      The patient is accompanied by an adult female.    The patient's hemoglobin A1c has increased to 8 percent. She states that she has been eats everything that is put in front of her. She states that she was able to keep her hemoglobin A1c down with diet. The patient states that she is going to try to do better with her diet.    The patient's chemistry level is stable. Her kidney function is about the same as it was prior. Her liver enzymes are normal. Her proteins are good. Her triglycerides are down from 195 mg/dL to 173 mg/dL. Her total cholesterol and bad cholesterol are okay.    The patient's blood pressure is up and down. She is currently taking digoxin, Lasix, hydrochlorothiazide, and losartan. She denies any problems with being lightheaded routinely.    The patient is allergic to ACE INHIBITORS  and SINGULAIR. She states that both caused her to cough. She states that she can not take a medication that she can not think of.    The patient states that she is getting around well as long as she uses her walker and cane. She states that she is sneezing a lot now.    The patient states that she has been coughing since she had COVID-19 in 08/2022. She states that she is still tired. She denies any sputum production. She states that it is an annoying cough. She states that she has tried Tessalon Perles in the past, but they did not help. She states that she can take a dose of cough medicine and it will stop the coughing for a while.    The patient states that her feet and legs are swelling. She states that she has been taking an extra fluid pill when they swell.    The patient has not had her influenza vaccine.    Patient's PMR from outside  medical facility reviewed and noted.    Review of Systems     Otherwise complete ROS reviewed and negative except as mentioned in the HPI.    Past Medical History:   Past Medical History:   Diagnosis Date   • Atrial fibrillation (HCC)    • Diabetes mellitus (HCC)    • GERD (gastroesophageal reflux disease)    • High cholesterol    • Hypertension    • OA (osteoarthritis)      Past Surgical History:  Past Surgical History:   Procedure Laterality Date   • BREAST BIOPSY Right    • BREAST CYST ASPIRATION     • HYSTERECTOMY     • OOPHORECTOMY     • TONSILLECTOMY       Social History:  reports that she has never smoked. She has never used smokeless tobacco. She reports that she does not drink alcohol and does not use drugs.    Family History: family history includes Hypertension in her mother; No Known Problems in her brother, daughter, father, maternal aunt, maternal grandmother, paternal aunt, paternal grandmother, sister, son, and another family member.      Allergies:  Allergies   Allergen Reactions   • Singulair [Montelukast] Cough   • Ace Inhibitors Cough     Medications:  Prior to Admission medications    Medication Sig Start Date End Date Taking? Authorizing Provider   cloNIDine (Catapres) 0.1 MG tablet Take 1 tablet by mouth 2 (Two) Times a Day As Needed for High Blood Pressure (For blood pressure > 160/90). 2/8/22  Yes Lauren Donald APRN   cyanocobalamin 1000 MCG/ML injection 1cc IM weekly x 3, then monthly thereafter 12/20/21  Yes Susanna Monique APRN   dextromethorphan polistirex ER (Delsym) 30 MG/5ML Suspension Extended Release oral suspension Take 10 mL by mouth Every 12 (Twelve) Hours As Needed (Cough). 8/17/22  Yes Sharon Corley APRN   digoxin (LANOXIN) 125 MCG tablet TAKE 1 TABLET BY MOUTH EVERY OTHER DAY (SUN TUES THU SAT) 5/11/22  Yes Allyson Do DO   docusate sodium (COLACE) 100 MG capsule Take 100 mg by mouth 2 (Two) Times a Day.   Yes Provider, Historical, MD   Eliquis 5  "MG tablet tablet TAKE 1 TABLET BY MOUTH TWICE DAILY 7/20/22  Yes Allyson Do DO   fenofibrate (TRICOR) 145 MG tablet TAKE 1 TABLET BY MOUTH EVERY DAY 7/5/22  Yes Allyson Do DO   fluticasone (Flonase) 50 MCG/ACT nasal spray 2 sprays into the nostril(s) as directed by provider Daily. 8/17/22  Yes Sharon Corley APRN   furosemide (LASIX) 20 MG tablet TAKE 1 TABLET BY MOUTH EVERY DAY AS NEEDED FOR WEIGHT GAIN OR FLUID RETENTION 9/6/22  Yes Allyson Do DO   glipizide (GLUCOTROL) 10 MG tablet TAKE 1 TABLET BY MOUTH TWICE DAILY WITH FOOD 8/30/22  Yes Allyson Do DO   hydrALAZINE (APRESOLINE) 100 MG tablet TAKE 1 TABLET BY MOUTH THREE TIMES A DAY  9/23/21  Yes Susanna Monique APRN   hydroCHLOROthiazide (MICROZIDE) 12.5 MG capsule TAKE 1 CAPSULE BY MOUTH EVERY DAY 7/5/22  Yes Allyson Do DO   hydrOXYzine (ATARAX) 10 MG tablet Take 1 tablet by mouth Every 6 (Six) Hours As Needed for Itching. 6/15/22  Yes Allyson Do DO   Iron, Ferrous Sulfate, 325 (65 Fe) MG tablet Take 1 each by mouth Daily. 5/17/22  Yes Allyson Do DO   levocetirizine (XYZAL) 5 MG tablet Take 5 mg by mouth Every Evening.   Yes Raheel Underwood MD   losartan (COZAAR) 100 MG tablet TAKE 1 TABLET BY MOUTH EVERY DAY 9/6/22  Yes Allyson Do DO   magnesium oxide (MAGOX) 400 (241.3 Mg) MG tablet tablet Take 400 mg by mouth Daily.   Yes ProviderRaheel MD   metoprolol succinate XL (TOPROL-XL) 25 MG 24 hr tablet TAKE 1 TABLET BY MOUTH TWICE DAILY 9/6/22  Yes Allyson Do DO   omeprazole (priLOSEC) 40 MG capsule TAKE 1 CAPSULE BY MOUTH EVERY DAY 1/17/22  Yes Redd Dumont APRN   simvastatin (ZOCOR) 40 MG tablet TAKE 1 TABLET BY MOUTH EVERY NIGHT 9/6/22  Yes Allyson Do DO   Syringe 25G X 1\" 3 ML misc Weekly injection x 3, then monthly thereafter 12/20/21  Yes Susanna Monique APRN   True Metrix Blood Glucose Test test strip TEST AS DIRECTED 3/22/22  Yes " "Allyson Do DO   Trulicity 1.5 MG/0.5ML solution pen-injector INJECT 1.5 MG UNDER THE SKIN INTO THE APPROPRIATE AREA AS DIRECTED 1 (ONE) TIME PER WEEK. 6/6/22  Yes Albina Padron APRN   vitamin C (ASCORBIC ACID) 500 MG tablet Take 1 tablet by mouth Daily. 5/17/22  Yes Allyson Do DO   benzonatate (TESSALON) 200 MG capsule TAKE 1 CAPSULE BY MOUTH THREE TIMES A DAY AS NEEDED FOR COUGH 2/21/22 9/16/22  Redd Dumont APRN       Objective     Vital Signs: /62   Pulse 70   Temp 96.8 °F (36 °C)   Ht 157.5 cm (62\")   Wt 62.1 kg (137 lb)   LMP  (LMP Unknown)   SpO2 99%   Breastfeeding No   BMI 25.06 kg/m²   Physical Exam  Vitals and nursing note reviewed.   Constitutional:       Appearance: Normal appearance. She is well-developed.   HENT:      Head: Normocephalic and atraumatic.      Right Ear: External ear normal.      Left Ear: External ear normal.      Nose: Nose normal.      Mouth/Throat:      Mouth: Mucous membranes are moist.   Eyes:      Extraocular Movements: Extraocular movements intact.      Conjunctiva/sclera: Conjunctivae normal.      Pupils: Pupils are equal, round, and reactive to light.   Neck:      Thyroid: No thyromegaly.      Vascular: No JVD.      Trachea: No tracheal deviation.   Cardiovascular:      Rate and Rhythm: Normal rate and regular rhythm.      Pulses: Normal pulses.      Heart sounds: Normal heart sounds. No murmur heard.    No friction rub. No gallop.   Pulmonary:      Effort: Pulmonary effort is normal.      Breath sounds: Normal breath sounds.   Abdominal:      General: Bowel sounds are normal. There is no distension.      Palpations: Abdomen is soft.      Tenderness: There is no abdominal tenderness.   Musculoskeletal:         General: Normal range of motion.      Cervical back: Normal range of motion and neck supple.   Lymphadenopathy:      Cervical: No cervical adenopathy.   Skin:     General: Skin is warm and dry.      Capillary Refill: " Capillary refill takes less than 2 seconds.   Neurological:      Mental Status: She is alert and oriented to person, place, and time.      Cranial Nerves: No cranial nerve deficit.      Coordination: Coordination normal.   Psychiatric:         Mood and Affect: Mood normal.         Behavior: Behavior normal.         BMI is >= 25 and <30. (Overweight) The following options were offered after discussion;: exercise counseling/recommendations and nutrition counseling/recommendations      Results Reviewed:  Glucose   Date Value Ref Range Status   09/09/2022 146 (H) 65 - 99 mg/dL Final   03/14/2022 283 (H) 65 - 99 mg/dL Final     BUN   Date Value Ref Range Status   09/09/2022 31 (H) 8 - 23 mg/dL Final   03/14/2022 30 (H) 8 - 23 mg/dL Final     Creatinine   Date Value Ref Range Status   09/09/2022 1.68 (H) 0.57 - 1.00 mg/dL Final   03/14/2022 1.50 (H) 0.57 - 1.00 mg/dL Final   01/07/2022 1.60 (H) 0.60 - 1.30 mg/dL Final     Comment:     Serial Number: [id number]:  [id number]     Sodium   Date Value Ref Range Status   09/09/2022 138 136 - 145 mmol/L Final   03/14/2022 136 136 - 145 mmol/L Final     Potassium   Date Value Ref Range Status   09/09/2022 4.1 3.5 - 5.2 mmol/L Final   03/14/2022 4.2 3.5 - 5.2 mmol/L Final     Chloride   Date Value Ref Range Status   09/09/2022 104 98 - 107 mmol/L Final   03/14/2022 100 98 - 107 mmol/L Final     CO2   Date Value Ref Range Status   03/14/2022 23.0 22.0 - 29.0 mmol/L Final     Total CO2   Date Value Ref Range Status   09/09/2022 26.3 22.0 - 29.0 mmol/L Final     Calcium   Date Value Ref Range Status   09/09/2022 9.7 8.6 - 10.5 mg/dL Final   03/14/2022 10.0 8.6 - 10.5 mg/dL Final     ALT (SGPT)   Date Value Ref Range Status   09/09/2022 13 1 - 33 U/L Final   03/14/2022 17 1 - 33 U/L Final     AST (SGOT)   Date Value Ref Range Status   09/09/2022 17 1 - 32 U/L Final   03/14/2022 26 1 - 32 U/L Final     WBC   Date Value Ref Range Status   05/04/2022 5.29 3.40 - 10.80 10*3/mm3 Final      Hematocrit   Date Value Ref Range Status   05/04/2022 31.1 (L) 34.0 - 46.6 % Final   03/14/2022 31.0 (L) 34.0 - 46.6 % Final     Platelets   Date Value Ref Range Status   05/04/2022 292 140 - 450 10*3/mm3 Final   03/14/2022 315 140 - 450 10*3/mm3 Final     Triglycerides   Date Value Ref Range Status   09/09/2022 173 (H) 0 - 150 mg/dL Final     HDL Cholesterol   Date Value Ref Range Status   09/09/2022 37 (L) 40 - 60 mg/dL Final     LDL Chol Calc (NIH)   Date Value Ref Range Status   09/09/2022 75 0 - 100 mg/dL Final     Hemoglobin A1C   Date Value Ref Range Status   09/09/2022 8.00 (H) 4.80 - 5.60 % Final     Comment:     Hemoglobin A1C Ranges:  Increased Risk for Diabetes  5.7% to 6.4%  Diabetes                     >= 6.5%  Diabetic Goal                < 7.0%     12/15/2021 8.8 % Final         Assessment / Plan     Assessment/Plan:  1. Type 2 diabetes mellitus without complication, without long-term current use of insulin ([facility])  Carbohydrate consistent diet.  Monitor blood sugars.    2. Uncontrolled hypertension  Add amlodipine 2.5 mg daily monitor blood pressure.  Goal less than 130/80.    3. Mixed hyperlipidemia  Low-cholesterol diet  Continue simvastatin  4. Chronic renal failure, stage 4 (severe) (HCC)  Monitor CMP routinely  Avoid NSAIDs        Plan:  We discussed flu vaccine. We are going to have her really watch her diet for simple sugars and carbohydrates. We are going to add amlodipine 2.5 mg daily to see if we can bring her blood pressure down a little bit without making her too low. I have asked that they come back in 3 months for re-evaluation of that hemoglobin A1c. We discussed flu shot. She will get that next month.    Return in about 3 months (around 12/16/2022). unless patient needs to be seen sooner or acute issues arise.      I have discussed the patient results/orders and and plan/recommendation with them at today's visit.      Allyson Do,    09/16/2022      Transcribed from  ambient dictation for Allyson Do DO by Angelina Blanton .  09/16/22   16:24 CDT    Patient verbalized consent to the visit recording.

## 2022-09-26 ENCOUNTER — TELEPHONE (OUTPATIENT)
Dept: INTERNAL MEDICINE | Facility: CLINIC | Age: 87
End: 2022-09-26

## 2022-09-26 ENCOUNTER — OFFICE VISIT (OUTPATIENT)
Dept: CARDIOLOGY | Facility: CLINIC | Age: 87
End: 2022-09-26

## 2022-09-26 VITALS
WEIGHT: 136 LBS | SYSTOLIC BLOOD PRESSURE: 164 MMHG | BODY MASS INDEX: 25.03 KG/M2 | HEART RATE: 64 BPM | DIASTOLIC BLOOD PRESSURE: 90 MMHG | OXYGEN SATURATION: 99 % | HEIGHT: 62 IN

## 2022-09-26 DIAGNOSIS — I10 BENIGN ESSENTIAL HTN: ICD-10-CM

## 2022-09-26 DIAGNOSIS — I48.19 PERSISTENT ATRIAL FIBRILLATION: Primary | ICD-10-CM

## 2022-09-26 DIAGNOSIS — L60.9 NAIL ABNORMALITIES: Primary | ICD-10-CM

## 2022-09-26 DIAGNOSIS — E78.2 MIXED HYPERLIPIDEMIA: ICD-10-CM

## 2022-09-26 PROCEDURE — 99214 OFFICE O/P EST MOD 30 MIN: CPT | Performed by: INTERNAL MEDICINE

## 2022-09-26 PROCEDURE — 93000 ELECTROCARDIOGRAM COMPLETE: CPT | Performed by: INTERNAL MEDICINE

## 2022-09-26 RX ORDER — FEXOFENADINE HYDROCHLORIDE 60 MG/1
60 TABLET, FILM COATED ORAL DAILY
COMMUNITY

## 2022-09-26 NOTE — PROGRESS NOTES
Subjective:     Encounter Date:09/26/2022      Patient ID: Libia Perales is a 87 y.o. female with persistent atrial fibrillation, hypertension, hyperlipidemia, type 2 diabetes mellitus who presents today for routine follow-up.    Chief Complaint: Here today for follow-up of atrial fibrillation    History of Present Illness    This patient presents today for routine follow-up.  She says that she has been doing well and feeling well.  She does have persistent atrial fibrillation.  Every once a while, she says that she will have an electric shock like sensation in her chest that only last for a second or 2 and then spontaneously resolves.  She also notes occasional palpitations.  No lightheadedness, dizziness, syncope.  No significant shortness of breath or dyspnea on exertion -she does note mild occasional symptoms, however.  The patient remains anticoagulated.  She denies having any significant bleeding issues.  No orthopnea, PND, edema.  The patient notes that blood pressure fluctuates quite a bit.  Recently, her primary care provider added amlodipine.  She has tolerated this well and blood pressure may be a little more stable although is elevated today.  No side effects from current medications.  Overall, she says that she seems to be doing well.      Current Outpatient Medications:   •  amLODIPine (NORVASC) 5 MG tablet, Take 0.5 tablets by mouth Daily., Disp: 30 tablet, Rfl: 2  •  apixaban (ELIQUIS) 2.5 MG tablet tablet, Take 1 tablet by mouth 2 (Two) Times a Day., Disp: 60 tablet, Rfl: 11  •  cloNIDine (Catapres) 0.1 MG tablet, Take 1 tablet by mouth 2 (Two) Times a Day As Needed for High Blood Pressure (For blood pressure > 160/90)., Disp: 30 tablet, Rfl: 1  •  cyanocobalamin 1000 MCG/ML injection, 1cc IM weekly x 3, then monthly thereafter, Disp: 10 mL, Rfl: 1  •  dextromethorphan polistirex ER (Delsym) 30 MG/5ML Suspension Extended Release oral suspension, Take 10 mL by mouth Every 12 (Twelve) Hours As  "Needed (Cough)., Disp: 280 mL, Rfl: 0  •  digoxin (LANOXIN) 125 MCG tablet, TAKE 1 TABLET BY MOUTH EVERY OTHER DAY (SUN TUES THU SAT), Disp: 16 tablet, Rfl: 5  •  docusate sodium (COLACE) 100 MG capsule, Take 100 mg by mouth 2 (Two) Times a Day., Disp: , Rfl:   •  fenofibrate (TRICOR) 145 MG tablet, TAKE 1 TABLET BY MOUTH EVERY DAY, Disp: 90 tablet, Rfl: 3  •  fexofenadine (ALLEGRA) 60 MG tablet, Take 60 mg by mouth Daily., Disp: , Rfl:   •  furosemide (LASIX) 20 MG tablet, TAKE 1 TABLET BY MOUTH EVERY DAY AS NEEDED FOR WEIGHT GAIN OR FLUID RETENTION, Disp: 30 tablet, Rfl: 11  •  glipizide (GLUCOTROL) 10 MG tablet, TAKE 1 TABLET BY MOUTH TWICE DAILY WITH FOOD, Disp: 180 tablet, Rfl: 3  •  hydrALAZINE (APRESOLINE) 100 MG tablet, TAKE 1 TABLET BY MOUTH THREE TIMES A DAY , Disp: 90 tablet, Rfl: 11  •  hydroCHLOROthiazide (MICROZIDE) 12.5 MG capsule, TAKE 1 CAPSULE BY MOUTH EVERY DAY, Disp: 90 capsule, Rfl: 3  •  hydrOXYzine (ATARAX) 10 MG tablet, Take 1 tablet by mouth Every 6 (Six) Hours As Needed for Itching., Disp: 60 tablet, Rfl: 1  •  Iron, Ferrous Sulfate, 325 (65 Fe) MG tablet, Take 1 each by mouth Daily., Disp: 90 tablet, Rfl: 3  •  losartan (COZAAR) 100 MG tablet, TAKE 1 TABLET BY MOUTH EVERY DAY, Disp: 90 tablet, Rfl: 11  •  magnesium oxide (MAGOX) 400 (241.3 Mg) MG tablet tablet, Take 400 mg by mouth Daily., Disp: , Rfl:   •  metFORMIN (GLUCOPHAGE) 1000 MG tablet, Take 1,000 mg by mouth 2 (Two) Times a Day With Meals., Disp: , Rfl:   •  metoprolol succinate XL (TOPROL-XL) 25 MG 24 hr tablet, TAKE 1 TABLET BY MOUTH TWICE DAILY, Disp: 60 tablet, Rfl: 11  •  omeprazole (priLOSEC) 40 MG capsule, TAKE 1 CAPSULE BY MOUTH EVERY DAY, Disp: 90 capsule, Rfl: 5  •  simvastatin (ZOCOR) 40 MG tablet, TAKE 1 TABLET BY MOUTH EVERY NIGHT, Disp: 90 tablet, Rfl: 11  •  Syringe 25G X 1\" 3 ML misc, Weekly injection x 3, then monthly thereafter, Disp: 15 each, Rfl: 0  •  True Metrix Blood Glucose Test test strip, TEST AS " DIRECTED, Disp: 100 each, Rfl: 12  •  Trulicity 1.5 MG/0.5ML solution pen-injector, INJECT 1.5 MG UNDER THE SKIN INTO THE APPROPRIATE AREA AS DIRECTED 1 (ONE) TIME PER WEEK., Disp: 4 pen, Rfl: 5  •  vitamin C (ASCORBIC ACID) 500 MG tablet, Take 1 tablet by mouth Daily., Disp: 90 tablet, Rfl: 3  •  levocetirizine (XYZAL) 5 MG tablet, Take 5 mg by mouth Every Evening., Disp: , Rfl:     Allergies   Allergen Reactions   • Singulair [Montelukast] Cough   • Ace Inhibitors Cough     Social History     Tobacco Use   • Smoking status: Never Smoker   • Smokeless tobacco: Never Used   Substance Use Topics   • Alcohol use: No     Review of Systems   Constitutional: Positive for malaise/fatigue. Negative for fever and weight loss.   Cardiovascular: Positive for chest pain (Nothing significant, only very rare episodes of sharp electrical type pain in the chest, lasting only seconds, nonradiating from the chest, no provoking or alleviating factors, mild in severity), dyspnea on exertion (Mild, chronic, unchanged) and palpitations. Negative for leg swelling, orthopnea, paroxysmal nocturnal dyspnea and syncope.   Respiratory: Positive for shortness of breath (Mild, intermittent, chronic, unchanged). Negative for cough, hemoptysis and wheezing.    Hematologic/Lymphatic: Negative for adenopathy and bleeding problem.   Gastrointestinal: Negative for abdominal pain, hematemesis, hematochezia, melena, nausea and vomiting.   Genitourinary: Negative for hematuria.   Neurological: Negative for dizziness, headaches and loss of balance.         ECG 12 Lead    Date/Time: 9/26/2022 12:26 PM  Performed by: Tone Coello MD  Authorized by: Tone Coello MD   Comparison: compared with previous ECG from 3/14/2022  Similar to previous ECG  Rhythm: atrial fibrillation  Ectopy: unifocal PVCs  Rate: normal  BPM: 64  Conduction: left anterior fascicular block  QRS axis: left  Other findings: non-specific ST-T wave changes, left  "ventricular hypertrophy and poor R wave progression    Clinical impression: abnormal EKG               Objective:     Vitals reviewed.   Constitutional:       General: Not in acute distress.     Appearance: Well-developed and not in distress. Chronically ill-appearing.   Eyes:      Extraocular Movements: Extraocular movements intact.   HENT:      Head: Normocephalic and atraumatic.   Neck:      Thyroid: No thyroid mass.      Vascular: No JVD.   Pulmonary:      Effort: Pulmonary effort is normal.      Breath sounds: Normal breath sounds. No wheezing. No rhonchi. No rales.   Cardiovascular:      Normal rate. Occasional ectopic beats. Irregularly irregular rhythm.      Murmurs: There is no murmur.      No gallop.   Pulses:     Intact distal pulses.   Edema:     Peripheral edema absent.   Abdominal:      General: Bowel sounds are normal. There is no distension.      Palpations: Abdomen is soft.      Tenderness: There is no abdominal tenderness.   Skin:     General: Skin is warm and dry. There is no cyanosis.      Findings: No erythema or rash.   Neurological:      Mental Status: Alert and oriented to person, place, and time.      Cranial Nerves: No cranial nerve deficit.       /90   Pulse 64   Ht 157.5 cm (62\")   Wt 61.7 kg (136 lb)   LMP  (LMP Unknown)   SpO2 99%   BMI 24.87 kg/m²     Data/Lab Review:     Lab Results   Component Value Date    WBC 5.29 05/04/2022    HGB 9.2 (L) 05/04/2022    HCT 31.1 (L) 05/04/2022    MCV 85.9 05/04/2022     05/04/2022     Lab Results   Component Value Date    GLUCOSE 146 (H) 09/09/2022    CALCIUM 9.7 09/09/2022     09/09/2022    K 4.1 09/09/2022    CO2 26.3 09/09/2022     09/09/2022    BUN 31 (H) 09/09/2022    CREATININE 1.68 (H) 09/09/2022    EGFRIFAFRI 42 (L) 11/03/2021    EGFRIFNONA 35 (L) 11/03/2021    BCR 18.5 09/09/2022    ANIONGAP 13.0 03/14/2022     Lab Results   Component Value Date    CHLPL 142 09/09/2022    TRIG 173 (H) 09/09/2022    HDL 37 (L) " 09/09/2022    LDL 75 09/09/2022         Assessment:          Diagnosis Plan   1. Persistent atrial fibrillation (HCC)  apixaban (ELIQUIS) 2.5 MG tablet tablet    ECG 12 Lead   2. Benign essential HTN     3. Mixed hyperlipidemia            Plan:       1.  Persistent atrial fibrillation: The patient remains clinically stable.  She notes occasional palpitations but no prolonged symptoms.  She is anticoagulated.  Her most recent creatinine level is greater than 1.5 and has been on the last few checks.  Given that she is also greater than 80 years of age, we will decrease the dose of Eliquis down to 2.5 mg twice daily.  Continue digoxin.  No changes otherwise needed at this time as the patient is clinically and hemodynamically stable, however.     2.  Essential hypertension: Blood pressure has been somewhat erratic.  Today's reading is elevated although it has been better controlled after the addition of amlodipine.  Continue to monitor.  Continue other medications.     3.  Mixed hyperlipidemia: Most recent lipid panel is referenced above, well controlled given the patient's risk profile.     We will plan a 12-month follow-up unless otherwise needed sooner.

## 2022-10-12 ENCOUNTER — CLINICAL SUPPORT (OUTPATIENT)
Dept: INTERNAL MEDICINE | Facility: CLINIC | Age: 87
End: 2022-10-12

## 2022-10-12 DIAGNOSIS — Z23 FLU VACCINE NEED: Primary | ICD-10-CM

## 2022-10-12 PROCEDURE — G0008 ADMIN INFLUENZA VIRUS VAC: HCPCS | Performed by: FAMILY MEDICINE

## 2022-10-12 PROCEDURE — 90662 IIV NO PRSV INCREASED AG IM: CPT | Performed by: FAMILY MEDICINE

## 2022-10-18 DIAGNOSIS — Z79.899 ENCOUNTER FOR MONITORING DIGOXIN THERAPY: Primary | ICD-10-CM

## 2022-10-18 DIAGNOSIS — Z51.81 ENCOUNTER FOR MONITORING DIGOXIN THERAPY: Primary | ICD-10-CM

## 2022-10-18 RX ORDER — DIGOXIN 125 MCG
TABLET ORAL
Qty: 16 TABLET | Refills: 11 | Status: SHIPPED | OUTPATIENT
Start: 2022-10-18

## 2022-10-19 ENCOUNTER — TELEPHONE (OUTPATIENT)
Dept: INTERNAL MEDICINE | Facility: CLINIC | Age: 87
End: 2022-10-19

## 2022-10-19 RX ORDER — HYDRALAZINE HYDROCHLORIDE 100 MG/1
100 TABLET, FILM COATED ORAL 3 TIMES DAILY
Qty: 90 TABLET | Refills: 5 | Status: SHIPPED | OUTPATIENT
Start: 2022-10-19

## 2022-10-19 NOTE — TELEPHONE ENCOUNTER
Caller: [facility] - [address] - [phone] [facility] [phone] FX    Relationship: Pharmacy    Best call back number: [phone]    Requested Prescriptions:   Requested Prescriptions     Pending Prescriptions Disp Refills   • hydrALAZINE (APRESOLINE) 100 MG tablet 90 tablet 11     Sig: Take 1 tablet by mouth 3 (Three) Times a Day.        Pharmacy where request should be sent: [address] - [phone] [facility] [phone] FX     Additional details provided by patient: PER PHARMACY PATIENT IS COMPLETELY OUT    Does the patient have less than a 3 day supply:  [x] Yes  [] No    Wayne Chavez Rep   10/19/22 11:02 CDT

## 2022-10-19 NOTE — TELEPHONE ENCOUNTER
Have not been able to reach patient. Have attempted to reach multiple times/no answer. Made a note under next appt to have this rechecked.

## 2022-11-08 DIAGNOSIS — E11.9 TYPE 2 DIABETES MELLITUS WITHOUT COMPLICATION, UNSPECIFIED WHETHER LONG TERM INSULIN USE: ICD-10-CM

## 2022-11-08 RX ORDER — DULAGLUTIDE 1.5 MG/.5ML
1.5 INJECTION, SOLUTION SUBCUTANEOUS WEEKLY
Qty: 0.5 ML | Refills: 11 | Status: SHIPPED | OUTPATIENT
Start: 2022-11-08

## 2022-12-13 ENCOUNTER — OFFICE VISIT (OUTPATIENT)
Dept: INTERNAL MEDICINE | Facility: CLINIC | Age: 87
End: 2022-12-13

## 2022-12-13 VITALS
BODY MASS INDEX: 25.21 KG/M2 | SYSTOLIC BLOOD PRESSURE: 152 MMHG | WEIGHT: 137 LBS | HEART RATE: 71 BPM | HEIGHT: 62 IN | OXYGEN SATURATION: 98 % | DIASTOLIC BLOOD PRESSURE: 76 MMHG | TEMPERATURE: 97.9 F

## 2022-12-13 DIAGNOSIS — E11.65 TYPE 2 DIABETES MELLITUS WITH HYPERGLYCEMIA, WITHOUT LONG-TERM CURRENT USE OF INSULIN: Primary | ICD-10-CM

## 2022-12-13 DIAGNOSIS — I48.19 PERSISTENT ATRIAL FIBRILLATION: ICD-10-CM

## 2022-12-13 DIAGNOSIS — I10 UNCONTROLLED HYPERTENSION: ICD-10-CM

## 2022-12-13 LAB — HBA1C MFR BLD: 7.8 %

## 2022-12-13 PROCEDURE — 99214 OFFICE O/P EST MOD 30 MIN: CPT | Performed by: NURSE PRACTITIONER

## 2022-12-13 PROCEDURE — 83036 HEMOGLOBIN GLYCOSYLATED A1C: CPT | Performed by: NURSE PRACTITIONER

## 2022-12-13 PROCEDURE — 3051F HG A1C>EQUAL 7.0%<8.0%: CPT | Performed by: NURSE PRACTITIONER

## 2022-12-13 RX ORDER — AMLODIPINE BESYLATE 5 MG/1
5 TABLET ORAL DAILY
Qty: 90 TABLET | Refills: 1 | Status: SHIPPED | OUTPATIENT
Start: 2022-12-13

## 2022-12-13 NOTE — PROGRESS NOTES
Subjective     Chief Complaint:  Requests refills for medications for the next month    HPI:  The patient presents to the office today for a 3-month follow-up.  Her blood pressure is elevated today at 152/76.  She does monitor this on a regular basis at home and states her systolic blood pressures typically between 150 and 170.  She does take clonidine as needed for diastolic blood pressure greater than 90.  She denies any chest pain, dizziness/lightheadedness or frequent headache.  She does have some shortness of breath with exertion at times which she feels is at baseline.    The patient's hemoglobin A1c has slightly improved from September at 8.0-7.8 today.  The patient does admit that she eats more sweets than she should and has not made any dietary changes in the last 3 months.  She has been walking more when the weather is nice.  He does check her blood glucose on a daily basis and states this typically runs around 150.  The patient reports she was taken off of metformin, she believes at some point in the last year.  She is still taking glipizide and Trulicity as prescribed.  She did try taking Jardiance however this caused yeast infections.    The patient is going on a trip to [place] for the next month and requests a blister pack of her medications for the next month.    Patient's PMR from outside medical facility reviewed and noted.    Past Medical History:   Past Medical History:   Diagnosis Date   • Atrial fibrillation (HCC)    • Diabetes mellitus (HCC)    • GERD (gastroesophageal reflux disease)    • High cholesterol    • Hypertension    • OA (osteoarthritis)    • Pneumonia     few years ago     Past Surgical History:  Past Surgical History:   Procedure Laterality Date   • BREAST BIOPSY Right    • BREAST CYST ASPIRATION     • HYSTERECTOMY     • OOPHORECTOMY     • TONSILLECTOMY       Social History:  reports that she has never smoked. She has never used smokeless tobacco. She reports that she does  not drink alcohol and does not use drugs.    Family History: family history includes Hypertension in her mother; No Known Problems in her brother, daughter, father, maternal aunt, maternal grandmother, paternal aunt, paternal grandmother, sister, son, and another family member.      Allergies:  Allergies   Allergen Reactions   • Singulair [Montelukast] Cough   • Ace Inhibitors Cough     Medications:  Prior to Admission medications    Medication Sig Start Date End Date Taking? Authorizing Provider   amLODIPine (NORVASC) 5 MG tablet Take 1 tablet by mouth Daily. 12/13/22  Yes Sharon Corley APRN   apixaban (ELIQUIS) 2.5 MG tablet tablet Take 1 tablet by mouth 2 (Two) Times a Day. 9/26/22  Yes Tone Coello MD   cloNIDine (Catapres) 0.1 MG tablet Take 1 tablet by mouth 2 (Two) Times a Day As Needed for High Blood Pressure (For blood pressure > 160/90). 2/8/22  Yes Lauren Donald APRN   cyanocobalamin 1000 MCG/ML injection 1cc IM weekly x 3, then monthly thereafter 12/20/21  Yes Susanna Monique APRN   digoxin (LANOXIN) 125 MCG tablet TAKE 1 TABLET BY MOUTH EVERY OTHER DAY (SUN TUES THU SAT) 10/18/22  Yes Albina Padron APRN   docusate sodium (COLACE) 100 MG capsule Take 100 mg by mouth 2 (Two) Times a Day.   Yes Raheel Underwood MD   fenofibrate (TRICOR) 145 MG tablet TAKE 1 TABLET BY MOUTH EVERY DAY 7/5/22  Yes Allyson Do DO   fexofenadine (ALLEGRA) 60 MG tablet Take 60 mg by mouth Daily.   Yes Raheel Underwood MD   furosemide (LASIX) 20 MG tablet TAKE 1 TABLET BY MOUTH EVERY DAY AS NEEDED FOR WEIGHT GAIN OR FLUID RETENTION 9/6/22  Yes Allyson Do DO   glipizide (GLUCOTROL) 10 MG tablet TAKE 1 TABLET BY MOUTH TWICE DAILY WITH FOOD 8/30/22  Yes Allyson Do DO   hydrALAZINE (APRESOLINE) 100 MG tablet Take 1 tablet by mouth 3 (Three) Times a Day. 10/19/22  Yes Horn, Allyson Keren, DO   hydroCHLOROthiazide (MICROZIDE) 12.5 MG capsule TAKE 1 CAPSULE BY  "MOUTH EVERY DAY 7/5/22  Yes Allyson Do DO   hydrOXYzine (ATARAX) 10 MG tablet Take 1 tablet by mouth Every 6 (Six) Hours As Needed for Itching. 6/15/22  Yes Allyson Do DO   Iron, Ferrous Sulfate, 325 (65 Fe) MG tablet Take 1 each by mouth Daily. 5/17/22  Yes Allyson Do DO   levocetirizine (XYZAL) 5 MG tablet Take 5 mg by mouth Every Evening.   Yes Raheel Underwood MD   losartan (COZAAR) 100 MG tablet TAKE 1 TABLET BY MOUTH EVERY DAY 9/6/22  Yes Allyson Do DO   magnesium oxide (MAGOX) 400 (241.3 Mg) MG tablet tablet Take 400 mg by mouth Daily.   Yes Raheel Underwood MD   metoprolol succinate XL (TOPROL-XL) 25 MG 24 hr tablet TAKE 1 TABLET BY MOUTH TWICE DAILY 9/6/22  Yes Allyson Do DO   omeprazole (priLOSEC) 40 MG capsule TAKE 1 CAPSULE BY MOUTH EVERY DAY 1/17/22  Yes Redd Dumont APRN   simvastatin (ZOCOR) 40 MG tablet TAKE 1 TABLET BY MOUTH EVERY NIGHT 9/6/22  Yes Allyson Do DO   Syringe 25G X 1\" 3 ML misc Weekly injection x 3, then monthly thereafter 12/20/21  Yes Susanna Monique APRN   True Metrix Blood Glucose Test test strip TEST AS DIRECTED 3/22/22  Yes Allyson Do DO   Trulicity 1.5 MG/0.5ML solution pen-injector INJECT 1.5 MG UNDER THE SKIN INTO THE APPROPRIATE AREA AS DIRECTED 1 (ONE) TIME PER WEEK. 11/8/22  Yes Albina Padron C, APRN   vitamin C (ASCORBIC ACID) 500 MG tablet Take 1 tablet by mouth Daily. 5/17/22  Yes Allyson Do DO   amLODIPine (NORVASC) 5 MG tablet Take 0.5 tablets by mouth Daily. 9/16/22 12/13/22 Yes Allyson Do DO   metFORMIN (GLUCOPHAGE) 1000 MG tablet Take 1,000 mg by mouth 2 (Two) Times a Day With Meals.    Raheel Underwood MD   dextromethorphan polistirex ER (Delsym) 30 MG/5ML Suspension Extended Release oral suspension Take 10 mL by mouth Every 12 (Twelve) Hours As Needed (Cough). 8/17/22 12/13/22  Sharon Corley, APRN       Objective     Vital Signs: /76 " "(BP Location: Left arm, Patient Position: Sitting, Cuff Size: Adult)   Pulse 71   Temp 97.9 °F (36.6 °C) (Temporal)   Ht 157.5 cm (62\")   Wt 62.1 kg (137 lb)   LMP  (LMP Unknown)   SpO2 98%   BMI 25.06 kg/m²   Physical Exam  Vitals and nursing note reviewed.   Constitutional:       General: She is not in acute distress.     Appearance: She is not ill-appearing or toxic-appearing.   HENT:      Head: Normocephalic and atraumatic.      Mouth/Throat:      Mouth: Mucous membranes are moist.      Pharynx: Oropharynx is clear.   Cardiovascular:      Rate and Rhythm: Normal rate. Rhythm irregular.      Pulses: Normal pulses.      Heart sounds: Murmur heard.   Pulmonary:      Effort: Pulmonary effort is normal.      Breath sounds: No wheezing, rhonchi or rales.   Abdominal:      General: Bowel sounds are normal. There is no distension.      Palpations: Abdomen is soft.      Tenderness: There is no abdominal tenderness.   Musculoskeletal:         General: No swelling or tenderness. Normal range of motion.      Cervical back: Normal range of motion and neck supple. No tenderness.   Skin:     General: Skin is warm and dry.      Findings: No erythema or rash.   Neurological:      General: No focal deficit present.      Mental Status: She is alert and oriented to person, place, and time.   Psychiatric:         Mood and Affect: Mood normal.         Behavior: Behavior normal.         Thought Content: Thought content normal.         Judgment: Judgment normal.       Results Reviewed:  Reviewed previous office visit note with Dr. Do from 9/16/2022    Assessment / Plan     Assessment/Plan:  Diagnoses and all orders for this visit:    1. Type 2 diabetes mellitus with hyperglycemia, without long-term current use of insulin (HCC) (Primary)  -     POC Glycated Hemoglobin, Total    2. Persistent atrial fibrillation (CMS/[facility])    3. Uncontrolled hypertension    Other orders  -     amLODIPine (NORVASC) 5 MG tablet; Take 1 tablet by " mouth Daily.  Dispense: 90 tablet; Refill: 1       Patient presents to the office today for a 3-month follow-up.  At her last office visit in September, amlodipine was added to her antihypertensive regimen.  I would like to increase this from 2.5 mg to 5 mg given continued elevation of her blood pressure.  Patient will continue to monitor her blood pressure at home.  Her labs indicated stable renal function and electrolytes when assessed in September.  Continue hydralazine, hydrochlorothiazide, losartan, Toprol-XL at current dosages.  Clonidine as needed only for systolic blood pressure greater than 160 and diastolic blood pressure greater than 90.  Patient also takes digoxin for history of paroxysmal atrial fibrillation.  She is on Eliquis for stroke prophylaxis which is appropriately dosed given her age and renal function.    The patient's hemoglobin A1c has improved slightly from September from 8-7.8.  We will continue present regimen for now and we discussed that should her hemoglobin A1c worsen or not improve in 3 months we may increase her Trulicity at that time.  She is on maximum dosage of glipizide.  She did not tolerate Jardiance due to itching and yeast infections.  Encouraged her to continue physical activity as able.    The patient is going on a trip to [place] for 1 month.  We have discussed with her pharmacist to dispense blister pack for her medications for the next month.    Return in about 5 weeks (around 1/18/2023) for Recheck- HTN. unless patient needs to be seen sooner or acute issues arise.    I have discussed the patient results/orders and and plan/recommendation with them at today's visit.      Sharon Corley, YOBANY   12/13/2022    Answers for HPI/ROS submitted by the patient on 12/6/2022  Please describe your symptoms.: Follow up appt  Have you had these symptoms before?: Yes  How long have you been having these symptoms?: Greater than 2 weeks  Please list any medications you are currently  taking for this condition.: On file  Please describe any probable cause for these symptoms. : None  What is the primary reason for your visit?: Other

## 2023-01-16 DIAGNOSIS — D51.0 PERNICIOUS ANEMIA: ICD-10-CM

## 2023-01-16 RX ORDER — CYANOCOBALAMIN 1000 UG/ML
1000 INJECTION, SOLUTION INTRAMUSCULAR; SUBCUTANEOUS
Qty: 6 ML | Refills: 1 | Status: SHIPPED | OUTPATIENT
Start: 2023-01-16

## 2023-01-19 ENCOUNTER — OFFICE VISIT (OUTPATIENT)
Dept: INTERNAL MEDICINE | Facility: CLINIC | Age: 88
End: 2023-01-19
Payer: MEDICARE

## 2023-01-19 VITALS
SYSTOLIC BLOOD PRESSURE: 160 MMHG | HEART RATE: 64 BPM | DIASTOLIC BLOOD PRESSURE: 76 MMHG | BODY MASS INDEX: 25.76 KG/M2 | WEIGHT: 140 LBS | HEIGHT: 62 IN | TEMPERATURE: 97.1 F | OXYGEN SATURATION: 99 %

## 2023-01-19 DIAGNOSIS — Z79.4 TYPE 2 DIABETES MELLITUS WITHOUT COMPLICATION, WITH LONG-TERM CURRENT USE OF INSULIN: Chronic | ICD-10-CM

## 2023-01-19 DIAGNOSIS — E11.9 TYPE 2 DIABETES MELLITUS WITHOUT COMPLICATION, WITH LONG-TERM CURRENT USE OF INSULIN: Chronic | ICD-10-CM

## 2023-01-19 DIAGNOSIS — I10 BENIGN ESSENTIAL HTN: ICD-10-CM

## 2023-01-19 DIAGNOSIS — H65.93 FLUID LEVEL BEHIND TYMPANIC MEMBRANE OF BOTH EARS: Primary | ICD-10-CM

## 2023-01-19 PROCEDURE — 99214 OFFICE O/P EST MOD 30 MIN: CPT | Performed by: NURSE PRACTITIONER

## 2023-01-19 PROCEDURE — 96372 THER/PROPH/DIAG INJ SC/IM: CPT | Performed by: NURSE PRACTITIONER

## 2023-01-19 RX ORDER — FLUTICASONE PROPIONATE 50 MCG
2 SPRAY, SUSPENSION (ML) NASAL DAILY
Qty: 9.9 ML | Refills: 0 | Status: SHIPPED | OUTPATIENT
Start: 2023-01-19

## 2023-01-19 RX ORDER — TRIAMCINOLONE ACETONIDE 40 MG/ML
40 INJECTION, SUSPENSION INTRA-ARTICULAR; INTRAMUSCULAR ONCE
Status: COMPLETED | OUTPATIENT
Start: 2023-01-19 | End: 2023-01-19

## 2023-01-19 RX ADMIN — TRIAMCINOLONE ACETONIDE 40 MG: 40 INJECTION, SUSPENSION INTRA-ARTICULAR; INTRAMUSCULAR at 09:20

## 2023-01-19 NOTE — PROGRESS NOTES
"        Subjective     Chief Complaint:  Sinus drainage.    HPI:  The patient presents to the office today for a 1 month follow-up.  She was last seen in December 2022 to follow-up on hypertension and diabetes.  Her blood pressure was elevated in the office and she had noted elevated readings at home as well with systolic blood pressures between 150 and 170.  At that time, her amlodipine was increased from 2.5 mg to 5 mg.  The patient did not bring her home blood pressure log with her today.  She does remember that her systolic blood pressure yesterday was 146, and she was very pleased with that because \"that is good for me\".  Her blood pressure in the office this morning is 160/76 and she has taken her medications this morning.  She denies any chest pain.  She feels her shortness of breath is at baseline with her asthma.    She recently returned from a month-long trip to [place] and states since returning home her head feels \"off\".  She does have some sinus drainage chronically, but does feel this is slightly worse than normal.  She denies sore throat or postnasal drip.  She denies fever or chills.  She denies any cough worse than baseline.  She does sometimes feel a sensation that her ears are full.  She denies headache, dizziness/lightheadedness.  She is unsure if she is taking an antihistamine, does have both Allegra and Xyzal listed on home medication list.    Patient's PMR from outside medical facility reviewed and noted.    Past Medical History:   Past Medical History:   Diagnosis Date   • Atrial fibrillation (HCC)    • Diabetes mellitus (HCC)    • GERD (gastroesophageal reflux disease)    • High cholesterol    • Hypertension    • OA (osteoarthritis)    • Pneumonia     few years ago     Past Surgical History:  Past Surgical History:   Procedure Laterality Date   • BREAST BIOPSY Right    • BREAST CYST ASPIRATION     • HYSTERECTOMY     • OOPHORECTOMY     • TONSILLECTOMY       Social History:  reports that she " has never smoked. She has never used smokeless tobacco. She reports that she does not drink alcohol and does not use drugs.    Family History: family history includes Hypertension in her mother; No Known Problems in her brother, daughter, father, maternal aunt, maternal grandmother, paternal aunt, paternal grandmother, sister, son, and another family member.      Allergies:  Allergies   Allergen Reactions   • Singulair [Montelukast] Cough   • Ace Inhibitors Cough     Medications:  Prior to Admission medications    Medication Sig Start Date End Date Taking? Authorizing Provider   amLODIPine (NORVASC) 5 MG tablet Take 1 tablet by mouth Daily. 12/13/22  Yes Sharon Corley APRN   apixaban (ELIQUIS) 2.5 MG tablet tablet Take 1 tablet by mouth 2 (Two) Times a Day. 9/26/22  Yes Tone Coello MD   cloNIDine (Catapres) 0.1 MG tablet Take 1 tablet by mouth 2 (Two) Times a Day As Needed for High Blood Pressure (For blood pressure > 160/90). 2/8/22  Yes Lauren Donald APRN   cyanocobalamin 1000 MCG/ML injection Inject 1 mL into the appropriate muscle as directed by prescriber Every 30 (Thirty) Days. INJECT 1ML INTO THE MUSCLE WEEKLY FOR 3 WEEKS THEN MONTHLY THEREAFTER 1/16/23  Yes Sharon Corley APRN   digoxin (LANOXIN) 125 MCG tablet TAKE 1 TABLET BY MOUTH EVERY OTHER DAY (SUN TUES THU SAT) 10/18/22  Yes Albina Padron APRN   docusate sodium (COLACE) 100 MG capsule Take 100 mg by mouth 2 (Two) Times a Day.   Yes ProviderRaheel MD   fenofibrate (TRICOR) 145 MG tablet TAKE 1 TABLET BY MOUTH EVERY DAY 7/5/22  Yes Allyson Do DO   furosemide (LASIX) 20 MG tablet TAKE 1 TABLET BY MOUTH EVERY DAY AS NEEDED FOR WEIGHT GAIN OR FLUID RETENTION 9/6/22  Yes Allyson Do DO   glipizide (GLUCOTROL) 10 MG tablet TAKE 1 TABLET BY MOUTH TWICE DAILY WITH FOOD 8/30/22  Yes Allyson Do DO   hydrALAZINE (APRESOLINE) 100 MG tablet Take 1 tablet by mouth 3 (Three) Times a Day.  "10/19/22  Yes Allyson Do DO   hydroCHLOROthiazide (MICROZIDE) 12.5 MG capsule TAKE 1 CAPSULE BY MOUTH EVERY DAY 7/5/22  Yes Allyson Do DO   hydrOXYzine (ATARAX) 10 MG tablet Take 1 tablet by mouth Every 6 (Six) Hours As Needed for Itching. 6/15/22  Yes Allyson Do DO   Iron, Ferrous Sulfate, 325 (65 Fe) MG tablet Take 1 each by mouth Daily. 5/17/22  Yes Allyson Do DO   losartan (COZAAR) 100 MG tablet TAKE 1 TABLET BY MOUTH EVERY DAY 9/6/22  Yes Allyson Do DO   metoprolol succinate XL (TOPROL-XL) 25 MG 24 hr tablet TAKE 1 TABLET BY MOUTH TWICE DAILY 9/6/22  Yes Allyson Do DO   omeprazole (priLOSEC) 40 MG capsule TAKE 1 CAPSULE BY MOUTH EVERY DAY 1/17/22  Yes Redd Dumont APRN   simvastatin (ZOCOR) 40 MG tablet TAKE 1 TABLET BY MOUTH EVERY NIGHT 9/6/22  Yes Allyson Do DO   Syringe 25G X 1\" 3 ML misc Weekly injection x 3, then monthly thereafter 12/20/21  Yes Susanna Monique APRN   True Metrix Blood Glucose Test test strip TEST AS DIRECTED 3/22/22  Yes Allyson Do DO   Trulicity 1.5 MG/0.5ML solution pen-injector INJECT 1.5 MG UNDER THE SKIN INTO THE APPROPRIATE AREA AS DIRECTED 1 (ONE) TIME PER WEEK. 11/8/22  Yes Albina Padron, YOBANY   vitamin C (ASCORBIC ACID) 500 MG tablet Take 1 tablet by mouth Daily. 5/17/22  Yes Allyson Do DO   fexofenadine (ALLEGRA) 60 MG tablet Take 60 mg by mouth Daily.    ProviderRaheel MD   fluticasone (FLONASE) 50 MCG/ACT nasal spray 2 sprays into the nostril(s) as directed by provider Daily. 1/19/23   Sharon Corley APRN   levocetirizine (XYZAL) 5 MG tablet Take 5 mg by mouth Every Evening.    ProviderRaheel MD   magnesium oxide (MAGOX) 400 (241.3 Mg) MG tablet tablet Take 400 mg by mouth Daily.    Provider, MD Raheel   metFORMIN (GLUCOPHAGE) 1000 MG tablet Take 1,000 mg by mouth 2 (Two) Times a Day With Meals.    Provider, MD Raheel       Objective " "    Vital Signs: /76 (BP Location: Left arm, Patient Position: Sitting, Cuff Size: Adult)   Pulse 64   Temp 97.1 °F (36.2 °C) (Temporal)   Ht 157.5 cm (62\")   Wt 63.5 kg (140 lb)   LMP  (LMP Unknown)   SpO2 99%   BMI 25.61 kg/m²   Physical Exam  Vitals and nursing note reviewed.   Constitutional:       General: She is not in acute distress.     Appearance: She is not ill-appearing or toxic-appearing.   HENT:      Head: Normocephalic and atraumatic.      Right Ear: A middle ear effusion is present.      Left Ear: A middle ear effusion is present. Tympanic membrane is bulging.      Mouth/Throat:      Mouth: Mucous membranes are moist.      Pharynx: Oropharynx is clear.   Cardiovascular:      Rate and Rhythm: Normal rate. Rhythm irregular.      Pulses: Normal pulses.      Heart sounds: Murmur heard.   Pulmonary:      Effort: Pulmonary effort is normal.      Breath sounds: No wheezing, rhonchi or rales.   Abdominal:      General: Bowel sounds are normal. There is no distension.      Palpations: Abdomen is soft.      Tenderness: There is no abdominal tenderness.   Musculoskeletal:         General: No swelling or tenderness. Normal range of motion.      Cervical back: Normal range of motion and neck supple. No tenderness.   Skin:     General: Skin is warm and dry.      Findings: No erythema or rash.   Neurological:      General: No focal deficit present.      Mental Status: She is alert and oriented to person, place, and time.   Psychiatric:         Mood and Affect: Mood normal.         Behavior: Behavior normal.         Thought Content: Thought content normal.         Judgment: Judgment normal.       Results Reviewed:  Reviewed patient's last office visit note with me from 12/13/2022.    Assessment / Plan     Assessment/Plan:  Diagnoses and all orders for this visit:    1. Fluid level behind tympanic membrane of both ears (Primary)  -     triamcinolone acetonide (KENALOG-40) injection 40 mg    2. Benign " essential HTN    3. Type 2 diabetes mellitus without complication, with long-term current use of insulin (HCC)    Other orders  -     fluticasone (FLONASE) 50 MCG/ACT nasal spray; 2 sprays into the nostril(s) as directed by provider Daily.  Dispense: 9.9 mL; Refill: 0       The patient presents to the office today for a 1 month follow-up regarding her blood pressure.  Her blood pressure in the office today is elevated at 160/76.  She did not bring her blood pressure log from home for the last month, so at this time I do not want to make any changes to her medication regimen. We will continue present regimen with amlodipine 5 mg, hydralazine 100 mg 3 times per day, hydrochlorothiazide 12.5 mg once daily, losartan 100 mg once daily, Toprol-XL 25 mg once daily.  Patient also uses clonidine twice daily as needed for systolic blood pressure greater than 160 or diastolic blood pressure greater than 90.  I have asked her to continue to monitor her blood pressure at home and to bring her blood pressure log to her next office visit.    The patient does ask if Trulicity could be changed to something possibly more affordable.  She indicates the Trulicity cost her over $600 this month, is normally around $200 per month.  I will attempt to call her pharmacy to find a more cost effective alternative.  Her last hemoglobin A1c in December had improved from 8.0-7.8.  We will plan to recheck in 2 months.  Continue present regimen with metformin and glipizide, patient is on maximum dosage of each.  She did not bring her blood glucose log to the office with her today either.    Regarding the patient's sinus symptoms, she does have fluid noted behind each tympanic membrane.  We will give a Kenalog injection in the office today and prescribe Flonase as well.  We will try to find out from her pharmacy which antihistamine she is taking.  Patient has been instructed to call if her symptoms worsen or do not improve.    Return in about 2  months (around 3/19/2023) for Recheck- A1c. unless patient needs to be seen sooner or acute issues arise.    I have discussed the patient results/orders and and plan/recommendation with them at today's visit.      Sharon Corley, APRN   01/19/2023

## 2023-01-31 DIAGNOSIS — K21.9 GERD WITHOUT ESOPHAGITIS: ICD-10-CM

## 2023-02-01 RX ORDER — OMEPRAZOLE 40 MG/1
CAPSULE, DELAYED RELEASE ORAL
Qty: 90 CAPSULE | Refills: 5 | Status: SHIPPED | OUTPATIENT
Start: 2023-02-01

## 2023-02-01 NOTE — TELEPHONE ENCOUNTER
[facility] is requesting a refill on Omeprazole       Rx Refill Note  Requested Prescriptions     Pending Prescriptions Disp Refills   • omeprazole (priLOSEC) 40 MG capsule [Pharmacy Med Name: OMEPRAZOLE 40MG CAPSULE DR] 90 capsule 5     Sig: TAKE 1 CAPSULE BY MOUTH EVERY DAY      Last office visit with prescribing clinician: 3/17/2022   Last telemedicine visit with prescribing clinician: 3/23/2023   Next office visit with prescribing clinician: 3/23/2023                         Would you like a call back once the refill request has been completed: [] Yes [] No    If the office needs to give you a call back, can they leave a voicemail: [] Yes [] No    Poly Ledezma MA  02/01/23, 12:56 CST

## 2023-02-09 ENCOUNTER — TELEPHONE (OUTPATIENT)
Dept: PODIATRY | Facility: CLINIC | Age: 88
End: 2023-02-09
Payer: MEDICARE

## 2023-02-09 NOTE — TELEPHONE ENCOUNTER
Spoke with pt and informed them that at there appt they would be seeing jersey tapia instead of dr rosenthal, pt stated understanding and thanked me

## 2023-02-21 NOTE — TELEPHONE ENCOUNTER
Occupational Therapy    Visit Type: treatment  Born at Gestational Age: 27w1d and now corrected gestational age 41w 2d      SUBJECTIVE  Transfer from [facility] for eyes.    No parent present for session.  Face, Legs, Activity, Cry, Consolability Scale (FLACC)     Face: 0 - No particular expression or smile    Legs: 0 - Normal position or relaxed    Activity: 0 - Lying quietly, normal position, moves easily    Cry: 0 - No cry (awake or asleep)    Consolability: 0 - Content, relaxed    Score: 0    OBJECTIVE    Autonomic Stability:    Heart Rate: stable    Respiratory: stable    Oxygen Saturations: stable    Bed Type: bassinet  Respiratory Support: room air  Feeding/Nutritional Status: NG, Bottle and Refer to speech therapy notes for information regarding feeding plan of care    State: light sleep, eyes shut, some movement and dozing, eyes opening/closing    Neurobehavioral:    Cry: no cry at all    State regulation: arouses to input    Tolerance to sensory input: adapts    : intermittant disconjugate gaze.    Visual response: roving eye movements    Visual orientation: follows briefly to side    Touch response: adapts with containment/therapeutic touch    Vestibular processing: adapts with containment/support    Irritability: no cry at all to stimuli    Self regulation:       -achieved with assistance of: non-nutritive suck on pacifier, boundaries and containment      Head Shape: WNL        Movement:  Overall analysis of movement during session: symmetrical and reciprocal     Interventions    Treatment Provided:  - State regulation:  2 point containment, provided vestibular input, calming strategies, therapeutic infant massage, graded touch    ROM/Positioning:  midline orientation for hands and legs, positioning for physiologic flexion and/or age appropriate alignment, passive ROM of cervical spine, ROM, hip and pelvic flexion in supine, slow bicycling of legs    Developmental:   side lying left, side lying right,  Verified with dgt - was only seeing Dr. Gonzalez for nail trimming.  Ok to refer to Dr. Carvalho?   supported sitting with maximal physical assistance, elevated supine    Neuroprotective Care  hand holding/finger grasping, hands to midline, containment, static touch, LE boundaries, facilitated tuck, soft voicing, slow paced handling, dimmed lights, positive oral sensory stimulation    Parent/Caregiver HEP/Education: Parent unavailable for education. Teaching completed with RN.    Education Completed  - Person instructed: RN  - Education completed: current neuromotor and neurobehavioral status of infant  - Teaching method: verbal instruction/explanation  - Response to education: Verbalizes understanding  - Infant is not ready for safe sleep and will have Therapeutic Positioning Card at bedside with specific suggestions. Provided Therapeutic Positioning or recommendations for Head Shape, Midline skills, neck flexibility and development such as tummy Time every other care, gel cushion, Jonas frog cocooning head on both sides, Z phan, sidelie to both sides and Sleep sack or swaddle.  Will re-assess on 2/28/23.           ASSESSMENT    - Impairments: activity tolerance, head control, tolerance to handling and state regulation  - Functional Limitations: functional mobility, sleep/wake cycle and self regulation   Infant tolerated session well, initially awake and with good anti-gravity flexion in supine. Good midline approach with B UE in supine. Head shape symmetric with bilateral head turning and intermittent tracking, though not consistent. Some startles with touch, however with static input and positive touch, improved state regulation and tolerance. Drifted to sleep during session. She is on therapeutic positioning with re-check 2/28/23.       Discharge Recommendations:  OT Referrals/Discharge Recommendations: Refer to NICU follow up clinic, Refer to early intervention ([facility] transfer)      Pain at end of session:   Face, Legs, Activity, Cry, Consolability (FLACC) at end of session:   Face: 0-No particular  expression or smile    Legs: 0-Normal position or relaxed    Activity: 0 - Lying quietly, normal position, moves easily    Cry: 0-No cry (awake or asleep)    Consolability:    Score:       Interpretation: 0=relaxed and comfortable, 1-3=mild discomfort, 4-6=moderate pain, 7-10=severe       discomfort patient or pain or both    PLAN  Suggestions for next session as indicated:        Frequency of Treatment: 1-2x week            GOALS    Short Term Goals:   Infant will demonstrate  autonomic, motor, and state stability 90% of the time by discharge EMERGING  Infant will be able to self regulate with hand (s) to mouth independently in session MET  Infant will kick LEs few reciprocal cycles. MET   Infant will demonstrate tucking and rounding in side lie for GA MET  Infant will demonstrate visual regard 2/3 attempts EMERGING  Infant will demonstrate increased alert state for half of the session. MET    Documented in the chart in the following areas: Assessment.      Therapy procedure time and total treatment time can be found documented on the Time Entry flowsheet

## 2023-03-16 NOTE — PROGRESS NOTES
[facility] - PODIATRY    Today's Date: 03/28/2023     Patient Name: Libia Perales  MRN: [id number]  CSN: [id number]  PCP: Zachariah Huffman MD  Referring Provider: Allyson Do DO    SUBJECTIVE     Chief Complaint   Patient presents with   • Establish Care     Zachariah Huffman MD 03/20/2023- NEW PT- DX Nail abnormalities/DIABETIC NAIL CARE-pt states wanting to start diabetic foot/nail care- pt denies pain   • Diabetes     209mg/dl BG this am     HPI: Libia Perales, a 88 y.o.female, comes to clinic as a(n) new patient presenting for diabetic foot exam and complaining of thickened, irregular toenails of both feet. Patient has h/o AF, DM, GERD, Gait abnormality, HLD, HTN, OA, Pneumonia. Patient is NIDDM with last stated BG level of 209mg/dl. Patient presents with complaints of thickened, irregular toenails of both feet. States that she is unable to adequately reach nails to care for them and is uncomfortable doing so due to thickness, length, and hx of DM with mild neuropathy. States she was seeing Dr. Gonzalez previously but hasn't had nail care in approximately 6 months. Uses cane for ambulatory support. Denies pain. Relates previous treatment(s) including care by Dr. Gonzalez. Denies any constitutional symptoms. No other pedal complaints at this time.    Past Medical History:   Diagnosis Date   • Atrial fibrillation (HCC)    • Diabetes mellitus (HCC)    • Difficulty walking Approximately 2 years   • GERD (gastroesophageal reflux disease)    • High cholesterol    • Hypertension    • OA (osteoarthritis)    • Pneumonia     few years ago     Past Surgical History:   Procedure Laterality Date   • BREAST BIOPSY Right    • BREAST CYST ASPIRATION     • HYSTERECTOMY     • OOPHORECTOMY     • TONSILLECTOMY       Family History   Problem Relation Age of Onset   • No Known Problems Father    • Hypertension Mother    • No Known Problems Sister    • No Known Problems Brother    • No Known  Problems Daughter    • No Known Problems Son    • No Known Problems Maternal Grandmother    • No Known Problems Paternal Grandmother    • No Known Problems Maternal Aunt    • No Known Problems Paternal Aunt    • No Known Problems Other    • Colon cancer Neg Hx    • BRCA 1/2 Neg Hx    • Breast cancer Neg Hx    • Endometrial cancer Neg Hx    • Ovarian cancer Neg Hx      Social History     Socioeconomic History   • Marital status:    Tobacco Use   • Smoking status: Never   • Smokeless tobacco: Never   Vaping Use   • Vaping Use: Never used   Substance and Sexual Activity   • Alcohol use: No   • Drug use: No   • Sexual activity: Not Currently     Partners: Female     Birth control/protection: Surgical     Allergies   Allergen Reactions   • Singulair [Montelukast] Cough   • Ace Inhibitors Cough     Current Outpatient Medications   Medication Sig Dispense Refill   • amLODIPine (NORVASC) 5 MG tablet Take 1 tablet by mouth Daily. 90 tablet 1   • apixaban (ELIQUIS) 2.5 MG tablet tablet Take 1 tablet by mouth 2 (Two) Times a Day. 60 tablet 11   • benzonatate (TESSALON) 200 MG capsule Take 1 capsule by mouth 3 (Three) Times a Day As Needed for Cough.     • cloNIDine (Catapres) 0.1 MG tablet Take 1 tablet by mouth 2 (Two) Times a Day As Needed for High Blood Pressure (For blood pressure > 160/90). 30 tablet 1   • cyanocobalamin 1000 MCG/ML injection Inject 1 mL into the appropriate muscle as directed by prescriber Every 30 (Thirty) Days. INJECT 1ML INTO THE MUSCLE WEEKLY FOR 3 WEEKS THEN MONTHLY THEREAFTER 6 mL 1   • digoxin (LANOXIN) 125 MCG tablet TAKE 1 TABLET BY MOUTH EVERY OTHER DAY (SUN TUES THU SAT) 16 tablet 11   • docusate sodium (COLACE) 100 MG capsule Take 1 capsule by mouth 2 (Two) Times a Day.     • fenofibrate (TRICOR) 145 MG tablet TAKE 1 TABLET BY MOUTH EVERY DAY 90 tablet 3   • fexofenadine (ALLEGRA) 60 MG tablet Take 1 tablet by mouth Daily.     • fluticasone (FLONASE) 50 MCG/ACT nasal spray 2 sprays  "into the nostril(s) as directed by provider Daily. 9.9 mL 0   • furosemide (LASIX) 20 MG tablet TAKE 1 TABLET BY MOUTH EVERY DAY AS NEEDED FOR WEIGHT GAIN OR FLUID RETENTION 30 tablet 11   • glipizide (GLUCOTROL) 10 MG tablet TAKE 1 TABLET BY MOUTH TWICE DAILY WITH FOOD 180 tablet 3   • hydrALAZINE (APRESOLINE) 100 MG tablet Take 1 tablet by mouth 3 (Three) Times a Day. 90 tablet 5   • hydroCHLOROthiazide (MICROZIDE) 12.5 MG capsule TAKE 1 CAPSULE BY MOUTH EVERY DAY 90 capsule 3   • hydrOXYzine (ATARAX) 10 MG tablet Take 1 tablet by mouth Every 6 (Six) Hours As Needed for Itching. 60 tablet 1   • Iron, Ferrous Sulfate, 325 (65 Fe) MG tablet Take 1 each by mouth Daily. 90 tablet 3   • levocetirizine (XYZAL) 5 MG tablet Take 1 tablet by mouth Every Evening.     • losartan (COZAAR) 100 MG tablet TAKE 1 TABLET BY MOUTH EVERY DAY 90 tablet 11   • magnesium oxide (MAGOX) 400 (241.3 Mg) MG tablet tablet Take 1 tablet by mouth Daily.     • metFORMIN (GLUCOPHAGE) 1000 MG tablet Take 1 tablet by mouth 2 (Two) Times a Day With Meals.     • omeprazole (priLOSEC) 40 MG capsule TAKE 1 CAPSULE BY MOUTH EVERY DAY 90 capsule 5   • simvastatin (ZOCOR) 40 MG tablet TAKE 1 TABLET BY MOUTH EVERY NIGHT 90 tablet 11   • Syringe 25G X 1\" 3 ML misc Weekly injection x 3, then monthly thereafter 15 each 0   • True Metrix Blood Glucose Test test strip TEST AS DIRECTED 100 each 12   • Trulicity 1.5 MG/0.5ML solution pen-injector INJECT 1.5 MG UNDER THE SKIN INTO THE APPROPRIATE AREA AS DIRECTED 1 (ONE) TIME PER WEEK. 0.5 mL 11   • vitamin C (ASCORBIC ACID) 500 MG tablet Take 1 tablet by mouth Daily. 90 tablet 3     No current facility-administered medications for this visit.     Review of Systems   Constitutional: Negative for chills and fever.   HENT: Negative for congestion.    Respiratory: Negative for shortness of breath.    Cardiovascular: Negative for chest pain and leg swelling.   Gastrointestinal: Negative for constipation, diarrhea, " nausea and vomiting.   Musculoskeletal: Positive for arthralgias and gait problem. Negative for myalgias.   Skin: Negative for wound.   Neurological: Positive for numbness.       OBJECTIVE     Vitals:    03/28/23 0951   BP: 136/72   Pulse: 68   SpO2: 95%       PHYSICAL EXAM  GEN:   Accompanied by daughter.     Foot/Ankle Exam:       General:   Diabetic Foot Exam Performed    Appearance: appears stated age and healthy and elderly    Orientation: AAOx3    Affect: appropriate    Gait: antalgic    Assistance: cane    Shoe Gear:  Casual shoes    VASCULAR      Right Foot Vascularity   Dorsalis pedis:  2+  Posterior tibial:  2+  Skin Temperature: warm    Edema Grading:  None  CFT:  3  Pedal Hair Growth:  Absent  Varicosities: mild varicosities       Left Foot Vascularity   Dorsalis pedis:  2+  Posterior tibial:  2+  Skin Temperature: warm    Edema Grading:  None  CFT:  3  Pedal Hair Growth:  Absent  Varicosities: mild varicosities        NEUROLOGIC     Right Foot Neurologic   Light touch sensation:  Diminished  Vibratory sensation:  Normal  Hot/Cold sensation: normal    Protective Sensation using [place] Monofilament:  9     Left Foot Neurologic   Light touch sensation:  Diminished  Vibratory sensation:  Normal  Hot/cold sensation: normal    Protective Sensation using [place]adin-Kal Monofilament:  9     MUSCULOSKELETAL      Right Foot Musculoskeletal   Ecchymosis:  None  Tenderness: toenails    Arch:  Normal  Hallux valgus: No       Left Foot Musculoskeletal   Ecchymosis:  None  Tenderness: toenails    Arch:  Normal  Hallux valgus: No       MUSCLE STRENGTH     Right Foot Muscle Strength   Foot dorsiflexion:  5  Foot plantar flexion:  5  Foot inversion:  5  Foot eversion:  5     Left Foot Muscle Strength   Foot dorsiflexion:  5  Foot plantar flexion:  5  Foot inversion:  5  Foot eversion:  5     RANGE OF MOTION      Right Foot Range of Motion   Foot and ankle ROM within normal limits       Left Foot Range of  Motion   Foot and ankle ROM within normal limits       DERMATOLOGIC     Right Foot Dermatologic   Skin: skin intact and atrophic    Nails: onychomycosis, abnormally thick, subungual debris and dystrophic nails       Left Foot Dermatologic   Skin: skin intact and atrophic    Nails: onychomycosis, abnormally thick, subungual debris and dystrophic nails        RADIOLOGY/NUCLEAR:  No results found.    LABORATORY/CULTURE RESULTS:      PATHOLOGY RESULTS:       ASSESSMENT/PLAN     Diagnoses and all orders for this visit:    1. Onychomycosis (Primary)    2. Thickened nail    3. Type 2 diabetes mellitus with diabetic neuropathy, without long-term current use of insulin (HCC)    4. Encounter for diabetic foot exam ([facility])    5. Stage 4 chronic kidney disease ([facility])      Comprehensive lower extremity examination and evaluation was performed.  Discussed findings and treatment plan including risks, benefits, and treatment options with patient in detail. Patient agreed with treatment plan.  Diabetic foot exam performed.  After verbal consent obtained, nail(s) x10 debrided of length and thickness with nail nipper without incidence  Patient may maintain nails and calluses at home utilizing emery board or pumice stone between visits as needed  Reviewed at home diabetic foot care including daily foot checks   Continue diabetic monitoring and control under direction of PCP.   An After Visit Summary was printed and given to the patient at discharge, including (if requested) any available informative/educational handouts regarding diagnosis, treatment, or medications. All questions were answered to patient/family satisfaction. Should symptoms fail to improve or worsen they agree to call or return to clinic or to go to the Emergency Department. Discussed the importance of following up with any needed screening tests/labs/specialist appointments and any requested follow-up recommended by me today. Importance of maintaining follow-up  discussed and patient accepts that missed appointments can delay diagnosis and potentially lead to worsening of conditions.  Return in about 3 months (around 6/28/2023)., or sooner if acute issues arise.        This document has been electronically signed by YOBANY Cochran on March 28, 2023 10:09 CDT

## 2023-03-20 ENCOUNTER — OFFICE VISIT (OUTPATIENT)
Dept: INTERNAL MEDICINE | Facility: CLINIC | Age: 88
End: 2023-03-20
Payer: MEDICARE

## 2023-03-20 VITALS
HEIGHT: 62 IN | TEMPERATURE: 97.6 F | DIASTOLIC BLOOD PRESSURE: 72 MMHG | BODY MASS INDEX: 25.58 KG/M2 | WEIGHT: 139 LBS | SYSTOLIC BLOOD PRESSURE: 134 MMHG | HEART RATE: 62 BPM | OXYGEN SATURATION: 95 %

## 2023-03-20 DIAGNOSIS — E11.65 TYPE 2 DIABETES MELLITUS WITH HYPERGLYCEMIA, WITHOUT LONG-TERM CURRENT USE OF INSULIN: Primary | ICD-10-CM

## 2023-03-20 DIAGNOSIS — N18.4 STAGE 4 CHRONIC KIDNEY DISEASE: ICD-10-CM

## 2023-03-20 DIAGNOSIS — I10 BENIGN ESSENTIAL HTN: Chronic | ICD-10-CM

## 2023-03-20 DIAGNOSIS — E78.2 MIXED HYPERLIPIDEMIA: ICD-10-CM

## 2023-03-20 DIAGNOSIS — I48.19 PERSISTENT ATRIAL FIBRILLATION: ICD-10-CM

## 2023-03-20 DIAGNOSIS — K21.9 GASTROESOPHAGEAL REFLUX DISEASE WITHOUT ESOPHAGITIS: ICD-10-CM

## 2023-03-20 PROBLEM — R06.09 DOE (DYSPNEA ON EXERTION): Status: RESOLVED | Noted: 2020-09-21 | Resolved: 2023-03-20

## 2023-03-20 PROBLEM — E11.9 TYPE 2 DIABETES MELLITUS WITHOUT COMPLICATION, WITH LONG-TERM CURRENT USE OF INSULIN (HCC): Chronic | Status: RESOLVED | Noted: 2020-03-10 | Resolved: 2023-03-20

## 2023-03-20 PROBLEM — Z79.4 TYPE 2 DIABETES MELLITUS WITHOUT COMPLICATION, WITH LONG-TERM CURRENT USE OF INSULIN (HCC): Chronic | Status: RESOLVED | Noted: 2020-03-10 | Resolved: 2023-03-20

## 2023-03-20 PROBLEM — E83.42 HYPOMAGNESEMIA: Status: RESOLVED | Noted: 2020-06-24 | Resolved: 2023-03-20

## 2023-03-20 PROBLEM — M19.90 OSTEOARTHRITIS: Status: RESOLVED | Noted: 2020-06-24 | Resolved: 2023-03-20

## 2023-03-20 PROBLEM — F07.81 POSTCONCUSSION SYNDROME: Status: RESOLVED | Noted: 2021-03-04 | Resolved: 2023-03-20

## 2023-03-20 LAB — HBA1C MFR BLD: 7.5 %

## 2023-03-20 PROCEDURE — 1159F MED LIST DOCD IN RCRD: CPT | Performed by: INTERNAL MEDICINE

## 2023-03-20 PROCEDURE — 1160F RVW MEDS BY RX/DR IN RCRD: CPT | Performed by: INTERNAL MEDICINE

## 2023-03-20 PROCEDURE — 83036 HEMOGLOBIN GLYCOSYLATED A1C: CPT | Performed by: INTERNAL MEDICINE

## 2023-03-20 PROCEDURE — 99214 OFFICE O/P EST MOD 30 MIN: CPT | Performed by: INTERNAL MEDICINE

## 2023-03-20 PROCEDURE — 3051F HG A1C>EQUAL 7.0%<8.0%: CPT | Performed by: INTERNAL MEDICINE

## 2023-03-20 NOTE — PROGRESS NOTES
"      Chief Complaint  Hypertension (3 month follow up ) and Diabetes (A1c:  7.5)    Subjective        Libia Perales presents to [facility] PRIMARY [facility]ARE    HPI    Patient presents for the above problems.  The patient has no significant issues going on at present.  The patient overall feels well.    Review of Systems  Answers for HPI/ROS submitted by the patient on 3/19/2023  Please describe your symptoms.: Scheduled appointment  Have you had these symptoms before?: Yes  How long have you been having these symptoms?: Greater than 2 weeks  What is the primary reason for your visit?: Other      Objective   Vital Signs:  /72 (BP Location: Left arm, Patient Position: Sitting, Cuff Size: Adult)   Pulse 62   Temp 97.6 °F (36.4 °C) (Temporal)   Ht 157.5 cm (62\")   Wt 63 kg (139 lb)   SpO2 95%   BMI 25.42 kg/m²   Estimated body mass index is 25.42 kg/m² as calculated from the following:    Height as of this encounter: 157.5 cm (62\").    Weight as of this encounter: 63 kg (139 lb).      Physical Exam  Vitals reviewed.   Constitutional:       Appearance: She is not ill-appearing.   HENT:      Mouth/Throat:      Mouth: Mucous membranes are dry.      Pharynx: Oropharynx is clear.   Eyes:      General: No scleral icterus.     Conjunctiva/sclera: Conjunctivae normal.   Cardiovascular:      Rate and Rhythm: Normal rate. Rhythm irregular.      Heart sounds: Murmur heard.   Pulmonary:      Effort: Pulmonary effort is normal. No respiratory distress.   Skin:     General: Skin is warm and dry.      Coloration: Skin is not pale.   Neurological:      General: No focal deficit present.      Mental Status: She is alert and oriented to person, place, and time.   Psychiatric:         Mood and Affect: Mood normal.         Behavior: Behavior normal.                Diabetic Foot Exam Performed           Assessment and Plan   Diagnoses and all orders for this visit:    1. Type 2 diabetes mellitus with " hyperglycemia, without long-term current use of insulin (HCC) (Primary)  -     POC Glycated Hemoglobin, Total    2. Benign essential HTN  -     Microalbumin / Creatinine Urine Ratio - Urine, Clean Catch  -     Comprehensive metabolic panel    3. Mixed hyperlipidemia    4. Persistent atrial fibrillation (CMS/HCC)    5. Stage 4 chronic kidney disease (HCC)  -     Microalbumin / Creatinine Urine Ratio - Urine, Clean Catch  -     Comprehensive metabolic panel    6. Gastroesophageal reflux disease without esophagitis      Patient is presently on Trulicity, glipizide, and metformin.  Patient has significant chronic kidney disease, we may need to consider getting rid of it the metformin unfortunately.  Patient may benefit from a trial of Farxiga.  We will check urine microalbumin/creatinine ratio.  Patient's blood pressure is well controlled at present.  Patient's blood pressure has been elevated some at home.  We will avoid making any changes at the present time.  Requested that the patient bring a blood pressure log by.    Patient has a history of hyperlipidemia, patient tolerating simvastatin without any significant issues.    Patient has a long history of persistent atrial fibrillation, she seems as though she is in atrial fibrillation at the present time.  Rate controlled.  Continue the Eliquis.  This is for stroke prevention.    At the reflux controlled.  Continue omeprazole.      Result Review :  The following data was reviewed by: Zachariah Huffman MD on 03/20/2023:  CMP    CMP 5/4/22 9/9/22   Glucose 130 (A) 146 (A)   BUN 43 (A) 31 (A)   Creatinine 1.72 (A) 1.68 (A)   Sodium 139 138   Potassium 4.3 4.1   Chloride 103 104   Calcium 9.8 9.7   Total Protein 7.1 6.6   Albumin 4.10 4.00   Globulin 3.0 2.6   Total Bilirubin 0.3 0.3   Alkaline Phosphatase 50 54   AST (SGOT) 23 17   ALT (SGPT) 16 13   BUN/Creatinine Ratio 25.0 18.5   (A) Abnormal value            CBC w/diff    CBC w/Diff 5/4/22   WBC 5.29   RBC 3.62 (A)    Hemoglobin 9.2 (A)   Hematocrit 31.1 (A)   MCV 85.9   MCH 25.4 (A)   MCHC 29.6 (A)   RDW 15.1   Platelets 292   Neutrophil Rel % 67.7   Lymphocyte Rel % 21.6   Monocyte Rel % 7.0   Eosinophil Rel % 2.3   Basophil Rel % 0.8   (A) Abnormal value            Lipid Panel    Lipid Panel 5/4/22 9/9/22   Total Cholesterol 146 142   Triglycerides 195 (A) 173 (A)   HDL Cholesterol 34 (A) 37 (A)   VLDL Cholesterol 33 30   LDL Cholesterol  79 75   (A) Abnormal value       Comments are available for some flowsheets but are not being displayed.           TSH    TSH 5/4/22   TSH 4.660 (A)   (A) Abnormal value            A1C Last 3 Results    HGBA1C Last 3 Results 9/9/22 12/13/22 3/20/23   Hemoglobin A1C 8.00 (A) 7.8 7.5   (A) Abnormal value       Comments are available for some flowsheets but are not being displayed.           Microalbumin    Microalbumin 5/4/22   Microalbumin, Urine 743.2      Comments are available for some flowsheets but are not being displayed.                           Follow Up   Return in about 3 months (around 6/20/2023), or if symptoms worsen or fail to improve, for Medicare Wellness.  Patient was given instructions and counseling regarding her condition or for health maintenance advice. Please see specific information pulled into the AVS if appropriate.       OLIVA Huffman MD, FACP, [facility]      Electronically signed by Zachariah Huffman MD, 03/20/23, 12:30 PM CDT.

## 2023-03-21 LAB
ALBUMIN SERPL-MCNC: 4.6 G/DL (ref 3.5–5.2)
ALBUMIN/CREAT UR: 957 MG/G CREAT (ref 0–29)
ALBUMIN/GLOB SERPL: 1.5 G/DL
ALP SERPL-CCNC: 66 U/L (ref 39–117)
ALT SERPL-CCNC: 18 U/L (ref 1–33)
AST SERPL-CCNC: 22 U/L (ref 1–32)
BILIRUB SERPL-MCNC: 0.5 MG/DL (ref 0–1.2)
BUN SERPL-MCNC: 53 MG/DL (ref 8–23)
BUN/CREAT SERPL: 27.9 (ref 7–25)
CALCIUM SERPL-MCNC: 10.5 MG/DL (ref 8.6–10.5)
CHLORIDE SERPL-SCNC: 102 MMOL/L (ref 98–107)
CO2 SERPL-SCNC: 26.4 MMOL/L (ref 22–29)
CREAT SERPL-MCNC: 1.9 MG/DL (ref 0.57–1)
CREAT UR-MCNC: 17.3 MG/DL
EGFRCR SERPLBLD CKD-EPI 2021: 25.1 ML/MIN/1.73
GLOBULIN SER CALC-MCNC: 3 GM/DL
GLUCOSE SERPL-MCNC: 212 MG/DL (ref 65–99)
MICROALBUMIN UR-MCNC: 165.6 UG/ML
POTASSIUM SERPL-SCNC: 4.4 MMOL/L (ref 3.5–5.2)
PROT SERPL-MCNC: 7.6 G/DL (ref 6–8.5)
SODIUM SERPL-SCNC: 140 MMOL/L (ref 136–145)

## 2023-03-27 ENCOUNTER — TELEPHONE (OUTPATIENT)
Dept: PODIATRY | Facility: CLINIC | Age: 88
End: 2023-03-27
Payer: MEDICARE

## 2023-03-27 NOTE — TELEPHONE ENCOUNTER
Called patient regarding appt on 03/28/2023. Left message for patient to return call if any questions or concerns arise.

## 2023-03-28 ENCOUNTER — OFFICE VISIT (OUTPATIENT)
Dept: PODIATRY | Facility: CLINIC | Age: 88
End: 2023-03-28
Payer: MEDICARE

## 2023-03-28 VITALS
WEIGHT: 141 LBS | DIASTOLIC BLOOD PRESSURE: 72 MMHG | BODY MASS INDEX: 25.95 KG/M2 | SYSTOLIC BLOOD PRESSURE: 136 MMHG | HEIGHT: 62 IN | OXYGEN SATURATION: 95 % | HEART RATE: 68 BPM

## 2023-03-28 DIAGNOSIS — E11.9 ENCOUNTER FOR DIABETIC FOOT EXAM: ICD-10-CM

## 2023-03-28 DIAGNOSIS — L60.2 THICKENED NAIL: ICD-10-CM

## 2023-03-28 DIAGNOSIS — N18.4 STAGE 4 CHRONIC KIDNEY DISEASE: ICD-10-CM

## 2023-03-28 DIAGNOSIS — E11.40 TYPE 2 DIABETES MELLITUS WITH DIABETIC NEUROPATHY, WITHOUT LONG-TERM CURRENT USE OF INSULIN: ICD-10-CM

## 2023-03-28 DIAGNOSIS — B35.1 ONYCHOMYCOSIS: Primary | ICD-10-CM

## 2023-03-28 PROCEDURE — 99213 OFFICE O/P EST LOW 20 MIN: CPT | Performed by: NURSE PRACTITIONER

## 2023-03-28 PROCEDURE — 11721 DEBRIDE NAIL 6 OR MORE: CPT | Performed by: NURSE PRACTITIONER

## 2023-03-28 PROCEDURE — 1159F MED LIST DOCD IN RCRD: CPT | Performed by: NURSE PRACTITIONER

## 2023-03-28 PROCEDURE — 1160F RVW MEDS BY RX/DR IN RCRD: CPT | Performed by: NURSE PRACTITIONER

## 2023-03-28 RX ORDER — BENZONATATE 200 MG/1
200 CAPSULE ORAL 3 TIMES DAILY PRN
COMMUNITY

## 2023-04-26 RX ORDER — HYDRALAZINE HYDROCHLORIDE 100 MG/1
100 TABLET, FILM COATED ORAL 3 TIMES DAILY
Qty: 90 TABLET | Refills: 5 | Status: SHIPPED | OUTPATIENT
Start: 2023-04-26

## 2023-05-17 DIAGNOSIS — R05.3 CHRONIC COUGH: Chronic | ICD-10-CM

## 2023-05-17 RX ORDER — ALBUTEROL SULFATE 90 UG/1
AEROSOL, METERED RESPIRATORY (INHALATION)
Refills: 11 | OUTPATIENT
Start: 2023-05-17

## 2023-05-17 NOTE — TELEPHONE ENCOUNTER
Pharmacy sent a request for refills on Ventolin HFA. Rx Refill Note  Requested Prescriptions     Pending Prescriptions Disp Refills    Ventolin  (90 Base) MCG/ACT inhaler [Pharmacy Med Name: VENTOLIN HFA  AEROSOL SOLN]  11     Sig: INHALE 2 PUFFS EVERY 4 (FOUR) HOURS AS NEEDED FOR WHEEZING OR SHORTNESS OF AIR      Last office visit with prescribing clinician: 3/17/2022   Last telemedicine visit with prescribing clinician: 1/31/2023   Next office visit with prescribing clinician: Visit date not found                         Would you like a call back once the refill request has been completed: [] Yes [] No    If the office needs to give you a call back, can they leave a voicemail: [] Yes [] No    Maxim Mcgraw, NICOLE  05/17/23, 08:23 CDT

## 2023-05-22 RX ORDER — AMLODIPINE BESYLATE 5 MG/1
5 TABLET ORAL DAILY
Qty: 30 TABLET | Refills: 11 | Status: SHIPPED | OUTPATIENT
Start: 2023-05-22

## 2023-05-31 ENCOUNTER — TELEPHONE (OUTPATIENT)
Dept: INTERNAL MEDICINE | Facility: CLINIC | Age: 88
End: 2023-05-31

## 2023-05-31 DIAGNOSIS — N18.30 STAGE 3 CHRONIC KIDNEY DISEASE, UNSPECIFIED WHETHER STAGE 3A OR 3B CKD: Primary | ICD-10-CM

## 2023-05-31 RX ORDER — HYDROCHLOROTHIAZIDE 12.5 MG/1
CAPSULE, GELATIN COATED ORAL
Qty: 90 CAPSULE | Refills: 3 | OUTPATIENT
Start: 2023-05-31

## 2023-05-31 RX ORDER — FENOFIBRATE 145 MG/1
TABLET, COATED ORAL
Qty: 90 TABLET | Refills: 3 | Status: SHIPPED | OUTPATIENT
Start: 2023-05-31

## 2023-05-31 RX ORDER — FUROSEMIDE 20 MG/1
TABLET ORAL
Qty: 90 TABLET | Refills: 3 | Status: SHIPPED | OUTPATIENT
Start: 2023-05-31

## 2023-05-31 NOTE — TELEPHONE ENCOUNTER
I spoke with the patient and she didn't know about her medication.   Spoke with [facility] and they do her med tray. They said she takes Hctz every day and Lasix daily prn.   The patient said that she will try to come do labs today if her son can bring her today.

## 2023-05-31 NOTE — TELEPHONE ENCOUNTER
Caller: [facility] - [address] - [phone] [facility] [phone] FX    Relationship: Pharmacy    Best call back number: [phone]    Requested Prescriptions:   Requested Prescriptions     Pending Prescriptions Disp Refills   • hydroCHLOROthiazide (MICROZIDE) 12.5 MG capsule [Pharmacy Med Name: HYDROCHLOROTHIAZIDE 12.5MG CAPSULE] 90 capsule 3     Sig: TAKE 1 CAPSULE BY MOUTH EVERY DAY   • furosemide (LASIX) 20 MG tablet [Pharmacy Med Name: FUROSEMIDE 20MG TABLET] 90 tablet 3     Sig: TAKE 1 TABLET BY MOUTH EVERY DAY AS NEEDED FOR WEIGHT GAIN OR FLUID RETENTION   • fenofibrate (TRICOR) 145 MG tablet [Pharmacy Med Name: FENOFIBRATE 145MG TABLET] 90 tablet 3     Sig: TAKE 1 TABLET BY MOUTH EVERY DAY        Pharmacy where request should be sent: [address] - [phone] [facility] [phone] FX     Last office visit with prescribing clinician: 3/20/2023   Last telemedicine visit with prescribing clinician: Visit date not found   Next office visit with prescribing clinician: 5/31/2023     Additional details provided by patient: PATIENT IS GOING OUT OF TOWN FOR 3 MONTHS AND THEY ARE NEEDING A 3 MONTH REFILL FOR HER BEFORE Friday.     Does the patient have less than a 3 day supply:  [x] Yes  [] No    Would you like a call back once the refill request has been completed: [] Yes [x] No    If the office needs to give you a call back, can they leave a voicemail: [] Yes [x] No    Wayne Dewey Rep   05/31/23 08:47 CDT

## 2023-06-01 ENCOUNTER — TELEPHONE (OUTPATIENT)
Dept: INTERNAL MEDICINE | Facility: CLINIC | Age: 88
End: 2023-06-01
Payer: MEDICARE

## 2023-06-01 NOTE — TELEPHONE ENCOUNTER
[place] at [facility] called ([phone])     Mrs Perales is leaving for [place] tomorrow for 3 month and is needing script for HCTZ

## 2023-06-02 DIAGNOSIS — E11.65 TYPE 2 DIABETES MELLITUS WITH HYPERGLYCEMIA, WITHOUT LONG-TERM CURRENT USE OF INSULIN: Primary | ICD-10-CM

## 2023-06-02 RX ORDER — DULAGLUTIDE 4.5 MG/.5ML
4 INJECTION, SOLUTION SUBCUTANEOUS WEEKLY
Qty: 6 ML | Refills: 3 | Status: CANCELLED | OUTPATIENT
Start: 2023-06-02

## 2023-06-02 RX ORDER — DULAGLUTIDE 3 MG/.5ML
3 INJECTION, SOLUTION SUBCUTANEOUS
Qty: 2 ML | Refills: 3 | Status: SHIPPED | OUTPATIENT
Start: 2023-07-28 | End: 2023-08-19

## 2023-06-05 ENCOUNTER — TELEPHONE (OUTPATIENT)
Dept: INTERNAL MEDICINE | Facility: CLINIC | Age: 88
End: 2023-06-05

## 2023-06-05 NOTE — TELEPHONE ENCOUNTER
Pharmacy Name:    [facility] [place] - [address] - [phone]  - [phone]         What medication are you calling in regards to: PHARMACY HAS QUESTIONS ABOUT PATIENTS MEDICATIONS AND WHAT CHANGES ARE BEING MADE      Additional notes: PHARMACY STAFF STATES PATIENT CALLED THEM AND TOLD THEM THERE WAS GOING TO BE CHANGES TO HER MEDICATIONS BUT PHARMACY HAS NOT RECEIVED ANYTHING FROM OFFICE AND IS WORKING ON PATIENTS MED PACKS

## 2023-08-15 ENCOUNTER — LAB (OUTPATIENT)
Dept: INTERNAL MEDICINE | Facility: CLINIC | Age: 88
End: 2023-08-15
Payer: MEDICARE

## 2023-08-15 ENCOUNTER — DOCUMENTATION (OUTPATIENT)
Dept: INTERNAL MEDICINE | Facility: CLINIC | Age: 88
End: 2023-08-15
Payer: MEDICARE

## 2023-08-15 ENCOUNTER — NURSE TRIAGE (OUTPATIENT)
Dept: CALL CENTER | Facility: HOSPITAL | Age: 88
End: 2023-08-15
Payer: MEDICARE

## 2023-08-15 DIAGNOSIS — I10 BENIGN ESSENTIAL HTN: ICD-10-CM

## 2023-08-15 DIAGNOSIS — E78.2 MIXED HYPERLIPIDEMIA: ICD-10-CM

## 2023-08-15 DIAGNOSIS — E11.65 TYPE 2 DIABETES MELLITUS WITH HYPERGLYCEMIA, WITHOUT LONG-TERM CURRENT USE OF INSULIN: ICD-10-CM

## 2023-08-15 DIAGNOSIS — N18.30 STAGE 3 CHRONIC KIDNEY DISEASE, UNSPECIFIED WHETHER STAGE 3A OR 3B CKD: Primary | ICD-10-CM

## 2023-08-15 DIAGNOSIS — Z79.899 LONG-TERM USE OF HIGH-RISK MEDICATION: ICD-10-CM

## 2023-08-15 DIAGNOSIS — I48.19 PERSISTENT ATRIAL FIBRILLATION: ICD-10-CM

## 2023-08-16 LAB
ALBUMIN SERPL-MCNC: 4.2 G/DL (ref 3.5–5.2)
ALBUMIN/CREAT UR: 252 MG/G CREAT (ref 0–29)
ALBUMIN/GLOB SERPL: 1.6 G/DL
ALP SERPL-CCNC: 58 U/L (ref 39–117)
ALT SERPL-CCNC: 13 U/L (ref 1–33)
APPEARANCE UR: CLEAR
AST SERPL-CCNC: 18 U/L (ref 1–32)
BACTERIA #/AREA URNS HPF: ABNORMAL /HPF
BASOPHILS # BLD AUTO: 0.03 10*3/MM3 (ref 0–0.2)
BASOPHILS NFR BLD AUTO: 0.5 % (ref 0–1.5)
BILIRUB SERPL-MCNC: 0.4 MG/DL (ref 0–1.2)
BILIRUB UR QL STRIP: NEGATIVE
BUN SERPL-MCNC: 47 MG/DL (ref 8–23)
BUN/CREAT SERPL: 22.1 (ref 7–25)
CALCIUM SERPL-MCNC: 9.9 MG/DL (ref 8.6–10.5)
CASTS URNS MICRO: ABNORMAL
CHLORIDE SERPL-SCNC: 105 MMOL/L (ref 98–107)
CHOLEST SERPL-MCNC: 166 MG/DL (ref 0–200)
CO2 SERPL-SCNC: 24.1 MMOL/L (ref 22–29)
COLOR UR: YELLOW
CREAT SERPL-MCNC: 2.13 MG/DL (ref 0.57–1)
CREAT UR-MCNC: 39.1 MG/DL
DIGOXIN SERPL-MCNC: 3.1 NG/ML (ref 0.6–1.2)
EGFRCR SERPLBLD CKD-EPI 2021: 21.9 ML/MIN/1.73
EOSINOPHIL # BLD AUTO: 0.1 10*3/MM3 (ref 0–0.4)
EOSINOPHIL NFR BLD AUTO: 1.8 % (ref 0.3–6.2)
EPI CELLS #/AREA URNS HPF: ABNORMAL /HPF
ERYTHROCYTE [DISTWIDTH] IN BLOOD BY AUTOMATED COUNT: 12.4 % (ref 12.3–15.4)
GLOBULIN SER CALC-MCNC: 2.7 GM/DL
GLUCOSE SERPL-MCNC: 200 MG/DL (ref 65–99)
GLUCOSE UR QL STRIP: NEGATIVE
HBA1C MFR BLD: 8.9 % (ref 4.8–5.6)
HCT VFR BLD AUTO: 31.7 % (ref 34–46.6)
HDLC SERPL-MCNC: 32 MG/DL (ref 40–60)
HGB BLD-MCNC: 10.5 G/DL (ref 12–15.9)
HGB UR QL STRIP: NEGATIVE
IMM GRANULOCYTES # BLD AUTO: 0.04 10*3/MM3 (ref 0–0.05)
IMM GRANULOCYTES NFR BLD AUTO: 0.7 % (ref 0–0.5)
KETONES UR QL STRIP: NEGATIVE
LDLC SERPL CALC-MCNC: 95 MG/DL (ref 0–100)
LEUKOCYTE ESTERASE UR QL STRIP: ABNORMAL
LYMPHOCYTES # BLD AUTO: 1.21 10*3/MM3 (ref 0.7–3.1)
LYMPHOCYTES NFR BLD AUTO: 21.3 % (ref 19.6–45.3)
MAGNESIUM SERPL-MCNC: 1.9 MG/DL (ref 1.6–2.4)
MCH RBC QN AUTO: 29.9 PG (ref 26.6–33)
MCHC RBC AUTO-ENTMCNC: 33.1 G/DL (ref 31.5–35.7)
MCV RBC AUTO: 90.3 FL (ref 79–97)
MICROALBUMIN UR-MCNC: 98.7 UG/ML
MONOCYTES # BLD AUTO: 0.39 10*3/MM3 (ref 0.1–0.9)
MONOCYTES NFR BLD AUTO: 6.9 % (ref 5–12)
NEUTROPHILS # BLD AUTO: 3.9 10*3/MM3 (ref 1.7–7)
NEUTROPHILS NFR BLD AUTO: 68.8 % (ref 42.7–76)
NITRITE UR QL STRIP: NEGATIVE
NRBC BLD AUTO-RTO: 0 /100 WBC (ref 0–0.2)
PH UR STRIP: 6 [PH] (ref 5–8)
PLATELET # BLD AUTO: 219 10*3/MM3 (ref 140–450)
POTASSIUM SERPL-SCNC: 4.7 MMOL/L (ref 3.5–5.2)
PROT SERPL-MCNC: 6.9 G/DL (ref 6–8.5)
PROT UR QL STRIP: ABNORMAL
RBC # BLD AUTO: 3.51 10*6/MM3 (ref 3.77–5.28)
RBC #/AREA URNS HPF: ABNORMAL /HPF
SODIUM SERPL-SCNC: 140 MMOL/L (ref 136–145)
SP GR UR STRIP: 1.01 (ref 1–1.03)
TRIGL SERPL-MCNC: 230 MG/DL (ref 0–150)
TSH SERPL DL<=0.005 MIU/L-ACNC: 3.49 UIU/ML (ref 0.27–4.2)
UROBILINOGEN UR STRIP-MCNC: ABNORMAL MG/DL
VLDLC SERPL CALC-MCNC: 39 MG/DL (ref 5–40)
WBC # BLD AUTO: 5.67 10*3/MM3 (ref 3.4–10.8)
WBC #/AREA URNS HPF: ABNORMAL /HPF

## 2023-08-16 NOTE — PROGRESS NOTES
I received a call at 11:15 PM with critical lab, patient had a digoxin level of 3.1.  I tried to call patient tonight and did not get an answer. I left a message on her voicemail telling her that her level is high and that she should go to the ER if she is having nausea/vomiting, dizziness, or low heart rate. I also recommended that she call Dr. Huffman's office in the morning if she is not feeling well. I also recommended that she NOT TAKE her lanoxin until she sees him in the office for her visit 8/17/23. [facility]

## 2023-08-16 NOTE — TELEPHONE ENCOUNTER
"Call in from [facility] in lab states pt Digoxin 3.10 order per Dr. Huffman at 0801 lab states. YOBANY Leone was called and given pt information and critical lab value Digoxin 3.10.         Reason for Disposition   Lab or radiology calling with CRITICAL test results    Additional Information   Negative: Lab calling with strep throat test results and triager can call in prescription   Negative: Lab calling with urinalysis test results and triager can call in prescription   Negative: Medication questions   Negative: Medication renewal and refill questions   Negative: Pre-operative or pre-procedural questions   Negative: ED call to PCP (i.e., primary care provider; doctor, NP, or PA)   Negative: Doctor (or NP/PA) call to PCP   Negative: Call about patient who is currently hospitalized    Answer Assessment - Initial Assessment Questions  1. REASON FOR CALL or QUESTION: \"What is your reason for calling today?\" or \"How can I best  help you?\" or \"What question do you have that I can help answer?\"      Lab call from [facility] Digoxin level 3.10  2. CALLER: Document the source of call. (e.g., laboratory, patient).      Lab    Protocols used: PCP Call - No Triage-ADULT-AH    "

## 2023-08-17 ENCOUNTER — OFFICE VISIT (OUTPATIENT)
Dept: INTERNAL MEDICINE | Facility: CLINIC | Age: 88
End: 2023-08-17
Payer: MEDICARE

## 2023-08-17 VITALS
BODY MASS INDEX: 25.76 KG/M2 | HEART RATE: 52 BPM | SYSTOLIC BLOOD PRESSURE: 178 MMHG | HEIGHT: 62 IN | TEMPERATURE: 97.3 F | WEIGHT: 140 LBS | DIASTOLIC BLOOD PRESSURE: 62 MMHG | OXYGEN SATURATION: 99 %

## 2023-08-17 DIAGNOSIS — I48.19 PERSISTENT ATRIAL FIBRILLATION: ICD-10-CM

## 2023-08-17 DIAGNOSIS — N18.4 STAGE 4 CHRONIC KIDNEY DISEASE: ICD-10-CM

## 2023-08-17 DIAGNOSIS — E78.2 MIXED HYPERLIPIDEMIA: ICD-10-CM

## 2023-08-17 DIAGNOSIS — I10 BENIGN ESSENTIAL HTN: Primary | Chronic | ICD-10-CM

## 2023-08-17 DIAGNOSIS — E11.65 TYPE 2 DIABETES MELLITUS WITH HYPERGLYCEMIA, WITHOUT LONG-TERM CURRENT USE OF INSULIN: ICD-10-CM

## 2023-08-17 RX ORDER — SPIRONOLACTONE 25 MG/1
25 TABLET ORAL DAILY
Qty: 30 TABLET | Refills: 2 | Status: SHIPPED | OUTPATIENT
Start: 2023-08-17

## 2023-08-17 RX ORDER — CHLORTHALIDONE 25 MG/1
25 TABLET ORAL DAILY
Qty: 30 TABLET | Refills: 2 | Status: SHIPPED | OUTPATIENT
Start: 2023-08-17 | End: 2023-08-17

## 2023-08-17 RX ORDER — PEN NEEDLE, DIABETIC 31 G X1/4"
1 NEEDLE, DISPOSABLE MISCELLANEOUS DAILY
Qty: 100 EACH | Refills: 3 | Status: SHIPPED | OUTPATIENT
Start: 2023-08-17

## 2023-08-17 RX ORDER — METOPROLOL SUCCINATE 25 MG/1
TABLET, EXTENDED RELEASE ORAL
COMMUNITY
Start: 2023-05-30

## 2023-08-17 NOTE — PROGRESS NOTES
The ABCs of the Annual Wellness Visit  Subsequent Medicare Wellness Visit    Chief Complaint   Patient presents with    Medicare Wellness-subsequent     Labs printed       Discuss Medication     Patient would like to discuss Trulicity due to increase cost.       Subjective    History of Present Illness:  Libia Perales is a 88 y.o. female who presents for a Subsequent Medicare Wellness Visit.    The following portions of the patient's history were reviewed and   updated as appropriate: allergies, current medications, past family history, past medical history, past social history, past surgical history and problem list.    Compared to one year ago, the patient feels her physical   health is the same.    Compared to one year ago, the patient feels her mental   health is worse.    Recent Hospitalizations:  She was not admitted to the hospital during the last year.       Current Medical Providers:  Patient Care Team:  Zachariah Huffman MD as PCP - General (Internal Medicine)  Tone Ceballos MD as Consulting Physician (Urology)  Tone Coello MD as Cardiologist (Cardiology)  Redd Dumont APRN as Nurse Practitioner (Pulmonary Disease)    Outpatient Medications Prior to Visit   Medication Sig Dispense Refill    amLODIPine (NORVASC) 5 MG tablet TAKE 1 TABLET BY MOUTH DAILY. 30 tablet 11    apixaban (ELIQUIS) 2.5 MG tablet tablet Take 1 tablet by mouth 2 (Two) Times a Day. 60 tablet 11    benzonatate (TESSALON) 200 MG capsule Take 1 capsule by mouth 3 (Three) Times a Day As Needed for Cough.      cloNIDine (Catapres) 0.1 MG tablet Take 1 tablet by mouth 2 (Two) Times a Day As Needed for High Blood Pressure (For blood pressure > 160/90). 30 tablet 1    cyanocobalamin 1000 MCG/ML injection Inject 1 mL into the appropriate muscle as directed by prescriber Every 30 (Thirty) Days. INJECT 1ML INTO THE MUSCLE WEEKLY FOR 3 WEEKS THEN MONTHLY THEREAFTER 6 mL 1    digoxin (LANOXIN) 125 MCG  "tablet TAKE 1 TABLET BY MOUTH EVERY OTHER DAY (SUN TUES THU SAT) 16 tablet 11    docusate sodium (COLACE) 100 MG capsule Take 1 capsule by mouth 2 (Two) Times a Day.      Dulaglutide (Trulicity) 3 MG/0.5ML solution pen-injector Inject 0.5 mL under the skin into the appropriate area as directed Every 7 (Seven) Days for 4 doses. Inject weekly x 4 weeks, then increase to next dose 2 mL 3    fenofibrate (TRICOR) 145 MG tablet TAKE 1 TABLET BY MOUTH EVERY DAY 90 tablet 3    fexofenadine (ALLEGRA) 60 MG tablet Take 1 tablet by mouth Daily.      fluticasone (FLONASE) 50 MCG/ACT nasal spray 2 sprays into the nostril(s) as directed by provider Daily. 9.9 mL 0    furosemide (LASIX) 20 MG tablet TAKE 1 TABLET BY MOUTH EVERY DAY AS NEEDED FOR WEIGHT GAIN OR FLUID RETENTION 90 tablet 3    glipizide (GLUCOTROL) 10 MG tablet TAKE 1 TABLET BY MOUTH TWICE DAILY WITH FOOD 180 tablet 3    hydrALAZINE (APRESOLINE) 100 MG tablet TAKE 1 TABLET BY MOUTH 3 (THREE) TIMES A DAY. 90 tablet 5    hydrOXYzine (ATARAX) 10 MG tablet Take 1 tablet by mouth Every 6 (Six) Hours As Needed for Itching. 60 tablet 1    Iron, Ferrous Sulfate, 325 (65 Fe) MG tablet Take 1 each by mouth Daily. 90 tablet 3    levocetirizine (XYZAL) 5 MG tablet Take 1 tablet by mouth Every Evening.      losartan (COZAAR) 100 MG tablet TAKE 1 TABLET BY MOUTH EVERY DAY 90 tablet 11    magnesium oxide (MAGOX) 400 (241.3 Mg) MG tablet tablet Take 1 tablet by mouth Daily.      metoprolol succinate XL (TOPROL-XL) 25 MG 24 hr tablet       omeprazole (priLOSEC) 40 MG capsule TAKE 1 CAPSULE BY MOUTH EVERY DAY 90 capsule 5    simvastatin (ZOCOR) 40 MG tablet TAKE 1 TABLET BY MOUTH EVERY NIGHT 90 tablet 11    Syringe 25G X 1\" 3 ML misc Weekly injection x 3, then monthly thereafter 15 each 0    True Metrix Blood Glucose Test test strip TEST AS DIRECTED 100 each 12    vitamin C (ASCORBIC ACID) 500 MG tablet Take 1 tablet by mouth Daily. 90 tablet 3    hydroCHLOROthiazide (MICROZIDE) 12.5 " "MG capsule TAKE 1 CAPSULE BY MOUTH EVERY DAY 90 capsule 3    metFORMIN (GLUCOPHAGE) 1000 MG tablet Take 1 tablet by mouth 2 (Two) Times a Day With Meals. (Patient not taking: Reported on 8/17/2023)       No facility-administered medications prior to visit.       No opioid medication identified on active medication list. I have reviewed chart for other potential  high risk medication/s and harmful drug interactions in the elderly.        Aspirin is not on active medication list.  Aspirin use is not indicated based on review of current medical condition/s. Risk of harm outweighs potential benefits.  .    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  ACP discussion was held with the patient during this visit. Patient does not have an advance directive, information provided.       Objective    Vitals:    08/17/23 0951   BP: 178/62  Comment: BP has been running high recently   BP Location: Left arm   Patient Position: Sitting   Cuff Size: Adult   Pulse: 52   Temp: 97.3 øF (36.3 øC)   TempSrc: Temporal   SpO2: 99%   Weight: 63.5 kg (140 lb)   Height: 157.5 cm (62\")   PainSc: 0-No pain     Estimated body mass index is 25.61 kg/mý as calculated from the following:    Height as of this encounter: 157.5 cm (62\").    Weight as of this encounter: 63.5 kg (140 lb).    BMI is >= 25 and <30. (Overweight) The following options were offered after " discussion;: none (medical contraindication)      Does the patient have evidence of cognitive impairment? No      Lab Results   Component Value Date    CHLPL 166 08/15/2023    TRIG 230 (H) 08/15/2023    HDL 32 (L) 08/15/2023    LDL 95 08/15/2023    VLDL 39 08/15/2023    HGBA1C 8.90 (H) 08/15/2023            HEALTH RISK ASSESSMENT    Smoking Status:  Social History     Tobacco Use   Smoking Status Never   Smokeless Tobacco Never     Alcohol Consumption:  Social History     Substance and Sexual Activity   Alcohol Use No     Fall Risk Screen:    STEADI Fall Risk Assessment was completed, and patient is at MODERATE risk for falls. Assessment completed on:2023    Depression Screenin/17/2023     9:54 AM   PHQ-2/PHQ-9 Depression Screening   Little Interest or Pleasure in Doing Things 0-->not at all   Feeling Down, Depressed or Hopeless 1-->several days   PHQ-9: Brief Depression Severity Measure Score 1       Health Habits and Functional and Cognitive Screenin/17/2023     9:56 AM   Functional & Cognitive Status   Do you have difficulty preparing food and eating? No   Do you have difficulty bathing yourself, getting dressed or grooming yourself? No   Do you have difficulty using the toilet? No   Do you have difficulty moving around from place to place? No   Do you have trouble with steps or getting out of a bed or a chair? Yes   Current Diet Well Balanced Diet   Dental Exam Not up to date   Eye Exam Up to date   Exercise (times per week) 2 times per week   Current Exercises Include Walking   Do you need help using the phone?  No   Are you deaf or do you have serious difficulty hearing?  No   Do you need help to go to places out of walking distance? Yes   Do you need help shopping? Yes   Do you need help preparing meals?  No   Do you need help with housework?  Yes   Do you need help with laundry? No   Do you need help taking your medications? No   Do you need help managing money? No   Do you ever drive  or ride in a car without wearing a seat belt? No   Have you felt unusual stress, anger or loneliness in the last month? No   Who do you live with? Alone   If you need help, do you have trouble finding someone available to you? No   Have you been bothered in the last four weeks by sexual problems? No   Do you have difficulty concentrating, remembering or making decisions? Yes       Age-appropriate Screening Schedule:  Refer to the list below for future screening recommendations based on patient's age, sex and/or medical conditions. Orders for these recommended tests are listed in the plan section. The patient has been provided with a written plan.    Health Maintenance   Topic Date Due    ZOSTER VACCINE (2 of 3) 12/01/2015    DXA SCAN  02/23/2023    COVID-19 Vaccine (5 - Moderna series) 02/25/2023    ANNUAL WELLNESS VISIT  05/11/2023    INFLUENZA VACCINE  10/01/2023    HEMOGLOBIN A1C  02/15/2024    DIABETIC EYE EXAM  05/08/2024    DIABETIC FOOT EXAM  08/03/2024    LIPID PANEL  08/15/2024    URINE MICROALBUMIN  08/15/2024    TDAP/TD VACCINES (3 - Td or Tdap) 11/11/2026    Pneumococcal Vaccine 65+  Discontinued              Assessment & Plan   CMS Preventative Services Quick Reference  Risk Factors Identified During Encounter  Fall Risk-High or Moderate: Discussed Fall Prevention in the home  Immunizations Discussed/Encouraged: Influenza, Shingrix, and COVID19  Inadequate Social Support, Isolation, Loneliness, Lack of Transportation, Financial Difficulties, or Caregiver Stress: working to find a more affordable option for her medications.  Dental Screening Recommended  Vision Screening Recommended  The above risks/problems have been discussed with the patient.  Follow up actions/plans if indicated are seen below in the Assessment/Plan Section.  Pertinent information has been shared with the patient in the After Visit Summary.    Diagnoses and all orders for this visit:    1. Benign essential HTN (Primary)  -      "Discontinue: chlorthalidone (HYGROTON) 25 MG tablet; Take 1 tablet by mouth Daily.  Dispense: 30 tablet; Refill: 2  -     spironolactone (Aldactone) 25 MG tablet; Take 1 tablet by mouth Daily.  Dispense: 30 tablet; Refill: 2    2. Type 2 diabetes mellitus with hyperglycemia, without long-term current use of insulin  -     Insulin Glargine (LANTUS SOLOSTAR) 100 UNIT/ML injection pen; Inject 15 Units under the skin into the appropriate area as directed Every Night.  Dispense: 3 mL; Refill: 2  -     Insulin Pen Needle (Pen Needles) 31G X 6 MM misc; 1 each Daily.  Dispense: 100 each; Refill: 3    3. Stage 4 chronic kidney disease  -     Ambulatory Referral to Nephrology    4. Mixed hyperlipidemia    5. Persistent atrial fibrillation              Result Review :           Follow Up:   No follow-ups on file.     An After Visit Summary and PPPS were made available to the patient.                         OLIVA Huffman MD, FACP, FH      Electronically signed by Zachariah Huffman MD, 08/17/23, 10:15 AM CDT.    Additional E&M Note during same encounter follows:  Patient has multiple medical problems which are significant and separately identifiable that require additional work above and beyond the Medicare Wellness Visit.      Chief Complaint  Medicare Wellness-subsequent (Labs printed /) and Discuss Medication (Patient would like to discuss Trulicity due to increase cost. )    Subjective        HPI  Libia Perales is also being seen today for above problems.          Objective   Vital Signs:  /62 (BP Location: Left arm, Patient Position: Sitting, Cuff Size: Adult) Comment: BP has been running high recently  Pulse 52   Temp 97.3 øF (36.3 øC) (Temporal)   Ht 157.5 cm (62\")   Wt 63.5 kg (140 lb)   SpO2 99%   BMI 25.61 kg/mý     Physical Exam  Vitals reviewed.   Constitutional:       Appearance: She is not ill-appearing.   Cardiovascular:      Rate and Rhythm: Normal rate and regular rhythm. Rhythm irregular. "      Heart sounds: Murmur heard.   Pulmonary:      Effort: Pulmonary effort is normal. No respiratory distress.   Neurological:      General: No focal deficit present.      Mental Status: She is alert and oriented to person, place, and time.   Psychiatric:         Mood and Affect: Mood normal.         Behavior: Behavior normal.         The following data was reviewed by: Zachariah Huffman MD on 08/17/2023:  CMP          3/20/2023    10:11 5/31/2023    09:33 8/15/2023    08:01   CMP   Glucose 212  307  200    BUN 53  41  47    Creatinine 1.90  1.81  2.13    Sodium 140  137  140    Potassium 4.4  4.4  4.7    Chloride 102  95  105    Calcium 10.5  9.8  9.9    Total Protein 7.6   6.9    Albumin 4.6   4.2    Globulin 3.0   2.7    Total Bilirubin 0.5   0.4    Alkaline Phosphatase 66   58    AST (SGOT) 22   18    ALT (SGPT) 18   13    BUN/Creatinine Ratio 27.9  22.7  22.1      CBC w/diff          8/15/2023    08:01   CBC w/Diff   WBC 5.67    RBC 3.51    Hemoglobin 10.5    Hematocrit 31.7    MCV 90.3    MCH 29.9    MCHC 33.1    RDW 12.4    Platelets 219    Neutrophil Rel % 68.8    Lymphocyte Rel % 21.3    Monocyte Rel % 6.9    Eosinophil Rel % 1.8    Basophil Rel % 0.5      Lipid Panel          9/9/2022    07:54 8/15/2023    08:01   Lipid Panel   Total Cholesterol 142  166    Triglycerides 173  230    HDL Cholesterol 37  32    VLDL Cholesterol 30  39    LDL Cholesterol  75  95      TSH          8/15/2023    08:01   TSH   TSH 3.490      A1C Last 3 Results          12/13/2022    11:16 3/20/2023    10:31 8/15/2023    08:01   HGBA1C Last 3 Results   Hemoglobin A1C 7.8  7.5  8.90      Microalbumin          3/20/2023    10:11 8/15/2023    08:01   Microalbumin   Microalbumin, Urine 165.6  98.7      UA          8/15/2023    08:01   Urinalysis   Blood, UA Negative    Leukocytes, UA See below:    Nitrite, UA Negative    RBC, UA 0-2    Bacteria, UA Comment                 Assessment and Plan   Diagnoses and all orders for this  visit:    1. Benign essential HTN (Primary)  -     Discontinue: chlorthalidone (HYGROTON) 25 MG tablet; Take 1 tablet by mouth Daily.  Dispense: 30 tablet; Refill: 2  -     spironolactone (Aldactone) 25 MG tablet; Take 1 tablet by mouth Daily.  Dispense: 30 tablet; Refill: 2    2. Type 2 diabetes mellitus with hyperglycemia, without long-term current use of insulin  -     Insulin Glargine (LANTUS SOLOSTAR) 100 UNIT/ML injection pen; Inject 15 Units under the skin into the appropriate area as directed Every Night.  Dispense: 3 mL; Refill: 2  -     Insulin Pen Needle (Pen Needles) 31G X 6 MM misc; 1 each Daily.  Dispense: 100 each; Refill: 3    3. Stage 4 chronic kidney disease  -     Ambulatory Referral to Nephrology    4. Mixed hyperlipidemia    5. Persistent atrial fibrillation    The patient is noted to have poorly controlled hypertension.  Patient needs additional agent.  Patient is also noted to have chronic kidney disease stage IV.  Patient was noted to have significant proteinuria.  Back in March she was noted to have a microalbumin creatinine ratio of 957, she is down to 252 at the present time.  Patient would really benefit from an SGLT2 inhibitor, however we do not typically start this with a GFR of less than 25.  I will instead use the spironolactone.  We will have to monitor her potassium very closely.  We will go ahead and refer her to nephrology as well.    The patient hemoglobin A1c has also continue to worsen.  The patient hemoglobin A1c is 8.9.  Patient is having difficulty affording the Trulicity.  We can try to do it with insulin alone, but it may take some time to get the appropriate dose worked out and we will have to be very careful in an 88-year-old with chronic kidney disease and the use of insulin.  We will start the patient on 15 units of glargine at night.  I want the patient to keep a very close eye on her blood sugars.    Can have the patient return for a quick follow-up.    Patient has a  history of atrial fibrillation.  Patient is on chronic anticoagulation.  Patient is dose reduced due to creatinine greater than 1.5 and age greater than 80.  She needs to have the 3 criteria for dose reduction.       Follow Up   No follow-ups on file.  Patient was given instructions and counseling regarding her condition or for health maintenance advice. Please see specific information pulled into the AVS if appropriate.       ,

## 2023-08-18 PROBLEM — N18.4 STAGE 4 CHRONIC KIDNEY DISEASE: Status: ACTIVE | Noted: 2023-08-18

## 2023-08-28 DIAGNOSIS — I48.19 PERSISTENT ATRIAL FIBRILLATION: ICD-10-CM

## 2023-08-28 RX ORDER — METOPROLOL SUCCINATE 25 MG/1
TABLET, EXTENDED RELEASE ORAL
Qty: 60 TABLET | Refills: 11 | Status: SHIPPED | OUTPATIENT
Start: 2023-08-28

## 2023-08-28 RX ORDER — GLIPIZIDE 10 MG/1
TABLET ORAL
Qty: 180 TABLET | Refills: 3 | Status: SHIPPED | OUTPATIENT
Start: 2023-08-28

## 2023-08-28 RX ORDER — APIXABAN 2.5 MG/1
TABLET, FILM COATED ORAL
Qty: 60 TABLET | Refills: 11 | Status: SHIPPED | OUTPATIENT
Start: 2023-08-28

## 2023-08-28 RX ORDER — HYDRALAZINE HYDROCHLORIDE 100 MG/1
100 TABLET, FILM COATED ORAL 3 TIMES DAILY
Qty: 90 TABLET | Refills: 11 | Status: SHIPPED | OUTPATIENT
Start: 2023-08-28

## 2023-08-28 RX ORDER — DIGOXIN 125 MCG
TABLET ORAL
Qty: 16 TABLET | Refills: 11 | Status: SHIPPED | OUTPATIENT
Start: 2023-08-28

## 2023-08-31 ENCOUNTER — OFFICE VISIT (OUTPATIENT)
Dept: INTERNAL MEDICINE | Facility: CLINIC | Age: 88
End: 2023-08-31
Payer: MEDICARE

## 2023-08-31 VITALS
DIASTOLIC BLOOD PRESSURE: 70 MMHG | OXYGEN SATURATION: 99 % | WEIGHT: 144 LBS | HEART RATE: 50 BPM | HEIGHT: 62 IN | TEMPERATURE: 97.8 F | SYSTOLIC BLOOD PRESSURE: 140 MMHG | BODY MASS INDEX: 26.5 KG/M2

## 2023-08-31 DIAGNOSIS — E11.649 TYPE 2 DIABETES MELLITUS WITH HYPOGLYCEMIA WITHOUT COMA, WITHOUT LONG-TERM CURRENT USE OF INSULIN: ICD-10-CM

## 2023-08-31 DIAGNOSIS — I10 BENIGN ESSENTIAL HTN: Primary | Chronic | ICD-10-CM

## 2023-08-31 DIAGNOSIS — N18.4 STAGE 4 CHRONIC KIDNEY DISEASE: ICD-10-CM

## 2023-08-31 NOTE — PROGRESS NOTES
"      Chief Complaint  Hypertension and Diabetes (Pt having issues with sugar being too low in the morning /Is off balance when that low )    Subjective        Libia Perales presents to [facility]    HPI  Patient presents for the above problems.  Please see assessment plan for further HPI components.    Review of Systems   Constitutional:  Positive for fatigue.   Neurological:  Positive for dizziness, tremors and weakness.   Answers submitted by the patient for this visit:  Primary Reason for Visit (Submitted on 8/27/2023)  What is the primary reason for your visit?: Diabetes  Diabetes Questionnaire (Submitted on 8/27/2023)  Chief Complaint: Diabetes problem  MedicAlert ID: No  Disease duration: 20 Years  Symptom course: worsening  blackouts: No  hospitalization: No  nocturnal hypoglycemia: Yes  required assistance: No  CVA: No  heart disease: No  impotence: No  nephropathy: No  peripheral neuropathy: No  PVD: No  retinopathy: No  CAD risks: dyslipidemia, hypertension  Current treatments: insulin injections  Treatment compliance: most of the time  Dose schedule: at bedtime  Given by: patient  Home blood tests: 1-2 x per day  Monitoring compliance: inadequate  Blood glucose trend: decreasing steadily  breakfast time: 7-8 am  breakfast glucose level: 70-90  Weight trend: stable  Current diet: generally unhealthy  Meal planning: none  Exercise: rarely  Dietitian visit: No  Eye exam current: Yes  Sees podiatrist: Yes    Objective   Vital Signs:  /70 (BP Location: Left arm, Patient Position: Sitting, Cuff Size: Adult)   Pulse 50   Temp 97.8 øF (36.6 øC) (Temporal)   Ht 157.5 cm (62\")   Wt 65.3 kg (144 lb)   SpO2 99%   BMI 26.34 kg/mý   Estimated body mass index is 26.34 kg/mý as calculated from the following:    Height as of this encounter: 157.5 cm (62\").    Weight as of this encounter: 65.3 kg (144 lb).      Physical Exam   General Appearance:  awake, alert, oriented, in " no acute distress, cooperative, and Son is present with her today  Extremities: trace pedal edema  Psych exam:alert,oriented, in NAD with a full range of affect, normal behavior and no psychotic features               Assessment and Plan   Diagnoses and all orders for this visit:    1. Benign essential HTN (Primary)  -     Basic metabolic panel    2. Type 2 diabetes mellitus with hypoglycemia without coma, without long-term current use of insulin  -     Discontinue: Insulin Glargine (LANTUS SOLOSTAR) 100 UNIT/ML injection pen; Inject 10 Units under the skin into the appropriate area as directed Daily.  Dispense: 3 mL; Refill: 2  -     Insulin Glargine (LANTUS SOLOSTAR) 100 UNIT/ML injection pen; Inject 5 Units under the skin into the appropriate area as directed Daily.  Dispense: 3 mL; Refill: 2  -     Basic metabolic panel    3. Stage 4 chronic kidney disease        The patient has been having blood sugars that are in the 70s in the morning.  Patient is symptomatic with her blood sugars as low.  Her last hemoglobin A1c was 8.9.  Patient was started on glargine 15 units.  I will have the patient hold the glargine and we will focus on diet.  Her son presented with her today.  The patient sounds as though she eats breakfast around 9 in the morning, and then somewhat grazes throughout the day on random foods.  Patient does not monitor what she eats for carbohydrates.  Patient does not have any other organized meal it sounds like throughout the day other than breakfast.  Patient has poor dietary habits that includes carbohydrate and sodium.  He indicated that he got a large container of lunch meat, and when he went back to her house, was noted to be gone within a few days.  Discussed with her that this is very high in sodium, and that she needs to monitor the sodium with the blood pressure being significantly elevated recently as well as watch the carbohydrates with the diabetes.  I asked her to watch her sugars very  closely after starting the insulin, however she continues to only take the blood sugar first thing in the morning.  Unsure what her blood sugars are doing before dinner or around bedtime.  I have requested that she take her sugar more than once a day and really monitor her diet a little bit closer.  Patient has not heard from nephrology as of yet.  She was referred at last visit.  She was noted to have a worsening in her creatinine.  Discussed with her that hypoglycemia, causes dehydration, which can worsen her kidney function.  Also discussed with her that poorly controlled hypertension also can worsen her kidney function.    If her sugars are remaining elevated, we will plan on starting her on 5 units of glargine, then titrate from there.  Would really like to get her A1c down around 8 if possible.  I think the dietary changes are of the utmost importance.    At a future visit, the patient is willing, I think it would be good for her to be able to have a visit with nutrition/dietitian to have a little bit more education.    Without the patient contributing with improved diet or providing good blood pressure readings and blood sugar readings, it would be difficult to further get her optimized.    Result Review :  The following data was reviewed by: Zachariah Huffman MD on :  CMP          3/20/2023    10:11 5/31/2023    09:33 8/15/2023    08:01   CMP   Glucose 212  307  200    BUN 53  41  47    Creatinine 1.90  1.81  2.13    Sodium 140  137  140    Potassium 4.4  4.4  4.7    Chloride 102  95  105    Calcium 10.5  9.8  9.9    Total Protein 7.6   6.9    Albumin 4.6   4.2    Globulin 3.0   2.7    Total Bilirubin 0.5   0.4    Alkaline Phosphatase 66   58    AST (SGOT) 22   18    ALT (SGPT) 18   13    BUN/Creatinine Ratio 27.9  22.7  22.1      BMP          3/20/2023    10:11 5/31/2023    09:33 8/15/2023    08:01   BMP   BUN 53  41  47    Creatinine 1.90  1.81  2.13    Sodium 140  137  140    Potassium 4.4  4.4  4.7     Chloride 102  95  105    CO2 26.4  24.8  24.1    Calcium 10.5  9.8  9.9      A1C Last 3 Results          12/13/2022    11:16 3/20/2023    10:31 8/15/2023    08:01   HGBA1C Last 3 Results   Hemoglobin A1C 7.8  7.5  8.90         BMI is >= 25 and <30. (Overweight) The following options were offered after discussion;: weight loss educational material (shared in after visit summary) and exercise counseling/recommendations      BMI is >= 25 and <30. (Overweight) The following options were offered after discussion;: weight loss educational material (shared in after visit summary) and exercise counseling/recommendations            Follow Up   Return in about 1 month (around 9/30/2023), or if symptoms worsen or fail to improve, for Recheck.  Patient was given instructions and counseling regarding her condition or for health maintenance advice. Please see specific information pulled into the AVS if appropriate.       OLIVA Huffman MD, FACP, FHM      Electronically signed by Zachariah Huffman MD, 08/31/23, 6:17 PM CDT.

## 2023-09-01 LAB
BUN SERPL-MCNC: 48 MG/DL (ref 8–23)
BUN/CREAT SERPL: 19.2 (ref 7–25)
CALCIUM SERPL-MCNC: 9.7 MG/DL (ref 8.6–10.5)
CHLORIDE SERPL-SCNC: 104 MMOL/L (ref 98–107)
CO2 SERPL-SCNC: 25.2 MMOL/L (ref 22–29)
CREAT SERPL-MCNC: 2.5 MG/DL (ref 0.57–1)
EGFRCR SERPLBLD CKD-EPI 2021: 18.1 ML/MIN/1.73
GLUCOSE SERPL-MCNC: 118 MG/DL (ref 65–99)
POTASSIUM SERPL-SCNC: 5.2 MMOL/L (ref 3.5–5.2)
SODIUM SERPL-SCNC: 141 MMOL/L (ref 136–145)

## 2023-09-28 ENCOUNTER — OFFICE VISIT (OUTPATIENT)
Dept: CARDIOLOGY | Facility: CLINIC | Age: 88
End: 2023-09-28
Payer: MEDICARE

## 2023-09-28 VITALS
HEART RATE: 42 BPM | HEIGHT: 62 IN | OXYGEN SATURATION: 97 % | SYSTOLIC BLOOD PRESSURE: 120 MMHG | BODY MASS INDEX: 24.84 KG/M2 | DIASTOLIC BLOOD PRESSURE: 62 MMHG | WEIGHT: 135 LBS

## 2023-09-28 DIAGNOSIS — I48.19 PERSISTENT ATRIAL FIBRILLATION: Primary | ICD-10-CM

## 2023-09-28 DIAGNOSIS — I10 BENIGN ESSENTIAL HTN: Chronic | ICD-10-CM

## 2023-09-28 DIAGNOSIS — E78.2 MIXED HYPERLIPIDEMIA: ICD-10-CM

## 2023-09-28 PROCEDURE — 99214 OFFICE O/P EST MOD 30 MIN: CPT | Performed by: INTERNAL MEDICINE

## 2023-09-28 PROCEDURE — 93000 ELECTROCARDIOGRAM COMPLETE: CPT | Performed by: INTERNAL MEDICINE

## 2023-09-28 PROCEDURE — 1159F MED LIST DOCD IN RCRD: CPT | Performed by: INTERNAL MEDICINE

## 2023-09-28 PROCEDURE — 1160F RVW MEDS BY RX/DR IN RCRD: CPT | Performed by: INTERNAL MEDICINE

## 2023-09-28 RX ORDER — DULAGLUTIDE 3 MG/.5ML
INJECTION, SOLUTION SUBCUTANEOUS
COMMUNITY
Start: 2023-09-13

## 2023-09-28 NOTE — PROGRESS NOTES
Subjective:     Encounter Date:09/28/2023      Patient ID: Libia Perales is a 88 y.o. female with longstanding persistent atrial fibrillation, hypertension, hyperlipidemia and type 2 diabetes mellitus who presents today for follow-up.    Chief Complaint: Here for follow-up of atrial fibrillation    History of Present Illness    This patient presents today for routine follow-up.  She has a history of longstanding persistent atrial fibrillation.  She notes occasional palpitations but no prolonged or significant symptoms.  She denies having a chest discomfort at this time.  She has mild chronic shortness of breath and dyspnea with exertion but describes the symptoms as stable and unchanged.  No significant lower extremity edema.  Her blood pressure is generally well controlled.  She does remain anticoagulated with Eliquis and denies having any significant bleeding issues.  No side effects from any of her medications at this time.  Overall, she says that she seems to be doing reasonably well.      Current Outpatient Medications:     amLODIPine (NORVASC) 5 MG tablet, TAKE 1 TABLET BY MOUTH DAILY., Disp: 30 tablet, Rfl: 11    benzonatate (TESSALON) 200 MG capsule, Take 1 capsule by mouth 3 (Three) Times a Day As Needed for Cough., Disp: , Rfl:     cloNIDine (Catapres) 0.1 MG tablet, Take 1 tablet by mouth 2 (Two) Times a Day As Needed for High Blood Pressure (For blood pressure > 160/90)., Disp: 30 tablet, Rfl: 1    cyanocobalamin 1000 MCG/ML injection, Inject 1 mL into the appropriate muscle as directed by prescriber Every 30 (Thirty) Days. INJECT 1ML INTO THE MUSCLE WEEKLY FOR 3 WEEKS THEN MONTHLY THEREAFTER, Disp: 6 mL, Rfl: 1    digoxin (LANOXIN) 125 MCG tablet, TAKE 1 TABLET BY MOUTH EVERY OTHER DAY (SUN TUES THU SAT), Disp: 16 tablet, Rfl: 11    docusate sodium (COLACE) 100 MG capsule, Take 1 capsule by mouth 2 (Two) Times a Day., Disp: , Rfl:     Eliquis 2.5 MG tablet tablet, TAKE 1 TABLET BY MOUTH 2 (TWO)  "TIMES A DAY., Disp: 60 tablet, Rfl: 11    fenofibrate (TRICOR) 145 MG tablet, TAKE 1 TABLET BY MOUTH EVERY DAY, Disp: 90 tablet, Rfl: 3    fexofenadine (ALLEGRA) 60 MG tablet, Take 1 tablet by mouth Daily., Disp: , Rfl:     fluticasone (FLONASE) 50 MCG/ACT nasal spray, 2 sprays into the nostril(s) as directed by provider Daily., Disp: 9.9 mL, Rfl: 0    furosemide (LASIX) 20 MG tablet, TAKE 1 TABLET BY MOUTH EVERY DAY AS NEEDED FOR WEIGHT GAIN OR FLUID RETENTION, Disp: 90 tablet, Rfl: 3    glipizide (GLUCOTROL) 10 MG tablet, TAKE 1 TABLET BY MOUTH TWICE DAILY WITH FOOD, Disp: 180 tablet, Rfl: 3    hydrALAZINE (APRESOLINE) 100 MG tablet, TAKE 1 TABLET BY MOUTH 3 (THREE) TIMES A DAY., Disp: 90 tablet, Rfl: 11    hydrOXYzine (ATARAX) 10 MG tablet, Take 1 tablet by mouth Every 6 (Six) Hours As Needed for Itching., Disp: 60 tablet, Rfl: 1    Insulin Glargine (LANTUS SOLOSTAR) 100 UNIT/ML injection pen, Inject 5 Units under the skin into the appropriate area as directed Daily., Disp: 3 mL, Rfl: 2    Insulin Pen Needle (Pen Needles) 31G X 6 MM misc, 1 each Daily., Disp: 100 each, Rfl: 3    Iron, Ferrous Sulfate, 325 (65 Fe) MG tablet, Take 1 each by mouth Daily., Disp: 90 tablet, Rfl: 3    levocetirizine (XYZAL) 5 MG tablet, Take 1 tablet by mouth Every Evening., Disp: , Rfl:     losartan (COZAAR) 100 MG tablet, TAKE 1 TABLET BY MOUTH EVERY DAY, Disp: 90 tablet, Rfl: 11    magnesium oxide (MAGOX) 400 (241.3 Mg) MG tablet tablet, Take 1 tablet by mouth Daily., Disp: , Rfl:     metoprolol succinate XL (TOPROL-XL) 25 MG 24 hr tablet, TAKE 1 TABLET BY MOUTH TWICE DAILY, Disp: 60 tablet, Rfl: 11    omeprazole (priLOSEC) 40 MG capsule, TAKE 1 CAPSULE BY MOUTH EVERY DAY, Disp: 90 capsule, Rfl: 5    simvastatin (ZOCOR) 40 MG tablet, TAKE 1 TABLET BY MOUTH EVERY NIGHT, Disp: 90 tablet, Rfl: 11    spironolactone (Aldactone) 25 MG tablet, Take 1 tablet by mouth Daily., Disp: 30 tablet, Rfl: 2    Syringe 25G X 1\" 3 ML misc, Weekly " injection x 3, then monthly thereafter, Disp: 15 each, Rfl: 0    True Metrix Blood Glucose Test test strip, TEST AS DIRECTED, Disp: 100 each, Rfl: 12    Trulicity 3 MG/0.5ML solution pen-injector, , Disp: , Rfl:     vitamin C (ASCORBIC ACID) 500 MG tablet, Take 1 tablet by mouth Daily., Disp: 90 tablet, Rfl: 3    Allergies   Allergen Reactions    Singulair [Montelukast] Cough    Ace Inhibitors Cough     Social History     Tobacco Use    Smoking status: Never    Smokeless tobacco: Never   Substance Use Topics    Alcohol use: No     Review of Systems   Constitutional: Negative for fever and weight loss.   Cardiovascular:  Negative for chest pain, dyspnea on exertion, leg swelling, orthopnea, palpitations, paroxysmal nocturnal dyspnea and syncope.   Respiratory:  Positive for shortness of breath. Negative for cough, hemoptysis and wheezing.    Hematologic/Lymphatic: Negative for bleeding problem. Does not bruise/bleed easily.   Gastrointestinal:  Negative for abdominal pain, hematemesis, hematochezia, melena, nausea and vomiting.   Genitourinary:  Negative for hematuria.   Neurological:  Negative for dizziness, headaches and loss of balance.       ECG 12 Lead    Date/Time: 9/28/2023 10:38 AM  Performed by: Tone Coello MD  Authorized by: Tone Coello MD   Comparison: compared with previous ECG from 9/26/2022  Similar to previous ECG  Rhythm: atrial fibrillation  Ectopy: unifocal PVCs  Rate: normal  BPM: 65  Conduction: left anterior fascicular block  QRS axis: left  Other findings: non-specific ST-T wave changes, left ventricular hypertrophy and poor R wave progression    Clinical impression: abnormal EKG           Objective:     Vitals reviewed.   Constitutional:       General: Not in acute distress.     Appearance: Well-developed and not in distress.   Eyes:      Extraocular Movements: Extraocular movements intact.   HENT:      Head: Normocephalic and atraumatic.   Neck:      Vascular: No JVD.  "  Pulmonary:      Effort: Pulmonary effort is normal.      Breath sounds: Normal breath sounds. No wheezing. No rhonchi. No rales.   Cardiovascular:      Normal rate. Occasional ectopic beats. Irregularly irregular rhythm.      Murmurs: There is no murmur.      No gallop.    Pulses:     Intact distal pulses.   Edema:     Peripheral edema absent.   Abdominal:      General: Bowel sounds are normal. There is no distension.      Palpations: Abdomen is soft.      Tenderness: There is no abdominal tenderness.   Skin:     General: Skin is warm and dry. There is no cyanosis.      Findings: No erythema or rash.   Neurological:      Mental Status: Alert and oriented to person, place, and time.      Cranial Nerves: No cranial nerve deficit.     /62   Pulse (!) 42   Ht 157.5 cm (62\")   Wt 61.2 kg (135 lb)   LMP  (LMP Unknown)   SpO2 97%   BMI 24.69 kg/m²     Data/Lab Review:     Lab Results   Component Value Date    WBC 5.67 08/15/2023    HGB 10.5 (L) 08/15/2023    HCT 31.7 (L) 08/15/2023    MCV 90.3 08/15/2023     08/15/2023     Lab Results   Component Value Date    GLUCOSE 118 (H) 08/31/2023    CALCIUM 9.7 08/31/2023     08/31/2023    K 5.2 08/31/2023    CO2 25.2 08/31/2023     08/31/2023    BUN 48 (H) 08/31/2023    CREATININE 2.50 (H) 08/31/2023    EGFRRESULT 18.1 (L) 08/31/2023    EGFR 33.6 (L) 03/14/2022    BCR 19.2 08/31/2023    ANIONGAP 13.0 03/14/2022     Lab Results   Component Value Date    CHLPL 166 08/15/2023    TRIG 230 (H) 08/15/2023    HDL 32 (L) 08/15/2023    LDL 95 08/15/2023     Lab Results   Component Value Date    DIGOXIN 3.10 (C) 08/15/2023         Assessment:          Diagnosis Plan   1. Persistent atrial fibrillation  Digoxin Level    ECG 12 Lead      2. Benign essential HTN        3. Mixed hyperlipidemia             Plan:       1.  Persistent atrial fibrillation: Stable with occasional palpitations but no significant symptoms otherwise.  The patient remains chronically " anticoagulated with the appropriate dose of Eliquis.  The patient also remains on digoxin with last level being significantly elevated.  We will repeat a digoxin level at this time.  Given the patient's renal insufficiency, she may need to discontinue this medication long-term if levels remain high.  Otherwise, she does not endorse any symptoms of digoxin toxicity.    [id number] SCORE   [id number] Score: 5 (9/28/2023 10:41 AM)     2.  Essential hypertension: Stable blood pressure at this time.  Continue to monitor.  Continue current medications.     3.  Mixed hyperlipidemia: The patient's most recent lipid panel as noted above.  Given the patient's risk profile, her LDL cholesterol is reasonable.     We will see the patient again in 12 months unless otherwise needed sooner.

## 2023-09-29 ENCOUNTER — OFFICE VISIT (OUTPATIENT)
Dept: INTERNAL MEDICINE | Facility: CLINIC | Age: 88
End: 2023-09-29
Payer: MEDICARE

## 2023-09-29 VITALS
HEART RATE: 56 BPM | RESPIRATION RATE: 18 BRPM | TEMPERATURE: 98.4 F | BODY MASS INDEX: 25.03 KG/M2 | SYSTOLIC BLOOD PRESSURE: 160 MMHG | DIASTOLIC BLOOD PRESSURE: 66 MMHG | WEIGHT: 136 LBS | OXYGEN SATURATION: 99 % | HEIGHT: 62 IN

## 2023-09-29 DIAGNOSIS — N18.4 STAGE 4 CHRONIC KIDNEY DISEASE: ICD-10-CM

## 2023-09-29 DIAGNOSIS — I10 BENIGN ESSENTIAL HTN: Primary | Chronic | ICD-10-CM

## 2023-09-29 DIAGNOSIS — E11.65 TYPE 2 DIABETES MELLITUS WITH HYPERGLYCEMIA, WITHOUT LONG-TERM CURRENT USE OF INSULIN: ICD-10-CM

## 2023-09-29 DIAGNOSIS — Z79.899 ENCOUNTER FOR LONG-TERM (CURRENT) DRUG USE: ICD-10-CM

## 2023-09-29 DIAGNOSIS — I48.19 PERSISTENT ATRIAL FIBRILLATION: ICD-10-CM

## 2023-09-29 RX ORDER — GLIPIZIDE 10 MG/1
10 TABLET, FILM COATED, EXTENDED RELEASE ORAL DAILY
Qty: 30 TABLET | Refills: 2 | Status: SHIPPED | OUTPATIENT
Start: 2023-09-29

## 2023-09-29 RX ORDER — AMLODIPINE BESYLATE 10 MG/1
10 TABLET ORAL DAILY
Qty: 90 TABLET | Refills: 2 | Status: SHIPPED | OUTPATIENT
Start: 2023-09-29

## 2023-09-29 RX ORDER — MELATONIN
1000 DAILY
COMMUNITY

## 2023-09-29 NOTE — PROGRESS NOTES
Chief Complaint  Diabetes (A1C - 8.9  Lab on 08/152023. /Patient has low BG readings in the morning.), Hypertension (3 month follow-up. Patient has home BP log. Patient needs digoxin level drawn.), and Immunizations (Patient wants to discuss RSV, Shingles)    Arian Perales presents to [facility]    HPI  Patient here for the above problems.  See Assessment and Plan for further HPI components.        Review of Systems   Endocrine: Positive for polyuria. Negative for polydipsia.   Neurological:  Positive for tremors. Negative for speech difficulty and headaches.   Psychiatric/Behavioral:  Negative for confusion.    Answers submitted by the patient for this visit:  Office Visit on 9/29/2023  9:45 AM with Zachariah Huffman MD  Primary Reason for Visit (Submitted on 9/22/2023)  What is the primary reason for your visit?: Diabetes  Answers submitted by the patient for this visit:  Primary Reason for Visit (Submitted on 9/22/2023)  What is the primary reason for your visit?: Diabetes  Diabetes Questionnaire (Submitted on 9/22/2023)  Chief Complaint: Diabetes problem  Diabetes type: type 1  MedicAlert ID: No  Disease duration: 15 Years  Symptom course: stable  Home blood tests: 1-2 x per day  Monitoring compliance: adequate  Treatment compliance: some of the time  High score: 110-130  Overall: 110-130  blurred vision: No  foot paresthesias: No  foot ulcerations: No  weight loss: No  blackouts: No  hospitalization: No  nocturnal hypoglycemia: Yes  required assistance: No  required glucagon: No  sweats: No  Dose schedule: at bedtime  Given by: patient  Injection sites: arms  Current diet: diabetic  Meal planning: ADA exchanges  Exercise: three times a week  Eye exam current: Yes  Sees podiatrist: Yes    Objective   Vital Signs:  /66 (BP Location: Left arm, Patient Position: Sitting, Cuff Size: Adult)   Pulse 56   Temp 98.4 °F (36.9 °C) (Infrared)   Resp 18    "Ht 157.5 cm (62\")   Wt 61.7 kg (136 lb)   SpO2 99%   BMI 24.87 kg/m²   Estimated body mass index is 24.87 kg/m² as calculated from the following:    Height as of this encounter: 157.5 cm (62\").    Weight as of this encounter: 61.7 kg (136 lb).      Physical Exam  Vitals reviewed.   Constitutional:       Appearance: She is not ill-appearing.   Eyes:      General: No scleral icterus.     Conjunctiva/sclera: Conjunctivae normal.   Cardiovascular:      Rate and Rhythm: Normal rate and regular rhythm.      Heart sounds: Murmur heard.   Pulmonary:      Effort: Pulmonary effort is normal. No respiratory distress.   Neurological:      General: No focal deficit present.      Mental Status: She is alert and oriented to person, place, and time.   Psychiatric:         Mood and Affect: Mood normal.         Behavior: Behavior normal.                       Assessment and Plan   Diagnoses and all orders for this visit:    1. Benign essential HTN (Primary)  -     Comprehensive metabolic panel  -     amLODIPine (NORVASC) 10 MG tablet; Take 1 tablet by mouth Daily.  Dispense: 90 tablet; Refill: 2    2. Encounter for long-term (current) drug use  -     Digoxin level    3. Type 2 diabetes mellitus with hyperglycemia, without long-term current use of insulin  -     glipizide (GLUCOTROL XL) 10 MG 24 hr tablet; Take 1 tablet by mouth Daily.  Dispense: 30 tablet; Refill: 2    4. Stage 4 chronic kidney disease    5. Persistent atrial fibrillation        Patient's blood sugar and blood pressure log were both reviewed, blood pressures were all noted to be elevated.  Patient's pulse is in the 50s to 60s.  Patient had a trend of low blood sugars in the morning, but was still having hyperglycemia in the evening.  Will stop the evening dose of glipizide and switch to once daily 10 mg glipizide XL.  Continue to monitor sugars.  Monitor for hypoglycemia.      Patients BP not at goal.  Elevated consistently.  Despite her age, goal is still <130/80 " for her especially with her CKD Stage 4.  The patient recently had labs with nephrology.  They are below summarized.  The patients eGFR is too low to benefit from an SGLT2.  Will increase amlodipine.  Monitor for edema.    Patient has persistent atrial fibrillation.  Dose adjusted on her eliquis appropriately based on age and renal function.  She's on digoxin with Dr. Coello.  She was due for digoxin level as was recently elevated.  Will check today.          Result Review :  The following data was reviewed by: Zachariah Huffman MD on 09/29/2023:  CMP          5/31/2023    09:33 8/15/2023    08:01 8/31/2023    09:31   CMP   Glucose 307  200  118    BUN 41  47  48    Creatinine 1.81  2.13  2.50    Sodium 137  140  141    Potassium 4.4  4.7  5.2    Chloride 95  105  104    Calcium 9.8  9.9  9.7    Total Protein  6.9     Albumin  4.2     Globulin  2.7     Total Bilirubin  0.4     Alkaline Phosphatase  58     AST (SGOT)  18     ALT (SGPT)  13     BUN/Creatinine Ratio 22.7  22.1  19.2      BMP          5/31/2023    09:33 8/15/2023    08:01 8/31/2023    09:31   BMP   BUN 41  47  48    Creatinine 1.81  2.13  2.50    Sodium 137  140  141    Potassium 4.4  4.7  5.2    Chloride 95  105  104    CO2 24.8  24.1  25.2    Calcium 9.8  9.9  9.7           Reviewed labs recently collected at nephrology on 9/12/2023, these were reviewed on the day of visit.  Uric acid was 7.8.  IgG, IgA, and IgM were all noted to be normal.  No M spike was noted.  Alpha globulin and beta globulin and gammaglobulin were all noted to be normal.  Patient's urine showed 1+ protein.  Patient's protein creatinine ratio was noted to be 1840.  Patient's CBC, white count was normal, hemoglobin was 10.2, platelets were 243.  Patient had an MCV of 90.  Patient's PTH was noted to be 74.  Vitamin D was low at 16.2.  Patient's albumin creatinine ratio was noted to be 762 in the urine.  Patient's creatinine was 2.07, glucose was 290.  Sodium was 137, potassium  was 4.3, liver enzymes were noted to be normal.  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  Anti-double-stranded DNA was normal.  Scleroderma antibodies were normal.  Leonardo antibodies were normal.  Sjogren's antibodies were normal.  Anti-Jo1 was normal.  Complement was normal.  This was for C3 and C4.  PNC ANCA were normal.  Anti-MPO and anti-OK-3 antibodies are normal.        BMI is within normal parameters. No other follow-up for BMI required.      BMI is within normal parameters. No other follow-up for BMI required.            Follow Up   Return in about 3 months (around 12/29/2023), or if symptoms worsen or fail to improve, for Recheck.  Patient was given instructions and counseling regarding her condition or for health maintenance advice. Please see specific information pulled into the AVS if appropriate.       OLIVA Huffman MD, FACP, FHM      Electronically signed by Zachariah Huffman MD, 09/29/23, 1:11 PM CDT.

## 2023-09-30 LAB
ALBUMIN SERPL-MCNC: 4.5 G/DL (ref 3.7–4.7)
ALBUMIN/GLOB SERPL: 1.5 {RATIO} (ref 1.2–2.2)
ALP SERPL-CCNC: 57 IU/L (ref 44–121)
ALT SERPL-CCNC: 13 IU/L (ref 0–32)
AST SERPL-CCNC: 24 IU/L (ref 0–40)
BILIRUB SERPL-MCNC: 0.4 MG/DL (ref 0–1.2)
BUN SERPL-MCNC: 67 MG/DL (ref 8–27)
BUN/CREAT SERPL: 30 (ref 12–28)
CALCIUM SERPL-MCNC: 9.5 MG/DL (ref 8.7–10.3)
CHLORIDE SERPL-SCNC: 101 MMOL/L (ref 96–106)
CO2 SERPL-SCNC: 17 MMOL/L (ref 20–29)
CREAT SERPL-MCNC: 2.23 MG/DL (ref 0.57–1)
DIGOXIN SERPL-MCNC: 1.1 NG/ML (ref 0.5–0.9)
EGFRCR SERPLBLD CKD-EPI 2021: 21 ML/MIN/1.73
GLOBULIN SER CALC-MCNC: 3.1 G/DL (ref 1.5–4.5)
GLUCOSE SERPL-MCNC: 133 MG/DL (ref 70–99)
POTASSIUM SERPL-SCNC: 5 MMOL/L (ref 3.5–5.2)
PROT SERPL-MCNC: 7.6 G/DL (ref 6–8.5)
SODIUM SERPL-SCNC: 137 MMOL/L (ref 134–144)

## 2023-10-02 ENCOUNTER — TELEPHONE (OUTPATIENT)
Dept: INTERNAL MEDICINE | Facility: CLINIC | Age: 88
End: 2023-10-02

## 2023-10-02 DIAGNOSIS — N18.4 STAGE 4 CHRONIC KIDNEY DISEASE: Primary | ICD-10-CM

## 2023-10-02 DIAGNOSIS — I48.19 PERSISTENT ATRIAL FIBRILLATION: ICD-10-CM

## 2023-10-02 DIAGNOSIS — E87.20 ACIDOSIS: ICD-10-CM

## 2023-10-02 DIAGNOSIS — Z79.899 ENCOUNTER FOR LONG-TERM (CURRENT) DRUG USE: ICD-10-CM

## 2023-10-02 RX ORDER — SODIUM BICARBONATE 650 MG/1
650 TABLET ORAL DAILY
Qty: 14 TABLET | Refills: 0 | Status: SHIPPED | OUTPATIENT
Start: 2023-10-02 | End: 2023-10-09 | Stop reason: SDUPTHER

## 2023-10-02 NOTE — TELEPHONE ENCOUNTER
"  Caller: Libia Perales \"Neli\"    Relationship: Self    Best call back number: [phone]     What is the best time to reach you: ANYTIME    Who are you requesting to speak with (clinical staff, provider,  specific staff member): CLINICAL    Do you know the name of the person who called: NO    What was the call regarding: PATIENT STATED SHE HAD A MISSED CALL FROM THE OFFICE BUT NO MESSAGE.     HUB UNABLE TO WARM TRANSFER        "

## 2023-10-02 NOTE — TELEPHONE ENCOUNTER
Patient was called and notified of lab results by medical assistant, Melba beckman. Patient voiced understanding.

## 2023-10-02 NOTE — PROGRESS NOTES
Yes, have her hold one dose of the digoxin. I have sent in RX for sodium bicarbonate 650 mg/daily. Have her return to office on  Friday for recheck of digoxin and BMP. Thank you.

## 2023-10-09 DIAGNOSIS — N18.4 STAGE 4 CHRONIC KIDNEY DISEASE: ICD-10-CM

## 2023-10-09 DIAGNOSIS — E87.20 ACIDOSIS: ICD-10-CM

## 2023-10-09 RX ORDER — DULAGLUTIDE 3 MG/.5ML
INJECTION, SOLUTION SUBCUTANEOUS
Qty: 2 ML | Refills: 11 | Status: SHIPPED | OUTPATIENT
Start: 2023-10-09

## 2023-10-09 RX ORDER — SODIUM BICARBONATE 650 MG/1
650 TABLET ORAL DAILY
Qty: 90 TABLET | Refills: 2 | Status: SHIPPED | OUTPATIENT
Start: 2023-10-09

## 2023-10-10 ENCOUNTER — TELEPHONE (OUTPATIENT)
Dept: INTERNAL MEDICINE | Facility: CLINIC | Age: 88
End: 2023-10-10

## 2023-10-10 NOTE — TELEPHONE ENCOUNTER
Caller: [facility] [address] - [phone]  - [phone]    Relationship: Pharmacy  SPOKE WITH JORDEN    Kamar call back number: [phone]     What is the best time to reach you: ANYTIME    Who are you requesting to speak with (clinical staff, provider,  specific staff member): CLINICAL      What was the call regarding: REGARDING RSV VACCINE. PHARMACY ONLY HAS ABRYSVO RSV VACCINE. CAN THEY GIVE THIS TO PATIENT?

## 2023-10-10 NOTE — TELEPHONE ENCOUNTER
Caller: [facility] - [place] - [phone]  - [phone] FX    Relationship: Pharmacy  [phone]   Best call back number:      What orders are you requesting (i.e. lab or imaging): RSV VACCINE ORDER     FAX  [phone]    Additional notes: REQUESTING RSV VACCINE ORDER

## 2023-10-11 RX ORDER — RESPIRATORY SYNCYTIAL VIRUS VACCINE 120MCG/0.5
0.5 KIT INTRAMUSCULAR ONCE
Qty: 0.5 ML | Refills: 0 | Status: SHIPPED | OUTPATIENT
Start: 2023-10-11 | End: 2023-10-11

## 2023-11-14 DIAGNOSIS — I10 BENIGN ESSENTIAL HTN: Chronic | ICD-10-CM

## 2023-11-14 RX ORDER — SPIRONOLACTONE 25 MG/1
25 TABLET ORAL DAILY
Qty: 30 TABLET | Refills: 11 | Status: SHIPPED | OUTPATIENT
Start: 2023-11-14

## 2023-11-28 DIAGNOSIS — I10 UNCONTROLLED HYPERTENSION: ICD-10-CM

## 2023-11-28 DIAGNOSIS — E11.65 TYPE 2 DIABETES MELLITUS WITH HYPERGLYCEMIA, WITHOUT LONG-TERM CURRENT USE OF INSULIN: ICD-10-CM

## 2023-11-28 RX ORDER — LOSARTAN POTASSIUM 100 MG/1
TABLET ORAL
Qty: 90 TABLET | Refills: 3 | Status: SHIPPED | OUTPATIENT
Start: 2023-11-28

## 2023-11-28 RX ORDER — SIMVASTATIN 40 MG
40 TABLET ORAL NIGHTLY
Qty: 90 TABLET | Refills: 3 | Status: SHIPPED | OUTPATIENT
Start: 2023-11-28

## 2023-11-28 RX ORDER — GLIPIZIDE 10 MG/1
10 TABLET, FILM COATED, EXTENDED RELEASE ORAL DAILY
Qty: 90 TABLET | Refills: 3 | Status: SHIPPED | OUTPATIENT
Start: 2023-11-28

## 2023-11-28 RX ORDER — CLONIDINE HYDROCHLORIDE 0.1 MG/1
TABLET ORAL
Qty: 30 TABLET | Refills: 11 | Status: SHIPPED | OUTPATIENT
Start: 2023-11-28

## 2023-12-13 ENCOUNTER — TELEPHONE (OUTPATIENT)
Dept: INTERNAL MEDICINE | Facility: CLINIC | Age: 88
End: 2023-12-13

## 2023-12-13 NOTE — TELEPHONE ENCOUNTER
"  Caller: Libia Perales Neli \"Neli\"    Relationship: Self    Best call back number: [phone]     What medication are you requesting: ALTERNATIVE TO LASIX 20 MG     What are your current symptoms: SWELLING IN LEGS AND FEET     How long have you been experiencing symptoms: ONE WEEK     Have you had these symptoms before:    [x] Yes  [] No    Have you been treated for these symptoms before:   [x] Yes  [] No    If a prescription is needed, what is your preferred pharmacy and phone number: [facility] #[id number] - [place], [place] - [address]  - [phone] [facility] [phone]      Additional notes:    PATIENT STATES HER LASIX IS NOT HELPING THE SWELLING IN HER FEET AND LEGS. PATIENT IS WONDERING IF AN ALTERNATIVE MEDICATION COULD BE PRESCRIBED?    PLEASE CALL PATIENT TO FOLLOW-UP ON THIS REQUEST.   "

## 2023-12-13 NOTE — TELEPHONE ENCOUNTER
Called pt this morning and informed she would need to be seen and assessed - appt made for tomorrow as she could not come in today.  She denies any SOB.

## 2023-12-14 ENCOUNTER — OFFICE VISIT (OUTPATIENT)
Dept: INTERNAL MEDICINE | Facility: CLINIC | Age: 88
End: 2023-12-14
Payer: MEDICARE

## 2023-12-14 VITALS
OXYGEN SATURATION: 99 % | SYSTOLIC BLOOD PRESSURE: 158 MMHG | WEIGHT: 140 LBS | TEMPERATURE: 98 F | HEART RATE: 64 BPM | RESPIRATION RATE: 18 BRPM | HEIGHT: 62 IN | DIASTOLIC BLOOD PRESSURE: 68 MMHG | BODY MASS INDEX: 25.76 KG/M2

## 2023-12-14 DIAGNOSIS — R60.0 BILATERAL LOWER EXTREMITY EDEMA: Primary | ICD-10-CM

## 2023-12-14 DIAGNOSIS — R06.02 SHORTNESS OF BREATH: ICD-10-CM

## 2023-12-14 DIAGNOSIS — N18.4 STAGE 4 CHRONIC KIDNEY DISEASE: ICD-10-CM

## 2023-12-14 PROCEDURE — 99214 OFFICE O/P EST MOD 30 MIN: CPT

## 2023-12-14 PROCEDURE — 1159F MED LIST DOCD IN RCRD: CPT

## 2023-12-14 PROCEDURE — 1160F RVW MEDS BY RX/DR IN RCRD: CPT

## 2023-12-14 RX ORDER — BUMETANIDE 1 MG/1
1 TABLET ORAL DAILY
Qty: 30 TABLET | Refills: 0 | Status: SHIPPED | OUTPATIENT
Start: 2023-12-14

## 2023-12-14 NOTE — PROGRESS NOTES
Subjective     Chief Complaint:  Leg swelling, shortness of breath    HPI:  Patient presents today with complaints of bilateral lower extremity swelling.  She called the office on 12/13/2023 stating that her Lasix was not helping.  She takes 20 mg daily and as needed for weight gain.  When she called, she reported that she was not experiencing any shortness of breath but today she feels that her shortness of breath is worse.  She states that this has occurred over the last week.  She thinks that she has gained approximately 5 pounds since then.  She has been taking 2-2.5 tablets daily of Lasix (40-50 mg) but has not noticed any improvement in her swelling or shortness of breath.  She denies orthopnea or chest pain.  She denies any tenderness in her lower extremities.  She has not been wearing compression socks or elevating her feet.      She follows with Dr. Coello for persistent atrial fibrillation and hypertension.  Last echocardiogram was obtained in 2018 and showed EF 56-60 it did note LV diastolic dysfunction along with trace aortic, mitral, and tricuspid valve regurgitation.  She states that she has never been told that she has heart failure and has just taken Lasix for lower extremity swelling.  It is worth noting that she also takes amlodipine 10 mg daily.  She has CKD stage IV and was referred to nephrology in August.  Blood pressure today is 158/68.  Heart rate is 64.    Past Medical History:   Past Medical History:   Diagnosis Date    Atrial fibrillation     Diabetes mellitus     Difficulty walking Approximately 2 years    GERD (gastroesophageal reflux disease)     High cholesterol     Hypertension     OA (osteoarthritis)     Pneumonia     few years ago    Stage 4 chronic kidney disease 8/18/2023     Past Surgical History:  Past Surgical History:   Procedure Laterality Date    BREAST BIOPSY Right     BREAST CYST ASPIRATION      HYSTERECTOMY      OOPHORECTOMY      TONSILLECTOMY          Allergies:  Allergies   Allergen Reactions    Singulair [Montelukast] Cough    Nsaids Other (See Comments)     Due to kidney disease.      Ace Inhibitors Cough     Medications:  Prior to Admission medications    Medication Sig Start Date End Date Taking? Authorizing Provider   amLODIPine (NORVASC) 10 MG tablet Take 1 tablet by mouth Daily. 9/29/23  Yes Zachariah Huffman MD   Cholecalciferol 25 MCG (1000 UT) tablet Take 1 tablet by mouth Daily.   Yes Raheel Underwood MD   cloNIDine (CATAPRES) 0.1 MG tablet TAKE 1 TABLET BY MOUTH TWICE DAILY AS NEEDED FOR HIGH BLOOD PRESSURE GREATER THAN 160/90 11/28/23  Yes Zachariah Huffman MD   cyanocobalamin 1000 MCG/ML injection Inject 1 mL into the appropriate muscle as directed by prescriber Every 30 (Thirty) Days. INJECT 1ML INTO THE MUSCLE WEEKLY FOR 3 WEEKS THEN MONTHLY THEREAFTER 1/16/23  Yes Sharon Corley APRN   digoxin (LANOXIN) 125 MCG tablet TAKE 1 TABLET BY MOUTH EVERY OTHER DAY (SUN TUES THU SAT) 8/28/23  Yes Zachariah Huffman MD   docusate sodium (COLACE) 100 MG capsule Take 1 capsule by mouth 2 (Two) Times a Day.   Yes Raheel Underwood MD   Eliquis 2.5 MG tablet tablet TAKE 1 TABLET BY MOUTH 2 (TWO) TIMES A DAY. 8/28/23  Yes Tone Coello MD   fenofibrate (TRICOR) 145 MG tablet TAKE 1 TABLET BY MOUTH EVERY DAY 5/31/23  Yes Zachariah Huffman MD   furosemide (LASIX) 20 MG tablet TAKE 1 TABLET BY MOUTH EVERY DAY AS NEEDED FOR WEIGHT GAIN OR FLUID RETENTION 5/31/23  Yes Zachariah Huffman MD   glipizide (GLUCOTROL XL) 10 MG 24 hr tablet TAKE 1 TABLET BY MOUTH DAILY. 11/28/23  Yes Zachariah Huffman MD   hydrALAZINE (APRESOLINE) 100 MG tablet TAKE 1 TABLET BY MOUTH 3 (THREE) TIMES A DAY. 8/28/23  Yes Zachariah Huffman MD   hydrOXYzine (ATARAX) 10 MG tablet Take 1 tablet by mouth Every 6 (Six) Hours As Needed for Itching. 6/15/22  Yes Allyson Do   Insulin Pen Needle (Pen Needles) 31G X 6 MM misc 1 each Daily. 8/17/23   "Yes Zachariah Huffman MD   losartan (COZAAR) 100 MG tablet TAKE 1 TABLET BY MOUTH EVERY DAY 11/28/23  Yes Zachariah Huffman MD   metoprolol succinate XL (TOPROL-XL) 25 MG 24 hr tablet TAKE 1 TABLET BY MOUTH TWICE DAILY 8/28/23  Yes Zachariah Huffman MD   omeprazole (priLOSEC) 40 MG capsule TAKE 1 CAPSULE BY MOUTH EVERY DAY 2/1/23  Yes Redd Dumont APRN   simvastatin (ZOCOR) 40 MG tablet TAKE 1 TABLET BY MOUTH EVERY NIGHT 11/28/23  Yes Zachariah Huffman MD   sodium bicarbonate 650 MG tablet Take 1 tablet by mouth Daily. 10/9/23  Yes Albina Padron APRN   spironolactone (ALDACTONE) 25 MG tablet TAKE 1 TABLET BY MOUTH DAILY. 11/14/23  Yes VIDA Ellsworth,    Syringe 25G X 1\" 3 ML misc Weekly injection x 3, then monthly thereafter 12/20/21  Yes Susanna Monique APRN   True Metrix Blood Glucose Test test strip TEST AS DIRECTED 3/22/22  Yes Allyson Do   Trulicity 3 MG/0.5ML solution pen-injector INJECT 3MG UNDER THE SKIN EVERY 7 DAYS 10/9/23  Yes Zachariah Huffman MD   fluticasone (FLONASE) 50 MCG/ACT nasal spray 2 sprays into the nostril(s) as directed by provider Daily. 1/19/23   Sharon Corley APRN   Iron, Ferrous Sulfate, 325 (65 Fe) MG tablet Take 1 each by mouth Daily. 5/17/22   Allyson Do   magnesium oxide (MAGOX) 400 (241.3 Mg) MG tablet tablet Take 1 tablet by mouth Daily.    Provider, MD Raheel   vitamin C (ASCORBIC ACID) 500 MG tablet Take 1 tablet by mouth Daily. 5/17/22   Allyson Do       Objective     Vital Signs: /68 (BP Location: Left arm, Patient Position: Sitting, Cuff Size: Adult)   Pulse 64   Temp 98 °F (36.7 °C) (Infrared)   Resp 18   Ht 157.5 cm (62\")   Wt 63.5 kg (140 lb)   LMP  (LMP Unknown)   SpO2 99%   BMI 25.61 kg/m²   Physical Exam  Vitals and nursing note reviewed.   Constitutional:       General: She is not in acute distress.     Appearance: Normal appearance.   Cardiovascular:      Rate and Rhythm: Normal " rate and regular rhythm.      Pulses: Normal pulses.      Heart sounds: Normal heart sounds. No murmur heard.  Pulmonary:      Effort: Pulmonary effort is normal. No respiratory distress.      Breath sounds: Normal breath sounds. No wheezing.   Abdominal:      General: Bowel sounds are normal. There is no distension.      Palpations: Abdomen is soft.      Tenderness: There is no abdominal tenderness.   Musculoskeletal:         General: No tenderness. Normal range of motion.      Right lower leg: 3+ Pitting Edema present.      Left lower leg: 3+ Pitting Edema present.   Skin:     General: Skin is warm and dry.      Capillary Refill: Capillary refill takes less than 2 seconds.      Findings: No bruising, erythema or rash.   Neurological:      Mental Status: She is alert and oriented to person, place, and time. Mental status is at baseline.      Motor: No weakness.       Results Reviewed:  Reviewed renal function on BMP obtained on 10/6/2023.  Reviewed echocardiogram obtained on 7/5/2018.    Assessment / Plan     Assessment/Plan:  Diagnoses and all orders for this visit:    1. Bilateral lower extremity edema (Primary)  -     bumetanide (Bumex) 1 MG tablet; Take 1 tablet by mouth Daily.  Dispense: 30 tablet; Refill: 0  -     Basic metabolic panel  -     Adult Transthoracic Echo Complete W/ Cont if Necessary Per Protocol; Future  -     proBNP    2. Shortness of breath  -     Adult Transthoracic Echo Complete W/ Cont if Necessary Per Protocol; Future  -     proBNP    3. Stage 4 chronic kidney disease  -     Basic metabolic panel       Breath sounds are clear on exam but the patient does have 3+ pitting edema in bilateral lower extremities.  Since the patient has been taking 40-50 mg of Lasix over the past week, will check BMP today to assess renal function.  Will plan to transition to Bumex 1 mg daily.  She has been instructed to stop taking Lasix.  Will also check BNP.  Echocardiogram has been ordered as I have concern  for possible worsening LV dysfunction or regurgitation.  May consider decreasing amlodipine dose but will wait for lab results before making any additional changes to her medications.  She has been advised to begin wearing compression socks and keep her feet elevated above her heart.  She will continue monitoring her weight and reducing sodium intake.  Will call patient tomorrow when lab results are available.  She already has a follow-up appointment scheduled with Dr. Huffman on 12/20.  I have advised her to keep this appointment.  She was instructed to call the clinic or go to the emergency department if she develops worsening shortness of breath or chest pain.    Return for Next scheduled follow up. unless patient needs to be seen sooner or acute issues arise.    I have discussed the patient results/orders and and plan/recommendation with them at today's visit.      Maribel Sy, APRN   12/14/2023

## 2023-12-15 LAB
BUN SERPL-MCNC: 51 MG/DL (ref 8–27)
BUN/CREAT SERPL: 24 (ref 12–28)
CALCIUM SERPL-MCNC: 9.7 MG/DL (ref 8.7–10.3)
CHLORIDE SERPL-SCNC: 99 MMOL/L (ref 96–106)
CO2 SERPL-SCNC: 21 MMOL/L (ref 20–29)
CREAT SERPL-MCNC: 2.11 MG/DL (ref 0.57–1)
EGFRCR SERPLBLD CKD-EPI 2021: 22 ML/MIN/1.73
GLUCOSE SERPL-MCNC: 112 MG/DL (ref 70–99)
NT-PROBNP SERPL-MCNC: 8229 PG/ML (ref 0–738)
POTASSIUM SERPL-SCNC: 4.3 MMOL/L (ref 3.5–5.2)
SODIUM SERPL-SCNC: 139 MMOL/L (ref 134–144)

## 2023-12-20 ENCOUNTER — OFFICE VISIT (OUTPATIENT)
Dept: INTERNAL MEDICINE | Facility: CLINIC | Age: 88
End: 2023-12-20
Payer: MEDICARE

## 2023-12-20 VITALS
WEIGHT: 136 LBS | OXYGEN SATURATION: 99 % | BODY MASS INDEX: 25.03 KG/M2 | DIASTOLIC BLOOD PRESSURE: 70 MMHG | SYSTOLIC BLOOD PRESSURE: 154 MMHG | HEIGHT: 62 IN | TEMPERATURE: 97.8 F | HEART RATE: 64 BPM

## 2023-12-20 DIAGNOSIS — N18.4 STAGE 4 CHRONIC KIDNEY DISEASE: ICD-10-CM

## 2023-12-20 DIAGNOSIS — E11.65 TYPE 2 DIABETES MELLITUS WITH HYPERGLYCEMIA, WITHOUT LONG-TERM CURRENT USE OF INSULIN: Primary | ICD-10-CM

## 2023-12-20 DIAGNOSIS — S29.012A STRAIN OF LEFT RHOMBOID MUSCLE: ICD-10-CM

## 2023-12-20 DIAGNOSIS — I10 BENIGN ESSENTIAL HTN: Chronic | ICD-10-CM

## 2023-12-20 LAB — HBA1C MFR BLD: 5.4 % (ref 4.5–5.7)

## 2023-12-20 RX ORDER — GLIPIZIDE 5 MG/1
5 TABLET, FILM COATED, EXTENDED RELEASE ORAL DAILY
Qty: 90 TABLET | Refills: 2 | Status: SHIPPED | OUTPATIENT
Start: 2023-12-20

## 2023-12-29 ENCOUNTER — HOSPITAL ENCOUNTER (OUTPATIENT)
Dept: GENERAL RADIOLOGY | Facility: HOSPITAL | Age: 88
Discharge: HOME | End: 2023-12-29
Payer: MEDICARE

## 2023-12-29 ENCOUNTER — TELEPHONE (OUTPATIENT)
Dept: INTERNAL MEDICINE | Facility: CLINIC | Age: 88
End: 2023-12-29

## 2023-12-29 ENCOUNTER — LAB (OUTPATIENT)
Dept: LAB | Facility: HOSPITAL | Age: 88
End: 2023-12-29
Payer: MEDICARE

## 2023-12-29 DIAGNOSIS — R06.01 ORTHOPNEA: ICD-10-CM

## 2023-12-29 DIAGNOSIS — R06.01 ORTHOPNEA: Primary | ICD-10-CM

## 2023-12-29 DIAGNOSIS — R60.0 BILATERAL LOWER EXTREMITY EDEMA: Primary | ICD-10-CM

## 2023-12-29 LAB
ANION GAP SERPL CALCULATED.3IONS-SCNC: 11 MMOL/L (ref 4–13)
BUN SERPL-MCNC: 41 MG/DL (ref 5–21)
BUN/CREAT SERPL: 20.6
CALCIUM SPEC-SCNC: 9.9 MG/DL (ref 8.4–10.4)
CHLORIDE SERPL-SCNC: 106 MMOL/L (ref 98–110)
CO2 SERPL-SCNC: 24 MMOL/L (ref 24–31)
CREAT SERPL-MCNC: 1.99 MG/DL (ref 0.5–1.4)
EGFRCR SERPLBLD CKD-EPI 2021: 23.6 ML/MIN/1.73
GLUCOSE SERPL-MCNC: 162 MG/DL (ref 70–100)
POTASSIUM SERPL-SCNC: 4.1 MMOL/L (ref 3.5–5.3)
SODIUM SERPL-SCNC: 141 MMOL/L (ref 135–145)

## 2023-12-29 PROCEDURE — 80048 BASIC METABOLIC PNL TOTAL CA: CPT

## 2023-12-29 PROCEDURE — 36415 COLL VENOUS BLD VENIPUNCTURE: CPT

## 2023-12-29 PROCEDURE — 71046 X-RAY EXAM CHEST 2 VIEWS: CPT

## 2023-12-29 RX ORDER — BUMETANIDE 1 MG/1
1 TABLET ORAL 2 TIMES DAILY
Qty: 60 TABLET | Refills: 0 | Status: SHIPPED | OUTPATIENT
Start: 2023-12-29 | End: 2024-01-02 | Stop reason: SDUPTHER

## 2023-12-29 NOTE — TELEPHONE ENCOUNTER
"  Caller: Libia Perales Neli \"Neli\"    Relationship: Self    Best call back number: [phone]     What medication are you requesting: SOMETHING FOR SWELLING IN BOTH LEGS    What are your current symptoms: SWOLLEN LEGS    How long have you been experiencing symptoms: 2 DAYS    Have you had these symptoms before:    [x] Yes  [] No    Have you been treated for these symptoms before:   [x] Yes  [] No    If a prescription is needed, what is your preferred pharmacy and phone number: [id number] - [place] - [address]  [address]  - [phone] [facility] [phone]      Additional notes: CALLER STATED THAT HER MEDICATION FOR FLUID RETENTION WAS CHANGED AND IT IS NOT WORKING. CALLER ASKED IF SOMETHING ELSE CAN BE CALLED IN? PLEASE CALL PATIENT WHEN THIS HAS BEEN SENT.        "

## 2023-12-29 NOTE — TELEPHONE ENCOUNTER
Caller: Marla Perales    Relationship: Emergency Contact    Best call back number: [phone]     Do you know the name of the person who called: SONMARLA    What was the call regarding: PATIENTS SON IS CALLING FOR RESULTS OF X-RAYS DONE IN [facility] [place] TODAY 12.29.23 AT 11:45 AM. THEY ARE ALSO CONCERN SWELLING IN HER FEET.    Is it okay if the provider responds through MyChart: YES

## 2024-01-02 ENCOUNTER — OFFICE VISIT (OUTPATIENT)
Dept: INTERNAL MEDICINE | Facility: CLINIC | Age: 89
End: 2024-01-02
Payer: MEDICARE

## 2024-01-02 VITALS
HEIGHT: 62 IN | HEART RATE: 77 BPM | SYSTOLIC BLOOD PRESSURE: 122 MMHG | DIASTOLIC BLOOD PRESSURE: 70 MMHG | RESPIRATION RATE: 20 BRPM | OXYGEN SATURATION: 100 % | BODY MASS INDEX: 26.54 KG/M2 | TEMPERATURE: 97.4 F | WEIGHT: 144.2 LBS

## 2024-01-02 DIAGNOSIS — I50.32 CHRONIC DIASTOLIC (CONGESTIVE) HEART FAILURE: Primary | ICD-10-CM

## 2024-01-02 DIAGNOSIS — R60.0 BILATERAL LOWER EXTREMITY EDEMA: ICD-10-CM

## 2024-01-02 DIAGNOSIS — Z79.01 CHRONIC ANTICOAGULATION: ICD-10-CM

## 2024-01-02 DIAGNOSIS — E11.65 TYPE 2 DIABETES MELLITUS WITH HYPERGLYCEMIA, WITHOUT LONG-TERM CURRENT USE OF INSULIN: ICD-10-CM

## 2024-01-02 DIAGNOSIS — N18.4 STAGE 4 CHRONIC KIDNEY DISEASE: ICD-10-CM

## 2024-01-02 DIAGNOSIS — I48.19 PERSISTENT ATRIAL FIBRILLATION: ICD-10-CM

## 2024-01-02 DIAGNOSIS — I10 BENIGN ESSENTIAL HTN: Chronic | ICD-10-CM

## 2024-01-02 DIAGNOSIS — S29.012A STRAIN OF LEFT RHOMBOID MUSCLE: ICD-10-CM

## 2024-01-02 RX ORDER — BUMETANIDE 1 MG/1
1 TABLET ORAL 2 TIMES DAILY
Qty: 60 TABLET | Refills: 1 | Status: SHIPPED | OUTPATIENT
Start: 2024-01-02

## 2024-01-02 NOTE — PROGRESS NOTES
"    Chief Complaint  Follow-up (Edema, legs swelling, memory issue, left shoulder still hurting, patches not helping)    Subjective        Libia Perales presents to [facility]  History of Present Illness  See below.     Objective   Vital Signs:  /70 (BP Location: Left arm, Patient Position: Sitting, Cuff Size: Adult)   Pulse 77   Temp 97.4 °F (36.3 °C)   Resp 20   Ht 157.5 cm (62.01\")   Wt 65.4 kg (144 lb 3.2 oz)   SpO2 100%   BMI 26.37 kg/m²   Estimated body mass index is 26.37 kg/m² as calculated from the following:    Height as of this encounter: 157.5 cm (62.01\").    Weight as of this encounter: 65.4 kg (144 lb 3.2 oz).       Physical Exam  Constitutional:       Comments: Seen and discussed with her daughter-in-law.   HENT:      Head: Normocephalic and atraumatic.   Eyes:      Conjunctiva/sclera: Conjunctivae normal.      Pupils: Pupils are equal, round, and reactive to light.   Cardiovascular:      Rate and Rhythm: Normal rate and regular rhythm.      Heart sounds: Normal heart sounds.   Pulmonary:      Effort: Pulmonary effort is normal. No respiratory distress.      Breath sounds: Normal breath sounds.   Musculoskeletal:         General: Swelling present.      Cervical back: Neck supple.      Comments: Left shoulder discomfort and decreased range of motion.   Skin:     General: Skin is warm and dry.      Findings: No rash.   Neurological:      General: No focal deficit present.      Mental Status: She is alert and oriented to person, place, and time.   Psychiatric:         Mood and Affect: Mood normal.         Behavior: Behavior normal.         Thought Content: Thought content normal.         Judgment: Judgment normal.        Result Review :  PA and lateral chest x-ray on 12/29 showed no acute process in the chest.  Heart appeared mildly enlarged although the central vasculature are nondilated no evidence of decompensated congestive failure/pulmonary " edema.    BMP from 12/29 showed a creatinine of 1.99.  Her creatinine was 2.11 on 12/14.  Her baseline serum creatinine appears to be on the order of 2.2.  She has longstanding CKD, stage IV.    BNP on 12/14 was 8229.    She had an echocardiogram ordered on 12/14 that has not been accomplished.  Her last echocardiogram was in June 2018 and showed an EF of 56-60% and left ventricular diastolic dysfunction at that time.         Assessment and Plan   Diagnoses and all orders for this visit:    1. Chronic diastolic (congestive) heart failure (Primary)    2. Bilateral lower extremity edema  -     Basic metabolic panel; Future  -     bumetanide (Bumex) 1 MG tablet; Take 1 tablet by mouth 2 (Two) Times a Day.  Dispense: 60 tablet; Refill: 1    3. Stage 4 chronic kidney disease  -     Basic metabolic panel; Future    4. Benign essential HTN    5. Persistent atrial fibrillation    6. Chronic anticoagulation    7. Type 2 diabetes mellitus with hyperglycemia, without long-term current use of insulin    8. Strain of left rhomboid muscle    Presents today for follow-up of increased lower extremity edema recently.    Seen in the office on 12/14 by YOBANY Santos.  She was complaining of worsening lower extremity edema.  She was transitioned from Lasix to Bumex.      She then saw Dr. Huffman on 12/20.  Main issues on that visit were elevated blood pressure and left shoulder pain.    I was asked about her on 12/29.  I sent her for a chest x-ray that did not show pulmonary edema.  I obtained a BMP that did not show deterioration of renal function.  I recommended that she take Bumex twice daily over the weekend and then be reevaluated today.    She states that the edema has only minimally improved.  She has been trying to wear compression stockings, but they have been painful.  She does admit that she has been having increased salt intake through the holidays.  She has been eating ham, chiu, sausage, as well as other snacks  including Chex mix.  Counseled she and her daughter-in-law that her increased sodium intake is likely also quite the culprit of her increasing problem.  She is scheduled to have her echocardiogram next week.  Feel she does have some degree of chronic diastolic heart failure.  Will allow her to continue to use Bumex twice daily for now, but will recheck a BMP in the office this Friday.  She also sees Dr. Werner next week.    Blood pressure 122/70 today.  She is on multiple antihypertensives including losartan and spironolactone.  She also takes Toprol-XL.  Also on hydralazine. Continue the same for now.    Recent adjustments made to diabetic medications by Dr. Huffman.  His note reviewed.    Still complains of pain in her left shoulder.  Lidocaine patches recently prescribed to help.  Unable to take nonsteroidals related to her CKD.  She has not been taking any Tylenol.  Recommended that she take 1 g 4 times daily for the next several days and then use on an as-needed basis.    Her next appointment with Dr. Huffman is in March, but anticipate that she will likely need to be seen in follow-up prior to that.    I will update the patient and her family on her renal function on Friday and make further recommendations regarding her diuretics at that time.           Follow Up   Return for Next scheduled follow up.  Patient was given instructions and counseling regarding her condition or for health maintenance advice. Please see specific information pulled into the AVS if appropriate.      OLIVA Ellsworth DO       Electronically signed by VIDA Ellsworth DO, 01/02/24, 9:48 AM CST.

## 2024-01-09 ENCOUNTER — HOSPITAL ENCOUNTER (OUTPATIENT)
Dept: CARDIOLOGY | Facility: HOSPITAL | Age: 89
Discharge: HOME | End: 2024-01-09
Payer: MEDICARE

## 2024-01-09 VITALS
BODY MASS INDEX: 26.5 KG/M2 | HEIGHT: 62 IN | DIASTOLIC BLOOD PRESSURE: 70 MMHG | SYSTOLIC BLOOD PRESSURE: 122 MMHG | WEIGHT: 144 LBS

## 2024-01-09 DIAGNOSIS — R06.02 SHORTNESS OF BREATH: ICD-10-CM

## 2024-01-09 DIAGNOSIS — R60.0 BILATERAL LOWER EXTREMITY EDEMA: ICD-10-CM

## 2024-01-09 LAB
BH CV ECHO MEAS - AO MAX PG: 13.7 MMHG
BH CV ECHO MEAS - AO MEAN PG: 6 MMHG
BH CV ECHO MEAS - AO ROOT DIAM: 2.6 CM
BH CV ECHO MEAS - AO V2 MAX: 185 CM/SEC
BH CV ECHO MEAS - AO V2 VTI: 36.2 CM
BH CV ECHO MEAS - AVA(I,D): 1.6 CM2
BH CV ECHO MEAS - EDV(CUBED): 80.1 ML
BH CV ECHO MEAS - EDV(MOD-SP2): 67.9 ML
BH CV ECHO MEAS - EDV(MOD-SP4): 60.2 ML
BH CV ECHO MEAS - EF(MOD-BP): 66.1 %
BH CV ECHO MEAS - EF(MOD-SP2): 70.1 %
BH CV ECHO MEAS - EF(MOD-SP4): 61.3 %
BH CV ECHO MEAS - ESV(CUBED): 23.6 ML
BH CV ECHO MEAS - ESV(MOD-SP2): 20.3 ML
BH CV ECHO MEAS - ESV(MOD-SP4): 23.3 ML
BH CV ECHO MEAS - FS: 33.4 %
BH CV ECHO MEAS - IVS/LVPW: 0.91 CM
BH CV ECHO MEAS - IVSD: 1.06 CM
BH CV ECHO MEAS - LA DIMENSION: 4 CM
BH CV ECHO MEAS - LAT PEAK E' VEL: 12.5 CM/SEC
BH CV ECHO MEAS - LV DIASTOLIC VOL/BSA (35-75): 36.2 CM2
BH CV ECHO MEAS - LV MASS(C)D: 166.7 GRAMS
BH CV ECHO MEAS - LV MAX PG: 4.1 MMHG
BH CV ECHO MEAS - LV MEAN PG: 2 MMHG
BH CV ECHO MEAS - LV SYSTOLIC VOL/BSA (12-30): 14 CM2
BH CV ECHO MEAS - LV V1 MAX: 100.9 CM/SEC
BH CV ECHO MEAS - LV V1 VTI: 20.4 CM
BH CV ECHO MEAS - LVIDD: 4.3 CM
BH CV ECHO MEAS - LVIDS: 2.9 CM
BH CV ECHO MEAS - LVOT AREA: 2.8 CM2
BH CV ECHO MEAS - LVOT DIAM: 1.9 CM
BH CV ECHO MEAS - LVPWD: 1.17 CM
BH CV ECHO MEAS - MED PEAK E' VEL: 7.3 CM/SEC
BH CV ECHO MEAS - MV A MAX VEL: 47 CM/SEC
BH CV ECHO MEAS - MV DEC TIME: 0.14 SEC
BH CV ECHO MEAS - MV E MAX VEL: 136 CM/SEC
BH CV ECHO MEAS - MV E/A: 2.9
BH CV ECHO MEAS - PA V2 MAX: 111 CM/SEC
BH CV ECHO MEAS - RAP SYSTOLE: 15 MMHG
BH CV ECHO MEAS - RVSP: 63.7 MMHG
BH CV ECHO MEAS - SI(MOD-SP2): 28.6 ML/M2
BH CV ECHO MEAS - SI(MOD-SP4): 22.2 ML/M2
BH CV ECHO MEAS - SV(LVOT): 57.8 ML
BH CV ECHO MEAS - SV(MOD-SP2): 47.6 ML
BH CV ECHO MEAS - SV(MOD-SP4): 36.9 ML
BH CV ECHO MEAS - TAPSE (>1.6): 1.07 CM
BH CV ECHO MEAS - TR MAX PG: 48.7 MMHG
BH CV ECHO MEAS - TR MAX VEL: 349 CM/SEC
BH CV ECHO MEASUREMENTS AVERAGE E/E' RATIO: 13.74
BH CV XLRA - RV BASE: 4.6 CM
BH CV XLRA - RV LENGTH: 8.4 CM
BH CV XLRA - RV MID: 4 CM
BH CV XLRA - TDI S': 9.5 CM/SEC
LEFT ATRIUM VOLUME INDEX: 42.1 ML/M2

## 2024-01-09 PROCEDURE — 93306 TTE W/DOPPLER COMPLETE: CPT

## 2024-01-12 ENCOUNTER — TELEPHONE (OUTPATIENT)
Dept: INTERNAL MEDICINE | Facility: CLINIC | Age: 89
End: 2024-01-12
Payer: MEDICARE

## 2024-01-12 NOTE — TELEPHONE ENCOUNTER
Called patient to further discuss her echocardiogram.  Confirmed with cardiology that there is no additional testing or medication adjustments needed.  Plan to continue diuresis for now with Bumex.  We discussed maintaining a low-sodium diet and restricting fluid intake.  Also discussed that she would benefit from wearing compression socks.  Stressed the importance of daily weights.  Could consider adding some flex dosing with Bumex if needed.

## 2024-01-23 ENCOUNTER — TELEPHONE (OUTPATIENT)
Dept: INTERNAL MEDICINE | Facility: CLINIC | Age: 89
End: 2024-01-23

## 2024-01-23 NOTE — TELEPHONE ENCOUNTER
Hub staff attempted to follow warm transfer process and was unsuccessful     Caller: [facility] - [place] - [address] - [phone]  - [phone] FX    Relationship to patient: Pharmacy    Best call back number: [phone]     Patient is needing: TO SPEAK WITH CLINICAL STAFF ABOUT IMMUNIZATION RECORDS. PATIENT NEEDED SOME VACCINES BUT DOESN'T REMEMBER WHICH ONES

## 2024-01-30 ENCOUNTER — TELEPHONE (OUTPATIENT)
Dept: PODIATRY | Facility: CLINIC | Age: 89
End: 2024-01-30
Payer: MEDICARE

## 2024-01-30 NOTE — TELEPHONE ENCOUNTER
Called patient to reschedule appointment from 11/2023. Patient phone number has been changed could not leave VM.

## 2024-03-04 DIAGNOSIS — D51.0 PERNICIOUS ANEMIA: ICD-10-CM

## 2024-03-04 DIAGNOSIS — K21.9 GERD WITHOUT ESOPHAGITIS: ICD-10-CM

## 2024-03-04 RX ORDER — CYANOCOBALAMIN 1000 UG/ML
1000 INJECTION, SOLUTION INTRAMUSCULAR; SUBCUTANEOUS
Qty: 1 ML | Refills: 11 | Status: SHIPPED | OUTPATIENT
Start: 2024-03-04

## 2024-03-04 RX ORDER — OMEPRAZOLE 40 MG/1
CAPSULE, DELAYED RELEASE ORAL
Qty: 90 CAPSULE | Refills: 11 | OUTPATIENT
Start: 2024-03-04

## 2024-03-04 NOTE — TELEPHONE ENCOUNTER
Pharmacy sent a request for refills on Omeprazole. Refill request routed to [facility].   Rx Refill Note  Requested Prescriptions     Pending Prescriptions Disp Refills    omeprazole (priLOSEC) 40 MG capsule [Pharmacy Med Name: OMEPRAZOLE 40MG CAPSULE DR] 90 capsule 11     Sig: TAKE 1 CAPSULE BY MOUTH EVERY DAY      Last office visit with prescribing clinician: 3/17/2022   Last telemedicine visit with prescribing clinician: Visit date not found   Next office visit with prescribing clinician: Visit date not found                         Would you like a call back once the refill request has been completed: [] Yes [] No    If the office needs to give you a call back, can they leave a voicemail: [] Yes [] No    Maxim Mcgraw, NICOLE  03/04/24, 13:58 CST

## 2024-03-05 DIAGNOSIS — K21.9 GERD WITHOUT ESOPHAGITIS: ICD-10-CM

## 2024-03-05 RX ORDER — OMEPRAZOLE 40 MG/1
CAPSULE, DELAYED RELEASE ORAL
Qty: 90 CAPSULE | Refills: 11 | Status: SHIPPED | OUTPATIENT
Start: 2024-03-05

## 2024-03-13 ENCOUNTER — HOSPITAL ENCOUNTER (OUTPATIENT)
Facility: HOSPITAL | Age: 89
Setting detail: OBSERVATION
Discharge: HOME | End: 2024-03-15
Attending: FAMILY MEDICINE | Admitting: FAMILY MEDICINE
Payer: MEDICARE

## 2024-03-13 ENCOUNTER — APPOINTMENT (OUTPATIENT)
Dept: GENERAL RADIOLOGY | Facility: HOSPITAL | Age: 89
End: 2024-03-13
Payer: MEDICARE

## 2024-03-13 DIAGNOSIS — I48.19 PERSISTENT ATRIAL FIBRILLATION: ICD-10-CM

## 2024-03-13 DIAGNOSIS — I50.33 ACUTE ON CHRONIC DIASTOLIC (CONGESTIVE) HEART FAILURE: Primary | ICD-10-CM

## 2024-03-13 PROBLEM — I27.20 SEVERE PULMONARY HYPERTENSION: Status: ACTIVE | Noted: 2024-03-13

## 2024-03-13 LAB
ALBUMIN SERPL-MCNC: 4 G/DL (ref 3.5–5.2)
ALBUMIN/GLOB SERPL: 1.2 G/DL
ALP SERPL-CCNC: 74 U/L (ref 39–117)
ALT SERPL W P-5'-P-CCNC: 18 U/L (ref 1–33)
ANION GAP SERPL CALCULATED.3IONS-SCNC: 14 MMOL/L (ref 5–15)
APTT PPP: 60.7 SECONDS (ref 24.5–36)
AST SERPL-CCNC: 34 U/L (ref 1–32)
BASOPHILS # BLD AUTO: 0.01 10*3/MM3 (ref 0–0.2)
BASOPHILS NFR BLD AUTO: 0.2 % (ref 0–1.5)
BILIRUB SERPL-MCNC: 0.6 MG/DL (ref 0–1.2)
BILIRUB UR QL STRIP: NEGATIVE
BUN SERPL-MCNC: 73 MG/DL (ref 8–23)
BUN/CREAT SERPL: 31.3 (ref 7–25)
CALCIUM SPEC-SCNC: 9.6 MG/DL (ref 8.6–10.5)
CHLORIDE SERPL-SCNC: 105 MMOL/L (ref 98–107)
CLARITY UR: CLEAR
CO2 SERPL-SCNC: 22 MMOL/L (ref 22–29)
COLOR UR: YELLOW
CREAT SERPL-MCNC: 2.33 MG/DL (ref 0.57–1)
DEPRECATED RDW RBC AUTO: 50.9 FL (ref 37–54)
EGFRCR SERPLBLD CKD-EPI 2021: 19.6 ML/MIN/1.73
EOSINOPHIL # BLD AUTO: 0.05 10*3/MM3 (ref 0–0.4)
EOSINOPHIL NFR BLD AUTO: 1 % (ref 0.3–6.2)
ERYTHROCYTE [DISTWIDTH] IN BLOOD BY AUTOMATED COUNT: 14.8 % (ref 12.3–15.4)
GLOBULIN UR ELPH-MCNC: 3.4 GM/DL
GLUCOSE SERPL-MCNC: 215 MG/DL (ref 65–99)
GLUCOSE UR STRIP-MCNC: NEGATIVE MG/DL
HCT VFR BLD AUTO: 29.5 % (ref 34–46.6)
HGB BLD-MCNC: 9.4 G/DL (ref 12–15.9)
HGB UR QL STRIP.AUTO: NEGATIVE
IMM GRANULOCYTES # BLD AUTO: 0.02 10*3/MM3 (ref 0–0.05)
IMM GRANULOCYTES NFR BLD AUTO: 0.4 % (ref 0–0.5)
INR PPP: 1.53 (ref 0.91–1.09)
KETONES UR QL STRIP: NEGATIVE
LEUKOCYTE ESTERASE UR QL STRIP.AUTO: NEGATIVE
LYMPHOCYTES # BLD AUTO: 0.71 10*3/MM3 (ref 0.7–3.1)
LYMPHOCYTES NFR BLD AUTO: 14.1 % (ref 19.6–45.3)
MCH RBC QN AUTO: 29.6 PG (ref 26.6–33)
MCHC RBC AUTO-ENTMCNC: 31.9 G/DL (ref 31.5–35.7)
MCV RBC AUTO: 92.8 FL (ref 79–97)
MONOCYTES # BLD AUTO: 0.37 10*3/MM3 (ref 0.1–0.9)
MONOCYTES NFR BLD AUTO: 7.3 % (ref 5–12)
NEUTROPHILS NFR BLD AUTO: 3.89 10*3/MM3 (ref 1.7–7)
NEUTROPHILS NFR BLD AUTO: 77 % (ref 42.7–76)
NITRITE UR QL STRIP: NEGATIVE
NRBC BLD AUTO-RTO: 0 /100 WBC (ref 0–0.2)
NT-PROBNP SERPL-MCNC: ABNORMAL PG/ML (ref 0–1800)
PH UR STRIP.AUTO: 6 [PH] (ref 5–8)
PLATELET # BLD AUTO: 171 10*3/MM3 (ref 140–450)
PMV BLD AUTO: 11.4 FL (ref 6–12)
POTASSIUM SERPL-SCNC: 4.1 MMOL/L (ref 3.5–5.2)
PROT SERPL-MCNC: 7.4 G/DL (ref 6–8.5)
PROT UR QL STRIP: NEGATIVE
PROTHROMBIN TIME: 19 SECONDS (ref 11.8–14.8)
RBC # BLD AUTO: 3.18 10*6/MM3 (ref 3.77–5.28)
SODIUM SERPL-SCNC: 141 MMOL/L (ref 136–145)
SP GR UR STRIP: 1.01 (ref 1–1.03)
UROBILINOGEN UR QL STRIP: NORMAL
WBC NRBC COR # BLD AUTO: 5.05 10*3/MM3 (ref 3.4–10.8)

## 2024-03-13 PROCEDURE — 71045 X-RAY EXAM CHEST 1 VIEW: CPT

## 2024-03-13 PROCEDURE — G0378 HOSPITAL OBSERVATION PER HR: HCPCS

## 2024-03-13 PROCEDURE — 85610 PROTHROMBIN TIME: CPT

## 2024-03-13 PROCEDURE — 81003 URINALYSIS AUTO W/O SCOPE: CPT

## 2024-03-13 PROCEDURE — 96375 TX/PRO/DX INJ NEW DRUG ADDON: CPT

## 2024-03-13 PROCEDURE — 25010000002 BUMETANIDE PER 0.5 MG

## 2024-03-13 PROCEDURE — 99285 EMERGENCY DEPT VISIT HI MDM: CPT

## 2024-03-13 PROCEDURE — 93005 ELECTROCARDIOGRAM TRACING: CPT | Performed by: FAMILY MEDICINE

## 2024-03-13 PROCEDURE — 85730 THROMBOPLASTIN TIME PARTIAL: CPT

## 2024-03-13 PROCEDURE — 93010 ELECTROCARDIOGRAM REPORT: CPT | Performed by: INTERNAL MEDICINE

## 2024-03-13 PROCEDURE — 96374 THER/PROPH/DIAG INJ IV PUSH: CPT

## 2024-03-13 PROCEDURE — 80053 COMPREHEN METABOLIC PANEL: CPT

## 2024-03-13 PROCEDURE — 25010000002 FUROSEMIDE PER 20 MG: Performed by: FAMILY MEDICINE

## 2024-03-13 PROCEDURE — 85025 COMPLETE CBC W/AUTO DIFF WBC: CPT

## 2024-03-13 PROCEDURE — 93005 ELECTROCARDIOGRAM TRACING: CPT | Performed by: EMERGENCY MEDICINE

## 2024-03-13 PROCEDURE — 83880 ASSAY OF NATRIURETIC PEPTIDE: CPT

## 2024-03-13 RX ORDER — SODIUM CHLORIDE 0.9 % (FLUSH) 0.9 %
10 SYRINGE (ML) INJECTION AS NEEDED
Status: DISCONTINUED | OUTPATIENT
Start: 2024-03-13 | End: 2024-03-13 | Stop reason: SDUPTHER

## 2024-03-13 RX ORDER — DOCUSATE SODIUM 100 MG/1
100 CAPSULE, LIQUID FILLED ORAL 2 TIMES DAILY
Status: DISCONTINUED | OUTPATIENT
Start: 2024-03-13 | End: 2024-03-15 | Stop reason: HOSPADM

## 2024-03-13 RX ORDER — FERROUS SULFATE 325(65) MG
325 TABLET ORAL
COMMUNITY

## 2024-03-13 RX ORDER — FENOFIBRATE 145 MG/1
145 TABLET, COATED ORAL DAILY
COMMUNITY

## 2024-03-13 RX ORDER — SPIRONOLACTONE 25 MG/1
25 TABLET ORAL DAILY
Status: DISCONTINUED | OUTPATIENT
Start: 2024-03-13 | End: 2024-03-15 | Stop reason: HOSPADM

## 2024-03-13 RX ORDER — POLYETHYLENE GLYCOL 3350 17 G/17G
17 POWDER, FOR SOLUTION ORAL DAILY PRN
Status: DISCONTINUED | OUTPATIENT
Start: 2024-03-13 | End: 2024-03-15 | Stop reason: HOSPADM

## 2024-03-13 RX ORDER — SODIUM BICARBONATE 650 MG/1
650 TABLET ORAL DAILY
Status: DISCONTINUED | OUTPATIENT
Start: 2024-03-13 | End: 2024-03-15 | Stop reason: HOSPADM

## 2024-03-13 RX ORDER — PANTOPRAZOLE SODIUM 40 MG/1
40 TABLET, DELAYED RELEASE ORAL
Status: DISCONTINUED | OUTPATIENT
Start: 2024-03-14 | End: 2024-03-15 | Stop reason: HOSPADM

## 2024-03-13 RX ORDER — ATORVASTATIN CALCIUM 10 MG/1
20 TABLET, FILM COATED ORAL DAILY
Status: DISCONTINUED | OUTPATIENT
Start: 2024-03-13 | End: 2024-03-15 | Stop reason: HOSPADM

## 2024-03-13 RX ORDER — LOSARTAN POTASSIUM 100 MG/1
100 TABLET ORAL DAILY
COMMUNITY

## 2024-03-13 RX ORDER — ACETAMINOPHEN 325 MG/1
650 TABLET ORAL EVERY 4 HOURS PRN
Status: DISCONTINUED | OUTPATIENT
Start: 2024-03-13 | End: 2024-03-15 | Stop reason: HOSPADM

## 2024-03-13 RX ORDER — CLONIDINE HYDROCHLORIDE 0.1 MG/1
0.1 TABLET ORAL 2 TIMES DAILY PRN
Status: ON HOLD | COMMUNITY
End: 2024-03-14

## 2024-03-13 RX ORDER — ONDANSETRON 2 MG/ML
4 INJECTION INTRAMUSCULAR; INTRAVENOUS EVERY 6 HOURS PRN
Status: DISCONTINUED | OUTPATIENT
Start: 2024-03-13 | End: 2024-03-15 | Stop reason: HOSPADM

## 2024-03-13 RX ORDER — FLUTICASONE PROPIONATE 50 MCG
2 SPRAY, SUSPENSION (ML) NASAL DAILY
Status: DISCONTINUED | OUTPATIENT
Start: 2024-03-13 | End: 2024-03-15 | Stop reason: HOSPADM

## 2024-03-13 RX ORDER — LOSARTAN POTASSIUM 50 MG/1
100 TABLET ORAL DAILY
Status: DISCONTINUED | OUTPATIENT
Start: 2024-03-13 | End: 2024-03-15 | Stop reason: HOSPADM

## 2024-03-13 RX ORDER — BUMETANIDE 0.25 MG/ML
1 INJECTION INTRAMUSCULAR; INTRAVENOUS ONCE
Status: COMPLETED | OUTPATIENT
Start: 2024-03-13 | End: 2024-03-13

## 2024-03-13 RX ORDER — DULAGLUTIDE 3 MG/.5ML
3 INJECTION, SOLUTION SUBCUTANEOUS WEEKLY
COMMUNITY
End: 2024-03-21

## 2024-03-13 RX ORDER — SIMVASTATIN 40 MG
40 TABLET ORAL NIGHTLY
COMMUNITY

## 2024-03-13 RX ORDER — OMEPRAZOLE 40 MG/1
40 CAPSULE, DELAYED RELEASE ORAL DAILY
COMMUNITY

## 2024-03-13 RX ORDER — FUROSEMIDE 10 MG/ML
60 INJECTION INTRAMUSCULAR; INTRAVENOUS EVERY 12 HOURS
Status: DISPENSED | OUTPATIENT
Start: 2024-03-13 | End: 2024-03-14

## 2024-03-13 RX ORDER — GLIPIZIDE 5 MG/1
2.5 TABLET ORAL
Status: DISCONTINUED | OUTPATIENT
Start: 2024-03-13 | End: 2024-03-15 | Stop reason: HOSPADM

## 2024-03-13 RX ORDER — AMOXICILLIN 250 MG
2 CAPSULE ORAL 2 TIMES DAILY PRN
Status: DISCONTINUED | OUTPATIENT
Start: 2024-03-13 | End: 2024-03-15 | Stop reason: HOSPADM

## 2024-03-13 RX ORDER — METOPROLOL SUCCINATE 25 MG/1
25 TABLET, EXTENDED RELEASE ORAL 2 TIMES DAILY
Status: DISCONTINUED | OUTPATIENT
Start: 2024-03-13 | End: 2024-03-15 | Stop reason: HOSPADM

## 2024-03-13 RX ORDER — SPIRONOLACTONE 25 MG/1
25 TABLET ORAL DAILY
COMMUNITY

## 2024-03-13 RX ORDER — HYDRALAZINE HYDROCHLORIDE 100 MG/1
100 TABLET, FILM COATED ORAL 3 TIMES DAILY
COMMUNITY

## 2024-03-13 RX ORDER — ONDANSETRON 4 MG/1
4 TABLET, ORALLY DISINTEGRATING ORAL EVERY 6 HOURS PRN
Status: DISCONTINUED | OUTPATIENT
Start: 2024-03-13 | End: 2024-03-15 | Stop reason: HOSPADM

## 2024-03-13 RX ORDER — SODIUM CHLORIDE 9 MG/ML
40 INJECTION, SOLUTION INTRAVENOUS AS NEEDED
Status: DISCONTINUED | OUTPATIENT
Start: 2024-03-13 | End: 2024-03-15 | Stop reason: HOSPADM

## 2024-03-13 RX ORDER — DIGOXIN 125 MCG
125 TABLET ORAL EVERY OTHER DAY
COMMUNITY

## 2024-03-13 RX ORDER — BISACODYL 10 MG
10 SUPPOSITORY, RECTAL RECTAL DAILY PRN
Status: DISCONTINUED | OUTPATIENT
Start: 2024-03-13 | End: 2024-03-15 | Stop reason: HOSPADM

## 2024-03-13 RX ORDER — HYDRALAZINE HYDROCHLORIDE 50 MG/1
100 TABLET, FILM COATED ORAL 3 TIMES DAILY
Status: DISCONTINUED | OUTPATIENT
Start: 2024-03-13 | End: 2024-03-15 | Stop reason: HOSPADM

## 2024-03-13 RX ORDER — SODIUM CHLORIDE 0.9 % (FLUSH) 0.9 %
10 SYRINGE (ML) INJECTION EVERY 12 HOURS SCHEDULED
Status: DISCONTINUED | OUTPATIENT
Start: 2024-03-13 | End: 2024-03-15 | Stop reason: HOSPADM

## 2024-03-13 RX ORDER — SODIUM CHLORIDE 0.9 % (FLUSH) 0.9 %
10 SYRINGE (ML) INJECTION AS NEEDED
Status: DISCONTINUED | OUTPATIENT
Start: 2024-03-13 | End: 2024-03-15 | Stop reason: HOSPADM

## 2024-03-13 RX ORDER — METOPROLOL SUCCINATE 25 MG/1
25 TABLET, EXTENDED RELEASE ORAL 2 TIMES DAILY
COMMUNITY
End: 2024-03-21

## 2024-03-13 RX ORDER — BISACODYL 5 MG/1
5 TABLET, DELAYED RELEASE ORAL DAILY PRN
Status: DISCONTINUED | OUTPATIENT
Start: 2024-03-13 | End: 2024-03-15 | Stop reason: HOSPADM

## 2024-03-13 RX ORDER — ALUMINA, MAGNESIA, AND SIMETHICONE 2400; 2400; 240 MG/30ML; MG/30ML; MG/30ML
15 SUSPENSION ORAL EVERY 6 HOURS PRN
Status: DISCONTINUED | OUTPATIENT
Start: 2024-03-13 | End: 2024-03-15 | Stop reason: HOSPADM

## 2024-03-13 RX ORDER — DIGOXIN 125 MCG
125 TABLET ORAL EVERY OTHER DAY
Status: DISCONTINUED | OUTPATIENT
Start: 2024-03-14 | End: 2024-03-15 | Stop reason: HOSPADM

## 2024-03-13 RX ADMIN — ATORVASTATIN CALCIUM 20 MG: 10 TABLET, FILM COATED ORAL at 18:21

## 2024-03-13 RX ADMIN — HYDRALAZINE HYDROCHLORIDE 100 MG: 50 TABLET ORAL at 21:34

## 2024-03-13 RX ADMIN — METOPROLOL SUCCINATE 25 MG: 25 TABLET, EXTENDED RELEASE ORAL at 21:34

## 2024-03-13 RX ADMIN — BUMETANIDE 1 MG: 0.25 INJECTION INTRAMUSCULAR; INTRAVENOUS at 15:57

## 2024-03-13 RX ADMIN — GLIPIZIDE 2.5 MG: 5 TABLET ORAL at 18:21

## 2024-03-13 RX ADMIN — LOSARTAN POTASSIUM 100 MG: 50 TABLET, FILM COATED ORAL at 18:21

## 2024-03-13 RX ADMIN — SPIRONOLACTONE 25 MG: 25 TABLET ORAL at 18:21

## 2024-03-13 RX ADMIN — DOCUSATE SODIUM 100 MG: 100 CAPSULE, LIQUID FILLED ORAL at 21:35

## 2024-03-13 RX ADMIN — APIXABAN 2.5 MG: 2.5 TABLET, FILM COATED ORAL at 21:34

## 2024-03-13 RX ADMIN — Medication 10 ML: at 21:34

## 2024-03-13 RX ADMIN — FUROSEMIDE 60 MG: 10 INJECTION, SOLUTION INTRAVENOUS at 21:33

## 2024-03-13 RX ADMIN — MAGNESIUM GLUCONATE 500 MG ORAL TABLET 400 MG: 500 TABLET ORAL at 18:21

## 2024-03-13 RX ADMIN — SODIUM BICARBONATE 650 MG: 650 TABLET ORAL at 18:21

## 2024-03-13 NOTE — ED NOTES
"Nursing report ED to floor  Libia Perales  89 y.o.  female    HPI:   Chief Complaint   Patient presents with    Shortness of Breath       Admitting doctor:   Kwame Laughlin DO    Consulting provider(s):  Consults       No orders found from 2/13/2024 to 3/14/2024.             Admitting diagnosis:   The encounter diagnosis was Acute on chronic diastolic (congestive) heart failure.    Code status:   Current Code Status       Date Active Code Status Order ID Comments User Context       Not on file            Allergies:   Singulair [montelukast], Nsaids, and Ace inhibitors    Intake and Output  No intake or output data in the 24 hours ending 03/13/24 1606    Weight:       03/13/24  1308   Weight: 70.3 kg (155 lb)       Most recent vitals:   Vitals:    03/13/24 1308 03/13/24 1316 03/13/24 1402   BP: 156/60 159/98 154/72   Pulse: 70 72 71   Resp: 15     Temp: 97.8 °F (36.6 °C)     SpO2: 98% 99% 99%   Weight: 70.3 kg (155 lb)     Height: 157.5 cm (62\")       Oxygen Therapy: .    Active LDAs/IV Access:   Lines, Drains & Airways       Active LDAs       Name Placement date Placement time Site Days    Peripheral IV 03/13/24 1314 Distal;Right Forearm 03/13/24  1314  Forearm  less than 1                    Labs (abnormal labs have a star):   Labs Reviewed   COMPREHENSIVE METABOLIC PANEL - Abnormal; Notable for the following components:       Result Value    Glucose 215 (*)     BUN 73 (*)     Creatinine 2.33 (*)     AST (SGOT) 34 (*)     BUN/Creatinine Ratio 31.3 (*)     eGFR 19.6 (*)     All other components within normal limits    Narrative:     GFR Normal >60  Chronic Kidney Disease <60  Kidney Failure <15    The GFR formula is only valid for adults with stable renal function between ages 18 and 70.   PROTIME-INR - Abnormal; Notable for the following components:    Protime 19.0 (*)     INR 1.53 (*)     All other components within normal limits   APTT - Abnormal; Notable for the following components:    PTT 60.7 (*)     " All other components within normal limits   BNP (IN-HOUSE) - Abnormal; Notable for the following components:    proBNP 10,344.0 (*)     All other components within normal limits    Narrative:     This assay is used as an aid in the diagnosis of individuals suspected of having heart failure. It can be used as an aid in the diagnosis of acute decompensated heart failure (ADHF) in patients presenting with signs and symptoms of ADHF to the emergency department (ED). In addition, NT-proBNP of <300 pg/mL indicates ADHF is not likely.    Age Range Result Interpretation  NT-proBNP Concentration (pg/mL:      <50             Positive            >450                   Gray                 300-450                    Negative             <300    50-75           Positive            >900                  Gray                300-900                  Negative            <300      >75             Positive            >1800                  Gray                300-1800                  Negative            <300   CBC WITH AUTO DIFFERENTIAL - Abnormal; Notable for the following components:    RBC 3.18 (*)     Hemoglobin 9.4 (*)     Hematocrit 29.5 (*)     Neutrophil % 77.0 (*)     Lymphocyte % 14.1 (*)     All other components within normal limits   URINALYSIS W/ MICROSCOPIC IF INDICATED (NO CULTURE)   CBC AND DIFFERENTIAL    Narrative:     The following orders were created for panel order CBC & Differential.  Procedure                               Abnormality         Status                     ---------                               -----------         ------                     CBC Auto Differential[[id number]]        Abnormal            Final result                 Please view results for these tests on the individual orders.       Meds given in ED:   Medications   sodium chloride 0.9 % flush 10 mL (has no administration in time range)   bumetanide (BUMEX) injection 1 mg (1 mg Intravenous Given 3/13/24 2339)           NIH Stroke  Scale:       Isolation/Infection(s):  No active isolations   No active infections     COVID Testing  Collected .  Resulted .    Nursing report ED to floor:  Mental status: .  Ambulatory status: .  Precautions: .    ED nurse phone extentsion- ..

## 2024-03-13 NOTE — ED PROVIDER NOTES
Subjective   History of Present Illness  Patient is an 89-year-old female that presents to the emergency department with family in regards to increased dyspnea on exertion.  Past medical history includes CKD, arthritis, hypertension, hyperlipidemia, GERD, diabetes, atrial fibrillation, and CHF.  Patient states that she is chronically short of breath but over the last 2 to 3 weeks she has noticed increased dyspnea on exertion.  She states that any time she moves or exerts herself she becomes extremely winded.  Patient states only other symptom is general fatigue.  She states that she used to walk but has not walked over the winter due to this dyspnea on exertion.  Patient reports no chest pain during exertion. States she takes 1 mg of Bumex twice daily as well as spironolactone 25 mg daily.  Patient reports she also takes Eliquis for atrial fibrillation and denies any missed doses of any medications.  She states that she has chronic peripheral edema and has not noticed any worsening in edema. She denies any episodes of chest pain, abdominal pain, nausea, vomiting, constipation, or diarrhea.  Denies any lightheadedness, dizziness, blurred vision, headache, or near syncope.  Denies any urinary or neurologic changes.  Patient does not use home oxygen.      Review of Systems   Constitutional:  Positive for fatigue.   Respiratory:  Positive for shortness of breath.    Neurological:  Positive for weakness.   All other systems reviewed and are negative.      Past Medical History:   Diagnosis Date    Atrial fibrillation     Diabetes mellitus     Difficulty walking Approximately 2 years    GERD (gastroesophageal reflux disease)     High cholesterol     Hypertension     OA (osteoarthritis)     Pneumonia     few years ago    Stage 4 chronic kidney disease 8/18/2023       Allergies   Allergen Reactions    Singulair [Montelukast] Cough    Nsaids Other (See Comments)     Due to kidney disease.      Ace Inhibitors Cough       Past  Surgical History:   Procedure Laterality Date    BREAST BIOPSY Right     BREAST CYST ASPIRATION      HYSTERECTOMY      OOPHORECTOMY      TONSILLECTOMY         Family History   Problem Relation Age of Onset    No Known Problems Father     Hypertension Mother     No Known Problems Sister     No Known Problems Brother     No Known Problems Daughter     No Known Problems Son     No Known Problems Maternal Grandmother     No Known Problems Paternal Grandmother     No Known Problems Maternal Aunt     No Known Problems Paternal Aunt     No Known Problems Other     Colon cancer Neg Hx     BRCA 1/2 Neg Hx     Breast cancer Neg Hx     Endometrial cancer Neg Hx     Ovarian cancer Neg Hx        Social History     Socioeconomic History    Marital status:    Tobacco Use    Smoking status: Never    Smokeless tobacco: Never   Vaping Use    Vaping status: Never Used   Substance and Sexual Activity    Alcohol use: No    Drug use: No    Sexual activity: Not Currently     Partners: Female     Birth control/protection: Surgical           Objective   Physical Exam  Vitals and nursing note reviewed.   Constitutional:       Appearance: Normal appearance.      Comments: Nontoxic appearing. In no acute distress.    HENT:      Head: Normocephalic and atraumatic.      Right Ear: External ear normal.      Left Ear: External ear normal.      Nose: Nose normal.      Mouth/Throat:      Mouth: Mucous membranes are moist.      Pharynx: Oropharynx is clear.   Eyes:      Extraocular Movements: Extraocular movements intact.      Conjunctiva/sclera: Conjunctivae normal.      Pupils: Pupils are equal, round, and reactive to light.   Cardiovascular:      Rate and Rhythm: Normal rate and regular rhythm.      Pulses: Normal pulses.      Heart sounds: Normal heart sounds.   Pulmonary:      Effort: Pulmonary effort is normal. No tachypnea or respiratory distress.      Breath sounds: Normal breath sounds. Decreased breath sounds present. No wheezing.    Chest:      Chest wall: No tenderness.   Abdominal:      General: Bowel sounds are normal. There is no distension.      Palpations: Abdomen is soft.      Tenderness: There is no abdominal tenderness. There is no right CVA tenderness, left CVA tenderness, guarding or rebound.   Musculoskeletal:         General: Normal range of motion.      Cervical back: Normal range of motion and neck supple.      Right lower leg: No tenderness. Edema present.      Left lower leg: No tenderness. Edema present.      Comments: Chronic bilateral lower extremity nonpitting edema.   Skin:     General: Skin is warm and dry.      Capillary Refill: Capillary refill takes less than 2 seconds.   Neurological:      General: No focal deficit present.      Mental Status: She is alert and oriented to person, place, and time. Mental status is at baseline.   Psychiatric:         Mood and Affect: Mood normal.         Behavior: Behavior normal.         Thought Content: Thought content normal.         Judgment: Judgment normal.       Labs Reviewed   COMPREHENSIVE METABOLIC PANEL - Abnormal; Notable for the following components:       Result Value    Glucose 215 (*)     BUN 73 (*)     Creatinine 2.33 (*)     AST (SGOT) 34 (*)     BUN/Creatinine Ratio 31.3 (*)     eGFR 19.6 (*)     All other components within normal limits    Narrative:     GFR Normal >60  Chronic Kidney Disease <60  Kidney Failure <15    The GFR formula is only valid for adults with stable renal function between ages 18 and 70.   PROTIME-INR - Abnormal; Notable for the following components:    Protime 19.0 (*)     INR 1.53 (*)     All other components within normal limits   APTT - Abnormal; Notable for the following components:    PTT 60.7 (*)     All other components within normal limits   BNP (IN-HOUSE) - Abnormal; Notable for the following components:    proBNP 10,344.0 (*)     All other components within normal limits    Narrative:     This assay is used as an aid in the  diagnosis of individuals suspected of having heart failure. It can be used as an aid in the diagnosis of acute decompensated heart failure (ADHF) in patients presenting with signs and symptoms of ADHF to the emergency department (ED). In addition, NT-proBNP of <300 pg/mL indicates ADHF is not likely.    Age Range Result Interpretation  NT-proBNP Concentration (pg/mL:      <50             Positive            >450                   Gray                 300-450                    Negative             <300    50-75           Positive            >900                  Gray                300-900                  Negative            <300      >75             Positive            >1800                  Gray                300-1800                  Negative            <300   CBC WITH AUTO DIFFERENTIAL - Abnormal; Notable for the following components:    RBC 3.18 (*)     Hemoglobin 9.4 (*)     Hematocrit 29.5 (*)     Neutrophil % 77.0 (*)     Lymphocyte % 14.1 (*)     All other components within normal limits   URINALYSIS W/ MICROSCOPIC IF INDICATED (NO CULTURE)   CBC AND DIFFERENTIAL    Narrative:     The following orders were created for panel order CBC & Differential.  Procedure                               Abnormality         Status                     ---------                               -----------         ------                     CBC Auto Differential[[id number]]        Abnormal            Final result                 Please view results for these tests on the individual orders.      XR Chest 1 View   Final Result   1. Coarse markings and bronchial wall thickening, stable.   2. No acute appearing infiltrate.   3. Cardiomegaly.               This report was signed and finalized on 3/13/2024 2:18 PM by Dr. Montez Callahan MD.               Procedures           ED Course                                             Medical Decision Making  Libia Perales is a 89 y.o. female who presents to the ED with family in  regards to increased dyspnea on exertion.  Past medical history includes CKD, arthritis, hypertension, hyperlipidemia, GERD, diabetes, atrial fibrillation, and CHF.  Patient states that she is chronically short of breath but over the last 2 to 3 weeks she has noticed increased dyspnea on exertion.  She states that any time she moves or exerts herself she becomes extremely winded.  Patient states only other symptom is general fatigue.  She states that she used to walk but has not walked over the winter due to this dyspnea on exertion.  Patient reports no chest pain during exertion. States she takes 1 mg of Bumex twice daily as well as spironolactone 25 mg daily.  Patient reports she also takes Eliquis for atrial fibrillation and denies any missed doses of any medications.  She states that she has chronic peripheral edema and has not noticed any worsening in edema. She denies any episodes of chest pain, abdominal pain, nausea, vomiting, constipation, or diarrhea.  Denies any lightheadedness, dizziness, blurred vision, headache, or near syncope.  Denies any urinary or neurologic changes.  Patient does not use home oxygen.    Patient was non-toxic appearing on arrival. No acute distress was noted.  Vital signs stable.     Past medical history, surgical history, and medication regimen reviewed.     Previous notes, labs, imaging, and more reviewed.    Patient's presentation raises suspicion for differentials including, but not limited to, CHF exacerbation, pulmonary edema, pneumonia, pneumothorax, fluid overload, anemia.    Please refer to above section of note for lab and imaging results that were reviewed and interpreted by radiology as well as attending physician.     Medications administered,   sodium chloride 0.9 % flush 10 mL (has no administration in time range)  bumetanide (BUMEX) injection 1 mg (has no administration in time range)     Spoke with Dr. Laughlin, hospitalist on-call regarding admission to the  hospital due to worsening dyspnea on exertion and elevated BNP.     Given findings described above, patient's presentation is likely consistent with acute on chronic diastolic heart failure exacerbation.    I reassessed the patient and discussed the findings of the work up so far with everyone in the room. I said that the next step in treatment would be admission to the hospital for further workup and care. I also said that there is always some diagnostic uncertainty in the ER, that symptoms may change, and new things may be found after being admitted. Dr. Benavides, hospitalist service assumed primary care of the patient and the patient was admitted to their service in stable condition.    Dragon disclaimer:  Parts of this note may be an electronic transcription/translation of spoken language to printed text using the Dragon dictation system.       Problems Addressed:  Acute on chronic diastolic (congestive) heart failure: complicated acute illness or injury    Amount and/or Complexity of Data Reviewed  Labs: ordered.  Radiology: ordered.    Risk  Prescription drug management.  Decision regarding hospitalization.        Final diagnoses:   Acute on chronic diastolic (congestive) heart failure       ED Disposition  ED Disposition       ED Disposition   Decision to Admit    Condition   --    Comment   Level of Care: Telemetry [5]   Diagnosis: Acute on chronic diastolic CHF (congestive heart failure) [[id number]]   Admitting Physician: BYRON BENAVIDES [[id number]]   Attending Physician: BYRON BENAVIDES [[id number]]                 No follow-up provider specified.       Medication List      No changes were made to your prescriptions during this visit.            Douglas Reyes, APRN  03/13/24 1557

## 2024-03-13 NOTE — H&P
[facility] Medicine Services  HISTORY AND PHYSICAL    Date of Admission: 3/13/2024  Primary Care Physician: Zachariah Huffman MD    Subjective   Primary Historian: The patient    Chief Complaint: Shortness of breath, lower extremity edema    History of Present Illness    This 89-year-old female presents to the emergency department with a chief complaint of increasing shortness of breath.  The patient notes that she awakened this morning short of breath and was much worse than typical.  Over the past 2-3 weeks she has noted slowly increasing dyspnea with exertion.  She now comes in very short of breath with minimal exertion beginning this morning.  Workup in the emergency department reveals a creatinine at baseline at 2.33 with BUN 73 and glucose 215.  The remainder of CMP is unremarkable.  PT and PTT are unremarkable considering Eliquis dosing.  BNP is elevated at 10,344.  CBC is unremarkable except hemoglobin 9.4.  Chest x-ray shows coarse markings and bronchial wall thickening with benign stability compared to previous films.    Review of Systems   Constitutional: Negative.    HENT: Negative.     Eyes: Negative.    Respiratory:  Positive for shortness of breath.    Cardiovascular:  Positive for leg swelling.   Gastrointestinal: Negative.    Endocrine: Negative.    Genitourinary: Negative.    Musculoskeletal: Negative.    Skin: Negative.    Allergic/Immunologic: Negative.    Neurological: Negative.    Hematological: Negative.    Psychiatric/Behavioral: Negative.        Otherwise complete ROS reviewed and negative except as mentioned in the HPI.    Past Medical History:   Past Medical History:   Diagnosis Date    Atrial fibrillation     Diabetes mellitus     Difficulty walking Approximately 2 years    GERD (gastroesophageal reflux disease)     High cholesterol     Hypertension     OA (osteoarthritis)     Pneumonia     few years ago    Stage 4 chronic kidney disease 08/18/2023      Past Surgical History:  Past Surgical History:   Procedure Laterality Date    BREAST BIOPSY Right     BREAST CYST ASPIRATION      HYSTERECTOMY      OOPHORECTOMY      TONSILLECTOMY       Social History:  reports that she has never smoked. She has never used smokeless tobacco. She reports that she does not drink alcohol and does not use drugs.    Family History: family history includes Hypertension in her mother; No Known Problems in her brother, daughter, father, maternal aunt, maternal grandmother, paternal aunt, paternal grandmother, sister, son, and another family member.       Allergies:  Allergies   Allergen Reactions    Singulair [Montelukast] Cough    Nsaids Other (See Comments)     Due to kidney disease.      Ace Inhibitors Cough       Medications:  Prior to Admission medications    Medication Sig Start Date End Date Taking? Authorizing Provider   amLODIPine (NORVASC) 10 MG tablet Take 1 tablet by mouth Daily. 9/29/23  Yes Zachariah Huffman MD   apixaban (ELIQUIS) 2.5 MG tablet tablet Take 1 tablet by mouth 2 (Two) Times a Day.   Yes ProviderRaheel MD   bumetanide (Bumex) 1 MG tablet Take 1 tablet by mouth 2 (Two) Times a Day. 1/2/24  Yes VIDA Ellsworth DO   Cholecalciferol 25 MCG (1000 UT) tablet Take 1 tablet by mouth Daily.   Yes ProviderRaheel MD   digoxin (LANOXIN) 125 MCG tablet Take 1 tablet by mouth Every Other Day. Sun, Tues, Thurs, and Sat   Yes ProviderRaheel MD   docusate sodium (COLACE) 100 MG capsule Take 1 capsule by mouth 2 (Two) Times a Day As Needed for Constipation.   Yes ProviderRaheel MD   fenofibrate (TRICOR) 145 MG tablet Take 1 tablet by mouth Daily.   Yes ProviderRaheel MD   ferrous sulfate 325 (65 FE) MG tablet Take 1 tablet by mouth Daily With Breakfast.   Yes ProviderRaheel MD   fluticasone (FLONASE) 50 MCG/ACT nasal spray 2 sprays into the nostril(s) as directed by provider Daily. 1/19/23  Yes Sharon Corley APRN    glipizide (GLUCOTROL XL) 5 MG ER tablet Take 1 tablet by mouth Daily. 12/20/23  Yes Zachariah Huffman MD   hydrALAZINE (APRESOLINE) 100 MG tablet Take 1 tablet by mouth 3 (Three) Times a Day.   Yes Raheel Underwood MD   losartan (COZAAR) 100 MG tablet Take 1 tablet by mouth Daily.   Yes Raheel Underwood MD   magnesium oxide (MAGOX) 400 (241.3 Mg) MG tablet tablet Take 1 tablet by mouth Daily.   Yes Raheel Underwood MD   metoprolol succinate XL (TOPROL-XL) 25 MG 24 hr tablet Take 1 tablet by mouth 2 (Two) Times a Day.   Yes Raheel Underwood MD   omeprazole (priLOSEC) 40 MG capsule Take 1 capsule by mouth Daily.   Yes Raheel Underwood MD   simvastatin (ZOCOR) 40 MG tablet Take 1 tablet by mouth Every Night.   Yes Raheel Underwood MD   spironolactone (ALDACTONE) 25 MG tablet Take 1 tablet by mouth Daily.   Yes Raheel Underwood MD   vitamin C (ASCORBIC ACID) 500 MG tablet Take 1 tablet by mouth Daily. 5/17/22  Yes Allyson Do DO   cloNIDine (CATAPRES) 0.1 MG tablet Take 1 tablet by mouth 2 (Two) Times a Day As Needed for High Blood Pressure.    Raheel Underwood MD   cyanocobalamin 1000 MCG/ML injection INJECT 1 ML INTO THE APPROPRIATE MUSCLE AS DIRECTED BY PRESCRIBER EVERY 30 (THIRTY) DAYS. INJECT 1ML INTO THE MUSCLE WEEKLY FOR 3 WEEKS THEN MONTHLY THEREAFTER 3/4/24   Zachariah Huffman MD   Dulaglutide (Trulicity) 3 MG/0.5ML solution pen-injector Inject 0.5 mL under the skin into the appropriate area as directed 1 (One) Time Per Week.    Raheel Underwood MD   hydrOXYzine (ATARAX) 10 MG tablet Take 1 tablet by mouth Every 6 (Six) Hours As Needed for Itching.  Patient not taking: Reported on 3/13/2024 6/15/22   Allyson Do DO   sodium bicarbonate 650 MG tablet Take 1 tablet by mouth Daily.  Patient not taking: Reported on 3/13/2024 10/9/23   Albina Padron APRN   cloNIDine (CATAPRES) 0.1 MG tablet TAKE 1 TABLET BY MOUTH TWICE DAILY AS NEEDED FOR  "HIGH BLOOD PRESSURE GREATER THAN 160/90 11/28/23 3/13/24  Zachariah Huffman MD   digoxin (LANOXIN) 125 MCG tablet TAKE 1 TABLET BY MOUTH EVERY OTHER DAY (SUN TUES THU SAT) 8/28/23 3/13/24  Zachariah Huffman MD   Eliquis 2.5 MG tablet tablet TAKE 1 TABLET BY MOUTH 2 (TWO) TIMES A DAY. 8/28/23 3/13/24  Tone Coello MD   fenofibrate (TRICOR) 145 MG tablet TAKE 1 TABLET BY MOUTH EVERY DAY 5/31/23 3/13/24  Zachariah Huffman MD   hydrALAZINE (APRESOLINE) 100 MG tablet TAKE 1 TABLET BY MOUTH 3 (THREE) TIMES A DAY. 8/28/23 3/13/24  Zachariah Huffman MD   Insulin Pen Needle (Pen Needles) 31G X 6 MM misc 1 each Daily. 8/17/23 3/13/24  Zachariah Huffman MD   Iron, Ferrous Sulfate, 325 (65 Fe) MG tablet Take 1 each by mouth Daily. 5/17/22 3/13/24  Allyson Do DO   losartan (COZAAR) 100 MG tablet TAKE 1 TABLET BY MOUTH EVERY DAY 11/28/23 3/13/24  Zachariah Huffman MD   metoprolol succinate XL (TOPROL-XL) 25 MG 24 hr tablet TAKE 1 TABLET BY MOUTH TWICE DAILY 8/28/23 3/13/24  Zahcariah Huffman MD   omeprazole (priLOSEC) 40 MG capsule TAKE 1 CAPSULE BY MOUTH EVERY DAY  Patient taking differently: Take 1 capsule by mouth. 3/5/24 3/13/24  Zachariah Huffman MD   simvastatin (ZOCOR) 40 MG tablet TAKE 1 TABLET BY MOUTH EVERY NIGHT 11/28/23 3/13/24  Zachariah Huffman MD   spironolactone (ALDACTONE) 25 MG tablet TAKE 1 TABLET BY MOUTH DAILY. 11/14/23 3/13/24  VIDA Ellsworth,    Syringe 25G X 1\" 3 ML misc Weekly injection x 3, then monthly thereafter 12/20/21 3/13/24  Susanna Monique APRN   True Metrix Blood Glucose Test test strip TEST AS DIRECTED 3/22/22 3/13/24  Allyson Do DO   Trulicity 3 MG/0.5ML solution pen-injector INJECT 3MG UNDER THE SKIN EVERY 7 DAYS 10/9/23 3/13/24  Zachariah Huffman MD     I have utilized all available immediate resources to obtain, update, or review the patient's current medications (including all prescriptions, over-the-counter products, herbals, " "cannabis/cannabidiol products, and vitamin/mineral/dietary (nutritional) supplements).    Objective     Vital Signs: /61 (BP Location: Left arm, Patient Position: Lying)   Pulse 71   Temp 97.5 °F (36.4 °C) (Oral)   Resp 16   Ht 157.5 cm (62.01\")   Wt 64.9 kg (143 lb)   LMP  (LMP Unknown)   SpO2 99%   BMI 26.15 kg/m²   Physical Exam  Constitutional:       General: She is not in acute distress.     Appearance: Normal appearance. She is normal weight.   HENT:      Head: Normocephalic and atraumatic.      Right Ear: External ear normal.      Left Ear: External ear normal.      Nose: Nose normal.      Mouth/Throat:      Mouth: Mucous membranes are moist.      Pharynx: Oropharynx is clear.   Eyes:      General: No scleral icterus.     Conjunctiva/sclera: Conjunctivae normal.      Pupils: Pupils are equal, round, and reactive to light.   Cardiovascular:      Rate and Rhythm: Normal rate and regular rhythm.      Pulses: Normal pulses.      Heart sounds: Normal heart sounds. No murmur heard.  Pulmonary:      Effort: Pulmonary effort is normal.      Breath sounds: Rales (Bilateral scattered) present.   Abdominal:      General: Bowel sounds are normal.      Palpations: Abdomen is soft. There is no mass.      Tenderness: There is no abdominal tenderness.   Musculoskeletal:         General: Normal range of motion.      Right lower leg: Edema present.      Left lower leg: Edema present.   Skin:     General: Skin is warm and dry.      Coloration: Skin is not pale.   Neurological:      General: No focal deficit present.      Mental Status: She is alert and oriented to person, place, and time. Mental status is at baseline.      Cranial Nerves: No cranial nerve deficit.   Psychiatric:         Mood and Affect: Mood normal.         Judgment: Judgment normal.        Results Reviewed:  Lab Results (last 24 hours)       Procedure Component Value Units Date/Time    Urinalysis With Microscopic If Indicated (No Culture) - " Urine, Clean Catch [[id number]]  (Normal) Collected: 03/13/24 1711    Specimen: Urine, Clean Catch Updated: 03/13/24 1724     Color, UA Yellow     Appearance, UA Clear     pH, UA 6.0     Specific Gravity, UA 1.008     Glucose, UA Negative     Ketones, UA Negative     Bilirubin, UA Negative     Blood, UA Negative     Protein, UA Negative     Leuk Esterase, UA Negative     Nitrite, UA Negative     Urobilinogen, UA 0.2 E.U./dL    Narrative:      Urine microscopic not indicated.    Comprehensive Metabolic Panel [[id number]]  (Abnormal) Collected: 03/13/24 1330    Specimen: Blood Updated: 03/13/24 1408     Glucose 215 mg/dL      BUN 73 mg/dL      Creatinine 2.33 mg/dL      Sodium 141 mmol/L      Potassium 4.1 mmol/L      Chloride 105 mmol/L      CO2 22.0 mmol/L      Calcium 9.6 mg/dL      Total Protein 7.4 g/dL      Albumin 4.0 g/dL      ALT (SGPT) 18 U/L      AST (SGOT) 34 U/L      Alkaline Phosphatase 74 U/L      Total Bilirubin 0.6 mg/dL      Globulin 3.4 gm/dL      A/G Ratio 1.2 g/dL      BUN/Creatinine Ratio 31.3     Anion Gap 14.0 mmol/L      eGFR 19.6 mL/min/1.73     Narrative:      GFR Normal >60  Chronic Kidney Disease <60  Kidney Failure <15    The GFR formula is only valid for adults with stable renal function between ages 18 and 70.    BNP [[id number]]  (Abnormal) Collected: 03/13/24 1330    Specimen: Blood Updated: 03/13/24 1405     proBNP 10,344.0 pg/mL     Narrative:      This assay is used as an aid in the diagnosis of individuals suspected of having heart failure. It can be used as an aid in the diagnosis of acute decompensated heart failure (ADHF) in patients presenting with signs and symptoms of ADHF to the emergency department (ED). In addition, NT-proBNP of <300 pg/mL indicates ADHF is not likely.    Age Range Result Interpretation  NT-proBNP Concentration (pg/mL:      <50             Positive            >450                   Gray                 300-450                    Negative              <300    50-75           Positive            >900                  Gray                300-900                  Negative            <300      >75             Positive            >1800                  Gray                300-1800                  Negative            <300    Protime-INR [[id number]]  (Abnormal) Collected: 03/13/24 1330    Specimen: Blood Updated: 03/13/24 1359     Protime 19.0 Seconds      INR 1.53    aPTT [[id number]]  (Abnormal) Collected: 03/13/24 1330    Specimen: Blood Updated: 03/13/24 1359     PTT 60.7 seconds     CBC & Differential [[id number]]  (Abnormal) Collected: 03/13/24 1330    Specimen: Blood Updated: 03/13/24 1349    Narrative:      The following orders were created for panel order CBC & Differential.  Procedure                               Abnormality         Status                     ---------                               -----------         ------                     CBC Auto Differential[[id number]]        Abnormal            Final result                 Please view results for these tests on the individual orders.    CBC Auto Differential [[id number]]  (Abnormal) Collected: 03/13/24 1330    Specimen: Blood Updated: 03/13/24 1349     WBC 5.05 10*3/mm3      RBC 3.18 10*6/mm3      Hemoglobin 9.4 g/dL      Hematocrit 29.5 %      MCV 92.8 fL      MCH 29.6 pg      MCHC 31.9 g/dL      RDW 14.8 %      RDW-SD 50.9 fl      MPV 11.4 fL      Platelets 171 10*3/mm3      Neutrophil % 77.0 %      Lymphocyte % 14.1 %      Monocyte % 7.3 %      Eosinophil % 1.0 %      Basophil % 0.2 %      Immature Grans % 0.4 %      Neutrophils, Absolute 3.89 10*3/mm3      Lymphocytes, Absolute 0.71 10*3/mm3      Monocytes, Absolute 0.37 10*3/mm3      Eosinophils, Absolute 0.05 10*3/mm3      Basophils, Absolute 0.01 10*3/mm3      Immature Grans, Absolute 0.02 10*3/mm3      nRBC 0.0 /100 WBC           Imaging Results (Last 24 Hours)       Procedure Component Value Units Date/Time    XR Chest 1 View [[id number]]  Collected: 03/13/24 1417     Updated: 03/13/24 1421    Narrative:      EXAMINATION:  XR CHEST 1 VW-  3/13/2024 12:25 PM     HISTORY: Shortness of air.     COMPARISON: 12/29/2003.     TECHNIQUE: Single view AP image.     FINDINGS: No acute appearing infiltrate is seen. Coarse markings and  bronchial wall thickening appear stable. Heart size is prominent. There  is no CHF. The thoracic aorta is atheromatous. No acute bony abnormality  is seen.          Impression:      1. Coarse markings and bronchial wall thickening, stable.  2. No acute appearing infiltrate.  3. Cardiomegaly.           This report was signed and finalized on 3/13/2024 2:18 PM by Dr. Montez Callahan MD.             I have personally reviewed and interpreted the radiology studies and ECG obtained at time of admission.     Assessment / Plan   Assessment:   Active Hospital Problems    Diagnosis     **Acute on chronic heart failure with preserved ejection fraction (HFpEF)     Severe pulmonary hypertension     Chronic anticoagulation     Stage 4 chronic kidney disease     Type 2 diabetes mellitus with hyperglycemia, without long-term current use of insulin        Treatment Plan  Admit to telemetry  Lasix 60 mg IV every 12 hours x 2 doses  High resolution CT scan of the chest  Resume the bulk of home medications  Discontinue amlodipine secondary to peripheral edema  Daily BMP    Medical Decision Making  Number and Complexity of problems:   Acute on chronic heart failure with preserved ejection fraction, acute, high complexity  Severe pulmonary hypertension, chronic, high complexity  Stage IV CKD, chronic, high complexity  T2DM, chronic, moderate complexity    Differential Diagnosis: Pulmonary fibrosis, recurrent pulmonary emboli, intrinsic lung disease    Conditions and Status        Condition is unchanged.     [facility] Data  External documents reviewed: Care Everywhere documentation  Cardiac tracing (EKG, telemetry) interpretation: See HPI  Radiology  interpretation: See HPI  Labs reviewed: See HPI  Any tests that were considered but not ordered: None     Decision rules/scores evaluated (example [id number], Wells, etc): None     Discussed with: The patient     Care Planning  Shared decision making: The patient  Code status and discussions: Full code    Disposition  Social Determinants of Health that impact treatment or disposition: None noted  Estimated length of stay is 2-3 days.     I confirmed that the patient's advanced care plan is present, code status is documented, and a surrogate decision maker is listed in the patient's medical record.     The patient's surrogate decision maker is her son, Wagner.     The patient was seen and examined by me on 3/13/2024 at 1830.    Electronically signed by Kwame Laughlin DO, 03/13/24, 18:57 CDT.

## 2024-03-14 ENCOUNTER — APPOINTMENT (OUTPATIENT)
Dept: CT IMAGING | Facility: HOSPITAL | Age: 89
End: 2024-03-14
Payer: MEDICARE

## 2024-03-14 LAB
ANION GAP SERPL CALCULATED.3IONS-SCNC: 14 MMOL/L (ref 5–15)
BUN SERPL-MCNC: 65 MG/DL (ref 8–23)
BUN/CREAT SERPL: 29.4 (ref 7–25)
CALCIUM SPEC-SCNC: 9.5 MG/DL (ref 8.6–10.5)
CHLORIDE SERPL-SCNC: 104 MMOL/L (ref 98–107)
CO2 SERPL-SCNC: 25 MMOL/L (ref 22–29)
CREAT SERPL-MCNC: 2.21 MG/DL (ref 0.57–1)
EGFRCR SERPLBLD CKD-EPI 2021: 20.8 ML/MIN/1.73
GLUCOSE SERPL-MCNC: 106 MG/DL (ref 65–99)
POTASSIUM SERPL-SCNC: 3.6 MMOL/L (ref 3.5–5.2)
QT INTERVAL: 398 MS
QTC INTERVAL: 423 MS
SODIUM SERPL-SCNC: 143 MMOL/L (ref 136–145)

## 2024-03-14 PROCEDURE — G0378 HOSPITAL OBSERVATION PER HR: HCPCS

## 2024-03-14 PROCEDURE — 80048 BASIC METABOLIC PNL TOTAL CA: CPT | Performed by: FAMILY MEDICINE

## 2024-03-14 PROCEDURE — 96376 TX/PRO/DX INJ SAME DRUG ADON: CPT

## 2024-03-14 PROCEDURE — 25010000002 FUROSEMIDE PER 20 MG: Performed by: FAMILY MEDICINE

## 2024-03-14 PROCEDURE — 97161 PT EVAL LOW COMPLEX 20 MIN: CPT

## 2024-03-14 PROCEDURE — 71250 CT THORAX DX C-: CPT

## 2024-03-14 RX ORDER — FUROSEMIDE 20 MG/1
20 TABLET ORAL
Status: DISCONTINUED | OUTPATIENT
Start: 2024-03-15 | End: 2024-03-14

## 2024-03-14 RX ORDER — FUROSEMIDE 40 MG/1
40 TABLET ORAL
Status: DISCONTINUED | OUTPATIENT
Start: 2024-03-15 | End: 2024-03-15 | Stop reason: HOSPADM

## 2024-03-14 RX ORDER — FUROSEMIDE 10 MG/ML
60 INJECTION INTRAMUSCULAR; INTRAVENOUS
Status: COMPLETED | OUTPATIENT
Start: 2024-03-14 | End: 2024-03-14

## 2024-03-14 RX ADMIN — DOCUSATE SODIUM 100 MG: 100 CAPSULE, LIQUID FILLED ORAL at 10:07

## 2024-03-14 RX ADMIN — PANTOPRAZOLE SODIUM 40 MG: 40 TABLET, DELAYED RELEASE ORAL at 05:05

## 2024-03-14 RX ADMIN — DIGOXIN 125 MCG: 125 TABLET ORAL at 12:09

## 2024-03-14 RX ADMIN — FUROSEMIDE 60 MG: 10 INJECTION, SOLUTION INTRAVENOUS at 10:57

## 2024-03-14 RX ADMIN — ATORVASTATIN CALCIUM 20 MG: 10 TABLET, FILM COATED ORAL at 10:08

## 2024-03-14 RX ADMIN — HYDRALAZINE HYDROCHLORIDE 100 MG: 50 TABLET ORAL at 21:51

## 2024-03-14 RX ADMIN — GLIPIZIDE 2.5 MG: 5 TABLET ORAL at 18:01

## 2024-03-14 RX ADMIN — HYDRALAZINE HYDROCHLORIDE 100 MG: 50 TABLET ORAL at 18:01

## 2024-03-14 RX ADMIN — APIXABAN 2.5 MG: 2.5 TABLET, FILM COATED ORAL at 21:50

## 2024-03-14 RX ADMIN — Medication 10 ML: at 10:50

## 2024-03-14 RX ADMIN — Medication 10 ML: at 21:51

## 2024-03-14 RX ADMIN — MAGNESIUM GLUCONATE 500 MG ORAL TABLET 400 MG: 500 TABLET ORAL at 10:09

## 2024-03-14 RX ADMIN — METOPROLOL SUCCINATE 25 MG: 25 TABLET, EXTENDED RELEASE ORAL at 21:51

## 2024-03-14 RX ADMIN — SODIUM BICARBONATE 650 MG: 650 TABLET ORAL at 10:08

## 2024-03-14 RX ADMIN — GLIPIZIDE 2.5 MG: 5 TABLET ORAL at 09:28

## 2024-03-14 RX ADMIN — METOPROLOL SUCCINATE 25 MG: 25 TABLET, EXTENDED RELEASE ORAL at 10:08

## 2024-03-14 RX ADMIN — DOCUSATE SODIUM 100 MG: 100 CAPSULE, LIQUID FILLED ORAL at 21:50

## 2024-03-14 RX ADMIN — LOSARTAN POTASSIUM 100 MG: 50 TABLET, FILM COATED ORAL at 10:09

## 2024-03-14 RX ADMIN — HYDRALAZINE HYDROCHLORIDE 100 MG: 50 TABLET ORAL at 10:09

## 2024-03-14 RX ADMIN — APIXABAN 2.5 MG: 2.5 TABLET, FILM COATED ORAL at 10:10

## 2024-03-14 RX ADMIN — SPIRONOLACTONE 25 MG: 25 TABLET ORAL at 10:06

## 2024-03-14 RX ADMIN — FUROSEMIDE 60 MG: 10 INJECTION, SOLUTION INTRAVENOUS at 18:01

## 2024-03-14 NOTE — THERAPY DISCHARGE NOTE
Patient Name: Libia Perales  : 1934    MRN: [id number]                              Today's Date: 3/14/2024       Admit Date: 3/13/2024    Visit Dx:     ICD-10-CM ICD-9-CM   1. 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    Benign essential HTN    Persistent atrial fibrillation    Asthma    Fibrocystic breast disease    Gastro-esophageal reflux    High calcium levels    OA (osteoarthritis) of ankle    Right renal mass    Vertigo, benign paroxysmal    Chronic fatigue    Chronic cough    Allergic rhinitis    Stage 4 chronic kidney disease    Strain of left rhomboid muscle    Chronic anticoagulation    Chronic diastolic (congestive) heart failure    Acute on chronic diastolic CHF (congestive heart failure)    Severe pulmonary hypertension    Acute on chronic heart failure with preserved ejection fraction (HFpEF)     Past Medical History:   Diagnosis Date    Atrial fibrillation     Diabetes mellitus     Difficulty walking Approximately 2 years    GERD (gastroesophageal reflux disease)     High cholesterol     Hypertension     OA (osteoarthritis)     Pneumonia     few years ago    Stage 4 chronic kidney disease 2023     Past Surgical History:   Procedure Laterality Date    BREAST BIOPSY Right     BREAST CYST ASPIRATION      HYSTERECTOMY      OOPHORECTOMY      TONSILLECTOMY        General Information       Row Name 24 1101          Physical Therapy Time and Intention    Document Type evaluation  Dx; SOA and dyspnea resulting in acute on chronic CHF.  -HODA (jonathan) JERICHO (maisha) HODA (fernando)     Mode of Treatment physical therapy  -HODA (jonathan) JERICHO (maisha) HODA (fernando)       Row Name 24 1101          General Information    Patient Profile  Reviewed yes  -HODA (r) AJ (t) HODA (c)     Prior Level of Function independent:;all household mobility;community mobility;gait;transfer;bed mobility;ADL's;home management;cooking;cleaning  -HODA (r) AJ (t) HODA (c)     Existing Precautions/Restrictions no known precautions/restrictions  -HODA     Barriers to Rehab physical barrier  fatigue and requires short rest break  -HODA (r) AJ (t) HODA (c)       Row Name 03/14/24 1101          Living Environment    People in Home alone  -HODA (r) AJ (t) HODA (c)       Row Name 03/14/24 1101          Home Main Entrance    Number of Stairs, Main Entrance one  -HODA (r) AJ (t) HODA (c)     Stair Railings, Main Entrance none;other (see comments)  feels like she needs handrails  -HODA (r) AJ (t) HODA (c)       Row Name 03/14/24 1101          Stairs Within Home, Primary    Number of Stairs, Within Home, Primary none  -HODA (r) AJ (t) HODA (c)       Row Name 03/14/24 1101          Cognition    Orientation Status (Cognition) oriented x 4;oriented to;person;place;situation;time  -HODA (r) AJ (t) HODA (c)       Row Name 03/14/24 1101          Safety Issues, Functional Mobility    Impairments Affecting Function (Mobility) endurance/activity tolerance  -HODA (r) AJ (t) HODA (c)               User Key  (r) = Recorded By, (t) = Taken By, (c) = Cosigned By      Initials Name Provider Type    Surinder Condon, PT DPT Physical Therapist    Ludwin Russ, PT Student PT Student                   Mobility       Row Name 03/14/24 1101          Bed Mobility    Bed Mobility rolling right;supine-sit  -HODA (r) AJ (t) HODA (c)     Rolling Right [place] (Bed Mobility) independent  -HODA (r) AJ (t) HODA (c)     Supine-Sit [place] (Bed Mobility) independent  -HODA (r) AJ (t) HODA (c)     Assistive Device (Bed Mobility) head of bed elevated  -HODA (r) AJ (t) HODA (c)       Row Name 03/14/24 1101          Bed-Chair Transfer    Bed-Chair [place] (Transfers) independent  -HODA (r) JERICHO (t) HODA (c)     Assistive Device (Bed-Chair Transfers)  cane, straight  -HODA (r) AJ (t) HODA (c)       Row Name 03/14/24 1101          Sit-Stand Transfer    Sit-Stand [facility] (Transfers) independent  -HODA (r) AJ (t) HODA (c)     Assistive Device (Sit-Stand Transfers) cane, quad tip  -HODA (r) AJ (t) HODA (c)       Row Name 03/14/24 1101          Gait/Stairs (Locomotion)    Gait/Stairs Locomotion gait/ambulation assistive device;distance ambulated  200' with SBA and straight cane  -HODA (r) AJ (t) HODA (c)     [facility] Level (Gait) independent  -HODA (r) AJ (t) HODA (c)     Distance in Feet (Gait) 200  -HODA (r) AJ (t) HODA (c)               User Key  (r) = Recorded By, (t) = Taken By, (c) = Cosigned By      Initials Name Provider Type    Surinder Condon, PT DPT Physical Therapist    Ludwin Russ, PT Student PT Student                   Obj/Interventions       Row Name 03/14/24 1101          Range of Motion Comprehensive    General Range of Motion no range of motion deficits identified;bilateral lower extremity ROM WFL  -HODA (r) AJ (t) HODA (c)       Row Name 03/14/24 1101          Strength Comprehensive (MMT)    General Manual Muscle Testing (MMT) Assessment no strength deficits identified  -HODA (r) AJ (t) HODA (c)     Comment, General Manual Muscle Testing (MMT) Assessment functional 5/5 MMT hip, knee, ankle and feels she is at baseline  -HODA (r) AJ (t) HODA (c)       Row Name 03/14/24 1101          Motor Skills    Motor Skills functional endurance  -HODA (r) AJ (t) HODA (c)       Row Name 03/14/24 1101          Balance    Balance Assessment sitting dynamic balance;standing dynamic balance  -HODA (r) AJ (t) HODA (c)     Dynamic Sitting Balance independent  -HODA (r) AJ (t) HODA (c)     Position, Sitting Balance unsupported;sitting edge of bed  reaching to don shoes  -HODA (r) AJ (t) HODA (c)     Dynamic Standing Balance independent  -HODA (r) AJ (t) HODA (c)     Position/Device Used, Standing Balance supported;cane, straight  -HODA (r) AJ (t) HODA (c)     Balance Interventions sitting;sit to  stand;standing;static;dynamic;occupation based/functional task;lighting dimmed during activity;tandem standing;narrowed base of support  -HODA (r) JERICHO (t) HODA (c)       Row Name 03/14/24 1101          Sensory Assessment (Somatosensory)    Sensory Assessment (Somatosensory) LE sensation intact  -HODA (r) JERICHO (t) HODA (c)               User Key  (r) = Recorded By, (t) = Taken By, (c) = Cosigned By      Initials Name Provider Type    Surinder Condon, PT DPT Physical Therapist    Ludwin Russ, PT Student PT Student                   Goals/Plan    No documentation.                  Clinical Impression       Row Name 03/14/24 1101          Pain    Pretreatment Pain Rating 0/10 - no pain  -HODA (r) JERICHO (t) HODA (c)       Row Name 03/14/24 1101          Plan of Care Review    Plan of Care Reviewed With patient  -HODA (r) JERICHO (t) HODA (c)     Outcome Evaluation PT eval completed. Pt AOx4 resting comfortably in bed. Pt states she has been feeling more normal since admission and wants to return home. Pt demo independence with bed mobility rolling left and performing supine to sit. Pt demo functional ROM B LE with no reports of pain along with 5/5 functional strength B LE. Pt demo independence reaching outside of YOHAN to don shoes sitting unsupported on EOB with no loss of balance. Pt demo ability to walk 100' with 1 standing rest break prior to returning 100' back to room with use of straight cane in R UE. Pt has access to rollator at home and uses when she has outside activites and knows she will be fatigued. Pt SpO2 sat maintained between 94-97% on room air throughout full session. Pt demo 25/28 on tinetti ruling a low fall risk, pt currently sitting in bedside chair. Nursing staff notified of findings and pt sitting out of bed. Pt asked if  could call her son or maintence and ask for handrail for her apartment when she returns home to make her feel more safe. Pt demo she is currently at baseline and safe to d/c when  medically stable. PT to sign off at this time.  -HODA (r) AJ (t) HODA (c)       Row Name 03/14/24 1101          Therapy Assessment/Plan (PT)    Patient/Family Therapy Goals Statement (PT) go back home  -HODA (r) AJ (t) HODA (c)     Criteria for Skilled Interventions Met (PT) no;no problems identified which require skilled intervention;does not meet criteria for skilled intervention  -HODA (r) AJ (t) HODA (c)     Therapy Frequency (PT) evaluation only  -HODA (r) AJ (t) HODA (c)       Row Name 03/14/24 1101          Vital Signs    O2 Delivery Pre Treatment room air  -HODA (r) AJ (t) HODA (c)     O2 Delivery Intra Treatment room air  -HODA (r) AJ (t) HODA (c)     O2 Delivery Post Treatment room air  -HODA (r) AJ (t) HODA (c)     Pre Patient Position Supine  -HODA (r) AJ (t) HODA (c)     Intra Patient Position Standing  -HODA (r) AJ (t) HODA (c)     Post Patient Position Sitting  -HODA (r) AJ (t) HODA (c)       Row Name 03/14/24 1101          Positioning and Restraints    Pre-Treatment Position in bed  -HODA (r) AJ (t) HODA (c)     Post Treatment Position chair  -HODA (r) AJ (t) HODA (c)     In Chair notified nsg;sitting;call light within reach;encouraged to call for assist  -HODA (r) AJ (t) HODA (c)               User Key  (r) = Recorded By, (t) = Taken By, (c) = Cosigned By      Initials Name Provider Type    Surinder Condon, PT DPT Physical Therapist    Ludwin Russ, PT Student PT Student                   Outcome Measures       Row Name 03/14/24 1101 03/14/24 0925       How much help from another person do you currently need...    Turning from your back to your side while in flat bed without using bedrails? 4  -HODA (r) AJ (t) HODA (c) 4  -KM    Moving from lying on back to sitting on the side of a flat bed without bedrails? 4  -HODA (r) AJ (t) HODA (c) 4  -KM    Moving to and from a bed to a chair (including a wheelchair)? 4  -HODA (r) AJ (t) HODA (c) 4  -KM    Standing up from a chair using your arms (e.g., wheelchair, bedside chair)? 4  -HODA (r) JERICHO (t) HODA (c) 4  -KM  "   Climbing 3-5 steps with a railing? 3  -HODA (r) AJ (t) HODA (c) 3  -KM    To walk in hospital room? 4  -HODA (r) AJ (t) HODA (c) 3  -KM    AM-PAC 6 Clicks Score (PT) 23  -HODA (r) AJ (t) 22  -KM    Highest Level of Mobility Goal 7 --> Walk 25 feet or more  -HODA (r) AJ (t) 7 --> Walk 25 feet or more  -KM      Row Name 03/14/24 1101          Tinetti Assessment    Tinetti Assessment yes  -HODA (r) AJ (t) HODA (c)     Sitting Balance 1  -HODA (r) AJ (t) HODA (c)     Arises 2  -HODA (r) AJ (t) HODA (c)     Attempts to Rise 2  -HODA (r) AJ (t) HODA (c)     Immediate Standing Balance (first 5 sec) 2  -HOAD (r) AJ (t) HODA (c)     Standing Balance 1  -HODA (r) AJ (t) HODA (c)     Sternal Nudge (feet close together) 1  -HODA (r) AJ (t) HODA (c)     Eyes Closed (feet close together) 1  -HODA (r) AJ (t) HODA (c)     Turning 360 Degrees- Steps 1  -HODA (r) AJ (t) HODA (c)     Turning 360 Degrees- Steadiness 1  -HODA (r) AJ (t) HODA (c)     Sitting Down 1  mainly for her safety  -HODA (r) AJ (t) HODA (c)     Tinetti Balance Score 13  -HODA (r) AJ (t)     Gait Initiation (immediate after told \"go\") 1  -HODA (r) AJ (t) HODA (c)     Step Length- Right Swing 1  -HODA (r) AJ (t) HODA (c)     Step Length- Left Swing 1  -HODA (r) AJ (t) HODA (c)     Foot Clearance- Right Foot 1  -HODA (r) AJ (t) HODA (c)     Foot Clearance- Left Foot 1  -HODA (r) AJ (t) HODA (c)     Step Symmetry 1  -HODA (r) AJ (t) HODA (c)     Step Continuity 1  -HODA (r) AJ (t) HODA (c)     Path (excursion) 2  -HODA (r) AJ (t) HODA (c)     Trunk 2  -HODA (r) AJ (t) HODA (c)     Base of Support 1  -HODA (r) JERICHO (t) HODA (c)     Gait Score 12  -HODA (r) AJ (t)     Tinetti Total Score 25  -HODA (r) AJ (t)     Tinetti Assistive Device straight cane  -HODA (r) AJ (t) HODA (c)       Row Name 03/14/24 1105          Functional Assessment    Outcome Measure Options AM-PAC 6 Clicks Basic Mobility (PT);Tinetti  -HODA (r) JERICHO (t) HODA (c)               User Key  (r) = Recorded By, (t) = Taken By, (c) = Cosigned By      Initials Name Provider Type    Surinder Condon, PT DPT " ASA continued for graft occlusion-thromboembolism prophylaxis  Lipitor was also started for long term graft patency  Lopressor initiated for tachycardia and atrial fibrillation prophylaxis  VTE prophylaxis with SCD+ heparin  Pepcid maintained for GI bleeding prophylaxis Physical Therapist    Lynda Guzman, RN Registered Nurse    Ludwin Russ, PT Student PT Student                  Physical Therapy Education       Title: PT OT SLP Therapies (Resolved)       Topic: Physical Therapy (Resolved)       Point: Mobility training (Resolved)       Learning Progress Summary             Patient Acceptance, E, VU by JERICHO at 3/14/2024 7970    Comment: continue OOB activity and use cane or rollator for safety within home when d/c                                         User Key       Initials Effective Dates Name Provider Type Discipline    JERICHO 02/22/24 -  Ludwin Delcid, PT Student PT Student PT                  PT Recommendation and Plan     Plan of Care Reviewed With: patient  Outcome Evaluation: PT eval completed. Pt AOx4 resting comfortably in bed. Pt states she has been feeling more normal since admission and wants to return home. Pt demo independence with bed mobility rolling left and performing supine to sit. Pt demo functional ROM B LE with no reports of pain along with 5/5 functional strength B LE. Pt demo independence reaching outside of [place] to don shoes sitting unsupported on EOB with no loss of balance. Pt demo ability to walk 100' with 1 standing rest break prior to returning 100' back to room with use of straight cane in R UE. Pt has access to rollator at home and uses when she has outside activites and knows she will be fatigued. Pt SpO2 sat maintained between 94-97% on room air throughout full session. Pt demo 25/28 on tinetti ruling a low fall risk, pt currently sitting in bedside chair. Nursing staff notified of findings and pt sitting out of bed. Pt asked if  could call her son or maintence and ask for handrail for her apartment when she returns home to make her feel more safe. Pt demo she is currently at baseline and safe to d/c when medically stable. PT to sign off at this time.     Time Calculation:         PT Charges       Row Name 03/14/24 5842              Time Calculation    Start Time 1111  -HODA (r) AJ (t) HODA (c)      Stop Time 1132  +7 for chart review  -HODA (r) AJ (t) HODA (c)      Time Calculation (min) 21 min  -HODA (r) AJ (t)      PT Received On 03/14/24  -HODA (r) AJ (t) HODA (c)         Time Calculation- PT    Total Timed Code Minutes- PT 28 minute(s)  -HODA (r) AJ (t) HODA (c)                User Key  (r) = Recorded By, (t) = Taken By, (c) = Cosigned By      Initials Name Provider Type    Surinder Condon, PT DPT Physical Therapist    Ludwin Russ, PT Student PT Student                      PT G-Codes  Outcome Measure Options: AM-PAC 6 Clicks Basic Mobility (PT), Tinetti  AM-PAC 6 Clicks Score (PT): 23  Tinetti Total Score: 25    PT Discharge Summary  Anticipated Discharge Disposition (PT): home, home with assist    Ludwin Delcid, PT Student  3/14/2024        ASA continued for graft occlusion-thromboembolism prophylaxis  Lipitor was also started for long term graft patency  Lopressor initiated for tachycardia and atrial fibrillation prophylaxis  VTE prophylaxis with SCD+ heparin  Pepcid maintained for GI bleeding prophylaxis

## 2024-03-14 NOTE — PLAN OF CARE
Goal Outcome Evaluation:  Plan of Care Reviewed With: patient        Progress: improving  Outcome Evaluation: VSS, Afib rate controlled, pt's breathing much improved, sat's wnl on RA, No c/o pain, output  approx. 2L, since admission Ambulating without difficulty, safety maintained, MD will be notified of any changes

## 2024-03-14 NOTE — PLAN OF CARE
Goal Outcome Evaluation:  Plan of Care Reviewed With: patient           Outcome Evaluation: PT martina completed. Pt AOx4 resting comfortably in bed. Pt states she has been feeling more normal since admission and wants to return home. Pt demo independence with bed mobility rolling left and performing supine to sit. Pt demo functional ROM B LE with no reports of pain along with 5/5 functional strength B LE. Pt demo independence reaching outside of [place] to don shoes sitting unsupported on EOB with no loss of balance. Pt demo ability to walk 100' with 1 standing rest break prior to returning 100' back to room with use of straight cane in R UE. Pt has access to rollator at home and uses when she has outside activites and knows she will be fatigued. Pt SpO2 sat maintained between 94-97% on room air throughout full session. Pt demo 25/28 on tinetti ruling a low fall risk, pt currently sitting in bedside chair. Nursing staff notified of findings and pt sitting out of bed. Pt asked if  could call her son or maintence and ask for handrail for her apartment when she returns home to make her feel more safe. Pt demo she is currently at baseline and safe to d/c when medically stable. PT to sign off at this time.      Anticipated Discharge Disposition (PT): home, home with assist

## 2024-03-14 NOTE — PROGRESS NOTES
[facility] Medicine Services  INPATIENT PROGRESS NOTE    Patient Name: Libia Perales  Date of Admission: 3/13/2024  Today's Date: 03/14/24  Length of Stay: 0  Primary Care Physician: Zachariah Huffman MD    Subjective   Chief Complaint: No complaint  HPI     Patient seems to be doing well today.  Shortness of breath is improved.  She has diuresed about 2100 cc out greater than in since admission.  High-resolution CT scan of the chest shows no evidence of intrinsic lung disease and no pulmonary emboli.  Pulmonary hypertension appears to be on the basis of renal failure.  She will be started on furosemide 40 mg p.o. daily beginning tomorrow morning.  Will recheck BMP in a.m.    Review of Systems   All pertinent negatives and positives are as above. All other systems have been reviewed and are negative unless otherwise stated.     Objective    Temp:  [97.6 °F (36.4 °C)-98.1 °F (36.7 °C)] 98 °F (36.7 °C)  Heart Rate:  [73-79] 77  Resp:  [16] 16  BP: (133-170)/(42-85) 137/51  Physical Exam  Constitutional:       General: She is not in acute distress.     Appearance: Normal appearance. She is normal weight.   HENT:      Head: Normocephalic and atraumatic.      Right Ear: External ear normal.      Left Ear: External ear normal.      Nose: Nose normal.      Mouth: Mucous membranes are moist.      Pharynx: Oropharynx is clear.   Eyes:      General: No scleral icterus.     Conjunctiva/sclera: Conjunctivae normal.   Cardiovascular:      Rate and Rhythm: Normal rate and regular rhythm.      Pulses: Normal pulses.      Heart sounds: Normal heart sounds. No murmur heard.  Pulmonary:      Effort: Pulmonary effort is normal.      Breath sounds: Rales (Bilateral scattered) present.   Abdominal:      General: Bowel sounds are normal.      Palpations: Abdomen is soft. There is no mass.      Tenderness: There is no abdominal tenderness.   Musculoskeletal:         General: Normal range of  "motion.      Right lower leg: Edema present.      Left lower leg: Edema present.   Skin:     General: Skin is warm and dry.      Coloration: Skin is not pale.   Neurological:      General: No focal deficit present.      Mental Status: She is alert and oriented to person, place, and time. Mental status is at baseline.   Psychiatric:         Mood and Affect: Mood normal.         Judgment: Judgment normal.        Results Review:  I have reviewed the labs, radiology results, and diagnostic studies.    Laboratory Data:   Results from last 7 days   Lab Units 03/13/24  1330   WBC 10*3/mm3 5.05   HEMOGLOBIN g/dL 9.4*   HEMATOCRIT % 29.5*   PLATELETS 10*3/mm3 171        Results from last 7 days   Lab Units 03/14/24  0443 03/13/24  1330   SODIUM mmol/L 143 141   POTASSIUM mmol/L 3.6 4.1   CHLORIDE mmol/L 104 105   CO2 mmol/L 25.0 22.0   BUN mg/dL 65* 73*   CREATININE mg/dL 2.21* 2.33*   CALCIUM mg/dL 9.5 9.6   BILIRUBIN mg/dL  --  0.6   ALK PHOS U/L  --  74   ALT (SGPT) U/L  --  18   AST (SGOT) U/L  --  34*   GLUCOSE mg/dL 106* 215*       Culture Data:   No results found for: \"BLOODCX\", \"URINECX\", \"WOUNDCX\", \"MRSACX\", \"RESPCX\", \"STOOLCX\"    Radiology Data:   Imaging Results (Last 24 Hours)       Procedure Component Value Units Date/Time    CT Chest Hi Resolution Diagnostic [[id number]] Collected: 03/14/24 1346     Updated: 03/14/24 1359    Narrative:      EXAMINATION: CT CHEST HI RESOLUTION DIAGNOSTIC-      3/14/2024 12:16 PM     HISTORY: Pulmonary hypertension; I50.33-Acute on chronic diastolic  (congestive) heart failure     In order to have a CT radiation dose as low as reasonably achievable  Automated Exposure Control was utilized for adjustment of the mA and/or  KV according to patient size.     Total DLP = 826.28 mGy.cm     The high-resolution CT scan of the chest should performed without  intravenous contrast enhancement.     Images are acquired in axial plane, in full inspiration, expiration,  supine and prone position " and subsequent reconstruction in coronal and  sagittal planes.     Comparison is made with the previous study dated 7/23/2018.     The lungs are well-expanded.     Tiny nodule in the right upper lobe laterally, image #21 and series 4,  was noted in the previous study in 2018. No change. No additional  nodules are seen.     There are no areas to suggest an infiltrate.     No areas of focal consolidation.     No finding to suggest bronchiectasis. No groundglass opacities or  nodules. No honeycombing. No parenchymal distortion or reticular  infiltrate.     Central airways patent. No endobronchial abnormality.     Limited visualized soft tissue of the neck are unremarkable. Thyroid  gland is unremarkable.     No axillary lymphadenopathy.     Atheromatous changes of the thoracic aorta are noted. No aneurysmal  dilatation.     Moderate ectasia of the central pulmonary arteries are seen. The main  pulmonary artery measures 3.2 cm.     Atheromatous changes of coronary arteries are noted.     Heavy calcification of the mitral annulus seen.     Moderate cardiomegaly.     Limited visualized unenhanced abdomen is unremarkable except for a large  low-density mass in the upper pole of the right kidney which is similar  to the previous study.     There is small amount of fluid under the diaphragm bilaterally.     Images reviewed in bone window show no acute bony abnormality. There are  moderate chronic degenerative change of thoracic spine with accentuated  thoracic kyphosis.       Impression:      1. Unremarkable examination. No acute abnormality in the chest. No  finding to suggest chronic interstitial pneumonia/interstitial lung  disease.  2. Moderate pulmonary arterial hypertension. Moderate cardiomegaly.  Atheromatous change of coronary arteries.  3. Small amount of ascites in the upper abdomen. The abdomen is  incompletely included and not evaluated.                                            This report was signed and  finalized on 3/14/2024 1:56 PM by Dr. Vandana Cosby MD.               I have reviewed the patient's current medications.     Assessment/Plan   Assessment  Active Hospital Problems    Diagnosis     **Acute on chronic heart failure with preserved ejection fraction (HFpEF)     Severe pulmonary hypertension     Chronic anticoagulation     Stage 4 chronic kidney disease     Type 2 diabetes mellitus with hyperglycemia, without long-term current use of insulin        Treatment Plan  Lasix 40 mg p.o. twice daily starting tomorrow morning  Probable discharge tomorrow    Medical Decision Making  Number and Complexity of problems:   Acute on chronic heart failure with preserved ejection fraction, acute, high complexity  Severe pulmonary hypertension, chronic, high complexity  Stage IV CKD, chronic, high complexity  T2DM, chronic, moderate complexity     Differential Diagnosis: Pulmonary fibrosis, recurrent pulmonary emboli, intrinsic lung disease     Conditions and Status        Condition is unchanged.     [facility] Data  External documents reviewed: Care Everywhere documentation  Cardiac tracing (EKG, telemetry) interpretation: See HPI  Radiology interpretation: See HPI  Labs reviewed: See HPI  Any tests that were considered but not ordered: None     Decision rules/scores evaluated (example [id number], Wells, etc): None     Discussed with: The patient     Care Planning  Shared decision making: The patient  Code status and discussions: Full code     Disposition  Social Determinants of Health that impact treatment or disposition: None noted  I expect the patient to be discharged to home in 1 days.     Electronically signed by Kwame Laughlin DO, 03/14/24, 18:57 CDT.

## 2024-03-15 ENCOUNTER — READMISSION MANAGEMENT (OUTPATIENT)
Dept: CALL CENTER | Facility: HOSPITAL | Age: 89
End: 2024-03-15
Payer: MEDICARE

## 2024-03-15 VITALS
SYSTOLIC BLOOD PRESSURE: 139 MMHG | HEIGHT: 62 IN | BODY MASS INDEX: 26.31 KG/M2 | TEMPERATURE: 97.6 F | RESPIRATION RATE: 16 BRPM | DIASTOLIC BLOOD PRESSURE: 52 MMHG | HEART RATE: 60 BPM | WEIGHT: 143 LBS | OXYGEN SATURATION: 96 %

## 2024-03-15 LAB
ANION GAP SERPL CALCULATED.3IONS-SCNC: 10 MMOL/L (ref 5–15)
BUN SERPL-MCNC: 73 MG/DL (ref 8–23)
BUN/CREAT SERPL: 29.3 (ref 7–25)
CALCIUM SPEC-SCNC: 9.2 MG/DL (ref 8.6–10.5)
CHLORIDE SERPL-SCNC: 103 MMOL/L (ref 98–107)
CO2 SERPL-SCNC: 26 MMOL/L (ref 22–29)
CREAT SERPL-MCNC: 2.49 MG/DL (ref 0.57–1)
EGFRCR SERPLBLD CKD-EPI 2021: 18.1 ML/MIN/1.73
GLUCOSE SERPL-MCNC: 114 MG/DL (ref 65–99)
POTASSIUM SERPL-SCNC: 3.6 MMOL/L (ref 3.5–5.2)
SODIUM SERPL-SCNC: 139 MMOL/L (ref 136–145)

## 2024-03-15 PROCEDURE — 80048 BASIC METABOLIC PNL TOTAL CA: CPT | Performed by: FAMILY MEDICINE

## 2024-03-15 PROCEDURE — G0378 HOSPITAL OBSERVATION PER HR: HCPCS

## 2024-03-15 RX ADMIN — DOCUSATE SODIUM 100 MG: 100 CAPSULE, LIQUID FILLED ORAL at 09:04

## 2024-03-15 RX ADMIN — SPIRONOLACTONE 25 MG: 25 TABLET ORAL at 09:03

## 2024-03-15 RX ADMIN — HYDRALAZINE HYDROCHLORIDE 100 MG: 50 TABLET ORAL at 09:04

## 2024-03-15 RX ADMIN — METOPROLOL SUCCINATE 25 MG: 25 TABLET, EXTENDED RELEASE ORAL at 09:04

## 2024-03-15 RX ADMIN — FUROSEMIDE 40 MG: 40 TABLET ORAL at 09:03

## 2024-03-15 RX ADMIN — GLIPIZIDE 2.5 MG: 5 TABLET ORAL at 09:04

## 2024-03-15 RX ADMIN — APIXABAN 2.5 MG: 2.5 TABLET, FILM COATED ORAL at 09:03

## 2024-03-15 RX ADMIN — LOSARTAN POTASSIUM 100 MG: 50 TABLET, FILM COATED ORAL at 09:04

## 2024-03-15 RX ADMIN — SODIUM BICARBONATE 650 MG: 650 TABLET ORAL at 09:04

## 2024-03-15 RX ADMIN — ATORVASTATIN CALCIUM 20 MG: 10 TABLET, FILM COATED ORAL at 09:03

## 2024-03-15 RX ADMIN — MAGNESIUM GLUCONATE 500 MG ORAL TABLET 400 MG: 500 TABLET ORAL at 09:04

## 2024-03-15 RX ADMIN — PANTOPRAZOLE SODIUM 40 MG: 40 TABLET, DELAYED RELEASE ORAL at 05:05

## 2024-03-15 RX ADMIN — Medication 10 ML: at 09:08

## 2024-03-15 NOTE — PLAN OF CARE
Goal Outcome Evaluation:  Plan of Care Reviewed With: patient        Progress: no change  Outcome Evaluation: Patient had no complaints this shift. Seemed to rest well. Remains on RA with good O2 sats. Potassium replacement protocol flagged this morning but d/t pts creatinine clearance it was recommended potassium not be given. MD contacted and agreed to hold potassium replacement. Pt to possibly go home today? Af 51-75 with a a low of 37 with pvc's and couplets per tele. Call light in reach.

## 2024-03-15 NOTE — DISCHARGE SUMMARY
[facility] Medicine Services  DISCHARGE SUMMARY       Date of Admission: 3/13/2024  Date of Discharge:  3/15/2024  Primary Care Physician: Zachariah Huffman MD    Discharge Diagnoses:  Active Hospital Problems    Diagnosis     **Acute on chronic heart failure with preserved ejection fraction (HFpEF)     Severe pulmonary hypertension     Chronic anticoagulation     Stage 4 chronic kidney disease     Type 2 diabetes mellitus with hyperglycemia, without long-term current use of insulin          Presenting Problem/History of Present Illness:  Acute on chronic diastolic CHF (congestive heart failure) [I50.33]     Chief Complaint on Day of Discharge:   No complaint    History of Present Illness on Day of Discharge:   The patient is at her baseline.  No oxygen requirement noted.  Amlodipine has been held and the patient has subsequently had significant improvement and peripheral edema with a combination of diuresis and discontinuation of amlodipine.  She is anxious for discharge and is appropriate to return home today.    Hospital Course     This 89-year-old female presents to the emergency department with a chief complaint of increasing shortness of breath.  The patient notes that she awakened this morning short of breath and was much worse than typical.  Over the past 2-3 weeks she has noted slowly increasing dyspnea with exertion.  She now comes in very short of breath with minimal exertion beginning this morning.  Workup in the emergency department reveals a creatinine at baseline at 2.33 with BUN 73 and glucose 215.  The remainder of CMP is unremarkable.  PT and PTT are unremarkable considering Eliquis dosing.  BNP is elevated at 10,344.  CBC is unremarkable except hemoglobin 9.4.  Chest x-ray shows coarse markings and bronchial wall thickening with benign stability compared to previous films.  Treatment Plan  Admit to telemetry  Lasix 60 mg IV every 12 hours x 2 doses  High  "resolution CT scan of the chest  Resume the bulk of home medications  Discontinue amlodipine secondary to peripheral edema  Daily BMP    On the day after admission, IV diuretics were discontinued after 2100 cc out.  High-resolution CT scan of the chest was ordered at the time of admission showing no evidence of intrinsic lung disease and no evidence of recurrent pulmonary emboli.  Pulmonary hypertension appears to be on the basis of renal failure.  She was shifted to oral furosemide from her home Bumex.  She continues to diurese appropriately.  Creatinine bumped slightly on the day of discharge to 2.49 with a baseline of 2.3.  She will be changed back to Bumex at the time of discharge.  Amlodipine will be discontinued because of significant peripheral edema. She is stable for DC today.       Result Review    Result Review:  I have personally reviewed the results from the time of this admission to 3/15/2024 13:50 CDT and agree with these findings:  []  Laboratory  []  Microbiology  []  Radiology  []  EKG/Telemetry   []  Cardiology/Vascular   []  Pathology  []  Old records  []  Other:    Condition on Discharge:    Stable and improved    Physical Exam on Discharge:  /52 (BP Location: Left arm, Patient Position: Lying)   Pulse 60   Temp 97.6 °F (36.4 °C) (Oral)   Resp 16   Ht 157.5 cm (62.01\")   Wt 64.9 kg (143 lb)   LMP  (LMP Unknown)   SpO2 96%   BMI 26.15 kg/m²   Physical Exam       Constitutional:       General: She is not in acute distress.     Appearance: Normal appearance. She is normal weight.   HENT:      Head: Normocephalic and atraumatic.      Right Ear: External ear normal.      Left Ear: External ear normal.      Nose: Nose normal.      Mouth: Mucous membranes are moist.      Pharynx: Oropharynx is clear.   Eyes:      General: No scleral icterus.     Conjunctiva/sclera: Conjunctivae normal.   Cardiovascular:      Rate and Rhythm: Normal rate and regular rhythm.      Pulses: Normal pulses.      " Heart sounds: Normal heart sounds. No murmur heard.  Pulmonary:      Effort: Pulmonary effort is normal.      Breath sounds: Normal.   Abdominal:      General: Bowel sounds are normal.      Palpations: Abdomen is soft. There is no mass.      Tenderness: There is no abdominal tenderness.   Musculoskeletal:         General: Normal range of motion.      Right lower leg: Edema present.      Left lower leg: Edema present.   Skin:     General: Skin is warm and dry.      Coloration: Skin is not pale.   Neurological:      General: No focal deficit present.      Mental Status: She is alert and oriented to person, place, and time. Mental status is at baseline.   Psychiatric:         Mood and Affect: Mood normal.         Judgment: Judgment normal.     Discharge Disposition:  Home or Self Care    Discharge Medications:     Discharge Medications        Continue These Medications        Instructions Start Date   apixaban 2.5 MG tablet tablet  Commonly known as: ELIQUIS   2.5 mg, Oral, 2 Times Daily      bumetanide 1 MG tablet  Commonly known as: Bumex   1 mg, Oral, 2 Times Daily      cholecalciferol 25 MCG (1000 UT) tablet   1,000 Units, Oral, Daily      Cyanocobalamin 1000 MCG/ML kit   1 mL, Injection, Weekly,  For 3 weeks then monthly thereafter.        digoxin 125 MCG tablet  Commonly known as: LANOXIN   125 mcg, Oral, Every Other Day, Sun, Tues, Thurs, and Sat      docusate sodium 100 MG capsule  Commonly known as: COLACE   100 mg, Oral, 2 Times Daily PRN      fenofibrate 145 MG tablet  Commonly known as: TRICOR   145 mg, Oral, Daily      ferrous sulfate 325 (65 FE) MG tablet   325 mg, Oral, Daily With Breakfast      fluticasone 50 MCG/ACT nasal spray  Commonly known as: FLONASE   2 sprays, Nasal, Daily      glipizide 5 MG ER tablet  Commonly known as: GLUCOTROL XL   5 mg, Oral, Daily      hydrALAZINE 100 MG tablet  Commonly known as: APRESOLINE   100 mg, Oral, 3 Times Daily      losartan 100 MG tablet  Commonly known as:  COZAAR   100 mg, Oral, Daily      magnesium oxide 400 (241.3 Mg) MG tablet tablet  Commonly known as: MAGOX   400 mg, Oral, Daily      metoprolol succinate XL 25 MG 24 hr tablet  Commonly known as: TOPROL-XL   25 mg, Oral, 2 Times Daily      omeprazole 40 MG capsule  Commonly known as: priLOSEC   40 mg, Oral, Daily      simvastatin 40 MG tablet  Commonly known as: ZOCOR   40 mg, Oral, Nightly      sodium bicarbonate 650 MG tablet   650 mg, Oral, Daily      spironolactone 25 MG tablet  Commonly known as: ALDACTONE   25 mg, Oral, Daily      Trulicity 3 MG/0.5ML solution pen-injector  Generic drug: Dulaglutide   3 mg, Subcutaneous, Weekly      vitamin C 500 MG tablet  Commonly known as: ASCORBIC ACID   500 mg, Oral, Daily             Stop These Medications      amLODIPine 10 MG tablet  Commonly known as: NORVASC              Discharge Diet:   Diet Instructions       Diet: Diabetic Diets; Consistent Carbohydrate; Thin (IDDSI 0)      Discharge Diet: Diabetic Diets    Diabetic Diet: Consistent Carbohydrate    Fluid Consistency: Thin (IDDSI 0)            Discharge Care Plan / Instructions:   DC home    Activity at Discharge:   Activity Instructions       Activity as Tolerated              Follow-up Appointments:  Follow up with PCP next week    Electronically signed by Kwame Laughlin DO, 03/15/24, 13:50 CDT.    Time: Discharge less than 30 min    Part of this note may be an electronic transcription/translation of spoken language to printed text using the Dragon Dictation system.

## 2024-03-16 NOTE — OUTREACH NOTE
Prep Survey      Flowsheet Row Responses   [facility] patient discharged from? [place]   Is LACE score < 7 ? No   Eligibility [facility]   Date of Admission 03/13/24   Date of Discharge 03/15/24   Discharge Disposition Home or Self Care   Discharge diagnosis Shortness of breath, lower extremity edemaAcute on chronic heart failure with preserved ejection fraction   Does the patient have one of the following disease processes/diagnoses(primary or secondary)? CHF   Does the patient have Home health ordered? No   Is there a DME ordered? No   Prep survey completed? Yes            MY AYERS - Registered Nurse

## 2024-03-18 ENCOUNTER — TRANSITIONAL CARE MANAGEMENT TELEPHONE ENCOUNTER (OUTPATIENT)
Dept: CALL CENTER | Facility: HOSPITAL | Age: 89
End: 2024-03-18
Payer: MEDICARE

## 2024-03-18 ENCOUNTER — TELEPHONE (OUTPATIENT)
Dept: INTERNAL MEDICINE | Facility: CLINIC | Age: 89
End: 2024-03-18

## 2024-03-18 NOTE — TELEPHONE ENCOUNTER
Caller: [facility] - [address] - [phone]  - [phone]    Relationship: Pharmacy    Best call back number: [phone]     Which medication are you concerned about: TRULICITY     Who prescribed you this medication: IZABELA    When did you start taking this medication: ON-GOING    What are your concerns: DUE TO PRICE INSURANCE WANTS TO CHANGE FROM TRULICITY TO OZEMPIC

## 2024-03-18 NOTE — OUTREACH NOTE
Call Center TCM Note      Flowsheet Row Responses   [facility] patient discharged from? [place]   Does the patient have one of the following disease processes/diagnoses(primary or secondary)? CHF   TCM attempt successful? Yes   Call start time 1159   Call end time 1203   Discharge diagnosis Shortness of breath, lower extremity edemaAcute on chronic heart failure with preserved ejection fraction   Meds reviewed with patient/caregiver? Yes   Is the patient having any side effects they believe may be caused by any medication additions or changes? No   Does the patient have all medications ordered at discharge? N/A   Is the patient taking all medications as directed (includes completed medication regime)? Yes   Comments Appt with Dr. Huffman on 3/21 @ 10:15   Does the patient have an appointment with their PCP within 7-14 days of discharge? Yes   Psychosocial issues? No   Did the patient receive a copy of their discharge instructions? Yes   Nursing interventions Reviewed instructions with patient   What is the patient's perception of their health status since discharge? Improving   Nursing interventions Nurse provided patient education   Is the patient able to teach back signs and symptoms of worsening condition? (i.e. weight gain, shortness of air, etc.) Yes   If the patient is a current smoker, are they able to teach back resources for cessation? Not a smoker   Is the patient/caregiver able to teach back the hierarchy of who to call/visit for symptoms/problems? PCP, Specialist, Home health nurse, Urgent Care, ED, 911 Yes   CHF Zone this Call Green Zone   Green Zone No new or worsening shortness of breath, Patient reports doing well, Physical activity level is normal for you, No new swelling -  feet, ankles and legs look normal for you, No chest pain   Green Zone Interventions Daily weight check, Meds as directed, Low sodium diet, Follow up visits planned   TCM call completed? Yes   Wrap up additional comments  States she did have a 1lb weight gain this morning.  She will keep a close eye on her weight.  No issues with her breathing.   Call end time 1203             Edilma Boateng LPN    3/18/2024, 12:05 CDT

## 2024-03-21 ENCOUNTER — OFFICE VISIT (OUTPATIENT)
Dept: INTERNAL MEDICINE | Facility: CLINIC | Age: 89
End: 2024-03-21
Payer: MEDICARE

## 2024-03-21 VITALS
BODY MASS INDEX: 25.03 KG/M2 | SYSTOLIC BLOOD PRESSURE: 168 MMHG | HEIGHT: 62 IN | TEMPERATURE: 98.2 F | DIASTOLIC BLOOD PRESSURE: 62 MMHG | WEIGHT: 136 LBS | HEART RATE: 77 BPM | OXYGEN SATURATION: 100 %

## 2024-03-21 DIAGNOSIS — N18.4 STAGE 4 CHRONIC KIDNEY DISEASE: ICD-10-CM

## 2024-03-21 DIAGNOSIS — I10 BENIGN ESSENTIAL HTN: Chronic | ICD-10-CM

## 2024-03-21 DIAGNOSIS — R60.0 BILATERAL LOWER EXTREMITY EDEMA: ICD-10-CM

## 2024-03-21 DIAGNOSIS — I48.19 PERSISTENT ATRIAL FIBRILLATION: ICD-10-CM

## 2024-03-21 DIAGNOSIS — E11.65 TYPE 2 DIABETES MELLITUS WITH HYPERGLYCEMIA, WITHOUT LONG-TERM CURRENT USE OF INSULIN: Primary | ICD-10-CM

## 2024-03-21 DIAGNOSIS — I27.20 SEVERE PULMONARY HYPERTENSION: ICD-10-CM

## 2024-03-21 DIAGNOSIS — I50.32 CHRONIC DIASTOLIC (CONGESTIVE) HEART FAILURE: ICD-10-CM

## 2024-03-21 LAB — HBA1C MFR BLD: 5.8 % (ref 4.5–5.7)

## 2024-03-21 RX ORDER — BUMETANIDE 1 MG/1
TABLET ORAL
Qty: 45 TABLET | Refills: 2 | Status: SHIPPED | OUTPATIENT
Start: 2024-03-21

## 2024-03-21 RX ORDER — CARVEDILOL 6.25 MG/1
6.25 TABLET ORAL 2 TIMES DAILY WITH MEALS
Qty: 60 TABLET | Refills: 2 | Status: SHIPPED | OUTPATIENT
Start: 2024-03-21

## 2024-03-21 RX ORDER — DAPAGLIFLOZIN 10 MG/1
10 TABLET, FILM COATED ORAL DAILY
Qty: 90 TABLET | Refills: 2 | Status: SHIPPED | OUTPATIENT
Start: 2024-03-21

## 2024-03-21 NOTE — PROGRESS NOTES
"    CC: hospital follow-up for CHF    History:  Libia Perales is a 89 y.o. female who presents today for transitional care management after hospitalization.    Date of Discharge: 3/15/2024  TCM call successfully made on: 3/18/2024 by Edilma Boateng LPN    Discharge summary per Dr. Laughlin  \"This 89-year-old female presents to the emergency department with a chief complaint of increasing shortness of breath.  The patient notes that she awakened this morning short of breath and was much worse than typical.  Over the past 2-3 weeks she has noted slowly increasing dyspnea with exertion.  She now comes in very short of breath with minimal exertion beginning this morning.  Workup in the emergency department reveals a creatinine at baseline at 2.33 with BUN 73 and glucose 215.  The remainder of CMP is unremarkable.  PT and PTT are unremarkable considering Eliquis dosing.  BNP is elevated at 10,344.  CBC is unremarkable except hemoglobin 9.4.  Chest x-ray shows coarse markings and bronchial wall thickening with benign stability compared to previous films.  Treatment Plan  Admit to telemetry  Lasix 60 mg IV every 12 hours x 2 doses  High resolution CT scan of the chest  Resume the bulk of home medications  Discontinue amlodipine secondary to peripheral edema  Daily BMP     On the day after admission, IV diuretics were discontinued after 2100 cc out.  High-resolution CT scan of the chest was ordered at the time of admission showing no evidence of intrinsic lung disease and no evidence of recurrent pulmonary emboli.  Pulmonary hypertension appears to be on the basis of renal failure.  She was shifted to oral furosemide from her home Bumex.  She continues to diurese appropriately.  Creatinine bumped slightly on the day of discharge to 2.49 with a baseline of 2.3.  She will be changed back to Bumex at the time of discharge.  Amlodipine will be discontinued because of significant peripheral edema. She is stable for DC today. " "\"      ROS:  Review of Systems    Ms. Perales  reports that she has never smoked. She has never used smokeless tobacco. She reports that she does not drink alcohol and does not use drugs.      Current Outpatient Medications:     apixaban (ELIQUIS) 2.5 MG tablet tablet, Take 1 tablet by mouth 2 (Two) Times a Day., Disp: , Rfl:     bumetanide (Bumex) 1 MG tablet, Take 1 tablet by mouth Daily AND 0.5 tablets Every Afternoon. Take WHOLE tablet instead of HALF tablet in the afternoon with weight gain or increased fluid, Disp: 45 tablet, Rfl: 2    Cholecalciferol 25 MCG (1000 UT) tablet, Take 1 tablet by mouth Daily., Disp: , Rfl:     Cyanocobalamin 1000 MCG/ML kit, Inject 1 mL as directed 1 (One) Time Per Week.  For 3 weeks then monthly thereafter., Disp: , Rfl:     digoxin (LANOXIN) 125 MCG tablet, Take 1 tablet by mouth Every Other Day. Sun, Tues, Thurs, and Sat, Disp: , Rfl:     docusate sodium (COLACE) 100 MG capsule, Take 1 capsule by mouth 2 (Two) Times a Day As Needed for Constipation., Disp: , Rfl:     fenofibrate (TRICOR) 145 MG tablet, Take 1 tablet by mouth Daily., Disp: , Rfl:     ferrous sulfate 325 (65 FE) MG tablet, Take 1 tablet by mouth Daily With Breakfast., Disp: , Rfl:     fluticasone (FLONASE) 50 MCG/ACT nasal spray, 2 sprays into the nostril(s) as directed by provider Daily., Disp: 9.9 mL, Rfl: 0    hydrALAZINE (APRESOLINE) 100 MG tablet, Take 1 tablet by mouth 3 (Three) Times a Day., Disp: , Rfl:     losartan (COZAAR) 100 MG tablet, Take 1 tablet by mouth Daily., Disp: , Rfl:     magnesium oxide (MAGOX) 400 (241.3 Mg) MG tablet tablet, Take 1 tablet by mouth Daily., Disp: , Rfl:     omeprazole (priLOSEC) 40 MG capsule, Take 1 capsule by mouth Daily., Disp: , Rfl:     simvastatin (ZOCOR) 40 MG tablet, Take 1 tablet by mouth Every Night., Disp: , Rfl:     sodium bicarbonate 650 MG tablet, Take 1 tablet by mouth Daily., Disp: 90 tablet, Rfl: 2    spironolactone (ALDACTONE) 25 MG tablet, Take 1 tablet " "by mouth Daily., Disp: , Rfl:     vitamin C (ASCORBIC ACID) 500 MG tablet, Take 1 tablet by mouth Daily., Disp: 90 tablet, Rfl: 3    carvedilol (Coreg) 6.25 MG tablet, Take 1 tablet by mouth 2 (Two) Times a Day With Meals., Disp: 60 tablet, Rfl: 2    dapagliflozin Propanediol (Farxiga) 10 MG tablet, Take 10 mg by mouth Daily., Disp: 90 tablet, Rfl: 2      OBJECTIVE:  /62 (BP Location: Left arm, Patient Position: Sitting, Cuff Size: Adult)   Pulse 77   Temp 98.2 °F (36.8 °C) (Infrared)   Ht 157.5 cm (62\")   Wt 61.7 kg (136 lb)   LMP  (LMP Unknown)   SpO2 100%   BMI 24.87 kg/m²    Physical Exam  Vitals and nursing note reviewed.   Constitutional:       Appearance: She is not ill-appearing.   Eyes:      General: No scleral icterus.     Conjunctiva/sclera: Conjunctivae normal.   Cardiovascular:      Rate and Rhythm: Normal rate. Rhythm irregular.      Heart sounds: Murmur heard.   Pulmonary:      Effort: Pulmonary effort is normal. No respiratory distress.   Musculoskeletal:      Right lower leg: Edema (trace) present.      Left lower leg: Edema (trace) present.   Skin:     General: Skin is warm.      Coloration: Skin is not pale.   Neurological:      General: No focal deficit present.      Mental Status: She is alert and oriented to person, place, and time.   Psychiatric:         Mood and Affect: Mood normal.         Behavior: Behavior normal.                Assessment/Plan    Diagnoses and all orders for this visit:    1. Type 2 diabetes mellitus with hyperglycemia, without long-term current use of insulin (Primary)  -     POC Glycated Hemoglobin, Total  -     dapagliflozin Propanediol (Farxiga) 10 MG tablet; Take 10 mg by mouth Daily.  Dispense: 90 tablet; Refill: 2    2. Bilateral lower extremity edema  -     bumetanide (Bumex) 1 MG tablet; Take 1 tablet by mouth Daily AND 0.5 tablets Every Afternoon. Take WHOLE tablet instead of HALF tablet in the afternoon with weight gain or increased fluid  " Dispense: 45 tablet; Refill: 2    3. Benign essential HTN  -     carvedilol (Coreg) 6.25 MG tablet; Take 1 tablet by mouth 2 (Two) Times a Day With Meals.  Dispense: 60 tablet; Refill: 2    4. Chronic diastolic (congestive) heart failure    5. Persistent atrial fibrillation    6. Stage 4 chronic kidney disease    7. Severe pulmonary hypertension      Patient apparently diuresed well in the hospital.  The patient was taken off amlodipine due to peripheral edema which is okay except there was no replacement for her BP.  Her home readings consistently elevated and elevated today.  Need adequate BP control to avoid further exacerbation of pulmonary hypertension and diastolic dysfunction.  I will switch metoprolol to carvedilol for better BP control and continued chronotropic control for her atrial fibrillation which is likely contributing to diastolic dysfunction as well.     Patient A1c adequately controlled at < 6.5%.  Patient did not start farxiga due to cost.  Recommend that that patient stop trulicity (has not had in 2 weeks) and start farxiga.  Patient can also stop glipizide to avoid hypoglycemia in an elderly CKD patient.      For hypertension:  1) Continue to hold amlodipine  2) Switch metoprolol to carvedilol    For diabetes:  1) Stop trulicity and glipizide  2) Start farxiga - Dr. Werner to get labs in 1 month, reached out and passed information to Dr. Werner    Bumex 1 mg in AM, 0.5 mg in PM - Can take extra 0.5 mg with worse swelling or shortness of breath or weight gain    Elevate lower extremities  Low salt          Result Review :  The following data was reviewed by: Zachariah Huffman MD on 03/21/2024:  CMP          3/13/2024    13:30 3/14/2024    04:43 3/15/2024    03:26   CMP   Glucose 215  106  114    BUN 73  65  73    Creatinine 2.33  2.21  2.49    EGFR 19.6  20.8  18.1    Sodium 141  143  139    Potassium 4.1  3.6  3.6    Chloride 105  104  103    Calcium 9.6  9.5  9.2    Total Protein 7.4      Albumin  4.0      Globulin 3.4      Total Bilirubin 0.6      Alkaline Phosphatase 74      AST (SGOT) 34      ALT (SGPT) 18      Albumin/Globulin Ratio 1.2      BUN/Creatinine Ratio 31.3  29.4  29.3    Anion Gap 14.0  14.0  10.0      CBC          8/15/2023    08:01 3/13/2024    13:30   CBC   WBC 5.67  5.05    RBC 3.51  3.18    Hemoglobin 10.5  9.4    Hematocrit 31.7  29.5    MCV 90.3  92.8    MCH 29.9  29.6    MCHC 33.1  31.9    RDW 12.4  14.8    Platelets 219  171      CBC w/diff          8/15/2023    08:01 3/13/2024    13:30   CBC w/Diff   WBC 5.67  5.05    RBC 3.51  3.18    Hemoglobin 10.5  9.4    Hematocrit 31.7  29.5    MCV 90.3  92.8    MCH 29.9  29.6    MCHC 33.1  31.9    RDW 12.4  14.8    Platelets 219  171    Neutrophil Rel % 68.8  77.0    Immature Granulocyte Rel %  0.4    Lymphocyte Rel % 21.3  14.1    Monocyte Rel % 6.9  7.3    Eosinophil Rel % 1.8  1.0    Basophil Rel % 0.5  0.2      TSH          8/15/2023    08:01   TSH   TSH 3.490      BMP          3/13/2024    13:30 3/14/2024    04:43 3/15/2024    03:26   BMP   BUN 73  65  73    Creatinine 2.33  2.21  2.49    Sodium 141  143  139    Potassium 4.1  3.6  3.6    Chloride 105  104  103    CO2 22.0  25.0  26.0    Calcium 9.6  9.5  9.2      A1C Last 3 Results          8/15/2023    08:01 12/20/2023    10:47   HGBA1C Last 3 Results   Hemoglobin A1C 8.90  5.4      Microalbumin          8/15/2023    08:01   Microalbumin   Microalbumin, Urine 98.7      UA          8/15/2023    08:01 3/13/2024    17:11   Urinalysis   Specific Gravity, UA  1.008    Ketones, UA  Negative    Blood, UA Negative  Negative    Leukocytes, UA See below:  Negative    Nitrite, UA Negative  Negative    RBC, UA 0-2     Bacteria, UA Comment              An After Visit Summary was printed and given to the patient at discharge.  Return in about 2 months (around 5/21/2024), or if symptoms worsen or fail to improve, for Recheck, Medicare Wellness. Sooner if problems arise.         OLIVA Huffman MD,  FHM, FACP  Electronically signed by Zachariah Huffman MD, 03/21/24, 12:17 PM CDT.

## 2024-03-25 ENCOUNTER — NURSE TRIAGE (OUTPATIENT)
Dept: CALL CENTER | Facility: HOSPITAL | Age: 89
End: 2024-03-25
Payer: MEDICARE

## 2024-03-25 NOTE — TELEPHONE ENCOUNTER
Reason for Disposition   Blood glucose  mg/dL (3.9 -13.3 mmol/L)    Additional Information   Negative: Unconscious or difficult to awaken   Negative: Acting confused (e.g., disoriented, slurred speech)   Negative: Very weak (e.g., can't stand)   Negative: Sounds like a life-threatening emergency to the triager   Negative: [1] Vomiting AND [2] signs of dehydration (e.g., very dry mouth, lightheaded, dark urine)   Negative: [1] Blood glucose > 240 mg/dL (13.3 mmol/L) AND [2] rapid breathing   Negative: Blood glucose > 500 mg/dL (27.8 mmol/L)   Negative: [1] Blood glucose > 240 mg/dL (13.3 mmol/L) AND [2] urine ketones moderate-large (or more than 1+)   Negative: [1] Blood glucose > 240 mg/dL (13.3 mmol/L) AND [2] blood ketones > 1.4 mmol/L   Negative: [1] Blood glucose > 240 mg/dL (13.3 mmol/L) AND [2] vomiting AND [3] unable to check for ketones (in blood or urine)   Negative: [1] New-onset diabetes suspected (e.g., frequent urination, weak, weight loss) AND [2] vomiting or rapid breathing   Negative: Vomiting lasts > 4 hours   Negative: Patient sounds very sick or weak to the triager   Negative: Fever > 100.4 F (38.0 C)   Negative: Blood glucose > 400 mg/dL (22.2 mmol/L)   Negative: [1] Blood glucose > 300 mg/dL (16.7 mmol/L) AND [2] two or more times in a row   Negative: Urine ketones moderate - large (or blood ketones > 1.4 mmol/L)   Negative: [1] Symptoms of high blood sugar (e.g., abnormally thirsty, frequent urination, weight loss) AND [2] not able to test blood glucose AND [3] pregnant   Negative: [1] Caller has URGENT medication or insulin pump question AND [2] triager unable to answer question   Negative: [1] Blood glucose > 240 mg/dL (13.3 mmol/L) AND [2] pregnant   Negative: [1] Symptoms of high blood sugar (e.g., abnormally thirsty, frequent urination, weight loss) AND [2] not able to test blood glucose   Negative: New-onset diabetes suspected (e.g., abnormally thirsty, frequent urination, weight  "loss)   Negative: [1] Caller has NON-URGENT medication or insulin pump question AND [2] triager unable to answer question   Negative: [1] Blood glucose > 300 mg/dL (16.7 mmol/L) AND [2] uses insulin (e.g., insulin-dependent, all people with type 1 diabetes)   Negative: [1] Blood glucose 240 - 300 mg/dL (13.3 - 16.7 mmol/L) AND [2] uses insulin (e.g., insulin-dependent, all people with type 1 diabetes)   Negative: [1] Blood glucose > 300 mg/dL (16.7 mmol/L) AND [2] does not  use insulin (e.g., not insulin-dependent; most people with type 2 diabetes)   Negative: [1] Blood glucose 240 - 300 mg/dL (13.3 - 16.7 mmol/L) AND [2] does not  use insulin (e.g., not insulin-dependent; most people with type 2 diabetes)    Answer Assessment - Initial Assessment Questions  1. BLOOD GLUCOSE: \"What is your blood glucose level?\"       150 today, 177 yesterday, Saturday was 111 (normal range)  2. ONSET: \"When did you check the blood glucose?\"      This morning.  3. USUAL RANGE: \"What is your glucose level usually?\" (e.g., usual fasting morning value, usual evening value)      90s and low 100s  4. KETONES: \"Do you check for ketones (urine or blood test strips)?\" If Yes, ask: \"What does the test show now?\"       No.   5. TYPE 1 or 2:  \"Do you know what type of diabetes you have?\"  (e.g., Type 1, Type 2, Gestational; doesn't know)       Type 2  6. INSULIN: \"Do you take insulin?\" \"What type of insulin(s) do you use? What is the mode of delivery? (syringe, pen; injection or pump)?\"       No.  7. DIABETES PILLS: \"Do you take any pills for your diabetes?\" If Yes, ask: \"Have you missed taking any pills recently?\"      Farxiga (changed to this when discharged from hospital on 3/21)  8. OTHER SYMPTOMS: \"Do you have any symptoms?\" (e.g., fever, frequent urination, difficulty breathing, dizziness, weakness, vomiting)      No.   9. PREGNANCY: \"Is there any chance you are pregnant?\" \"When was your last menstrual period?\"      No.    Protocols used: " Diabetes - High Blood Sugar-ADULT-AH

## 2024-03-26 ENCOUNTER — READMISSION MANAGEMENT (OUTPATIENT)
Dept: CALL CENTER | Facility: HOSPITAL | Age: 89
End: 2024-03-26
Payer: MEDICARE

## 2024-03-26 NOTE — OUTREACH NOTE
CHF Week 2 Survey      Flowsheet Row Responses   [facility], [place] patient discharged from? [place]   Does the patient have one of the following disease processes/diagnoses(primary or secondary)? CHF   Week 2 attempt successful? Yes   Call start time 1214   Call end time 1221   Discharge diagnosis Acute on chronic heart failure with preserved ejection fraction (HFpEF)       Severe pulmonary hypertension       Chronic anticoagulation       Stage 4 chronic kidney disease       Type 2 diabetes mellitus with hyperglycemia, without long-term current use of insulin   Person spoke with today (if not patient) and relationship patient   Meds reviewed with patient/caregiver? Yes   Does the patient have all medications ordered at discharge? N/A   Is the patient taking all medications as directed (includes completed medication regime)? Yes   Does the patient have a primary care provider?  Yes   Comments regarding PCP Patient has had follow up with PCP Dr Huffman since discharge.   Has the patient kept scheduled appointments due by today? Yes   Has home health visited the patient within 72 hours of discharge? N/A   Pulse Ox monitoring None   Psychosocial issues? No   Did the patient receive a copy of their discharge instructions? Yes   Nursing interventions Reviewed instructions with patient   What is the patient's perception of their health status since discharge? Improving   Nursing interventions Nurse provided patient education   Is the patient able to teach back signs and symptoms of worsening condition? (i.e. weight gain, shortness of air, etc.) Yes   If the patient is a current smoker, are they able to teach back resources for cessation? Not a smoker   Is the patient/caregiver able to teach back the hierarchy of who to call/visit for symptoms/problems? PCP, Specialist, Home health nurse, Urgent Care, ED, 911 Yes   CHF Zone this Call Green Zone   Green Zone Patient reports doing well, No new or worsening shortness of breath,  Weight check stable, No new swelling -  feet, ankles and legs look normal for you   Green Zone Interventions Daily weight check, Low sodium diet, Follow up visits planned, Meds as directed   CHF Week 2 call completed? Yes   Is the patient interested in additional calls from an ambulatory ? No   Would this patient benefit from a Referral to [facility] Social Work? No   Call end time 1221            CHILANGO BERNAL - Registered Nurse

## 2024-03-29 RX ORDER — NIFEDIPINE 60 MG/1
60 TABLET, EXTENDED RELEASE ORAL DAILY
Qty: 30 TABLET | Refills: 2 | Status: SHIPPED | OUTPATIENT
Start: 2024-03-29

## 2024-04-01 ENCOUNTER — TELEPHONE (OUTPATIENT)
Dept: INTERNAL MEDICINE | Facility: CLINIC | Age: 89
End: 2024-04-01

## 2024-04-01 NOTE — TELEPHONE ENCOUNTER
Pharmacy Name: [facility]     Pharmacy representative name: JORDEN    Pharmacy representative phone number: [phone]     What medication are you calling in regards to: NIFEDPINE    What question does the pharmacy have: PATIENT IS UNAWARE OF WHY THEY NEED THIS NEW MEDICATION. JUST WANT TO VERIFY    Who is the provider that prescribed the medication: IZABELA     Additional notes:

## 2024-04-01 NOTE — TELEPHONE ENCOUNTER
"Notified Zev with [facility] this medication was added due to patient's BP.   Notified pharmacy patient was willing to trial Nifedipine and this was sent in 3/29.       This was noted on 3/28:  \"BP running mid to low 160's systolic and mid to upper 80's diastolic, regardless of time of day.   Sugars from the past week have been 134, 176, 149, 161, 176 (today)   Weight has been 132 for the past 2-3 days.\"   "

## 2024-04-18 RX ORDER — NIFEDIPINE 90 MG/1
90 TABLET, EXTENDED RELEASE ORAL DAILY
Qty: 90 TABLET | Refills: 3 | Status: SHIPPED | OUTPATIENT
Start: 2024-04-18

## 2024-04-18 RX ORDER — GLIPIZIDE 5 MG/1
5 TABLET, FILM COATED, EXTENDED RELEASE ORAL DAILY
Qty: 90 TABLET | Refills: 3 | Status: SHIPPED | OUTPATIENT
Start: 2024-04-18

## 2024-04-18 NOTE — TELEPHONE ENCOUNTER
Pt called with BP, pulse and sugar for the past 4 days:    BP before meds:  142/63 P-55  149/88 P-59  162/82 P-59  162/76 P-56    BP after meds (checks after lunch)  161/79 P-57  169/69 P-61  173/78 P-73  162/76 P-58    Nifedipine XL 60mg qd (added 3/29)  Aldactone 25mg qd  Losartan 100mg qd  Hydralazine 100mg tid  Digoxin 125mcg qd  Bumex 1mg 1 qam, 1/2 qpm  Coreg 6.25mg bid    States sugars are >200 every day (fasting), only checks once daily.  Was on Trulicity but was d/c'd and changed to Farxiga 10mg daily.

## 2024-05-01 ENCOUNTER — APPOINTMENT (OUTPATIENT)
Dept: CT IMAGING | Facility: HOSPITAL | Age: 89
DRG: 682 | End: 2024-05-01
Payer: MEDICARE

## 2024-05-01 ENCOUNTER — APPOINTMENT (OUTPATIENT)
Dept: GENERAL RADIOLOGY | Facility: HOSPITAL | Age: 89
DRG: 682 | End: 2024-05-01
Payer: MEDICARE

## 2024-05-01 ENCOUNTER — HOSPITAL ENCOUNTER (INPATIENT)
Facility: HOSPITAL | Age: 89
LOS: 5 days | Discharge: NURSING, REHAB OR LONG-TERM CARE | DRG: 682 | End: 2024-05-06
Attending: STUDENT IN AN ORGANIZED HEALTH CARE EDUCATION/TRAINING PROGRAM | Admitting: INTERNAL MEDICINE
Payer: MEDICARE

## 2024-05-01 DIAGNOSIS — R31.9 URINARY TRACT INFECTION WITH HEMATURIA, SITE UNSPECIFIED: ICD-10-CM

## 2024-05-01 DIAGNOSIS — N17.9 AKI (ACUTE KIDNEY INJURY): Primary | ICD-10-CM

## 2024-05-01 DIAGNOSIS — R13.10 DYSPHAGIA, UNSPECIFIED TYPE: ICD-10-CM

## 2024-05-01 DIAGNOSIS — Z79.01 CHRONIC ANTICOAGULATION: ICD-10-CM

## 2024-05-01 DIAGNOSIS — R41.82 ALTERED MENTAL STATUS, UNSPECIFIED ALTERED MENTAL STATUS TYPE: ICD-10-CM

## 2024-05-01 DIAGNOSIS — T46.0X1A DIGOXIN TOXICITY, ACCIDENTAL OR UNINTENTIONAL, INITIAL ENCOUNTER: ICD-10-CM

## 2024-05-01 DIAGNOSIS — N18.4 STAGE 4 CHRONIC KIDNEY DISEASE: ICD-10-CM

## 2024-05-01 DIAGNOSIS — N39.0 URINARY TRACT INFECTION WITH HEMATURIA, SITE UNSPECIFIED: ICD-10-CM

## 2024-05-01 DIAGNOSIS — G93.41 METABOLIC ENCEPHALOPATHY: ICD-10-CM

## 2024-05-01 DIAGNOSIS — E83.52 HIGH CALCIUM LEVELS: ICD-10-CM

## 2024-05-01 DIAGNOSIS — M17.0 PRIMARY OSTEOARTHRITIS OF BOTH KNEES: ICD-10-CM

## 2024-05-01 DIAGNOSIS — R53.82 CHRONIC FATIGUE: ICD-10-CM

## 2024-05-01 DIAGNOSIS — Z74.09 IMPAIRED MOBILITY: ICD-10-CM

## 2024-05-01 PROBLEM — N30.01 ACUTE CYSTITIS WITH HEMATURIA: Status: ACTIVE | Noted: 2024-05-01

## 2024-05-01 PROBLEM — R79.89 ELEVATED TROPONIN: Status: ACTIVE | Noted: 2024-05-01

## 2024-05-01 LAB
ALBUMIN SERPL-MCNC: 4.1 G/DL (ref 3.5–5.2)
ALBUMIN/GLOB SERPL: 1.2 G/DL
ALP SERPL-CCNC: 53 U/L (ref 39–117)
ALT SERPL W P-5'-P-CCNC: 9 U/L (ref 1–33)
ANION GAP SERPL CALCULATED.3IONS-SCNC: 12 MMOL/L (ref 5–15)
AST SERPL-CCNC: 19 U/L (ref 1–32)
B PARAPERT DNA SPEC QL NAA+PROBE: NOT DETECTED
B PERT DNA SPEC QL NAA+PROBE: NOT DETECTED
BACTERIA UR QL AUTO: ABNORMAL /HPF
BASOPHILS # BLD AUTO: 0.03 10*3/MM3 (ref 0–0.2)
BASOPHILS NFR BLD AUTO: 0.6 % (ref 0–1.5)
BILIRUB SERPL-MCNC: 0.8 MG/DL (ref 0–1.2)
BILIRUB UR QL STRIP: NEGATIVE
BUN SERPL-MCNC: 73 MG/DL (ref 8–23)
BUN/CREAT SERPL: 22.8 (ref 7–25)
C PNEUM DNA NPH QL NAA+NON-PROBE: NOT DETECTED
CALCIUM SPEC-SCNC: 9.6 MG/DL (ref 8.6–10.5)
CHLORIDE SERPL-SCNC: 103 MMOL/L (ref 98–107)
CLARITY UR: CLEAR
CO2 SERPL-SCNC: 23 MMOL/L (ref 22–29)
COLOR UR: YELLOW
CREAT SERPL-MCNC: 3.2 MG/DL (ref 0.57–1)
CRP SERPL-MCNC: 0.83 MG/DL (ref 0–0.5)
DEPRECATED RDW RBC AUTO: 50 FL (ref 37–54)
DIGOXIN SERPL-MCNC: 1.4 NG/ML (ref 0.6–1.2)
EGFRCR SERPLBLD CKD-EPI 2021: 13.4 ML/MIN/1.73
EOSINOPHIL # BLD AUTO: 0.1 10*3/MM3 (ref 0–0.4)
EOSINOPHIL NFR BLD AUTO: 2.1 % (ref 0.3–6.2)
ERYTHROCYTE [DISTWIDTH] IN BLOOD BY AUTOMATED COUNT: 14.4 % (ref 12.3–15.4)
FLUAV SUBTYP SPEC NAA+PROBE: NOT DETECTED
FLUBV RNA ISLT QL NAA+PROBE: NOT DETECTED
GEN 5 2HR TROPONIN T REFLEX: 67 NG/L
GLOBULIN UR ELPH-MCNC: 3.4 GM/DL
GLUCOSE BLDC GLUCOMTR-MCNC: 132 MG/DL (ref 70–130)
GLUCOSE SERPL-MCNC: 134 MG/DL (ref 65–99)
GLUCOSE UR STRIP-MCNC: ABNORMAL MG/DL
HADV DNA SPEC NAA+PROBE: NOT DETECTED
HCOV 229E RNA SPEC QL NAA+PROBE: NOT DETECTED
HCOV HKU1 RNA SPEC QL NAA+PROBE: NOT DETECTED
HCOV NL63 RNA SPEC QL NAA+PROBE: NOT DETECTED
HCOV OC43 RNA SPEC QL NAA+PROBE: NOT DETECTED
HCT VFR BLD AUTO: 33 % (ref 34–46.6)
HGB BLD-MCNC: 10.4 G/DL (ref 12–15.9)
HGB UR QL STRIP.AUTO: ABNORMAL
HMPV RNA NPH QL NAA+NON-PROBE: NOT DETECTED
HPIV1 RNA ISLT QL NAA+PROBE: NOT DETECTED
HPIV2 RNA SPEC QL NAA+PROBE: NOT DETECTED
HPIV3 RNA NPH QL NAA+PROBE: NOT DETECTED
HPIV4 P GENE NPH QL NAA+PROBE: NOT DETECTED
HYALINE CASTS UR QL AUTO: ABNORMAL /LPF
IMM GRANULOCYTES # BLD AUTO: 0.02 10*3/MM3 (ref 0–0.05)
IMM GRANULOCYTES NFR BLD AUTO: 0.4 % (ref 0–0.5)
INR PPP: 1.38 (ref 0.91–1.09)
KETONES UR QL STRIP: NEGATIVE
LEUKOCYTE ESTERASE UR QL STRIP.AUTO: ABNORMAL
LIPASE SERPL-CCNC: 42 U/L (ref 13–60)
LYMPHOCYTES # BLD AUTO: 1.02 10*3/MM3 (ref 0.7–3.1)
LYMPHOCYTES NFR BLD AUTO: 21.4 % (ref 19.6–45.3)
M PNEUMO IGG SER IA-ACNC: NOT DETECTED
MCH RBC QN AUTO: 29.7 PG (ref 26.6–33)
MCHC RBC AUTO-ENTMCNC: 31.5 G/DL (ref 31.5–35.7)
MCV RBC AUTO: 94.3 FL (ref 79–97)
MONOCYTES # BLD AUTO: 0.36 10*3/MM3 (ref 0.1–0.9)
MONOCYTES NFR BLD AUTO: 7.6 % (ref 5–12)
NEUTROPHILS NFR BLD AUTO: 3.23 10*3/MM3 (ref 1.7–7)
NEUTROPHILS NFR BLD AUTO: 67.9 % (ref 42.7–76)
NITRITE UR QL STRIP: NEGATIVE
NRBC BLD AUTO-RTO: 0 /100 WBC (ref 0–0.2)
PH UR STRIP.AUTO: 6.5 [PH] (ref 5–8)
PLATELET # BLD AUTO: 183 10*3/MM3 (ref 140–450)
PMV BLD AUTO: 11.8 FL (ref 6–12)
POTASSIUM SERPL-SCNC: 4.8 MMOL/L (ref 3.5–5.2)
PROCALCITONIN SERPL-MCNC: 0.23 NG/ML (ref 0–0.25)
PROT SERPL-MCNC: 7.5 G/DL (ref 6–8.5)
PROT UR QL STRIP: ABNORMAL
PROTHROMBIN TIME: 17.5 SECONDS (ref 11.8–14.8)
RBC # BLD AUTO: 3.5 10*6/MM3 (ref 3.77–5.28)
RBC # UR STRIP: ABNORMAL /HPF
REF LAB TEST METHOD: ABNORMAL
RHINOVIRUS RNA SPEC NAA+PROBE: NOT DETECTED
RSV RNA NPH QL NAA+NON-PROBE: NOT DETECTED
SARS-COV-2 RNA NPH QL NAA+NON-PROBE: NOT DETECTED
SODIUM SERPL-SCNC: 138 MMOL/L (ref 136–145)
SP GR UR STRIP: 1.01 (ref 1–1.03)
SQUAMOUS #/AREA URNS HPF: ABNORMAL /HPF
TROPONIN T DELTA: 1 NG/L
TROPONIN T SERPL HS-MCNC: 66 NG/L
UROBILINOGEN UR QL STRIP: ABNORMAL
WBC # UR STRIP: ABNORMAL /HPF
WBC NRBC COR # BLD AUTO: 4.76 10*3/MM3 (ref 3.4–10.8)

## 2024-05-01 PROCEDURE — 86140 C-REACTIVE PROTEIN: CPT | Performed by: NURSE PRACTITIONER

## 2024-05-01 PROCEDURE — 81001 URINALYSIS AUTO W/SCOPE: CPT | Performed by: NURSE PRACTITIONER

## 2024-05-01 PROCEDURE — 93010 ELECTROCARDIOGRAM REPORT: CPT | Performed by: EMERGENCY MEDICINE

## 2024-05-01 PROCEDURE — 85610 PROTHROMBIN TIME: CPT | Performed by: NURSE PRACTITIONER

## 2024-05-01 PROCEDURE — 70450 CT HEAD/BRAIN W/O DYE: CPT

## 2024-05-01 PROCEDURE — 87040 BLOOD CULTURE FOR BACTERIA: CPT

## 2024-05-01 PROCEDURE — 25010000002 ENOXAPARIN PER 10 MG: Performed by: NURSE PRACTITIONER

## 2024-05-01 PROCEDURE — 25810000003 SODIUM CHLORIDE 0.9 % SOLUTION: Performed by: NURSE PRACTITIONER

## 2024-05-01 PROCEDURE — 83690 ASSAY OF LIPASE: CPT | Performed by: NURSE PRACTITIONER

## 2024-05-01 PROCEDURE — 25010000002 CEFTRIAXONE PER 250 MG: Performed by: NURSE PRACTITIONER

## 2024-05-01 PROCEDURE — 25810000003 LACTATED RINGERS PER 1000 ML: Performed by: NURSE PRACTITIONER

## 2024-05-01 PROCEDURE — 80162 ASSAY OF DIGOXIN TOTAL: CPT | Performed by: NURSE PRACTITIONER

## 2024-05-01 PROCEDURE — 71045 X-RAY EXAM CHEST 1 VIEW: CPT

## 2024-05-01 PROCEDURE — 0202U NFCT DS 22 TRGT SARS-COV-2: CPT | Performed by: NURSE PRACTITIONER

## 2024-05-01 PROCEDURE — 84484 ASSAY OF TROPONIN QUANT: CPT | Performed by: NURSE PRACTITIONER

## 2024-05-01 PROCEDURE — 85025 COMPLETE CBC W/AUTO DIFF WBC: CPT | Performed by: NURSE PRACTITIONER

## 2024-05-01 PROCEDURE — P9612 CATHETERIZE FOR URINE SPEC: HCPCS

## 2024-05-01 PROCEDURE — 93005 ELECTROCARDIOGRAM TRACING: CPT | Performed by: NURSE PRACTITIONER

## 2024-05-01 PROCEDURE — 36415 COLL VENOUS BLD VENIPUNCTURE: CPT | Performed by: NURSE PRACTITIONER

## 2024-05-01 PROCEDURE — 82948 REAGENT STRIP/BLOOD GLUCOSE: CPT

## 2024-05-01 PROCEDURE — 74176 CT ABD & PELVIS W/O CONTRAST: CPT

## 2024-05-01 PROCEDURE — 99285 EMERGENCY DEPT VISIT HI MDM: CPT

## 2024-05-01 PROCEDURE — 80053 COMPREHEN METABOLIC PANEL: CPT | Performed by: NURSE PRACTITIONER

## 2024-05-01 PROCEDURE — 84145 PROCALCITONIN (PCT): CPT | Performed by: NURSE PRACTITIONER

## 2024-05-01 RX ORDER — ONDANSETRON 4 MG/1
4 TABLET, ORALLY DISINTEGRATING ORAL EVERY 6 HOURS PRN
Status: DISCONTINUED | OUTPATIENT
Start: 2024-05-01 | End: 2024-05-06 | Stop reason: HOSPADM

## 2024-05-01 RX ORDER — ONDANSETRON 2 MG/ML
4 INJECTION INTRAMUSCULAR; INTRAVENOUS EVERY 6 HOURS PRN
Status: DISCONTINUED | OUTPATIENT
Start: 2024-05-01 | End: 2024-05-06 | Stop reason: HOSPADM

## 2024-05-01 RX ORDER — DIGOXIN 125 MCG
125 TABLET ORAL EVERY OTHER DAY
Status: DISCONTINUED | OUTPATIENT
Start: 2024-05-01 | End: 2024-05-05

## 2024-05-01 RX ORDER — BISACODYL 10 MG
10 SUPPOSITORY, RECTAL RECTAL DAILY PRN
Status: DISCONTINUED | OUTPATIENT
Start: 2024-05-01 | End: 2024-05-06 | Stop reason: HOSPADM

## 2024-05-01 RX ORDER — FAMOTIDINE 10 MG/ML
20 INJECTION, SOLUTION INTRAVENOUS EVERY 12 HOURS SCHEDULED
Status: DISCONTINUED | OUTPATIENT
Start: 2024-05-01 | End: 2024-05-03

## 2024-05-01 RX ORDER — NICOTINE POLACRILEX 4 MG
15 LOZENGE BUCCAL
Status: DISCONTINUED | OUTPATIENT
Start: 2024-05-01 | End: 2024-05-06 | Stop reason: HOSPADM

## 2024-05-01 RX ORDER — DEXTROSE MONOHYDRATE 25 G/50ML
25 INJECTION, SOLUTION INTRAVENOUS
Status: DISCONTINUED | OUTPATIENT
Start: 2024-05-01 | End: 2024-05-06 | Stop reason: HOSPADM

## 2024-05-01 RX ORDER — SODIUM CHLORIDE, SODIUM LACTATE, POTASSIUM CHLORIDE, CALCIUM CHLORIDE 600; 310; 30; 20 MG/100ML; MG/100ML; MG/100ML; MG/100ML
50 INJECTION, SOLUTION INTRAVENOUS CONTINUOUS
Status: DISCONTINUED | OUTPATIENT
Start: 2024-05-01 | End: 2024-05-06

## 2024-05-01 RX ORDER — SODIUM CHLORIDE 0.9 % (FLUSH) 0.9 %
10 SYRINGE (ML) INJECTION AS NEEDED
Status: DISCONTINUED | OUTPATIENT
Start: 2024-05-01 | End: 2024-05-06 | Stop reason: HOSPADM

## 2024-05-01 RX ORDER — SODIUM CHLORIDE 9 MG/ML
40 INJECTION, SOLUTION INTRAVENOUS AS NEEDED
Status: DISCONTINUED | OUTPATIENT
Start: 2024-05-01 | End: 2024-05-06 | Stop reason: HOSPADM

## 2024-05-01 RX ORDER — BISACODYL 5 MG/1
5 TABLET, DELAYED RELEASE ORAL DAILY PRN
Status: DISCONTINUED | OUTPATIENT
Start: 2024-05-01 | End: 2024-05-06 | Stop reason: HOSPADM

## 2024-05-01 RX ORDER — AMOXICILLIN 250 MG
2 CAPSULE ORAL 2 TIMES DAILY PRN
Status: DISCONTINUED | OUTPATIENT
Start: 2024-05-01 | End: 2024-05-06 | Stop reason: HOSPADM

## 2024-05-01 RX ORDER — SODIUM CHLORIDE 0.9 % (FLUSH) 0.9 %
10 SYRINGE (ML) INJECTION EVERY 12 HOURS SCHEDULED
Status: DISCONTINUED | OUTPATIENT
Start: 2024-05-01 | End: 2024-05-06 | Stop reason: HOSPADM

## 2024-05-01 RX ORDER — HYDRALAZINE HYDROCHLORIDE 20 MG/ML
10 INJECTION INTRAMUSCULAR; INTRAVENOUS EVERY 6 HOURS PRN
Status: DISCONTINUED | OUTPATIENT
Start: 2024-05-01 | End: 2024-05-06 | Stop reason: HOSPADM

## 2024-05-01 RX ORDER — POLYETHYLENE GLYCOL 3350 17 G/17G
17 POWDER, FOR SOLUTION ORAL DAILY PRN
Status: DISCONTINUED | OUTPATIENT
Start: 2024-05-01 | End: 2024-05-06 | Stop reason: HOSPADM

## 2024-05-01 RX ORDER — ENOXAPARIN SODIUM 100 MG/ML
1 INJECTION SUBCUTANEOUS
Status: DISCONTINUED | OUTPATIENT
Start: 2024-05-01 | End: 2024-05-04

## 2024-05-01 RX ADMIN — SODIUM CHLORIDE, POTASSIUM CHLORIDE, SODIUM LACTATE AND CALCIUM CHLORIDE 50 ML/HR: 600; 310; 30; 20 INJECTION, SOLUTION INTRAVENOUS at 23:31

## 2024-05-01 RX ADMIN — ENOXAPARIN SODIUM 60 MG: 100 INJECTION SUBCUTANEOUS at 23:40

## 2024-05-01 RX ADMIN — FAMOTIDINE 20 MG: 10 INJECTION INTRAVENOUS at 23:39

## 2024-05-01 RX ADMIN — SODIUM CHLORIDE 1000 MG: 900 INJECTION INTRAVENOUS at 20:44

## 2024-05-01 RX ADMIN — SODIUM CHLORIDE 500 ML: 9 INJECTION, SOLUTION INTRAVENOUS at 23:31

## 2024-05-01 NOTE — ED NOTES
"Pt hx dementia.  Poor hx.  When asked by navya perez why she is at hospital, she replies \"I don't know.\"  "

## 2024-05-01 NOTE — ED PROVIDER NOTES
"Subjective   History of Present Illness  Patient is a 89-year-old female presents the emergency department per EMS with altered mental status.  Patient states she is not sure while she she is here.  She states \"I just do not feel right.\"  She denies any chest pain or shortness of breath.  No nausea or vomiting.  No abdominal pain.  Evidently per EMS report patient's son called EMS and advised that she had altered mental status and was not acting right.  The son is not at bedside currently.  Patient does not know why she is here.  She has a history of hypertension, hyperlipidemia, CKD, diabetes, and atrial fibrillation.    History provided by:  Patient and EMS personnel  History limited by:  Mental status change   used: No        Review of Systems   Unable to perform ROS: Mental status change   Constitutional:         Patient is a 89-year-old female presents the emergency department per EMS with altered mental status.  Patient states she is not sure while she she is here.  She states \"I just do not feel right.\"  She denies any chest pain or shortness of breath.  No nausea or vomiting.  No abdominal pain.  Evidently per EMS report patient's son called EMS and advised that she had altered mental status and was not acting right.  The son is not at bedside currently.  Patient does not know why she is here.  She has a history of hypertension, hyperlipidemia, CKD, diabetes, and atrial fibrillation.         Past Medical History:   Diagnosis Date    Atrial fibrillation     Diabetes mellitus     Difficulty walking Approximately 2 years    GERD (gastroesophageal reflux disease)     High cholesterol     Hypertension     OA (osteoarthritis)     Pneumonia     few years ago    Stage 4 chronic kidney disease 08/18/2023       Allergies   Allergen Reactions    Singulair [Montelukast] Cough    Nsaids Other (See Comments)     Due to kidney disease.      Ace Inhibitors Cough       Past Surgical History:   Procedure " Laterality Date    BREAST BIOPSY Right     BREAST CYST ASPIRATION      HYSTERECTOMY      OOPHORECTOMY      TONSILLECTOMY         Family History   Problem Relation Age of Onset    No Known Problems Father     Hypertension Mother     No Known Problems Sister     No Known Problems Brother     No Known Problems Daughter     No Known Problems Son     No Known Problems Maternal Grandmother     No Known Problems Paternal Grandmother     No Known Problems Maternal Aunt     No Known Problems Paternal Aunt     No Known Problems Other     Colon cancer Neg Hx     BRCA 1/2 Neg Hx     Breast cancer Neg Hx     Endometrial cancer Neg Hx     Ovarian cancer Neg Hx        Social History     Socioeconomic History    Marital status:    Tobacco Use    Smoking status: Never    Smokeless tobacco: Never   Vaping Use    Vaping status: Never Used   Substance and Sexual Activity    Alcohol use: No    Drug use: No    Sexual activity: Not Currently     Partners: Female     Birth control/protection: Surgical       Prior to Admission medications    Medication Sig Start Date End Date Taking? Authorizing Provider   apixaban (ELIQUIS) 2.5 MG tablet tablet Take 1 tablet by mouth 2 (Two) Times a Day.    Raheel Underwood MD   bumetanide (Bumex) 1 MG tablet Take 1 tablet by mouth Daily AND 0.5 tablets Every Afternoon. Take WHOLE tablet instead of HALF tablet in the afternoon with weight gain or increased fluid 3/21/24   Zachariah Huffman MD   carvedilol (Coreg) 6.25 MG tablet Take 1 tablet by mouth 2 (Two) Times a Day With Meals. 3/21/24   Zachariah Huffman MD   Cholecalciferol 25 MCG (1000 UT) tablet Take 1 tablet by mouth Daily.    Raheel Underwood MD   Cyanocobalamin 1000 MCG/ML kit Inject 1 mL as directed 1 (One) Time Per Week.   For 3 weeks then monthly thereafter.    Raheel Underwood MD   dapagliflozin Propanediol (Farxiga) 10 MG tablet Take 10 mg by mouth Daily. 3/21/24   Zachariah Huffman MD   digoxin (LANOXIN) 125 MCG  "tablet Take 1 tablet by mouth Every Other Day. Sun, Tues, Thisaiah, and Sat    Raheel Underwood MD   docusate sodium (COLACE) 100 MG capsule Take 1 capsule by mouth 2 (Two) Times a Day As Needed for Constipation.    Raheel Underwood MD   fenofibrate (TRICOR) 145 MG tablet Take 1 tablet by mouth Daily.    Raheel Underwood MD   ferrous sulfate 325 (65 FE) MG tablet Take 1 tablet by mouth Daily With Breakfast.    Raheel Underwood MD   fluticasone (FLONASE) 50 MCG/ACT nasal spray 2 sprays into the nostril(s) as directed by provider Daily. 1/19/23   Sharon Corley APRN   glipizide (GLUCOTROL XL) 5 MG ER tablet Take 1 tablet by mouth Daily. 4/18/24   Zachariah Huffman MD   hydrALAZINE (APRESOLINE) 100 MG tablet Take 1 tablet by mouth 3 (Three) Times a Day.    Raheel Underwood MD   losartan (COZAAR) 100 MG tablet Take 1 tablet by mouth Daily.    Raheel Underwood MD   magnesium oxide (MAGOX) 400 (241.3 Mg) MG tablet tablet Take 1 tablet by mouth Daily.    Raheel Underwood MD   NIFEdipine XL (PROCARDIA XL) 90 MG 24 hr tablet Take 1 tablet by mouth Daily. 4/18/24   Zachariah Huffman MD   omeprazole (priLOSEC) 40 MG capsule Take 1 capsule by mouth Daily.    Raheel Underwood MD   simvastatin (ZOCOR) 40 MG tablet Take 1 tablet by mouth Every Night.    Raheel Underwood MD   sodium bicarbonate 650 MG tablet Take 1 tablet by mouth Daily. 10/9/23   Albina Padron APRN   spironolactone (ALDACTONE) 25 MG tablet Take 1 tablet by mouth Daily.    Raheel Underwood MD   vitamin C (ASCORBIC ACID) 500 MG tablet Take 1 tablet by mouth Daily. 5/17/22   Allyson Do DO       /80   Pulse 72   Temp 97.7 °F (36.5 °C) (Oral)   Resp 18   Ht 157.5 cm (62\")   Wt 61.2 kg (135 lb)   LMP  (LMP Unknown)   SpO2 100%   BMI 24.69 kg/m²     Objective   Physical Exam  Vitals and nursing note reviewed.   Constitutional:       Appearance: She is well-developed.      Comments: " Patient is alert.  No acute distress.  Vitals are stable.   HENT:      Head: Normocephalic and atraumatic.   Eyes:      Conjunctiva/sclera: Conjunctivae normal.      Pupils: Pupils are equal, round, and reactive to light.   Neck:      Thyroid: No thyromegaly.      Trachea: No tracheal deviation.   Cardiovascular:      Rate and Rhythm: Normal rate. Rhythm irregular.      Heart sounds: Normal heart sounds.   Pulmonary:      Effort: Pulmonary effort is normal. No respiratory distress.      Breath sounds: Normal breath sounds. No wheezing or rales.   Chest:      Chest wall: No tenderness.   Abdominal:      General: Bowel sounds are normal.      Palpations: Abdomen is soft.      Comments: Abdomen is soft, nondistended nontender.   Musculoskeletal:         General: Normal range of motion.      Cervical back: Normal range of motion and neck supple.   Skin:     General: Skin is warm and dry.   Neurological:      Mental Status: She is alert.      Cranial Nerves: No cranial nerve deficit.      Deep Tendon Reflexes: Reflexes are normal and symmetric.      Comments: Alert and oriented to person and place.  Patient states she does not know why she is here. No focal weakness. Dtrs intact. Pt follows instructions.    Psychiatric:         Behavior: Behavior normal.         Thought Content: Thought content normal.         Judgment: Judgment normal.         Procedures         Lab Results (last 24 hours)       Procedure Component Value Units Date/Time    Respiratory Panel PCR w/COVID-19(SARS-CoV-2) JIM/EMERALD/VELMA/PAD/COR/SHAYNA In-House, NP Swab in UTM/VTM, 2 HR TAT - Swab, Nasopharynx [[id number]]  (Normal) Collected: 05/01/24 1829    Specimen: Swab from Nasopharynx Updated: 05/01/24 1956     ADENOVIRUS, PCR Not Detected     Coronavirus 229E Not Detected     Coronavirus HKU1 Not Detected     Coronavirus NL63 Not Detected     Coronavirus OC43 Not Detected     COVID19 Not Detected     Human Metapneumovirus Not Detected     Human  Rhinovirus/Enterovirus Not Detected     Influenza A PCR Not Detected     Influenza B PCR Not Detected     Parainfluenza Virus 1 Not Detected     Parainfluenza Virus 2 Not Detected     Parainfluenza Virus 3 Not Detected     Parainfluenza Virus 4 Not Detected     RSV, PCR Not Detected     Bordetella pertussis pcr Not Detected     Bordetella parapertussis PCR Not Detected     Chlamydophila pneumoniae PCR Not Detected     Mycoplasma pneumo by PCR Not Detected    Narrative:      In the setting of a positive respiratory panel with a viral infection PLUS a negative procalcitonin without other underlying concern for bacterial infection, consider observing off antibiotics or discontinuation of antibiotics and continue supportive care. If the respiratory panel is positive for atypical bacterial infection (Bordetella pertussis, Chlamydophila pneumoniae, or Mycoplasma pneumoniae), consider antibiotic de-escalation to target atypical bacterial infection.    CBC & Differential [[id number]]  (Abnormal) Collected: 05/01/24 1841    Specimen: Blood from Arm, Left Updated: 05/01/24 1854    Narrative:      The following orders were created for panel order CBC & Differential.  Procedure                               Abnormality         Status                     ---------                               -----------         ------                     CBC Auto Differential[[id number]]        Abnormal            Final result                 Please view results for these tests on the individual orders.    Protime-INR [[id number]]  (Abnormal) Collected: 05/01/24 1841    Specimen: Blood from Arm, Left Updated: 05/01/24 1904     Protime 17.5 Seconds      INR 1.38    Lipase [[id number]]  (Normal) Collected: 05/01/24 1841    Specimen: Blood from Arm, Left Updated: 05/01/24 1910     Lipase 42 U/L     CBC Auto Differential [[id number]]  (Abnormal) Collected: 05/01/24 1841    Specimen: Blood from Arm, Left Updated: 05/01/24 1854     WBC 4.76 10*3/mm3       RBC 3.50 10*6/mm3      Hemoglobin 10.4 g/dL      Hematocrit 33.0 %      MCV 94.3 fL      MCH 29.7 pg      MCHC 31.5 g/dL      RDW 14.4 %      RDW-SD 50.0 fl      MPV 11.8 fL      Platelets 183 10*3/mm3      Neutrophil % 67.9 %      Lymphocyte % 21.4 %      Monocyte % 7.6 %      Eosinophil % 2.1 %      Basophil % 0.6 %      Immature Grans % 0.4 %      Neutrophils, Absolute 3.23 10*3/mm3      Lymphocytes, Absolute 1.02 10*3/mm3      Monocytes, Absolute 0.36 10*3/mm3      Eosinophils, Absolute 0.10 10*3/mm3      Basophils, Absolute 0.03 10*3/mm3      Immature Grans, Absolute 0.02 10*3/mm3      nRBC 0.0 /100 WBC     Urinalysis With Culture If Indicated - Straight Cath [[id number]]  (Abnormal) Collected: 05/01/24 1901    Specimen: Urine from Straight Cath Updated: 05/01/24 1919     Color, UA Yellow     Appearance, UA Clear     pH, UA 6.5     Specific Gravity, UA 1.012     Glucose,  mg/dL (2+)     Ketones, UA Negative     Bilirubin, UA Negative     Blood, UA Moderate (2+)     Protein, UA Trace     Leuk Esterase, UA Trace     Nitrite, UA Negative     Urobilinogen, UA 0.2 E.U./dL    Narrative:      In absence of clinical symptoms, the presence of pyuria, bacteria, and/or nitrites on the urinalysis result does not correlate with infection.    Urinalysis, Microscopic Only - Straight Cath [[id number]]  (Abnormal) Collected: 05/01/24 1901    Specimen: Urine from Straight Cath Updated: 05/01/24 1919     RBC, UA Too Numerous to Count /HPF      WBC, UA 3-5 /HPF      Comment: Urine culture not indicated.        Bacteria, UA 1+ /HPF      Squamous Epithelial Cells, UA 0-2 /HPF      Hyaline Casts, UA 0-2 /LPF      Methodology Automated Microscopy    Comprehensive Metabolic Panel [[id number]]  (Abnormal) Collected: 05/01/24 2039    Specimen: Blood from Arm, Left Updated: 05/01/24 2111     Glucose 134 mg/dL      BUN 73 mg/dL      Creatinine 3.20 mg/dL      Sodium 138 mmol/L      Potassium 4.8 mmol/L      Chloride 103 mmol/L      CO2  23.0 mmol/L      Calcium 9.6 mg/dL      Total Protein 7.5 g/dL      Albumin 4.1 g/dL      ALT (SGPT) 9 U/L      AST (SGOT) 19 U/L      Alkaline Phosphatase 53 U/L      Total Bilirubin 0.8 mg/dL      Globulin 3.4 gm/dL      A/G Ratio 1.2 g/dL      BUN/Creatinine Ratio 22.8     Anion Gap 12.0 mmol/L      eGFR 13.4 mL/min/1.73      Comment: <15 Indicative of kidney failure       Narrative:      GFR Normal >60  Chronic Kidney Disease <60  Kidney Failure <15    The GFR formula is only valid for adults with stable renal function between ages 18 and 70.    Single High Sensitivity Troponin T [[id number]]  (Abnormal) Collected: 05/01/24 2039    Specimen: Blood from Arm, Left Updated: 05/01/24 2113     HS Troponin T 66 ng/L     Narrative:      High Sensitive Troponin T Reference Range:  <14.0 ng/L- Negative Female for AMI  <22.0 ng/L- Negative Male for AMI  >=14 - Abnormal Female indicating possible myocardial injury.  >=22 - Abnormal Male indicating possible myocardial injury.   Clinicians would have to utilize clinical acumen, EKG, Troponin, and serial changes to determine if it is an Acute Myocardial Infarction or myocardial injury due to an underlying chronic condition.         Procalcitonin [[id number]]  (Normal) Collected: 05/01/24 2039    Specimen: Blood from Arm, Left Updated: 05/01/24 2116     Procalcitonin 0.23 ng/mL     Narrative:      As a Marker for Sepsis (Non-Neonates):    1. <0.5 ng/mL represents a low risk of severe sepsis and/or septic shock.  2. >2 ng/mL represents a high risk of severe sepsis and/or septic shock.    As a Marker for Lower Respiratory Tract Infections that require antibiotic therapy:    PCT on Admission    Antibiotic Therapy       6-12 Hrs later    >0.5                Strongly Recommended  >0.25 - <0.5        Recommended   0.1 - 0.25          Discouraged              Remeasure/reassess PCT  <0.1                Strongly Discouraged     Remeasure/reassess PCT    As 28 day mortality risk marker:  "\"Change in Procalcitonin Result\" (>80% or <=80%) if Day 0 (or Day 1) and Day 4 values are available. Refer to http://www.[facility]-pct-calculator.com    Change in PCT <=80%  A decrease of PCT levels below or equal to 80% defines a positive change in PCT test result representing a higher risk for 28-day all-cause mortality of patients diagnosed with severe sepsis for septic shock.    Change in PCT >80%  A decrease of PCT levels of more than 80% defines a negative change in PCT result representing a lower risk for 28-day all-cause mortality of patients diagnosed with severe sepsis or septic shock.       C-reactive Protein [[id number]]  (Abnormal) Collected: 05/01/24 2039    Specimen: Blood from Arm, Left Updated: 05/01/24 2113     C-Reactive Protein 0.83 mg/dL     Digoxin Level [[id number]]  (Abnormal) Collected: 05/01/24 2039    Specimen: Blood from Arm, Left Updated: 05/01/24 2113     Digoxin 1.40 ng/mL     POC Glucose Once [[id number]]  (Abnormal) Collected: 05/01/24 2039    Specimen: Blood Updated: 05/01/24 2050     Glucose 132 mg/dL      Comment: : [id number] Glenny Cliffordromario ID: [id number]       High Sensitivity Troponin T 2Hr [[id number]] Collected: 05/01/24 2218    Specimen: Blood Updated: 05/01/24 2221    Blood Culture With PAUL - Blood, Arm, Right [[id number]] Collected: 05/01/24 2218    Specimen: Blood from Arm, Right Updated: 05/01/24 2242    Blood Culture With PAUL - Blood, Hand, Right [[id number]] Collected: 05/01/24 2218    Specimen: Blood from Hand, Right Updated: 05/01/24 2242            CT Abdomen Pelvis Without Contrast   Final Result       Small to moderate volume ascites with mild mesenteric edema and body   wall anasarca. No obvious evidence of cirrhosis.        Cardiomegaly with trace right pleural effusion.          No CT evidence to explain hematuria on this nonenhanced study.           This report was signed and finalized on 5/1/2024 8:43 PM by Ludwin Simmons.          CT Head Without " "Contrast   Final Result   Impression:         No acute intracranial abnormality.       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       This report was signed and finalized on 5/1/2024 6:02 PM by Ludwin Simmons.          XR Chest 1 View   Final Result       No acute cardiopulmonary abnormality.               This report was signed and finalized on 5/1/2024 5:42 PM by Ludwin Simmons.              ED Course  ED Course as of 05/01/24 2301   Wed May 01, 2024   1734 Spoke with the son.  He is on his way to the emergency department.  He states that the patient has a history of dementia and her memory has been worse over the past month.  He states today she was more confused than normal.  He states he was in [place] and was not at home when she called him and stated \"she was not feeling well.\"  He called the ambulance to come get her.  He states the patient does live alone and has not had problems living alone.  She is checked on several times a week.  He states that her memory has been worse over the past month. [CW]   1743 Son is at bedside and states that he is not sure how long she has been this confused today. He is unsure of last known well. He states that he called ems when she called him. The pt states that she has not felt well all day.  [CW]   1930 Pending labs at this time. Pt had moderate amt of blood in the urine- no hx of hematuria. Will ct scan abd/pelvis without contrast to further investigate hematuria. Pt had straight cath urine- hematuria could be in relation to procedure  [CW]   1938 Pending remainder of workup at this time. Reviewed labs and test results with son at this time. Pending remainder of workup. Reviewed pt and pt care plan with Elizabeth HAYWOOD. Care of pt transferred at this time  [CW]   1952 Care resumed from YOBANY David.  Labs and CT abdomen pelvis pending at this time. [KR]   2128 Call placed to Dr. Narvaez.  Labs have been reviewed.  CMP with creatinine 3.2, BUN 73, glucose 134.  " "CBC with no leukocytosis, stable H&H.  Digoxin level 1.4.  INR 1.38.  Lipase normal.  Troponin 66, reflex pending.  Procalcitonin normal.  CRP mildly elevated.  Urinalysis with 1+ bacteria, 3-5 WBCs, too numerous to count RBCs.  Negative respiratory panel. [KR]   2300 Chest x-ray revealed no acute findings.  CT head revealed no acute findings.  CT abdomen pelvis revealed Small to moderate volume ascites with mild mesenteric edema and body wall anasarca. No obvious evidence of cirrhosis. Cardiomegaly with trace right pleural effusion.   No CT evidence to explain hematuria on this nonenhanced study. [KR]   2301 Case has been discussed with on-call hospitalist, Dr. Narvaez. He has agreed to accept this patient for further management for which patient is agreeable. [KR]      ED Course User Index  [CW] Amna Bhatti APRN  [KR] Onelia Sequeira APRN        Medical Decision Making  Patient is a 89-year-old female presents the emergency department per EMS with altered mental status.  Patient states she is not sure while she she is here.  She states \"I just do not feel right.\"  She denies any chest pain or shortness of breath.  No nausea or vomiting.  No abdominal pain.  Evidently per EMS report patient's son called EMS and advised that she had altered mental status and was not acting right.  The son is not at bedside currently.  Patient does not know why she is here.  She has a history of hypertension, hyperlipidemia, CKD, diabetes, and atrial fibrillation.  Course of treatment in the er: Nontoxic-appearing 89-year-old female is alert and oriented to person and place.  Patient states she is not sure why she is here.  She states \"I just do not feel right.\"  She denies any chest pain or shortness of breath.  No abdominal pain.  Her son was initially not present however is at bedside now and states that the patient called him while he was in [place] and told him that he was not feeling well.  He states she seemed to " be confused.  Patient lives alone.  Son states she has a history of dementia but her forgetfulness has been worse over the past month.  She has recently been started on Farxiga.  He states that he talked with her yesterday and she was at her baseline.  He was seen states that he does not know when she started being confused today.  She states she has not felt well all day.  No focal weakness.   are strong and equal.  Follows commands.  Does appear ill. Cv: irregular rhythm lungs cta. Abd soft, non distended. Non tender. Ct head and labs have been ordered  Differential diagnosis: cva; sepsis; uti; pneumonia; viral illness; renal failure; hyperglycemia; tia    Pending remainder of workup at this time. Pt noted to have moderate amt of blood in urine. Could be from straight cath. Pt does have hx of ckd. She has mild uti. Rocephin ordered. Pending labs at present. Reviewed pt and pt care plan with CRISTY HAYWOOD. Care of pt transferred at this time     Problems Addressed:  AMARIS (acute kidney injury): complicated acute illness or injury  Altered mental status, unspecified altered mental status type: complicated acute illness or injury  Urinary tract infection with hematuria, site unspecified: complicated acute illness or injury    Amount and/or Complexity of Data Reviewed  Labs: ordered.  Radiology: ordered.  ECG/medicine tests: ordered.    Risk  Prescription drug management.  Decision regarding hospitalization.         Final diagnoses:   AMARIS (acute kidney injury)   Urinary tract infection with hematuria, site unspecified   Altered mental status, unspecified altered mental status type          Onelia Sequeira, YOBANY  05/01/24 1914

## 2024-05-02 PROBLEM — G93.41 METABOLIC ENCEPHALOPATHY: Status: ACTIVE | Noted: 2024-05-02

## 2024-05-02 LAB
ANION GAP SERPL CALCULATED.3IONS-SCNC: 17 MMOL/L (ref 5–15)
BACTERIA UR QL AUTO: ABNORMAL /HPF
BILIRUB UR QL STRIP: NEGATIVE
BUN SERPL-MCNC: 65 MG/DL (ref 8–23)
BUN/CREAT SERPL: 23.6 (ref 7–25)
CALCIUM SPEC-SCNC: 8 MG/DL (ref 8.6–10.5)
CHLORIDE SERPL-SCNC: 109 MMOL/L (ref 98–107)
CLARITY UR: CLEAR
CO2 SERPL-SCNC: 18 MMOL/L (ref 22–29)
COLOR UR: YELLOW
CREAT SERPL-MCNC: 2.75 MG/DL (ref 0.57–1)
DIGOXIN SERPL-MCNC: 1.3 NG/ML (ref 0.6–1.2)
EGFRCR SERPLBLD CKD-EPI 2021: 16 ML/MIN/1.73
GLUCOSE BLDC GLUCOMTR-MCNC: 145 MG/DL (ref 70–130)
GLUCOSE BLDC GLUCOMTR-MCNC: 146 MG/DL (ref 70–130)
GLUCOSE BLDC GLUCOMTR-MCNC: 148 MG/DL (ref 70–130)
GLUCOSE BLDC GLUCOMTR-MCNC: 155 MG/DL (ref 70–130)
GLUCOSE BLDC GLUCOMTR-MCNC: 235 MG/DL (ref 70–130)
GLUCOSE BLDC GLUCOMTR-MCNC: 63 MG/DL (ref 70–130)
GLUCOSE SERPL-MCNC: 101 MG/DL (ref 65–99)
GLUCOSE UR STRIP-MCNC: ABNORMAL MG/DL
HGB UR QL STRIP.AUTO: ABNORMAL
HYALINE CASTS UR QL AUTO: ABNORMAL /LPF
KETONES UR QL STRIP: NEGATIVE
LEUKOCYTE ESTERASE UR QL STRIP.AUTO: NEGATIVE
NITRITE UR QL STRIP: POSITIVE
PH UR STRIP.AUTO: 6.5 [PH] (ref 5–8)
POTASSIUM SERPL-SCNC: 4 MMOL/L (ref 3.5–5.2)
PROT UR QL STRIP: ABNORMAL
QT INTERVAL: 382 MS
QTC INTERVAL: 406 MS
RBC # UR STRIP: ABNORMAL /HPF
REF LAB TEST METHOD: ABNORMAL
SODIUM SERPL-SCNC: 144 MMOL/L (ref 136–145)
SP GR UR STRIP: 1.01 (ref 1–1.03)
SQUAMOUS #/AREA URNS HPF: ABNORMAL /HPF
TROPONIN T SERPL HS-MCNC: 61 NG/L
UROBILINOGEN UR QL STRIP: ABNORMAL
WBC # UR STRIP: ABNORMAL /HPF

## 2024-05-02 PROCEDURE — 25010000002 CEFTRIAXONE PER 250 MG: Performed by: NURSE PRACTITIONER

## 2024-05-02 PROCEDURE — 25810000003 LACTATED RINGERS PER 1000 ML: Performed by: NURSE PRACTITIONER

## 2024-05-02 PROCEDURE — 82948 REAGENT STRIP/BLOOD GLUCOSE: CPT

## 2024-05-02 PROCEDURE — 63710000001 INSULIN REGULAR HUMAN PER 5 UNITS: Performed by: NURSE PRACTITIONER

## 2024-05-02 PROCEDURE — 92610 EVALUATE SWALLOWING FUNCTION: CPT | Performed by: SPEECH-LANGUAGE PATHOLOGIST

## 2024-05-02 PROCEDURE — 87086 URINE CULTURE/COLONY COUNT: CPT | Performed by: NURSE PRACTITIONER

## 2024-05-02 PROCEDURE — 81001 URINALYSIS AUTO W/SCOPE: CPT | Performed by: NURSE PRACTITIONER

## 2024-05-02 PROCEDURE — 25010000002 ENOXAPARIN PER 10 MG: Performed by: NURSE PRACTITIONER

## 2024-05-02 RX ORDER — FERROUS SULFATE 325(65) MG
325 TABLET ORAL
Status: DISCONTINUED | OUTPATIENT
Start: 2024-05-02 | End: 2024-05-06 | Stop reason: HOSPADM

## 2024-05-02 RX ORDER — ATORVASTATIN CALCIUM 10 MG/1
20 TABLET, FILM COATED ORAL NIGHTLY
Status: DISCONTINUED | OUTPATIENT
Start: 2024-05-02 | End: 2024-05-06 | Stop reason: HOSPADM

## 2024-05-02 RX ORDER — LOSARTAN POTASSIUM 50 MG/1
100 TABLET ORAL DAILY
Status: DISCONTINUED | OUTPATIENT
Start: 2024-05-02 | End: 2024-05-06 | Stop reason: HOSPADM

## 2024-05-02 RX ORDER — PANTOPRAZOLE SODIUM 40 MG/1
40 TABLET, DELAYED RELEASE ORAL
Status: DISCONTINUED | OUTPATIENT
Start: 2024-05-02 | End: 2024-05-06 | Stop reason: HOSPADM

## 2024-05-02 RX ORDER — GLIPIZIDE 5 MG/1
2.5 TABLET ORAL
Status: DISCONTINUED | OUTPATIENT
Start: 2024-05-02 | End: 2024-05-06 | Stop reason: HOSPADM

## 2024-05-02 RX ORDER — MELATONIN
1000 DAILY
Status: DISCONTINUED | OUTPATIENT
Start: 2024-05-02 | End: 2024-05-06 | Stop reason: HOSPADM

## 2024-05-02 RX ORDER — SPIRONOLACTONE 25 MG/1
25 TABLET ORAL DAILY
Status: DISCONTINUED | OUTPATIENT
Start: 2024-05-02 | End: 2024-05-06

## 2024-05-02 RX ORDER — BUMETANIDE 1 MG/1
1 TABLET ORAL DAILY
COMMUNITY

## 2024-05-02 RX ORDER — GUAIFENESIN/DEXTROMETHORPHAN 100-10MG/5
5 SYRUP ORAL EVERY 4 HOURS PRN
Status: DISCONTINUED | OUTPATIENT
Start: 2024-05-02 | End: 2024-05-06 | Stop reason: HOSPADM

## 2024-05-02 RX ORDER — CARVEDILOL 6.25 MG/1
6.25 TABLET ORAL 2 TIMES DAILY WITH MEALS
Status: DISCONTINUED | OUTPATIENT
Start: 2024-05-02 | End: 2024-05-06 | Stop reason: HOSPADM

## 2024-05-02 RX ORDER — SODIUM BICARBONATE 650 MG/1
650 TABLET ORAL DAILY
Status: DISCONTINUED | OUTPATIENT
Start: 2024-05-02 | End: 2024-05-06 | Stop reason: HOSPADM

## 2024-05-02 RX ORDER — HYDRALAZINE HYDROCHLORIDE 50 MG/1
100 TABLET, FILM COATED ORAL 3 TIMES DAILY
Status: DISCONTINUED | OUTPATIENT
Start: 2024-05-02 | End: 2024-05-06 | Stop reason: HOSPADM

## 2024-05-02 RX ORDER — ASCORBIC ACID 500 MG
500 TABLET ORAL DAILY
Status: DISCONTINUED | OUTPATIENT
Start: 2024-05-02 | End: 2024-05-06 | Stop reason: HOSPADM

## 2024-05-02 RX ORDER — FLUTICASONE PROPIONATE 50 MCG
2 SPRAY, SUSPENSION (ML) NASAL DAILY
Status: DISCONTINUED | OUTPATIENT
Start: 2024-05-02 | End: 2024-05-02

## 2024-05-02 RX ORDER — BENZONATATE 100 MG/1
100 CAPSULE ORAL 3 TIMES DAILY PRN
Status: DISCONTINUED | OUTPATIENT
Start: 2024-05-02 | End: 2024-05-06 | Stop reason: HOSPADM

## 2024-05-02 RX ADMIN — Medication 10 ML: at 20:49

## 2024-05-02 RX ADMIN — NIFEDIPINE 90 MG: 60 TABLET, FILM COATED, EXTENDED RELEASE ORAL at 10:20

## 2024-05-02 RX ADMIN — SODIUM CHLORIDE 1000 MG: 900 INJECTION INTRAVENOUS at 20:49

## 2024-05-02 RX ADMIN — EMPAGLIFLOZIN 10 MG: 10 TABLET, FILM COATED ORAL at 10:20

## 2024-05-02 RX ADMIN — ENOXAPARIN SODIUM 60 MG: 100 INJECTION SUBCUTANEOUS at 20:50

## 2024-05-02 RX ADMIN — SODIUM BICARBONATE 650 MG: 650 TABLET ORAL at 10:20

## 2024-05-02 RX ADMIN — Medication 10 ML: at 00:11

## 2024-05-02 RX ADMIN — FAMOTIDINE 20 MG: 10 INJECTION INTRAVENOUS at 10:21

## 2024-05-02 RX ADMIN — GUAIFENESIN AND DEXTROMETHORPHAN 5 ML: 100; 10 SYRUP ORAL at 20:48

## 2024-05-02 RX ADMIN — HYDRALAZINE HYDROCHLORIDE 100 MG: 50 TABLET ORAL at 17:19

## 2024-05-02 RX ADMIN — DEXTROSE MONOHYDRATE 25 G: 25 INJECTION, SOLUTION INTRAVENOUS at 05:43

## 2024-05-02 RX ADMIN — SODIUM CHLORIDE, POTASSIUM CHLORIDE, SODIUM LACTATE AND CALCIUM CHLORIDE 50 ML/HR: 600; 310; 30; 20 INJECTION, SOLUTION INTRAVENOUS at 15:29

## 2024-05-02 RX ADMIN — FERROUS SULFATE TAB 325 MG (65 MG ELEMENTAL FE) 325 MG: 325 (65 FE) TAB at 10:20

## 2024-05-02 RX ADMIN — INSULIN HUMAN 4 UNITS: 100 INJECTION, SOLUTION PARENTERAL at 17:19

## 2024-05-02 RX ADMIN — CARVEDILOL 6.25 MG: 6.25 TABLET, FILM COATED ORAL at 17:19

## 2024-05-02 RX ADMIN — SPIRONOLACTONE 25 MG: 25 TABLET ORAL at 10:20

## 2024-05-02 RX ADMIN — FAMOTIDINE 20 MG: 10 INJECTION INTRAVENOUS at 20:50

## 2024-05-02 RX ADMIN — HYDRALAZINE HYDROCHLORIDE 100 MG: 50 TABLET ORAL at 20:49

## 2024-05-02 RX ADMIN — ATORVASTATIN CALCIUM 20 MG: 10 TABLET, FILM COATED ORAL at 20:49

## 2024-05-02 RX ADMIN — OXYCODONE HYDROCHLORIDE AND ACETAMINOPHEN 500 MG: 500 TABLET ORAL at 10:20

## 2024-05-02 RX ADMIN — PANTOPRAZOLE SODIUM 40 MG: 40 TABLET, DELAYED RELEASE ORAL at 10:20

## 2024-05-02 RX ADMIN — CARVEDILOL 6.25 MG: 6.25 TABLET, FILM COATED ORAL at 10:20

## 2024-05-02 RX ADMIN — Medication 1000 UNITS: at 10:20

## 2024-05-02 NOTE — PLAN OF CARE
Goal Outcome Evaluation:  Plan of Care Reviewed With: patient, caregiver, son (LOY Farias)        Progress: no change (Initial Evaluation)         Anticipated Discharge Disposition (SLP): unknown          SLP Swallowing Diagnosis: swallow WFL/no suspected pharyngeal impairment (05/02/24 0745)             SPEECH-LANGUAGE PATHOLOGY EVALUATION - SWALLOW  Subjective: The patient was seen on this date for a Clinical Swallow evaluation.  Patient was alert and cooperative. Son present at the bedside.   Significant history: Presented due to altered mental status. Concern for AMARIS with digoxin toxicity and UTI.   Objective: Oral motor examination results: WFL.  Textures given during assessment of swallow function included thin liquid, nectar thick liquid, honey thick liquid, puree consistency, and regular consistency.  Assessment: Difficulties were noted with none of the above consistencies.  Observations: No overt s/s of aspiration. Functional rotary chew with solids.   SLP Findings:  Patient presents with functional swallow, without esophageal component.   Recommendations: Diet Textures: Regular diet with thin liquids.  Medications should be taken whole with thin liquids or as preferred by patient.   Recommended Strategies: upright for PO and small bites and sips. Oral care 2x a day.  Other Recommended Evaluations: None. Speech/language/cognition screened with no noted concerns. Full evaluation not warranted.     Dysphagia therapy is not recommended.

## 2024-05-02 NOTE — ED NOTES
Called phlebotomy at this time and asked about an update on the blood draw and they said they would be down here in a minute.

## 2024-05-02 NOTE — PLAN OF CARE
Goal Outcome Evaluation: Patient came to the Er with complaints altered mental status. Patient is being admitted with acute renal failure.  BUN is 65, creatinine 2.75. No complaints of pain at this time. Accuchecks  ac and hs with sliding scale coverage as needed. Patient safety to be maintained this shift, continue to monitor and report abnormal to provider.

## 2024-05-02 NOTE — PLAN OF CARE
Goal Outcome Evaluation:  Plan of Care Reviewed With: patient, son        Progress: improving  Outcome Evaluation: Patient A+Ox4, RILEY- up with asst x1 to BR- tolerating food and drink without issues. IVF going- skin intact with notable bruises on hands. Son states he can bring in her cane- Safety maintained. VSS at this time. Noticable dry cough- new order for cough syrup

## 2024-05-02 NOTE — CONSULTS
"Pharmacy Dosing Service  Anticoagulant  Enoxaparin    Assessment/Action/Plan:  Pharmacy to Dose Lovenox request for the treatment of A-fib while patient is NPO until able to resume home med Eliquis. Initiated renally adjusted Lovenox 1 mg/kg SQ every 24 hours for patient with CrCl < 30 ml/min. Pharmacy will continue to monitor renal function and adjust dose accordingly.       Subjective:  Libia Perales is a 89 y.o. female on Enoxaparin 1 mg/kg SQ every 24 hours for indication of atrial fibrillation.  Objective:  [Ht: 157.5 cm (62\"); Wt: 61.2 kg (135 lb); BMI: Body mass index is 24.69 kg/m².]  Estimated Creatinine Clearance: 10.3 mL/min (A) (by C-G formula based on SCr of 3.2 mg/dL (H)).   Lab Results   Component Value Date      Lab Results   Component Value Date    INR 1.38 (H) 05/01/2024    PROTIME 17.5 (H) 05/01/2024      Lab Results   Component Value Date    HGB 10.4 (L) 05/01/2024      Lab Results   Component Value Date     05/01/2024       Dk Lopez, PharmD  05/01/24 23:23 CDT     "

## 2024-05-02 NOTE — THERAPY DISCHARGE NOTE
Acute Care - Speech Language Pathology   Swallow Initial Evaluation/Discharge [facility]     Patient Name: Libia Perales  : 1934  MRN: [id number]  Today's Date: 2024               Admit Date: 2024  SPEECH-LANGUAGE PATHOLOGY EVALUATION - SWALLOW  Subjective: The patient was seen on this date for a Clinical Swallow evaluation.  Patient was alert and cooperative. Son present at the bedside.   Significant history: Presented due to altered mental status. Concern for AMARIS with digoxin toxicity and UTI.   Objective: Oral motor examination results: WFL.  Textures given during assessment of swallow function included thin liquid, nectar thick liquid, honey thick liquid, puree consistency, and regular consistency.  Assessment: Difficulties were noted with none of the above consistencies.  Observations: No overt s/s of aspiration. Functional rotary chew with solids.   SLP Findings:  Patient presents with functional swallow, without esophageal component.   Recommendations: Diet Textures: Regular diet with thin liquids.  Medications should be taken whole with thin liquids or as preferred by patient.   Recommended Strategies: upright for PO and small bites and sips. Oral care 2x a day.  Other Recommended Evaluations: None. Speech/language/cognition screened with no noted concerns. Full evaluation not warranted.     Dysphagia therapy is not recommended.      Danielle Hollingsworth MS CCC-SLP 2024 08:59 CDT    Visit Dx:    ICD-10-CM ICD-9-CM   1. AMARIS (acute kidney injury)  N17.9 584.9   2. Urinary tract infection with hematuria, site unspecified  N39.0 599.0    R31.9 599.70   3. Altered mental status, unspecified altered mental status type  R41.82 780.97   4. 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    Benign essential HTN    Persistent atrial fibrillation    Asthma    Fibrocystic breast disease    Gastro-esophageal reflux    High calcium levels    OA (osteoarthritis) of ankle    Right renal mass    Vertigo, benign paroxysmal    Chronic fatigue    Chronic cough    Allergic rhinitis    Stage 4 chronic kidney disease    Strain of left rhomboid muscle    Chronic anticoagulation    Chronic diastolic (congestive) heart failure    Severe pulmonary hypertension    Acute on chronic heart failure with preserved ejection fraction (HFpEF)    Acute renal failure superimposed on stage 4 chronic kidney disease    Digoxin toxicity, accidental or unintentional, initial encounter    Altered mental status    Acute cystitis with hematuria    Elevated troponin     Past Medical History:   Diagnosis Date    Atrial fibrillation     Diabetes mellitus     Difficulty walking Approximately 2 years    GERD (gastroesophageal reflux disease)     High cholesterol     Hypertension     OA (osteoarthritis)     Pneumonia     few years ago    Stage 4 chronic kidney disease 08/18/2023     Past Surgical History:   Procedure Laterality Date    BREAST BIOPSY Right     BREAST CYST ASPIRATION      HYSTERECTOMY      OOPHORECTOMY      TONSILLECTOMY         SLP Recommendation and Plan  SLP Swallowing Diagnosis: swallow WFL/no suspected pharyngeal impairment (05/02/24 0745)  SLP Diet Recommendation: regular textures, thin liquids (05/02/24 0745)     Monitor for Signs of Aspiration: yes, notify SLP if any concerns (05/02/24 0745)  Recommended Diagnostics: No further SLP services recommended (05/02/24 0745)  Swallow Criteria for Skilled Therapeutic Interventions Met: no problems identified which require skilled intervention (05/02/24 0745)  Anticipated Discharge Disposition (SLP): unknown (05/02/24 0858)     Therapy Frequency (Swallow): evaluation only (05/02/24 0745)  Predicted Duration Therapy Intervention (Days):  until discharge (05/02/24 0745)           Anticipated Discharge Disposition (SLP): unknown (05/02/24 0858)           Reason for Discharge: all goals and outcomes met, no further needs identified (05/02/24 0858)                Plan of Care Reviewed With: patient, caregiver, son (LOY Farias) (05/02/24 0856)  Progress: no change (Initial Evaluation) (05/02/24 0856)    SWALLOW EVALUATION (Last 72 Hours)       SLP Adult Swallow Evaluation       Row Name 05/02/24 0745                   Rehab Evaluation    Document Type evaluation  -MG        Subjective Information no complaints  -MG        Patient Observations alert;cooperative;agree to therapy  -MG        Patient/Family/Caregiver Comments/Observations Son present.  -MG        Patient Effort good  -MG        Symptoms Noted During/After Treatment none  -MG           General Information    Patient Profile Reviewed yes  -MG        Pertinent History Of Current Problem Presented due to altered mental status. Concern for AMARIS with digoxin toxicity and UTI.  -MG        Current Method of Nutrition NPO  -MG        Precautions/Limitations, Vision WFL;for purposes of eval  -MG        Precautions/Limitations, Hearing WFL;for purposes of eval  -MG        Prior Level of Function-Communication WFL  -MG        Prior Level of Function-Swallowing no diet consistency restrictions  -MG        Plans/Goals Discussed with patient and family;other (see comments);agreed upon  LOY Farias  -MG        Barriers to Rehab none identified  -MG        Patient's Goals for Discharge return to PO diet  -MG        Family Goals for Discharge patient able to return to PO diet  -MG           Pain    Additional Documentation Pain Scale: FACES Pre/Post-Treatment (Group)  -MG           Pain Scale: FACES Pre/Post-Treatment    Pain: FACES Scale, Pretreatment 0-->no hurt  -MG        Posttreatment Pain Rating 0-->no hurt  -MG           Oral Motor Structure and Function    Dentition Assessment natural, present and adequate   -MG        Secretion Management WNL/WFL  -MG        Mucosal Quality moist, healthy  -MG        Volitional Swallow WFL  -MG        Volitional Cough WFL  -MG           Oral Musculature and Cranial Nerve Assessment    Oral Motor General Assessment WFL  -MG           General Eating/Swallowing Observations    Respiratory Support Currently in Use room air  -MG        Eating/Swallowing Skills fed by SLP;self-fed  -MG        Positioning During Eating upright in bed  -MG        Utensils Used spoon;straw  -MG        Consistencies Trialed pureed;honey-thick liquids;nectar/syrup-thick liquids;thin liquids;regular textures  -MG           Clinical Swallow Eval    Oral Prep Phase WFL  -MG        Oral Transit WFL  -MG        Oral Residue WFL  -MG        Pharyngeal Phase no overt signs/symptoms of pharyngeal impairment  -MG        Esophageal Phase unremarkable  -MG        Clinical Swallow Evaluation Summary See note.  -MG           SLP Evaluation Clinical Impression    SLP Swallowing Diagnosis swallow WFL/no suspected pharyngeal impairment  -MG        Functional Impact no impact on function  -MG        Swallow Criteria for Skilled Therapeutic Interventions Met no problems identified which require skilled intervention  -MG           Recommendations    Therapy Frequency (Swallow) evaluation only  -MG        Predicted Duration Therapy Intervention (Days) until discharge  -MG        SLP Diet Recommendation regular textures;thin liquids  -MG        Recommended Diagnostics No further SLP services recommended  -MG        Recommended Precautions and Strategies upright posture during/after eating;small bites of food and sips of liquid;general aspiration precautions  -MG        Oral Care Recommendations Oral Care BID/PRN;Toothbrush  -MG        SLP Rec. for Method of Medication Administration meds whole;as tolerated  -MG        Monitor for Signs of Aspiration yes;notify SLP if any concerns  -MG        Anticipated Discharge Disposition (SLP)  unknown  -MG                  User Key  (r) = Recorded By, (t) = Taken By, (c) = Cosigned By      Initials Name Effective Dates    Danielle Richardson MS CCC-SLP 07/11/23 -                     EDUCATION  The patient has been educated in the following areas:   Dysphagia (Swallowing Impairment) Oral Care/Hydration.                 Time Calculation:    Time Calculation- SLP       Row Name 05/02/24 0858             Time Calculation- SLP    SLP Start Time 0745  -MG      SLP Stop Time 0840  -MG      SLP Time Calculation (min) 55 min  -MG      SLP Received On 05/02/24  -MG         Untimed Charges    SLP Eval/Re-eval  ST Eval Oral Pharyng Swallow - 48251  -MG      21685-RM Eval Oral Pharyng Swallow Minutes 55  -MG         Total Minutes    Untimed Charges Total Minutes 55  -MG       Total Minutes 55  -MG                User Key  (r) = Recorded By, (t) = Taken By, (c) = Cosigned By      Initials Name Provider Type    Danielle Richardson MS CCC-SLP Speech and Language Pathologist                    Therapy Charges for Today       Code Description Service Date Service Provider Modifiers Qty    [id number] HC ST EVAL ORAL PHARYNG SWALLOW 4 5/2/2024 Danielle Hollingsworth MS CCC-SLP GN 1                 SLP Discharge Summary  Anticipated Discharge Disposition (SLP): unknown  Reason for Discharge: all goals and outcomes met, no further needs identified  Progress Toward Achieving Short/long Term Goals: all goals met within established timelines, discharge on same date as initial evaluation  Discharge Destination: other (see comments) (Remains boarder in ED)    Danielle Hollingsworth MS CCC-SLP  5/2/2024

## 2024-05-02 NOTE — H&P
"    [facility] Medicine Services  HISTORY AND PHYSICAL    Date of Admission: 5/1/2024  Primary Care Physician: Zachariah Huffman MD    Subjective   Primary Historian: Patient's son Davian    Chief Complaint: Altered mental status    History of Present Illness  Libia Perales is an 89-year-old female with past medical history of hypertension, stage IV chronic kidney disease, chronic diastolic heart failure, severe pulmonary hypertension, diabetes type 2, see below for complete list.  Patient recently admitted 3/13 - 3/15, 2024 with acute on chronic heart failure with preserved EF, patient was diuresed with good results.  She has been seen by her primary care provider since discharge.  She was in her usual state of health last known well approximately 8-8 30 this a.m. as she talked to her daughter on the phone.  Son states he usually calls around 2 PM however he was in [place] today when she called him.  He states he could tell by her conversation that \"something was not right\".  She did tell him she tried to call her daughter but daughter did not answer the phone.  Due to concerns EMS was called and patient transported to [facility] for evaluation.  Workup reveals acute cystitis with hematuria, creatinine 3.2 as compared to baseline of 2.3.  Digoxin level slightly elevated 1.4.  Patient is awake.  She did fail her nurse swallowing evaluation.  She is complaining of feeling cold.  She denies pain.  When asked when she came to the emergency department she haltingly stated \"I do not know\".  We will admit for further evaluation and treatment.    Review of Systems   Unable to obtain meaningful review of systems due to patient's altered mental status    Past Medical History:   Past Medical History:   Diagnosis Date    Atrial fibrillation     Diabetes mellitus     Difficulty walking Approximately 2 years    GERD (gastroesophageal reflux disease)     High cholesterol     " Hypertension     OA (osteoarthritis)     Pneumonia     few years ago    Stage 4 chronic kidney disease 08/18/2023     Past Surgical History:  Past Surgical History:   Procedure Laterality Date    BREAST BIOPSY Right     BREAST CYST ASPIRATION      HYSTERECTOMY      OOPHORECTOMY      TONSILLECTOMY       Social History:  reports that she has never smoked. She has never used smokeless tobacco. She reports that she does not drink alcohol and does not use drugs.    Family History: family history includes Hypertension in her mother; No Known Problems in her brother, daughter, father, maternal aunt, maternal grandmother, paternal aunt, paternal grandmother, sister, son, and another family member.       Allergies:  Allergies   Allergen Reactions    Singulair [Montelukast] Cough    Nsaids Other (See Comments)     Due to kidney disease.      Ace Inhibitors Cough       Medications:  Prior to Admission medications    Medication Sig Start Date End Date Taking? Authorizing Provider   apixaban (ELIQUIS) 2.5 MG tablet tablet Take 1 tablet by mouth 2 (Two) Times a Day.    Raheel Underwood MD   bumetanide (Bumex) 1 MG tablet Take 1 tablet by mouth Daily AND 0.5 tablets Every Afternoon. Take WHOLE tablet instead of HALF tablet in the afternoon with weight gain or increased fluid 3/21/24   Zachariah Huffman MD   carvedilol (Coreg) 6.25 MG tablet Take 1 tablet by mouth 2 (Two) Times a Day With Meals. 3/21/24   Zachariah Huffman MD   Cholecalciferol 25 MCG (1000 UT) tablet Take 1 tablet by mouth Daily.    Raheel Underwood MD   Cyanocobalamin 1000 MCG/ML kit Inject 1 mL as directed 1 (One) Time Per Week.   For 3 weeks then monthly thereafter.    Raheel Underwood MD   dapagliflozin Propanediol (Farxiga) 10 MG tablet Take 10 mg by mouth Daily. 3/21/24   Zachariah Huffman MD   digoxin (LANOXIN) 125 MCG tablet Take 1 tablet by mouth Every Other Day. Sun, Tues, Thurs, and Sat    Raheel Underwood MD   docusate sodium  "(COLACE) 100 MG capsule Take 1 capsule by mouth 2 (Two) Times a Day As Needed for Constipation.    Raheel Underwood MD   fenofibrate (TRICOR) 145 MG tablet Take 1 tablet by mouth Daily.    Raheel Underwood MD   ferrous sulfate 325 (65 FE) MG tablet Take 1 tablet by mouth Daily With Breakfast.    Raheel Underwood MD   fluticasone (FLONASE) 50 MCG/ACT nasal spray 2 sprays into the nostril(s) as directed by provider Daily. 1/19/23   Sharon Corley APRN   glipizide (GLUCOTROL XL) 5 MG ER tablet Take 1 tablet by mouth Daily. 4/18/24   Zachariah Huffman MD   hydrALAZINE (APRESOLINE) 100 MG tablet Take 1 tablet by mouth 3 (Three) Times a Day.    Raheel Underwood MD   losartan (COZAAR) 100 MG tablet Take 1 tablet by mouth Daily.    Raheel Underwood MD   magnesium oxide (MAGOX) 400 (241.3 Mg) MG tablet tablet Take 1 tablet by mouth Daily.    Raheel Underwood MD   NIFEdipine XL (PROCARDIA XL) 90 MG 24 hr tablet Take 1 tablet by mouth Daily. 4/18/24   Zachariah Huffman MD   omeprazole (priLOSEC) 40 MG capsule Take 1 capsule by mouth Daily.    Raheel Underwood MD   simvastatin (ZOCOR) 40 MG tablet Take 1 tablet by mouth Every Night.    Raheel Underwood MD   sodium bicarbonate 650 MG tablet Take 1 tablet by mouth Daily. 10/9/23   Albina Padron APRN   spironolactone (ALDACTONE) 25 MG tablet Take 1 tablet by mouth Daily.    Raheel Underwood MD   vitamin C (ASCORBIC ACID) 500 MG tablet Take 1 tablet by mouth Daily. 5/17/22   Allyson Do,      I have utilized all available immediate resources to obtain, update, or review the patient's current medications (including all prescriptions, over-the-counter products, herbals, cannabis/cannabidiol products, and vitamin/mineral/dietary (nutritional) supplements).    Objective     Vital Signs: /80   Pulse 72   Temp 97.7 °F (36.5 °C) (Oral)   Resp 18   Ht 157.5 cm (62\")   Wt 61.2 kg (135 lb)   LMP  (LMP Unknown) "   SpO2 100%   BMI 24.69 kg/m²   Physical Exam  Vitals reviewed.   Constitutional:       Appearance: She is ill-appearing.   HENT:      Head: Normocephalic and atraumatic.      Mouth/Throat:      Mouth: Mucous membranes are moist.      Pharynx: Oropharynx is clear.   Eyes:      Conjunctiva/sclera: Conjunctivae normal.   Cardiovascular:      Rate and Rhythm: Normal rate and regular rhythm.   Pulmonary:      Effort: Pulmonary effort is normal.      Breath sounds: Normal breath sounds.   Abdominal:      General: There is no distension.      Palpations: Abdomen is soft.   Musculoskeletal:      Cervical back: Neck supple.      Right lower leg: No edema.      Left lower leg: No edema.      Comments: Generalized weakness and debility   Skin:     General: Skin is warm and dry.   Neurological:      Mental Status: She is alert. She is disoriented.   Psychiatric:      Comments: Flat affect, no behavior outburst        Results Reviewed:  Lab Results (last 24 hours)       Procedure Component Value Units Date/Time    Procalcitonin [[id number]]  (Normal) Collected: 05/01/24 2039    Specimen: Blood from Arm, Left Updated: 05/01/24 2116     Procalcitonin 0.23 ng/mL     C-reactive Protein [[id number]]  (Abnormal) Collected: 05/01/24 2039    Specimen: Blood from Arm, Left Updated: 05/01/24 2113     C-Reactive Protein 0.83 mg/dL     Digoxin Level [[id number]]  (Abnormal) Collected: 05/01/24 2039    Specimen: Blood from Arm, Left Updated: 05/01/24 2113     Digoxin 1.40 ng/mL     Single High Sensitivity Troponin T [[id number]]  (Abnormal) Collected: 05/01/24 2039    Specimen: Blood from Arm, Left Updated: 05/01/24 2113     HS Troponin T 66 ng/L     Comprehensive Metabolic Panel [[id number]]  (Abnormal) Collected: 05/01/24 2039    Specimen: Blood from Arm, Left Updated: 05/01/24 2111     Glucose 134 mg/dL      BUN 73 mg/dL      Creatinine 3.20 mg/dL      Sodium 138 mmol/L      Potassium 4.8 mmol/L      Chloride 103 mmol/L      CO2 23.0 mmol/L       Calcium 9.6 mg/dL      Total Protein 7.5 g/dL      Albumin 4.1 g/dL      ALT (SGPT) 9 U/L      AST (SGOT) 19 U/L      Alkaline Phosphatase 53 U/L      Total Bilirubin 0.8 mg/dL      Globulin 3.4 gm/dL      A/G Ratio 1.2 g/dL      BUN/Creatinine Ratio 22.8     Anion Gap 12.0 mmol/L      eGFR 13.4 mL/min/1.73      Comment: <15 Indicative of kidney failure       POC Glucose Once [[id number]]  (Abnormal) Collected: 05/01/24 2039    Specimen: Blood Updated: 05/01/24 2050     Glucose 132 mg/dL      Comment: : [id number] judge.meeter ID: [id number]       Respiratory Panel PCR w/COVID-19(SARS-CoV-2) JIM/EMERALD/VELMA/PAD/COR/SHAYNA In-House, NP Swab in UTM/VTM, 2 HR TAT - Swab, Nasopharynx [[id number]]  (Normal) Collected: 05/01/24 1829    Specimen: Swab from Nasopharynx Updated: 05/01/24 1956     ADENOVIRUS, PCR Not Detected     Coronavirus 229E Not Detected     Coronavirus HKU1 Not Detected     Coronavirus NL63 Not Detected     Coronavirus OC43 Not Detected     COVID19 Not Detected     Human Metapneumovirus Not Detected     Human Rhinovirus/Enterovirus Not Detected     Influenza A PCR Not Detected     Influenza B PCR Not Detected     Parainfluenza Virus 1 Not Detected     Parainfluenza Virus 2 Not Detected     Parainfluenza Virus 3 Not Detected     Parainfluenza Virus 4 Not Detected     RSV, PCR Not Detected     Bordetella pertussis pcr Not Detected     Bordetella parapertussis PCR Not Detected     Chlamydophila pneumoniae PCR Not Detected     Mycoplasma pneumo by PCR Not Detected    Urinalysis With Culture If Indicated - Straight Cath [[id number]]  (Abnormal) Collected: 05/01/24 1901    Specimen: Urine from Straight Cath Updated: 05/01/24 1919     Color, UA Yellow     Appearance, UA Clear     pH, UA 6.5     Specific Gravity, UA 1.012     Glucose,  mg/dL (2+)     Ketones, UA Negative     Bilirubin, UA Negative     Blood, UA Moderate (2+)     Protein, UA Trace     Leuk Esterase, UA Trace     Nitrite, UA  Negative     Urobilinogen, UA 0.2 E.U./dL    Urinalysis, Microscopic Only - Straight Cath [[id number]]  (Abnormal) Collected: 05/01/24 1901    Specimen: Urine from Straight Cath Updated: 05/01/24 1919     RBC, UA Too Numerous to Count /HPF      WBC, UA 3-5 /HPF      Comment: Urine culture not indicated.        Bacteria, UA 1+ /HPF      Squamous Epithelial Cells, UA 0-2 /HPF      Hyaline Casts, UA 0-2 /LPF      Methodology Automated Microscopy    Lipase [[id number]]  (Normal) Collected: 05/01/24 1841    Specimen: Blood from Arm, Left Updated: 05/01/24 1910     Lipase 42 U/L     Protime-INR [[id number]]  (Abnormal) Collected: 05/01/24 1841    Specimen: Blood from Arm, Left Updated: 05/01/24 1904     Protime 17.5 Seconds      INR 1.38    CBC Auto Differential [[id number]]  (Abnormal) Collected: 05/01/24 1841    Specimen: Blood from Arm, Left Updated: 05/01/24 1854     WBC 4.76 10*3/mm3      RBC 3.50 10*6/mm3      Hemoglobin 10.4 g/dL      Hematocrit 33.0 %      MCV 94.3 fL      MCH 29.7 pg      MCHC 31.5 g/dL      RDW 14.4 %      RDW-SD 50.0 fl      MPV 11.8 fL      Platelets 183 10*3/mm3      Neutrophil % 67.9 %      Lymphocyte % 21.4 %      Monocyte % 7.6 %      Eosinophil % 2.1 %      Basophil % 0.6 %      Immature Grans % 0.4 %      Neutrophils, Absolute 3.23 10*3/mm3      Lymphocytes, Absolute 1.02 10*3/mm3      Monocytes, Absolute 0.36 10*3/mm3      Eosinophils, Absolute 0.10 10*3/mm3      Basophils, Absolute 0.03 10*3/mm3      Immature Grans, Absolute 0.02 10*3/mm3      nRBC 0.0 /100 WBC           Imaging Results (Last 24 Hours)       Procedure Component Value Units Date/Time    CT Abdomen Pelvis Without Contrast [[id number]] Collected: 05/01/24 2037     Updated: 05/01/24 2047    Narrative:      EXAM: CT ABDOMEN PELVIS WO CONTRAST-     INDICATION: hematuria       TECHNIQUE: Helically acquired CT images were obtained of the abdomen and  pelvis without intravenous contrast. Coronal and sagittal reformations  were  performed.       DOSE LENGTH PRODUCT: 279.58 mGy.cm mGy cm. Automated exposure control  was also utilized to decrease patient radiation dose.     COMPARISON: 11/16/2018     FINDINGS:     Lower Chest: Cardiomegaly. Mitral annular calcifications. Aortic  valvular calcifications. Coronary artery calcifications. Trace right  pleural fluid. Minimal atelectasis in the lung bases.     Liver: Unremarkable.     Biliary Tree: Unremarkable.     Spleen: Unremarkable.     Pancreas: Unremarkable.     Adrenal Glands: Unremarkable.     Kidneys/Ureters/Bladder: Stable large exophytic right renal cyst.     Reproductive Organs: Hysterectomy.     Gastrointestinal Tract: Unremarkable.      Lymphatics: Unremarkable.     Vasculature: Severe atherosclerosis.     Peritoneum/Retroperitoneum: Small to moderate volume ascites. Mild  mesenteric edema.     Abdominal Wall/Soft Tissues: Mild body wall anasarca. Tiny  fat-containing right inguinal hernia.     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 No acute or suspicious osseous finding.       Impression:         Small to moderate volume ascites with mild mesenteric edema and body  wall anasarca. No obvious evidence of cirrhosis.      Cardiomegaly with trace right pleural effusion.        No CT evidence to explain hematuria on this nonenhanced study.        This report was signed and finalized on 5/1/2024 8:43 PM by Ludwin Simmons.       CT Head Without Contrast [[id number]] Collected: 05/01/24 1753     Updated: 05/01/24 4209    Narrative:      Exam: CT HEAD WO CONTRAST- 5/1/2024 4:46 PM     HISTORY: ams, altered mental status.     DOSE LENGTH PRODUCT: 667.7 mGy.cm mGy cm. Automated exposure control was  also utilized to decrease patient radiation dose.     Technique:  Helically acquired CT of the brain without IV contrast was performed.  Sagittal and coronal reformations are also provided for review. Soft  tissue and bone kernels are available for  interpretation.     Comparison: CT head 3/1/2021. MRI brain 1/7/2022.     Findings:     Ventricles and extra-axial CSF spaces are normal in size.     No intraparenchymal or extra-axial hemorrhage.     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 Gray-white matter differentiation is  otherwise preserved.     Orbits are grossly unremarkable. Left sphenoid sinus mucous retention  cyst. Mild left maxillary sinus mucosal thickening. Mastoid air cells  are grossly clear.     No suspicious calvarial or extracranial soft tissue abnormality.     Other:None.       Impression:      Impression:       No acute intracranial abnormality.     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     This report was signed and finalized on 5/1/2024 6:02 PM by Ludwin Simmons.       XR Chest 1 View [[id number]] Collected: 05/01/24 1741     Updated: 05/01/24 1745    Narrative:      EXAM: XR CHEST 1 VW- 5/1/2024 4:40 PM     HISTORY: Altered mental status.     COMPARISON: 3/13/2024.     TECHNIQUE: Single frontal radiograph of the chest was obtained.     FINDINGS:     Support Devices: None.     Cardiac and Mediastinal Silhouettes: Cardiomegaly, stable.     Lungs/Pleura: No focal consolidation. No sizable pleural effusion. No  visible pneumothorax.     Osseous structures: No acute osseous finding.     Other: None.       Impression:         No acute cardiopulmonary abnormality.           This report was signed and finalized on 5/1/2024 5:42 PM by Ludwin Simmons.                Assessment / Plan   Assessment:   Active Hospital Problems    Diagnosis     **Acute renal failure superimposed on stage 4 chronic kidney disease     Digoxin toxicity, accidental or unintentional, initial encounter     Altered mental status     Acute cystitis with hematuria     Elevated troponin     Severe pulmonary hypertension     Chronic anticoagulation     Chronic diastolic (congestive) heart failure     Benign essential HTN     Persistent atrial fibrillation     Type 2 diabetes mellitus with  hyperglycemia, without long-term current use of insulin        Treatment Plan  1.  The patient will be admitted to Dr. Narvaez's service here at [facility].   2.  Continue Rocephin 1 g IV daily  3.  Home medications reviewed, will hold for now and evaluate swallowing safety in a.m.  4.  Hold digoxin, repeat dig level in a.m.  5.  N.p.o., oral care  6.  Monitor glucose every 6 hours with regular insulin sliding scale coverage  7.  Normal saline bolus 500 ml, follow with LR at 50 ml/hour  8.  Supplemental oxygen as needed, turn every 2 hours, daily weights  10.  Labs in a.m., repeat troponin now  11.  Continue external catheter is placed on the emergency department  12.  Consult , PT/OT and speech therapy in a.m.    Medical Decision Making  Number and Complexity of problems: 11  Differential Diagnosis: None    Conditions and Status        Condition is worsening.     MDM Data  External documents reviewed: No  Cardiac tracing (EKG, telemetry) interpretation: Reviewed  Radiology interpretation: Reviewed  Labs reviewed: Yes  Any tests that were considered but not ordered: No     Decision rules/scores evaluated (example [id number], [place], etc): No     Discussed with: Patient's son Davian and Dr. Narvaez     Care Planning  Shared decision making: Patient's son Davian and Dr. Narvaez  Code status and discussions: DNR/DNI    Disposition  Social Determinants of Health that impact treatment or disposition: Patient lives alone, may benefit from home health or rehab placement  Estimated length of stay is 1-2 days.     I confirmed that the patient's advanced care plan is present, code status is documented, and a surrogate decision maker is listed in the patient's medical record.     The patient's surrogate decision maker is dimitry Marcelo.     The patient was seen and examined by me on 5/1/2024 at 10:17 PM.    Electronically signed by YOBANY Miller, 05/01/24, 23:22 CDT.

## 2024-05-02 NOTE — PROGRESS NOTES
"    [facility] Medicine Services  INPATIENT PROGRESS NOTE    Length of Stay: 1  Date of Admission: 5/1/2024  Primary Care Physician: Zachariah Huffman MD    Subjective   Chief Complaint: Mental status changes    HPI   Pepper Perales presented to [facility] emergency room 5/1/2024 with status changes and confusion.  She has a history of stage IV chronic kidney disease, chronic diastolic heart failure, severe pulmonary hypertension and diabetes.  Patient had recent hospitalization 3/13/2024 - 3/15/2024 for acute on chronic heart failure with preserved ejection fraction, treated with diuresis with good results.  She followed up with her primary care provider.  She was in her usual state of health the day of admission approximately 8- 8:30 the morning she talked to her daughter on the phone.  Son normally calls her around 2 PM but he was in [place] when she called him.  Son could tell by the conversation that \"something was not right\".  Patient tried to call her daughter who did not answer the phone.  Due to some confusion EMS called and patient was brought to ED.  Creatinine 3.2 with previous creatinine 2.49 on 3/15/2024, dig level 1.5, CRP 8.83, urinalysis 3-5 WBC, 1+ bacteria, negative nitrate.  WBC 4.76.  CT scan of the abdomen noted small to moderate volume ascites with mild mesenteric edema and body wall anasarca.  No obvious evidence of cirrhosis.  Cardiomegaly with trace right pleural effusion.  No CT evidence to explain hematuria.  CT scan of the head no acute intracranial abnormality.  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  Chest x-ray no acute cardiopulmonary abnormality.  Lactated Ringer's fluid bolus, Rocephin given in ER.    Today  Patient was seen in emergency room 42.  No oxygen in use.  Son at bedside.  She is awake and answers questions appropriately.  She tells me her name, that she is at the hospital, she identifies her son and knows the date.  She tells me " "\"I did not know what was going on yesterday\".  Denies chest pain, palpitations or shortness of breath.  Creatinine improved down to 2.75 today.  Continue IV fluid hydration and Rocephin.  Await culture and sensitivity.  She denies fever or chills at home.      Review of Systems   Constitutional:  Positive for activity change and fatigue.   HENT:  Negative for congestion and trouble swallowing.    Eyes:  Negative for photophobia and visual disturbance.   Respiratory:  Negative for cough, shortness of breath and wheezing.    Cardiovascular:  Negative for chest pain, palpitations and leg swelling.   Gastrointestinal:  Negative for constipation, diarrhea, nausea and vomiting.   Endocrine: Negative for cold intolerance, heat intolerance and polyuria.   Genitourinary:  Positive for frequency. Negative for dysuria and urgency.   Musculoskeletal:  Negative for gait problem.   Skin:  Negative for color change, pallor, rash and wound.   Allergic/Immunologic: Negative for immunocompromised state.   Neurological:  Positive for weakness. Negative for light-headedness.   Hematological:  Negative for adenopathy. Does not bruise/bleed easily.   Psychiatric/Behavioral:  Negative for agitation, behavioral problems and confusion.      All pertinent negatives and positives are as above. All other systems have been reviewed and are negative unless otherwise stated.     Objective    Temp:  [97.7 °F (36.5 °C)-97.8 °F (36.6 °C)] 97.8 °F (36.6 °C)  Heart Rate:  [52-73] 60  Resp:  [16-18] 16  BP: (144-188)/(44-83) 160/48    Physical Exam  Vitals and nursing note reviewed.   Constitutional:       Comments: Sitting up in bed.  No oxygen in use.  Son in room.   HENT:      Head: Normocephalic and atraumatic.      Nose: No congestion.      Mouth/Throat:      Pharynx: Oropharynx is clear. No oropharyngeal exudate or posterior oropharyngeal erythema.   Eyes:      Extraocular Movements: Extraocular movements intact.      Pupils: Pupils are equal, " round, and reactive to light.   Cardiovascular:      Rate and Rhythm: Normal rate and regular rhythm.      Comments: Normal sinus 60 on bedside monitor  Pulmonary:      Breath sounds: No wheezing, rhonchi or rales.      Comments: No oxygen in use.  Abdominal:      Palpations: Abdomen is soft.      Tenderness: There is no abdominal tenderness.   Genitourinary:     Comments: Voiding.  Musculoskeletal:         General: No swelling or tenderness.      Cervical back: Normal range of motion and neck supple.   Skin:     General: Skin is warm and dry.   Neurological:      General: No focal deficit present.      Mental Status: She is alert and oriented to person, place, and time.      Comments: Moves extremities equally, answers questions appropriately.   Psychiatric:         Mood and Affect: Mood normal.         Behavior: Behavior normal.         Thought Content: Thought content normal.         Judgment: Judgment normal.           Results Review:  I have reviewed the labs, radiology results, and diagnostic studies.    Laboratory Data:      Results from last 7 days   Lab Units 05/01/24  1841   WBC 10*3/mm3 4.76   HEMOGLOBIN g/dL 10.4*   HEMATOCRIT % 33.0*   PLATELETS 10*3/mm3 183        Results from last 7 days   Lab Units 05/01/24  2332 05/01/24  2039   SODIUM mmol/L 144 138   POTASSIUM mmol/L 4.0 4.8   CHLORIDE mmol/L 109* 103   CO2 mmol/L 18.0* 23.0   BUN mg/dL 65* 73*   CREATININE mg/dL 2.75* 3.20*   GLUCOSE mg/dL 101* 134*   CALCIUM mg/dL 8.0* 9.6   ALT (SGPT) U/L  --  9         Culture Data:      Microbiology Results (last 10 days)       Procedure Component Value - Date/Time    Blood Culture With PAUL - Blood, Arm, Right [[id number]]  (Normal) Collected: 05/01/24 2218    Lab Status: Preliminary result Specimen: Blood from Arm, Right Updated: 05/02/24 1045     Blood Culture No growth at less than 24 hours    Blood Culture With PAUL - Blood, Hand, Right [[id number]]  (Normal) Collected: 05/01/24 2218    Lab Status:  Preliminary result Specimen: Blood from Hand, Right Updated: 05/02/24 1045     Blood Culture No growth at less than 24 hours    Respiratory Panel PCR w/COVID-19(SARS-CoV-2) JIM/EMERALD/VELMA/PAD/COR/SHAYNA In-House, NP Swab in UTM/VTM, 2 HR TAT - Swab, Nasopharynx [[id number]]  (Normal) Collected: 05/01/24 1829    Lab Status: Final result Specimen: Swab from Nasopharynx Updated: 05/01/24 1956     ADENOVIRUS, PCR Not Detected     Coronavirus 229E Not Detected     Coronavirus HKU1 Not Detected     Coronavirus NL63 Not Detected     Coronavirus OC43 Not Detected     COVID19 Not Detected     Human Metapneumovirus Not Detected     Human Rhinovirus/Enterovirus Not Detected     Influenza A PCR Not Detected     Influenza B PCR Not Detected     Parainfluenza Virus 1 Not Detected     Parainfluenza Virus 2 Not Detected     Parainfluenza Virus 3 Not Detected     Parainfluenza Virus 4 Not Detected     RSV, PCR Not Detected     Bordetella pertussis pcr Not Detected     Bordetella parapertussis PCR Not Detected     Chlamydophila pneumoniae PCR Not Detected     Mycoplasma pneumo by PCR Not Detected    Narrative:      In the setting of a positive respiratory panel with a viral infection PLUS a negative procalcitonin without other underlying concern for bacterial infection, consider observing off antibiotics or discontinuation of antibiotics and continue supportive care. If the respiratory panel is positive for atypical bacterial infection (Bordetella pertussis, Chlamydophila pneumoniae, or Mycoplasma pneumoniae), consider antibiotic de-escalation to target atypical bacterial infection.              Radiology Data:   Imaging Results (Last 72 Hours)       Procedure Component Value Units Date/Time    CT Abdomen Pelvis Without Contrast [[id number]] Collected: 05/01/24 2037     Updated: 05/01/24 2047    Narrative:      EXAM: CT ABDOMEN PELVIS WO CONTRAST-     INDICATION: hematuria       TECHNIQUE: Helically acquired CT images were obtained of the  abdomen and  pelvis without intravenous contrast. Coronal and sagittal reformations  were performed.       DOSE LENGTH PRODUCT: 279.58 mGy.cm mGy cm. Automated exposure control  was also utilized to decrease patient radiation dose.     COMPARISON: 11/16/2018     FINDINGS:     Lower Chest: Cardiomegaly. Mitral annular calcifications. Aortic  valvular calcifications. Coronary artery calcifications. Trace right  pleural fluid. Minimal atelectasis in the lung bases.     Liver: Unremarkable.     Biliary Tree: Unremarkable.     Spleen: Unremarkable.     Pancreas: Unremarkable.     Adrenal Glands: Unremarkable.     Kidneys/Ureters/Bladder: Stable large exophytic right renal cyst.     Reproductive Organs: Hysterectomy.     Gastrointestinal Tract: Unremarkable.      Lymphatics: Unremarkable.     Vasculature: Severe atherosclerosis.     Peritoneum/Retroperitoneum: Small to moderate volume ascites. Mild  mesenteric edema.     Abdominal Wall/Soft Tissues: Mild body wall anasarca. Tiny  fat-containing right inguinal hernia.     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 No acute or suspicious osseous finding.       Impression:         Small to moderate volume ascites with mild mesenteric edema and body  wall anasarca. No obvious evidence of cirrhosis.      Cardiomegaly with trace right pleural effusion.        No CT evidence to explain hematuria on this nonenhanced study.        This report was signed and finalized on 5/1/2024 8:43 PM by Ludwin Simmons.       CT Head Without Contrast [[id number]] Collected: 05/01/24 1753     Updated: 05/01/24 1805    Narrative:      Exam: CT HEAD WO CONTRAST- 5/1/2024 4:46 PM     HISTORY: ams, altered mental status.     DOSE LENGTH PRODUCT: 667.7 mGy.cm mGy cm. Automated exposure control was  also utilized to decrease patient radiation dose.     Technique:  Helically acquired CT of the brain without IV contrast was performed.  Sagittal and coronal  reformations are also provided for review. Soft  tissue and bone kernels are available for interpretation.     Comparison: CT head 3/1/2021. MRI brain 1/7/2022.     Findings:     Ventricles and extra-axial CSF spaces are normal in size.     No intraparenchymal or extra-axial hemorrhage.     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 Gray-white matter differentiation is  otherwise preserved.     Orbits are grossly unremarkable. Left sphenoid sinus mucous retention  cyst. Mild left maxillary sinus mucosal thickening. Mastoid air cells  are grossly clear.     No suspicious calvarial or extracranial soft tissue abnormality.     Other:None.       Impression:      Impression:       No acute intracranial abnormality.     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     This report was signed and finalized on 5/1/2024 6:02 PM by Ludwin Simmons.       XR Chest 1 View [[id number]] Collected: 05/01/24 1741     Updated: 05/01/24 1745    Narrative:      EXAM: XR CHEST 1 VW- 5/1/2024 4:40 PM     HISTORY: Altered mental status.     COMPARISON: 3/13/2024.     TECHNIQUE: Single frontal radiograph of the chest was obtained.     FINDINGS:     Support Devices: None.     Cardiac and Mediastinal Silhouettes: Cardiomegaly, stable.     Lungs/Pleura: No focal consolidation. No sizable pleural effusion. No  visible pneumothorax.     Osseous structures: No acute osseous finding.     Other: None.       Impression:         No acute cardiopulmonary abnormality.           This report was signed and finalized on 5/1/2024 5:42 PM by Ludwin Simmons.               Intake/Output    Intake/Output Summary (Last 24 hours) at 5/2/2024 1142  Last data filed at 5/1/2024 1900  Gross per 24 hour   Intake --   Output 300 ml   Net -300 ml       Scheduled Meds  [Held by provider] apixaban, 2.5 mg, Oral, BID  vitamin C, 500 mg, Oral, Daily  atorvastatin, 20 mg, Oral, Nightly  carvedilol, 6.25 mg, Oral, BID With Meals  cefTRIAXone, 1,000 mg, Intravenous, Q24H  cholecalciferol, 1,000  Units, Oral, Daily  [Held by provider] digoxin, 125 mcg, Oral, Every Other Day  empagliflozin, 10 mg, Oral, Daily  enoxaparin, 1 mg/kg, Subcutaneous, Q24H  famotidine, 20 mg, Intravenous, Q12H  ferrous sulfate, 325 mg, Oral, Daily With Breakfast  fluticasone, 2 spray, Nasal, Daily  [Held by provider] glipizide, 2.5 mg, Oral, BID AC  hydrALAZINE, 100 mg, Oral, TID  insulin regular, 2-7 Units, Subcutaneous, Q6H  losartan, 100 mg, Oral, Daily  NIFEdipine XL, 90 mg, Oral, Daily  pantoprazole, 40 mg, Oral, Q AM  sodium bicarbonate, 650 mg, Oral, Daily  sodium chloride, 10 mL, Intravenous, Q12H  spironolactone, 25 mg, Oral, Daily        I have reviewed the patient current medications.     Assessment/Plan     Active Hospital Problems    Diagnosis     **Acute renal failure superimposed on stage 4 chronic kidney disease     Metabolic encephalopathy suspect due to elevated creatinine and abnormal urinalysis     Digoxin toxicity, accidental or unintentional, initial encounter     Altered mental status     Acute cystitis with hematuria     Elevated troponin     Severe pulmonary hypertension     Chronic anticoagulation     Chronic diastolic (congestive) heart failure     Benign essential HTN     Persistent atrial fibrillation     Type 2 diabetes mellitus with hyperglycemia, without long-term current use of insulin        Treatment Plan:    1.  Metabolic encephalopathy suspect due to elevated creatinine and abnormal urinalysis.  Presented with confusion and found to have elevated creatinine and abnormal urinalysis.  Hydrated with IV fluids.  Encephalopathy resolved and patient awake and alert today.    2.  Acute kidney injury superimposed on CKD stage IV.  Presented with creatinine 3.2.  Previous creatinine 2.49 on 3/15/2024.  IV fluids given.  Decrease IV fluids to 50 mL/h.  Creatinine 2.75 today.  Repeat CMP in AM.  Continue sodium bicarbonate.    3.  Acute cystitis with hematuria.  Urinalysis positive nitrates, 3-5 WBC.   Continue Rocephin.  Await culture and sensitivity.    4.  Elevated digoxin level.  Digoxin level 1.4.  Hold digoxin.  Follow-up digoxin level 1.3 today.    5.  Primary hypertension. Blood pressure 160/48, 172/52.  Resume Coreg, losartan, procardia, hydralazine.    6.  Persistent atrial fibrillation.  Continue rate.  Restart Eliquis on 5/3.  Hold digoxin.  Lovenox serves as DVT prophylaxis.    7.  Chronic diastolic heart failure.  No exacerbation of symptoms.  Continue beta-blocker, ARB, spironolactone, statin.    8.  Diabetes mellitus type 2 without insulin.  Hemoglobin A1c 5.8 on 3/21/2024.  Accu-Cheks with sliding scale insulin coverage.  Glucoses 145, 155, 132.  Hold glipizide today.  Resume tomorrow.      Medical Decision Making  Number and Complexity of problems: 8  Metabolic encephalopathy due to elevated creatinine and abnormal urinalysis: Acute, high complexity, improving  Kidney injury superimposed on CKD stage IV: Acute, high complexity poses threat to life and bodily function, improving  Acute cystitis with hematuria: Acute, moderate complexity, stable  Elevated digoxin level: Acute, moderate complexity, not at baseline  Primary hypertension: Chronic, moderate complexity, not at baseline  Persistent atrial fibrillation: Chronic, moderate complexity, stable  Chronic diastolic heart failure: Chronic, moderate complexity, stable  Diabetes mellitus type 2 without insulin: Chronic, moderate complexity, stable    Differential Diagnosis: None    Conditions and Status        Condition is improving.     [facility] Data  External documents reviewed: Epic.  Reviewed hospitalization 3/13/2024.  Reviewed PCP Dr. Huffman office visit 3/21/2024  Cardiac tracing (EKG, telemetry) interpretation: Normal sinus rhythm on bedside monitor  Radiology interpretation: Reviewed radiology interpretation CT abdomen pelvis, CT scan of the head  Labs reviewed:   BMP 5/2/2024.  Repeat BMP in AM.  CBC 5/2/24.  Repeat CBC in AM.  Urinalysis  reviewed  Glucose 145, 155, 101    Any tests that were considered but not ordered: None     Decision rules/scores evaluated (example [id number], Wells, etc): None     Discussed with: Dr. Cisneros, patient, and son Davian     Care Planning  Shared decision making: Dr. Cisneros, patient, and son Wagner.  Patient and son agreed to IV antibiotics, IV fluids, follow-up lab work  Code status and discussions: No CPR, no intubation, no cardioversion, no dialysis  Patient surrogate decision maker is her son, Davian    Disposition  Social Determinants of Health that impact treatment or disposition: None  I expect the patient to be discharged to home in 2 days.     Electronically signed by YOBANY Rangel, 05/02/24, 11:42 CDT.    The above documentation resulted from a face-to-face encounter by me Mary HAYWOOD, [facility].

## 2024-05-03 ENCOUNTER — APPOINTMENT (OUTPATIENT)
Dept: GENERAL RADIOLOGY | Facility: HOSPITAL | Age: 89
DRG: 682 | End: 2024-05-03
Payer: MEDICARE

## 2024-05-03 ENCOUNTER — APPOINTMENT (OUTPATIENT)
Dept: ULTRASOUND IMAGING | Facility: HOSPITAL | Age: 89
DRG: 682 | End: 2024-05-03
Payer: MEDICARE

## 2024-05-03 LAB
ALBUMIN SERPL-MCNC: 3.6 G/DL (ref 3.5–5.2)
ALBUMIN/GLOB SERPL: 1.3 G/DL
ALP SERPL-CCNC: 57 U/L (ref 39–117)
ALT SERPL W P-5'-P-CCNC: 8 U/L (ref 1–33)
ANION GAP SERPL CALCULATED.3IONS-SCNC: 10 MMOL/L (ref 5–15)
AST SERPL-CCNC: 18 U/L (ref 1–32)
BACTERIA SPEC AEROBE CULT: NO GROWTH
BASOPHILS # BLD AUTO: 0.02 10*3/MM3 (ref 0–0.2)
BASOPHILS NFR BLD AUTO: 0.5 % (ref 0–1.5)
BILIRUB SERPL-MCNC: 0.5 MG/DL (ref 0–1.2)
BUN SERPL-MCNC: 63 MG/DL (ref 8–23)
BUN/CREAT SERPL: 22.5 (ref 7–25)
CALCIUM SPEC-SCNC: 9 MG/DL (ref 8.6–10.5)
CHLORIDE SERPL-SCNC: 107 MMOL/L (ref 98–107)
CO2 SERPL-SCNC: 23 MMOL/L (ref 22–29)
CREAT SERPL-MCNC: 2.8 MG/DL (ref 0.57–1)
DEPRECATED RDW RBC AUTO: 49.6 FL (ref 37–54)
DIGOXIN SERPL-MCNC: 0.9 NG/ML (ref 0.6–1.2)
EGFRCR SERPLBLD CKD-EPI 2021: 15.7 ML/MIN/1.73
EOSINOPHIL # BLD AUTO: 0.06 10*3/MM3 (ref 0–0.4)
EOSINOPHIL NFR BLD AUTO: 1.5 % (ref 0.3–6.2)
ERYTHROCYTE [DISTWIDTH] IN BLOOD BY AUTOMATED COUNT: 14.3 % (ref 12.3–15.4)
GLOBULIN UR ELPH-MCNC: 2.7 GM/DL
GLUCOSE BLDC GLUCOMTR-MCNC: 114 MG/DL (ref 70–130)
GLUCOSE BLDC GLUCOMTR-MCNC: 150 MG/DL (ref 70–130)
GLUCOSE BLDC GLUCOMTR-MCNC: 155 MG/DL (ref 70–130)
GLUCOSE BLDC GLUCOMTR-MCNC: 175 MG/DL (ref 70–130)
GLUCOSE BLDC GLUCOMTR-MCNC: 230 MG/DL (ref 70–130)
GLUCOSE SERPL-MCNC: 154 MG/DL (ref 65–99)
HCT VFR BLD AUTO: 25.9 % (ref 34–46.6)
HGB BLD-MCNC: 7.9 G/DL (ref 12–15.9)
LYMPHOCYTES # BLD AUTO: 0.7 10*3/MM3 (ref 0.7–3.1)
LYMPHOCYTES NFR BLD AUTO: 17.7 % (ref 19.6–45.3)
MCH RBC QN AUTO: 29 PG (ref 26.6–33)
MCHC RBC AUTO-ENTMCNC: 30.5 G/DL (ref 31.5–35.7)
MCV RBC AUTO: 95.2 FL (ref 79–97)
MONOCYTES # BLD AUTO: 0.34 10*3/MM3 (ref 0.1–0.9)
MONOCYTES NFR BLD AUTO: 8.6 % (ref 5–12)
NEUTROPHILS NFR BLD AUTO: 2.82 10*3/MM3 (ref 1.7–7)
NEUTROPHILS NFR BLD AUTO: 71.4 % (ref 42.7–76)
PLATELET # BLD AUTO: 134 10*3/MM3 (ref 140–450)
PMV BLD AUTO: 11.5 FL (ref 6–12)
POTASSIUM SERPL-SCNC: 4.9 MMOL/L (ref 3.5–5.2)
PROT SERPL-MCNC: 6.3 G/DL (ref 6–8.5)
RBC # BLD AUTO: 2.72 10*6/MM3 (ref 3.77–5.28)
SODIUM SERPL-SCNC: 140 MMOL/L (ref 136–145)
WBC NRBC COR # BLD AUTO: 3.95 10*3/MM3 (ref 3.4–10.8)

## 2024-05-03 PROCEDURE — 63710000001 INSULIN REGULAR HUMAN PER 5 UNITS: Performed by: NURSE PRACTITIONER

## 2024-05-03 PROCEDURE — 94664 DEMO&/EVAL PT USE INHALER: CPT

## 2024-05-03 PROCEDURE — 85025 COMPLETE CBC W/AUTO DIFF WBC: CPT | Performed by: NURSE PRACTITIONER

## 2024-05-03 PROCEDURE — 25010000002 ONDANSETRON PER 1 MG: Performed by: NURSE PRACTITIONER

## 2024-05-03 PROCEDURE — 97165 OT EVAL LOW COMPLEX 30 MIN: CPT

## 2024-05-03 PROCEDURE — 25010000002 CEFTRIAXONE PER 250 MG: Performed by: NURSE PRACTITIONER

## 2024-05-03 PROCEDURE — 25010000002 BUMETANIDE PER 0.5 MG: Performed by: NURSE PRACTITIONER

## 2024-05-03 PROCEDURE — 82948 REAGENT STRIP/BLOOD GLUCOSE: CPT

## 2024-05-03 PROCEDURE — 97162 PT EVAL MOD COMPLEX 30 MIN: CPT

## 2024-05-03 PROCEDURE — 80162 ASSAY OF DIGOXIN TOTAL: CPT | Performed by: NURSE PRACTITIONER

## 2024-05-03 PROCEDURE — 25010000002 ENOXAPARIN PER 10 MG: Performed by: NURSE PRACTITIONER

## 2024-05-03 PROCEDURE — 36415 COLL VENOUS BLD VENIPUNCTURE: CPT | Performed by: NURSE PRACTITIONER

## 2024-05-03 PROCEDURE — 80053 COMPREHEN METABOLIC PANEL: CPT | Performed by: NURSE PRACTITIONER

## 2024-05-03 PROCEDURE — 71046 X-RAY EXAM CHEST 2 VIEWS: CPT

## 2024-05-03 PROCEDURE — 25810000003 LACTATED RINGERS PER 1000 ML: Performed by: NURSE PRACTITIONER

## 2024-05-03 PROCEDURE — 76775 US EXAM ABDO BACK WALL LIM: CPT

## 2024-05-03 RX ORDER — BUMETANIDE 0.25 MG/ML
1 INJECTION INTRAMUSCULAR; INTRAVENOUS ONCE
Status: COMPLETED | OUTPATIENT
Start: 2024-05-03 | End: 2024-05-03

## 2024-05-03 RX ORDER — IPRATROPIUM BROMIDE AND ALBUTEROL SULFATE 2.5; .5 MG/3ML; MG/3ML
3 SOLUTION RESPIRATORY (INHALATION)
Status: DISCONTINUED | OUTPATIENT
Start: 2024-05-03 | End: 2024-05-03

## 2024-05-03 RX ORDER — ALBUTEROL SULFATE 2.5 MG/3ML
2.5 SOLUTION RESPIRATORY (INHALATION)
Status: DISCONTINUED | OUTPATIENT
Start: 2024-05-03 | End: 2024-05-03

## 2024-05-03 RX ORDER — IPRATROPIUM BROMIDE AND ALBUTEROL SULFATE 2.5; .5 MG/3ML; MG/3ML
3 SOLUTION RESPIRATORY (INHALATION) 4 TIMES DAILY PRN
Status: DISCONTINUED | OUTPATIENT
Start: 2024-05-03 | End: 2024-05-06 | Stop reason: HOSPADM

## 2024-05-03 RX ORDER — FAMOTIDINE 10 MG/ML
20 INJECTION, SOLUTION INTRAVENOUS NIGHTLY
Status: DISCONTINUED | OUTPATIENT
Start: 2024-05-03 | End: 2024-05-03 | Stop reason: ALTCHOICE

## 2024-05-03 RX ADMIN — CARVEDILOL 6.25 MG: 6.25 TABLET, FILM COATED ORAL at 08:11

## 2024-05-03 RX ADMIN — GLIPIZIDE 2.5 MG: 5 TABLET ORAL at 17:58

## 2024-05-03 RX ADMIN — FERROUS SULFATE TAB 325 MG (65 MG ELEMENTAL FE) 325 MG: 325 (65 FE) TAB at 08:13

## 2024-05-03 RX ADMIN — HYDRALAZINE HYDROCHLORIDE 100 MG: 50 TABLET ORAL at 20:16

## 2024-05-03 RX ADMIN — ENOXAPARIN SODIUM 60 MG: 100 INJECTION SUBCUTANEOUS at 20:17

## 2024-05-03 RX ADMIN — CARVEDILOL 6.25 MG: 6.25 TABLET, FILM COATED ORAL at 17:38

## 2024-05-03 RX ADMIN — ONDANSETRON 4 MG: 2 INJECTION INTRAMUSCULAR; INTRAVENOUS at 12:12

## 2024-05-03 RX ADMIN — SODIUM CHLORIDE 1000 MG: 900 INJECTION INTRAVENOUS at 20:16

## 2024-05-03 RX ADMIN — INSULIN HUMAN 2 UNITS: 100 INJECTION, SOLUTION PARENTERAL at 05:43

## 2024-05-03 RX ADMIN — HYDRALAZINE HYDROCHLORIDE 100 MG: 50 TABLET ORAL at 17:38

## 2024-05-03 RX ADMIN — OXYCODONE HYDROCHLORIDE AND ACETAMINOPHEN 500 MG: 500 TABLET ORAL at 08:11

## 2024-05-03 RX ADMIN — EMPAGLIFLOZIN 10 MG: 10 TABLET, FILM COATED ORAL at 08:14

## 2024-05-03 RX ADMIN — Medication 1000 UNITS: at 08:13

## 2024-05-03 RX ADMIN — Medication 10 ML: at 20:17

## 2024-05-03 RX ADMIN — LOSARTAN POTASSIUM 100 MG: 50 TABLET, FILM COATED ORAL at 08:13

## 2024-05-03 RX ADMIN — BUMETANIDE 1 MG: 0.25 INJECTION, SOLUTION INTRAMUSCULAR; INTRAVENOUS at 10:42

## 2024-05-03 RX ADMIN — NIFEDIPINE 90 MG: 60 TABLET, FILM COATED, EXTENDED RELEASE ORAL at 08:11

## 2024-05-03 RX ADMIN — SODIUM CHLORIDE, POTASSIUM CHLORIDE, SODIUM LACTATE AND CALCIUM CHLORIDE 50 ML/HR: 600; 310; 30; 20 INJECTION, SOLUTION INTRAVENOUS at 23:45

## 2024-05-03 RX ADMIN — HYDRALAZINE HYDROCHLORIDE 100 MG: 50 TABLET ORAL at 08:13

## 2024-05-03 RX ADMIN — INSULIN HUMAN 2 UNITS: 100 INJECTION, SOLUTION PARENTERAL at 23:42

## 2024-05-03 RX ADMIN — SODIUM BICARBONATE 650 MG: 650 TABLET ORAL at 08:12

## 2024-05-03 RX ADMIN — Medication 10 ML: at 08:14

## 2024-05-03 RX ADMIN — SPIRONOLACTONE 25 MG: 25 TABLET ORAL at 08:13

## 2024-05-03 RX ADMIN — GUAIFENESIN AND DEXTROMETHORPHAN 5 ML: 100; 10 SYRUP ORAL at 12:12

## 2024-05-03 RX ADMIN — ATORVASTATIN CALCIUM 20 MG: 10 TABLET, FILM COATED ORAL at 20:16

## 2024-05-03 RX ADMIN — PANTOPRAZOLE SODIUM 40 MG: 40 TABLET, DELAYED RELEASE ORAL at 05:38

## 2024-05-03 NOTE — PLAN OF CARE
Goal Outcome Evaluation:  Plan of Care Reviewed With: patient, son, grandchild(poncho)        Progress: no change  Outcome Evaluation: Pt asleep in chair upon PT arrival but easily awakens to name. A&Ox4 with family in room reporting no pain. PLOF reported as independent with ADLs ambulating with cane and dependent for home management and driving. Pt and family state that pt was doing much better yesterday but has had a decline this morning. MMT BLE 4/5 except B hip flexion 3+/5 ROM B hip flexion 25% reduced, neuropathy in B feet. Pt demos poor seated balance in chair however functional balance on commode. Pt completes sit to stand and ambulates CGA with walker with reduced speed and step length and forward flexed posture. Pt fatigues quickly requesting to lie down complerting bed mobility CGA. Discussion held regarding discharge planning with pt and family who are agreeable. Skilled PT needed to improve functional stregnth, balance, and endurance to reduce fall risk and improve pt's independence with ADLs.      Anticipated Discharge Disposition (PT): sub acute care setting

## 2024-05-03 NOTE — THERAPY EVALUATION
Patient Name: Libia Perales  : 1934    MRN: [id number]                              Today's Date: 5/3/2024       Admit Date: 2024    Visit Dx:     ICD-10-CM ICD-9-CM   1. AMARIS (acute kidney injury)  N17.9 584.9   2. Urinary tract infection with hematuria, site unspecified  N39.0 599.0    R31.9 599.70   3. Altered mental status, unspecified altered mental status type  R41.82 780.97   4. Dysphagia, unspecified type  R13.10 787.20   5. 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    Benign essential HTN    Persistent atrial fibrillation    Asthma    Fibrocystic breast disease    Gastro-esophageal reflux    High calcium levels    OA (osteoarthritis) of ankle    Right renal mass    Vertigo, benign paroxysmal    Chronic fatigue    Chronic cough    Allergic rhinitis    Stage 4 chronic kidney disease    Strain of left rhomboid muscle    Chronic anticoagulation    Chronic diastolic (congestive) heart failure    Severe pulmonary hypertension    Acute on chronic heart failure with preserved ejection fraction (HFpEF)    Acute kidney injury superimposed on stage 4 chronic kidney disease    Digoxin toxicity, accidental or unintentional, initial encounter    Altered mental status    Acute cystitis with hematuria    Elevated troponin    Metabolic encephalopathy suspect due to elevated creatinine and abnormal urinalysis     Past Medical History:   Diagnosis Date    Atrial fibrillation     Diabetes mellitus     Difficulty walking Approximately 2 years    GERD (gastroesophageal reflux disease)     High cholesterol     Hypertension     OA (osteoarthritis)     Pneumonia     few years ago    Stage 4 chronic kidney disease 2023     Past Surgical History:   Procedure  Laterality Date    BREAST BIOPSY Right     BREAST CYST ASPIRATION      HYSTERECTOMY      OOPHORECTOMY      TONSILLECTOMY        General Information       Row Name 05/03/24 0934          Physical Therapy Time and Intention    Document Type evaluation  AMARIS PMH: CKD stage 4, CHF, pulmonary HTN, diabetes, afib, OA, dementia  -HODA (r) CH (t) HODA (c)     Mode of Treatment physical therapy  -HODA (r) CH (t) HODA (c)       Row Name 05/03/24 0934          General Information    Patient Profile Reviewed yes  -HODA (r) CH (t) HODA (c)     Prior Level of Function independent:;all household mobility;transfer;bed mobility;ADL's;dependent:;cooking;cleaning;driving  ambulates with cane  -HODA (r) CH (t) HODA (c)     Existing Precautions/Restrictions fall  -HODA (r) CH (t) HODA (c)     Barriers to Rehab cognitive status;previous functional deficit  -HODA (r) CH (t) HODA (c)       Row Name 05/03/24 0934          Living Environment    People in Home alone  -HODA (r) CH (t) HODA (c)       Row Name 05/03/24 0934          Home Main Entrance    Number of Stairs, Main Entrance one  -HODA (r) CH (t) HODA (c)     Stair Railings, Main Entrance none  -HODA (r) CH (t) HODA (c)       Row Name 05/03/24 0934          Stairs Within Home, Primary    Number of Stairs, Within Home, Primary none  -HODA (r) CH (t) HODA (c)       Row Name 05/03/24 0934          Cognition    Orientation Status (Cognition) oriented x 4  -HODA (r) CH (t) HODA (c)       Row Name 05/03/24 0934          Safety Issues, Functional Mobility    Impairments Affecting Function (Mobility) balance;endurance/activity tolerance;sensation/sensory awareness;strength;visual/perceptual  -HODA (r) CH (t) HODA (c)               User Key  (r) = Recorded By, (t) = Taken By, (c) = Cosigned By      Initials Name Provider Type    Surinder Condon, PT DPT Physical Therapist    Gladys Smith PT Student PT Student                   Mobility       Row Name 05/03/24 0934          Bed Mobility    Bed Mobility sit-supine  -HODA (r) CH (t) HODA  (c)     Sit-Supine [place] (Bed Mobility) contact guard;verbal cues;nonverbal cues (demo/gesture)  -HODA (r) CH (t) HODA (c)       Row Name 05/03/24 0934          Sit-Stand Transfer    Sit-Stand [place] (Transfers) contact guard;verbal cues  -HODA (r) CH (t) HODA (c)     Assistive Device (Sit-Stand Transfers) walker, front-wheeled  -HODA (r) CH (t) HODA (c)       Row Name 05/03/24 0934          Gait/Stairs (Locomotion)    [place] Level (Gait) contact guard  -HODA (r) CH (t) HODA (c)     Assistive Device (Gait) walker, front-wheeled  -HODA (r) CH (t) HODA (c)     Patient was able to Ambulate --  -HODA     Distance in Feet (Gait) 15  -HODA (r) CH (t) HODA (c)     Deviations/Abnormal Patterns (Gait) gait speed decreased;stride length decreased  -HODA (r) CH (t) HODA (c)     Bilateral Gait Deviations forward flexed posture  -HODA (r) CH (t) HODA (c)               User Key  (r) = Recorded By, (t) = Taken By, (c) = Cosigned By      Initials Name Provider Type    Surinder Condon, PT DPT Physical Therapist    Gladys Smith PT Student PT Student                   Obj/Interventions       Row Name 05/03/24 0934          Range of Motion Comprehensive    Comment, General Range of Motion B hip flexion 25% reduced  -HODA (r) CH (t) HODA (c)       Row Name 05/03/24 0934          Strength Comprehensive (MMT)    Comment, General Manual Muscle Testing (MMT) Assessment BLE 4/5 except B hip flexion 3+/5  -HODA (r) CH (t) HODA (c)       Row Name 05/03/24 0934          Motor Skills    Motor Skills functional endurance  -HODA (r) CH (t) HODA (c)     Functional Endurance poor  -HODA (r) CH (t) HODA (c)       Row Name 05/03/24 0934          Balance    Balance Assessment sitting static balance;sitting dynamic balance;standing static balance;standing dynamic balance  -HODA (r) CH (t) HODA (c)     Static Sitting Balance standby assist  -HODA (r) CH (t) HODA (c)     Dynamic Sitting Balance standby assist  -HODA (r) CH (t) HODA (c)     Position, Sitting Balance unsupported;other (see  comments);supported;sitting in chair  on commode  -HODA (r) CH (t) HODA (c)     Static Standing Balance contact guard  -HODA (r) CH (t) HODA (c)     Dynamic Standing Balance contact guard  -HODA (r) CH (t) HODA (c)     Position/Device Used, Standing Balance walker, front-wheeled;supported  -HODA (r) CH (t) HODA (c)       Row Name 05/03/24 0934          Sensory Assessment (Somatosensory)    Sensory Assessment (Somatosensory) bilateral LE  -HODA (r) CH (t) HODA (c)     Bilateral LE Sensory Assessment light touch awareness;impaired  neuropathy in B feet  -HODA (r) CH (t) HODA (c)               User Key  (r) = Recorded By, (t) = Taken By, (c) = Cosigned By      Initials Name Provider Type    Surinder Condon, PT DPT Physical Therapist    Gladys Smith, PT Student PT Student                   Goals/Plan       Row Name 05/03/24 0934          Bed Mobility Goal 1 (PT)    Activity/Assistive Device (Bed Mobility Goal 1, PT) sit to supine;supine to sit  -HODA     [place] Level/Cues Needed (Bed Mobility Goal 1, PT) independent  -HODA (r) CH (t) HODA (c)     Time Frame (Bed Mobility Goal 1, PT) long term goal (LTG);10 days  -HODA (r) CH (t) HODA (c)     Progress/Outcomes (Bed Mobility Goal 1, PT) new goal  -HODA (r) CH (t) HODA (c)       Row Name 05/03/24 0934          Transfer Goal 1 (PT)    Activity/Assistive Device (Transfer Goal 1, PT) sit-to-stand/stand-to-sit;bed-to-chair/chair-to-bed;walker, rolling  -HODA (r) CH (t) HODA (c)     [place] Level/Cues Needed (Transfer Goal 1, PT) modified independence  -HODA (r) CH (t) HODA (c)     Time Frame (Transfer Goal 1, PT) long term goal (LTG);10 days  -HODA (r) CH (t) HODA (c)     Progress/Outcome (Transfer Goal 1, PT) new goal  -HODA (r) CH (t) HODA (c)       Row Name 05/03/24 0934          Gait Training Goal 1 (PT)    Activity/Assistive Device (Gait Training Goal 1, PT) gait (walking locomotion);assistive device use;backward stepping;decrease fall risk;improve balance and speed;increase endurance/gait  distance;sidestepping;turning, left;turning, right;walker, rolling  -HODA (r) CH (t) HODA (c)     [place] Level (Gait Training Goal 1, PT) contact guard required  -HODA (r) CH (t) HODA (c)     Distance (Gait Training Goal 1, PT) 50 ft  -HODA (r) CH (t) HODA (c)     Time Frame (Gait Training Goal 1, PT) long term goal (LTG);10 days  -HODA (r) CH (t) HODA (c)     Progress/Outcome (Gait Training Goal 1, PT) new goal  -HODA (r) CH (t) HODA (c)       Row Name 05/03/24 0934          Therapy Assessment/Plan (PT)    Planned Therapy Interventions (PT) balance training;bed mobility training;gait training;postural re-education;patient/family education;strengthening;transfer training  -HODA (r) CH (t) HODA (c)               User Key  (r) = Recorded By, (t) = Taken By, (c) = Cosigned By      Initials Name Provider Type    Surinder Condon, PT DPT Physical Therapist    Gladys Smith, PT Student PT Student                   Clinical Impression       Row Name 05/03/24 0920          Pain    Pretreatment Pain Rating 0/10 - no pain  -HODA (r) CH (t) HODA (c)     Posttreatment Pain Rating 0/10 - no pain  -HODA (r) CH (t) HODA (c)       Row Name 05/03/24 0914          Plan of Care Review    Plan of Care Reviewed With patient;son;grandchild(poncho)  -HODA (r) CH (t) HODA (c)     Progress no change  -HODA (r) CH (t) HODA (c)     Outcome Evaluation Pt asleep in chair upon PT arrival but easily awakens to name. A&Ox4 with family in room reporting no pain. PLOF reported as independent with ADLs ambulating with cane and dependent for home management and driving. Pt and family state that pt was doing much better yesterday but has had a decline this morning. MMT BLE 4/5 except B hip flexion 3+/5 ROM B hip flexion 25% reduced, neuropathy in B feet. Pt demos poor seated balance in chair however functional balance on commode. Pt completes sit to stand and ambulates CGA with walker with reduced speed and step length and forward flexed posture. Pt fatigues quickly requesting to  lie down complerting bed mobility CGA. Discussion held regarding discharge planning with pt and family who are agreeable. Skilled PT needed to improve functional stregnth, balance, and endurance to reduce fall risk and improve pt's independence with ADLs.  -HODA (r) CH (t) HODA (c)       Row Name 05/03/24 0934          Therapy Assessment/Plan (PT)    Rehab Potential (PT) good, to achieve stated therapy goals  -HODA (r) CH (t) HODA (c)     Criteria for Skilled Interventions Met (PT) yes;skilled treatment is necessary  -HODA (r) CH (t) HODA (c)     Therapy Frequency (PT) 2 times/day  -HODA (r) CH (t) HODA (c)     Predicted Duration of Therapy Intervention (PT) until d/c  -HODA (r) CH (t) HODA (c)       Row Name 05/03/24 0934          Vital Signs    Pre Patient Position Sitting  -HODA (r) CH (t) HODA (c)     Intra Patient Position Standing  -HODA (r) CH (t) HODA (c)     Post Patient Position Supine  -HODA (r) CH (t) HODA (c)       Row Name 05/03/24 0934          Positioning and Restraints    Pre-Treatment Position sitting in chair/recliner  -HODA (r) CH (t) HODA (c)     Post Treatment Position bed  -HODA (r) CH (t) HODA (c)     In Bed fowlers;call light within reach;encouraged to call for assist;exit alarm on;with family/caregiver  -HODA (r) CH (t) HODA (c)               User Key  (r) = Recorded By, (t) = Taken By, (c) = Cosigned By      Initials Name Provider Type    Surinder Condon, PT DPT Physical Therapist    Gladys Smith, KAYLIE Student PT Student                   Outcome Measures       Row Name 05/03/24 0934 05/03/24 0800       How much help from another person do you currently need...    Turning from your back to your side while in flat bed without using bedrails? 4  -HODA (r) CH (t) HODA (c) 4  -KB    Moving from lying on back to sitting on the side of a flat bed without bedrails? 3  -HODA (r) CH (t) HODA (c) 4  -KB    Moving to and from a bed to a chair (including a wheelchair)? 3  -HODA (r) ELIE (t) HODA (c) 3  -KB    Standing up from a chair using your arms  (e.g., wheelchair, bedside chair)? 3  -HODA (r) CH (t) HODA (c) 3  -KB    Climbing 3-5 steps with a railing? 2  -HODA (r) CH (t) HODA (c) 3  -KB    To walk in hospital room? 3  -HODA (r) CH (t) HODA (c) 3  -KB    AM-PAC 6 Clicks Score (PT) 18  -HODA (r) CH (t) 20  -KB    Highest Level of Mobility Goal 6 --> Walk 10 steps or more  -HODA (r) CH (t) 6 --> Walk 10 steps or more  -KB      Row Name 05/03/24 0934          Functional Assessment    Outcome Measure Options AM-PAC 6 Clicks Basic Mobility (PT)  -HODA (r) CH (t) HODA (c)               User Key  (r) = Recorded By, (t) = Taken By, (c) = Cosigned By      Initials Name Provider Type    Surinder Condon, PT DPT Physical Therapist    Perla Gregorio, RN Registered Nurse    Gladys Smith, PT Student PT Student                                 Physical Therapy Education       Title: PT OT SLP Therapies (In Progress)       Topic: Physical Therapy (In Progress)       Point: Mobility training (Done)       Learning Progress Summary             Patient Acceptance, E, VU,DU by  at 5/3/2024 1012    Comment: Pt educated on therole of PT in her care and on completing mobility safely and efficiently.   Family Acceptance, E, VU,DU by  at 5/3/2024 1012    Comment: Pt educated on therole of PT in her care and on completing mobility safely and efficiently.                         Point: Home exercise program (Not Started)       Learner Progress:  Not documented in this visit.              Point: Body mechanics (Done)       Learning Progress Summary             Patient Acceptance, E, VU,DU by  at 5/3/2024 1012    Comment: Pt educated on therole of PT in her care and on completing mobility safely and efficiently.   Family Acceptance, E, VU,DU by  at 5/3/2024 1012    Comment: Pt educated on therole of PT in her care and on completing mobility safely and efficiently.                         Point: Precautions (Not Started)       Learner Progress:  Not documented in this visit.                               User Key       Initials Effective Dates Name Provider Type Discipline     02/22/24 -  Gladys Ramirez, PT Student PT Student PT                  PT Recommendation and Plan  Planned Therapy Interventions (PT): balance training, bed mobility training, gait training, postural re-education, patient/family education, strengthening, transfer training  Plan of Care Reviewed With: patient, son, grandchild(poncho)  Progress: no change  Outcome Evaluation: Pt asleep in chair upon PT arrival but easily awakens to name. A&Ox4 with family in room reporting no pain. PLOF reported as independent with ADLs ambulating with cane and dependent for home management and driving. Pt and family state that pt was doing much better yesterday but has had a decline this morning. MMT BLE 4/5 except B hip flexion 3+/5 ROM B hip flexion 25% reduced, neuropathy in B feet. Pt demos poor seated balance in chair however functional balance on commode. Pt completes sit to stand and ambulates CGA with walker with reduced speed and step length and forward flexed posture. Pt fatigues quickly requesting to lie down complerting bed mobility CGA. Discussion held regarding discharge planning with pt and family who are agreeable. Skilled PT needed to improve functional stregnth, balance, and endurance to reduce fall risk and improve pt's independence with ADLs.     Time Calculation:         PT Charges       Row Name 05/03/24 0934             Time Calculation    Start Time 0934  -HODA (r) CH (t) HODA (c)      Stop Time 0958  -HODA (r) CH (t) HODA (c)      Time Calculation (min) 24 min  -HODA (r) CH (t)      PT Received On 05/03/24  -HODA (r) CH (t) HODA (c)      PT Goal Re-Cert Due Date 05/13/24  -HODA (r) CH (t) HODA (c)         Untimed Charges    PT Eval/Re-eval Minutes 34  10 minutes chart review  -HODA (r) CH (t) HODA (c)         Total Minutes    Untimed Charges Total Minutes 34  -HODA (r) CH (t)       Total Minutes 34  -HODA (r) CH (t)                User Key  (r) =  Recorded By, (t) = Taken By, (c) = Cosigned By      Initials Name Provider Type    Surinder Condon, PT DPT Physical Therapist    Gladys Smith, PT Student PT Student                      PT G-Codes  Outcome Measure Options: AM-PAC 6 Clicks Basic Mobility (PT)  AM-PAC 6 Clicks Score (PT): 18  PT Discharge Summary  Anticipated Discharge Disposition (PT): sub acute care setting    Gladys Ramirez PT Student  5/3/2024

## 2024-05-03 NOTE — PROGRESS NOTES
Malnutrition Severity Assessment    Patient Name:  Libia Perales  YOB: 1934  MRN: [id number]  Admit Date:  5/1/2024    Patient meets criteria for : Moderate (non-severe) Malnutrition (Secondary signs: Generalized weakness,fatigue)    Malnutrition Severity Assessment  Malnutrition Type: Chronic Disease - Related Malnutrition  Malnutrition Type (Last 8 Hours)       Malnutrition Severity Assessment       Row Name 05/03/24 1540       Malnutrition Severity Assessment    Malnutrition Type Chronic Disease - Related Malnutrition      Row Name 05/03/24 1540       Insufficient Energy Intake     Insufficient Energy Intake Findings Moderate    Insufficient Energy Intake  <75% of est. energy requirement for > or equal to 1 month  suspected      Row Name 05/03/24 1540       Muscle Loss    Loss of Muscle Mass Findings Moderate    [place] Moderate - slight depression      Row Name 05/03/24 1540       Fat Loss    Subcutaneous Fat Loss Findings Moderate    [place]  Moderate -  somewhat hollowness, slightly dark circles      Row Name 05/03/24 1540       Declining Functional Status    Declining Functional Status Findings Measurably Reduced  Generalized weakness/debility;activity change (per MD epic notes)      Row Name 05/03/24 1540       Criteria Met (Must meet criteria for severity in at least 2 of these categories: M Wasting, Fat Loss, Fluid, Secondary Signs, Wt. Status, Intake)    Patient meets criteria for  Moderate (non-severe) Malnutrition  Secondary signs: Generalized weakness,fatigue                    Electronically signed by:  Kathie Smith RDN, LD  05/03/24 15:48 CDT

## 2024-05-03 NOTE — PLAN OF CARE
Goal Outcome Evaluation:  Plan of Care Reviewed With: patient        Progress: improving     Alert and oriented x4 at present. RILEY. Up to bathroom x 1 assist and cane. IVF. IV antibiotics continue. VSS. Safety maintained.

## 2024-05-03 NOTE — NURSING NOTE
Son states that they prefer to have [facility] as #1 choice and then [place] as #2 for SNF.

## 2024-05-03 NOTE — THERAPY EVALUATION
Acute Care - Occupational Therapy Initial Evaluation  [facility]     Patient Name: Libia Perales  : 1934  MRN: [id number]  Today's Date: 5/3/2024     Date of Referral to OT: 24       Admit Date: 2024       ICD-10-CM ICD-9-CM   1. AMARIS (acute kidney injury)  N17.9 584.9   2. Urinary tract infection with hematuria, site unspecified  N39.0 599.0    R31.9 599.70   3. Altered mental status, unspecified altered mental status type  R41.82 780.97   4. Dysphagia, unspecified type  R13.10 787.20   5. 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    Benign essential HTN    Persistent atrial fibrillation    Asthma    Fibrocystic breast disease    Gastro-esophageal reflux    High calcium levels    OA (osteoarthritis) of ankle    Right renal mass    Vertigo, benign paroxysmal    Chronic fatigue    Chronic cough    Allergic rhinitis    Stage 4 chronic kidney disease    Strain of left rhomboid muscle    Chronic anticoagulation    Chronic diastolic (congestive) heart failure    Severe pulmonary hypertension    Acute on chronic heart failure with preserved ejection fraction (HFpEF)    Acute kidney injury superimposed on stage 4 chronic kidney disease    Digoxin toxicity, accidental or unintentional, initial encounter    Altered mental status    Acute cystitis with hematuria    Elevated troponin    Metabolic encephalopathy suspect due to elevated creatinine and abnormal urinalysis     Past Medical History:   Diagnosis Date    Atrial fibrillation     Diabetes mellitus     Difficulty walking Approximately 2 years    GERD (gastroesophageal reflux disease)     High cholesterol     Hypertension     OA (osteoarthritis)     Pneumonia     few years ago    Stage 4 chronic  kidney disease 08/18/2023     Past Surgical History:   Procedure Laterality Date    BREAST BIOPSY Right     BREAST CYST ASPIRATION      HYSTERECTOMY      OOPHORECTOMY      TONSILLECTOMY           OT ASSESSMENT FLOWSHEET (Last 12 Hours)       OT Evaluation and Treatment       Row Name 05/03/24 0931                   OT Time and Intention    Subjective Information complains of;fatigue  -EC        Document Type evaluation  -EC        Mode of Treatment occupational therapy  -EC           General Information    Patient Profile Reviewed yes  -EC        Prior Level of Function independent:;all household mobility;feeding;grooming;dressing;bathing;dependent:;driving;cooking;cleaning  -EC        Equipment Currently Used at Home cane, straight;rollator  -EC        Pertinent History of Current Functional Problem presents with generalized weakness Dx: AMARIS PMH: CKD, CHF, pulmonary HTN, DM  -EC        Existing Precautions/Restrictions fall  -EC        Barriers to Rehab previous functional deficit;cognitive status  -EC           Living Environment    Current Living Arrangements home  walk in shower, no seat  -EC        People in Home alone  son calls x1 day  -EC           Home Main Entrance    Number of Stairs, Main Entrance one  -EC        Stair Railings, Main Entrance none  -EC           Stairs Within Home, Primary    Number of Stairs, Within Home, Primary none  -EC           Pain Assessment    Pretreatment Pain Rating 0/10 - no pain  -EC        Posttreatment Pain Rating 0/10 - no pain  -EC        Pain Intervention(s) Repositioned;Ambulation/increased activity  -EC           Cognition    Orientation Status (Cognition) oriented x 4  -EC           Range of Motion Comprehensive    General Range of Motion bilateral upper extremity ROM WFL  -EC           Strength Comprehensive (MMT)    Comment, General Manual Muscle Testing (MMT) Assessment B shoulders 4-/5, all other 4/5  -EC           Sensory    Additional Documentation Sensory  Assessment (Somatosensory) (Group);Vision Assessment/Intervention (Group)  -EC           Vision Assessment/Intervention    Visual Impairment/Limitations other (see comments)  reports chronic vision trouble, unsure if pt typically wears lenses  -EC           Sensory Assessment (Somatosensory)    Sensory Assessment (Somatosensory) UE sensation intact  -EC           Activities of Daily Living    BADL Assessment/Intervention toileting  -EC           Toileting Assessment/Training    [place] Level (Toileting) adjust/manage clothing;change pad/brief;perform perineal hygiene;minimum assist (75% patient effort)  -EC        Assistive Devices (Toileting) commode;grab bar/safety frame  -EC        Position (Toileting) supported sitting;supported standing  -EC           BADL Safety/Performance    Impairments, BADL Safety/Performance balance;endurance/activity tolerance;strength;visual/perceptual;sensory awareness  -EC           Bed Mobility    Bed Mobility sit-supine  -EC        Sit-Supine [place] (Bed Mobility) verbal cues;contact guard  -EC        Assistive Device (Bed Mobility) bed rails  -EC           Functional Mobility    Functional Mobility- Ind. Level contact guard assist  -EC        Functional Mobility- Device walker, front-wheeled  -EC        Functional Mobility- Comment chair>BR>bed  -EC           Transfer Assessment/Treatment    Transfers stand-sit transfer;sit-stand transfer;toilet transfer  -EC           Sit-Stand Transfer    Sit-Stand [place] (Transfers) verbal cues;contact guard  -EC        Assistive Device (Sit-Stand Transfers) walker, front-wheeled  -EC           Stand-Sit Transfer    Stand-Sit [place] (Transfers) verbal cues;contact guard  -EC        Assistive Device (Stand-Sit Transfers) walker, front-wheeled  -EC           Toilet Transfer    Type (Toilet Transfer) sit-stand;stand-sit  -EC        [place] Level (Toilet Transfer) verbal cues;contact guard  -EC        Assistive Device  (Toilet Transfer) commode;grab bars/safety frame  -EC           Safety Issues, Functional Mobility    Impairments Affecting Function (Mobility) balance;endurance/activity tolerance;strength;visual/perceptual;sensation/sensory awareness  -EC           Balance    Balance Assessment sitting static balance;sitting dynamic balance;standing static balance;standing dynamic balance  -EC        Static Sitting Balance standby assist  -EC        Dynamic Sitting Balance standby assist  -EC        Position, Sitting Balance unsupported;sitting in chair  -EC        Static Standing Balance contact guard  -EC        Dynamic Standing Balance contact guard  -EC        Position/Device Used, Standing Balance supported;walker, front-wheeled  -EC           Plan of Care Review    Plan of Care Reviewed With patient;son;grandchild(poncho)  -EC        Progress no change  -EC        Outcome Evaluation OT eval complete.  Pt A&Ox4 asleep in chair. Family in room to clarify PLOF. Reported independence with ADLs & fxn mob, dep for home mngmnt & driving. She does have family to check on her daily. Family reports a fxnal decline as of recent. Today she demos decreased strength & is unsteady on her feet at times. She sit<>stand & amb to the BR w CGA using the RW. She required Denny for toileting tasks & LBD d/t decreased balance. Pt fatigued and wanting to lay down in the bed. Pt would benefit from skilled OT to address her deficits & maximize her safety given her high fall risk. At this time, recommend d/c to SNF vs home w S. Family & pt agreeable to this plan.  -EC           Positioning and Restraints    Pre-Treatment Position sitting in chair/recliner  -EC        Post Treatment Position bed  -EC        In Bed fowlers;call light within reach;encouraged to call for assist;exit alarm on;with family/caregiver;side rails up x3  -EC           Therapy Assessment/Plan (OT)    Date of Referral to OT 05/01/24  -EC        OT Diagnosis decreased ADLs  -EC         Rehab Potential (OT) good, to achieve stated therapy goals  -EC        Criteria for Skilled Therapeutic Interventions Met (OT) yes;meets criteria;skilled treatment is necessary  -EC        Therapy Frequency (OT) 5 times/wk  -EC        Predicted Duration of Therapy Intervention (OT) 10 days  -EC        Planned Therapy Interventions (OT) activity tolerance training;adaptive equipment training;BADL retraining;functional balance retraining;occupation/activity based interventions;patient/caregiver education/training;strengthening exercise;transfer/mobility retraining  -EC           OT Goals    Transfer Goal Selection (OT) transfer, OT goal 1  -EC        Bathing Goal Selection (OT) bathing, OT goal 1  -EC        Toileting Goal Selection (OT) toileting, OT goal 1  -EC           Transfer Goal 1 (OT)    Activity/Assistive Device (Transfer Goal 1, OT) sit-to-stand/stand-to-sit;bed-to-chair/chair-to-bed;toilet  -EC        [place] Level/Cues Needed (Transfer Goal 1, OT) supervision required  -EC        Time Frame (Transfer Goal 1, OT) long term goal (LTG)  -EC        Progress/Outcome (Transfer Goal 1, OT) new goal  -EC           Bathing Goal 1 (OT)    Activity/Device (Bathing Goal 1, OT) upper body bathing;lower body bathing  -EC        [place] Level/Cues Needed (Bathing Goal 1, OT) supervision required  -EC        Time Frame (Bathing Goal 1, OT) long term goal (LTG)  -EC        Progress/Outcomes (Bathing Goal 1, OT) new goal  -EC           Toileting Goal 1 (OT)    Activity/Device (Toileting Goal 1, OT) toileting skills, all  -EC        [place] Level/Cues Needed (Toileting Goal 1, OT) supervision required  -EC        Time Frame (Toileting Goal 1, OT) long term goal (LTG)  -EC        Progress/Outcome (Toileting Goal 1, OT) new goal  -EC                  User Key  (r) = Recorded By, (t) = Taken By, (c) = Cosigned By      Initials Name Effective Dates    Lissette Alfaro, BREEZYR/L 10/13/23 -                       Occupational Therapy Education       Title: PT OT SLP Therapies (In Progress)       Topic: Occupational Therapy (In Progress)       Point: ADL training (Done)       Description:   Instruct learner(s) on proper safety adaptation and remediation techniques during self care or transfers.   Instruct in proper use of assistive devices.                  Learning Progress Summary             Patient Acceptance, E, VU,NR by  at 5/3/2024 1159   Family Acceptance, E, VU,NR by EC at 5/3/2024 1159                         Point: Home exercise program (Not Started)       Description:   Instruct learner(s) on appropriate technique for monitoring, assisting and/or progressing therapeutic exercises/activities.                  Learner Progress:  Not documented in this visit.              Point: Precautions (Done)       Description:   Instruct learner(s) on prescribed precautions during self-care and functional transfers.                  Learning Progress Summary             Patient Acceptance, E, VU,NR by  at 5/3/2024 1159   Family Acceptance, E, VU,NR by EC at 5/3/2024 1159                         Point: Body mechanics (Done)       Description:   Instruct learner(s) on proper positioning and spine alignment during self-care, functional mobility activities and/or exercises.                  Learning Progress Summary             Patient Acceptance, E, VU,NR by  at 5/3/2024 1159   Family Acceptance, E, VU,NR by EC at 5/3/2024 1159                                         User Key       Initials Effective Dates Name Provider Type Discipline     10/13/23 -  Lissette Salazar OTR/L Occupational Therapist OT                      OT Recommendation and Plan  Planned Therapy Interventions (OT): activity tolerance training, adaptive equipment training, BADL retraining, functional balance retraining, occupation/activity based interventions, patient/caregiver education/training, strengthening exercise, transfer/mobility  retraining  Therapy Frequency (OT): 5 times/wk  Plan of Care Review  Plan of Care Reviewed With: patient, son, grandchild(poncho)  Progress: no change  Outcome Evaluation: OT eval complete.  Pt A&Ox4 asleep in chair. Family in room to clarify PLOF. Reported independence with ADLs & fxn mob, dep for home mngmnt & driving. She does have family to check on her daily. Family reports a fxnal decline as of recent. Today she demos decreased strength & is unsteady on her feet at times. She sit<>stand & amb to the BR w CGA using the RW. She required Denny for toileting tasks & LBD d/t decreased balance. Pt fatigued and wanting to lay down in the bed. Pt would benefit from skilled OT to address her deficits & maximize her safety given her high fall risk. At this time, recommend d/c to SNF vs home w S. Family & pt agreeable to this plan.  Plan of Care Reviewed With: patient, son, grandchild(poncho)  Outcome Evaluation: OT martina complete.  Pt A&Ox4 asleep in chair. Family in room to clarify PLOF. Reported independence with ADLs & fxn mob, dep for home mngmnt & driving. She does have family to check on her daily. Family reports a fxnal decline as of recent. Today she demos decreased strength & is unsteady on her feet at times. She sit<>stand & amb to the BR w CGA using the RW. She required Denny for toileting tasks & LBD d/t decreased balance. Pt fatigued and wanting to lay down in the bed. Pt would benefit from skilled OT to address her deficits & maximize her safety given her high fall risk. At this time, recommend d/c to SNF vs home w S. Family & pt agreeable to this plan.     Outcome Measures       Row Name 05/03/24 1100             How much help from another is currently needed...    Putting on and taking off regular lower body clothing? 2  -EC      Bathing (including washing, rinsing, and drying) 3  -EC      Toileting (which includes using toilet bed pan or urinal) 3  -EC      Putting on and taking off regular upper body clothing 3   -EC      Taking care of personal grooming (such as brushing teeth) 3  -EC      Eating meals 4  -EC      AM-PAC 6 Clicks Score (OT) 18  -EC         Functional Assessment    Outcome Measure Options AM-PAC 6 Clicks Daily Activity (OT)  -EC                User Key  (r) = Recorded By, (t) = Taken By, (c) = Cosigned By      Initials Name Provider Type    EC Lissette Salazar, OTR/L Occupational Therapist                    Time Calculation:    Time Calculation- OT       Row Name 05/03/24 1002             Time Calculation- OT    OT Start Time 0931  +10 min CR  -EC      OT Stop Time 1000  -EC      OT Time Calculation (min) 29 min  -EC      OT Received On 05/03/24  -EC      OT Goal Re-Cert Due Date 05/13/24  -EC         Untimed Charges    OT Eval/Re-eval Minutes 39  -EC         Total Minutes    Untimed Charges Total Minutes 39  -EC       Total Minutes 39  -EC                User Key  (r) = Recorded By, (t) = Taken By, (c) = Cosigned By      Initials Name Provider Type    EC Lissette Salazar, OTR/L Occupational Therapist                  Therapy Charges for Today       Code Description Service Date Service Provider Modifiers Qty    [id number] HC OT EVAL LOW COMPLEXITY 3 5/3/2024 Lissette Salazar, OTR/L GO 1                 Lissette Salazar OTR/L  5/3/2024

## 2024-05-03 NOTE — PROGRESS NOTES
"    [facility] Medicine Services  INPATIENT PROGRESS NOTE    Length of Stay: 2  Date of Admission: 5/1/2024  Primary Care Physician: Zachariah Huffman MD    Subjective   Chief Complaint: Mental status changes    HPI   Pepper Perales presented to [facility] emergency room 5/1/2024 with status changes and confusion.  She has a history of stage IV chronic kidney disease, chronic diastolic heart failure, severe pulmonary hypertension and diabetes.  Patient had recent hospitalization 3/13/2024 - 3/15/2024 for acute on chronic heart failure with preserved ejection fraction, treated with diuresis with good results.  She followed up with her primary care provider.  She was in her usual state of health the day of admission approximately 8- 8:30 the morning she talked to her daughter on the phone.  Son normally calls her around 2 PM but he was in [place] when she called him.  Son could tell by the conversation that \"something was not right\".  Patient tried to call her daughter who did not answer the phone.  Due to some confusion EMS called and patient was brought to ED.  Creatinine 3.2 with previous creatinine 2.49 on 3/15/2024, dig level 1.5, CRP 8.83, urinalysis 3-5 WBC, 1+ bacteria, negative nitrate.  WBC 4.76.  CT scan of the abdomen noted small to moderate volume ascites with mild mesenteric edema and body wall anasarca.  No obvious evidence of cirrhosis.  Cardiomegaly with trace right pleural effusion.  No CT evidence to explain hematuria.  CT scan of the head no acute intracranial abnormality.  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  Chest x-ray no acute cardiopulmonary abnormality.  Lactated Ringer's fluid bolus, Rocephin given in ER.    Today  Sitting up in chair and says she feels short of breath today but saturation has maintained 95-98%. Checked a chest x-ray that shows no acute disease.  Renal ultrasound no acute abnormality.  Creatinine slow to trend down to 2.8.  " Minimal peripheral edema but will give home dose of Bumex today.  Continue Rocephin x 3 doses.  Urine culture no growth.  Continue IV fluids at 50 mL/h.  Patient alert and oriented.  No fever, chills, cough or congestion.  Will ambulate in [place].  School therapy consulted recommends PT at discharge.    Review of Systems   Constitutional:  Positive for activity change and fatigue.   HENT:  Negative for congestion and trouble swallowing.    Eyes:  Negative for photophobia and visual disturbance.   Respiratory:  Negative for cough, shortness of breath and wheezing.    Cardiovascular:  Negative for chest pain, palpitations and leg swelling.   Gastrointestinal:  Negative for constipation, diarrhea, nausea and vomiting.   Endocrine: Negative for cold intolerance, heat intolerance and polyuria.   Genitourinary:  Positive for frequency. Negative for dysuria and urgency.   Musculoskeletal:  Negative for gait problem.   Skin:  Negative for color change, pallor, rash and wound.   Allergic/Immunologic: Negative for immunocompromised state.   Neurological:  Positive for weakness. Negative for light-headedness.   Hematological:  Negative for adenopathy. Does not bruise/bleed easily.   Psychiatric/Behavioral:  Negative for agitation, behavioral problems and confusion.      All pertinent negatives and positives are as above. All other systems have been reviewed and are negative unless otherwise stated.     Objective    Temp:  [97.7 °F (36.5 °C)-98 °F (36.7 °C)] 97.7 °F (36.5 °C)  Heart Rate:  [56-72] 56  Resp:  [16-18] 18  BP: (141-167)/(45-87) 152/45    Physical Exam  Vitals and nursing note reviewed.   Constitutional:       Comments: Sitting up in chair.  No oxygen in use.  Son in room.   HENT:      Head: Normocephalic and atraumatic.      Nose: No congestion.      Mouth/Throat:      Pharynx: Oropharynx is clear. No oropharyngeal exudate or posterior oropharyngeal erythema.   Eyes:      Extraocular Movements: Extraocular movements  intact.      Pupils: Pupils are equal, round, and reactive to light.   Cardiovascular:      Rate and Rhythm: Normal rate and regular rhythm.   Pulmonary:      Breath sounds: No wheezing, rhonchi or rales.      Comments: No oxygen in use.  Saturation 95-98% on room air.  Abdominal:      Palpations: Abdomen is soft.      Tenderness: There is no abdominal tenderness.   Genitourinary:     Comments: Voiding.  Musculoskeletal:         General: No swelling or tenderness.      Cervical back: Normal range of motion and neck supple.   Skin:     General: Skin is warm and dry.   Neurological:      General: No focal deficit present.      Mental Status: She is alert and oriented to person, place, and time.      Comments: Moves extremities equally, answers questions appropriately.   Psychiatric:         Mood and Affect: Mood normal.         Behavior: Behavior normal.         Thought Content: Thought content normal.         Judgment: Judgment normal.           Results Review:  I have reviewed the labs, radiology results, and diagnostic studies.    Laboratory Data:      Results from last 7 days   Lab Units 05/03/24  0524 05/01/24  1841   WBC 10*3/mm3 3.95 4.76   HEMOGLOBIN g/dL 7.9* 10.4*   HEMATOCRIT % 25.9* 33.0*   PLATELETS 10*3/mm3 134* 183        Results from last 7 days   Lab Units 05/03/24  0524 05/01/24  2332 05/01/24  2039   SODIUM mmol/L 140 144 138   POTASSIUM mmol/L 4.9 4.0 4.8   CHLORIDE mmol/L 107 109* 103   CO2 mmol/L 23.0 18.0* 23.0   BUN mg/dL 63* 65* 73*   CREATININE mg/dL 2.80* 2.75* 3.20*   GLUCOSE mg/dL 154* 101* 134*   CALCIUM mg/dL 9.0 8.0* 9.6   ALT (SGPT) U/L 8  --  9         Culture Data:      Microbiology Results (last 10 days)       Procedure Component Value - Date/Time    Urine Culture - Urine, Urine, Clean Catch [[id number]]  (Normal) Collected: 05/02/24 0536    Lab Status: Final result Specimen: Urine, Clean Catch Updated: 05/03/24 1055     Urine Culture No growth    Blood Culture With PAUL - Blood, Arm,  Right [[id number]]  (Normal) Collected: 05/01/24 2218    Lab Status: Preliminary result Specimen: Blood from Arm, Right Updated: 05/02/24 2245     Blood Culture No growth at 24 hours    Blood Culture With PAUL - Blood, Hand, Right [[id number]]  (Normal) Collected: 05/01/24 2218    Lab Status: Preliminary result Specimen: Blood from Hand, Right Updated: 05/02/24 2245     Blood Culture No growth at 24 hours    Respiratory Panel PCR w/COVID-19(SARS-CoV-2) JIM/EMERALD/VELMA/PAD/COR/SHAYNA In-House, NP Swab in UTM/VTM, 2 HR TAT - Swab, Nasopharynx [[id number]]  (Normal) Collected: 05/01/24 1829    Lab Status: Final result Specimen: Swab from Nasopharynx Updated: 05/01/24 1956     ADENOVIRUS, PCR Not Detected     Coronavirus 229E Not Detected     Coronavirus HKU1 Not Detected     Coronavirus NL63 Not Detected     Coronavirus OC43 Not Detected     COVID19 Not Detected     Human Metapneumovirus Not Detected     Human Rhinovirus/Enterovirus Not Detected     Influenza A PCR Not Detected     Influenza B PCR Not Detected     Parainfluenza Virus 1 Not Detected     Parainfluenza Virus 2 Not Detected     Parainfluenza Virus 3 Not Detected     Parainfluenza Virus 4 Not Detected     RSV, PCR Not Detected     Bordetella pertussis pcr Not Detected     Bordetella parapertussis PCR Not Detected     Chlamydophila pneumoniae PCR Not Detected     Mycoplasma pneumo by PCR Not Detected    Narrative:      In the setting of a positive respiratory panel with a viral infection PLUS a negative procalcitonin without other underlying concern for bacterial infection, consider observing off antibiotics or discontinuation of antibiotics and continue supportive care. If the respiratory panel is positive for atypical bacterial infection (Bordetella pertussis, Chlamydophila pneumoniae, or Mycoplasma pneumoniae), consider antibiotic de-escalation to target atypical bacterial infection.              Radiology Data:   Imaging Results (Last 72 Hours)       Procedure  Component Value Units Date/Time    XR Chest PA & Lateral [[id number]] Collected: 05/03/24 1158     Updated: 05/03/24 1201    Narrative:      EXAM: XR CHEST PA AND LATERAL-      DATE: 5/3/2024 10:56 AM     HISTORY: Shortness of breath; N17.9-Acute kidney failure, unspecified;  N39.0-Urinary tract infection, site not specified; R31.9-Hematuria,  unspecified; R41.82-Altered mental status, unspecified;  R13.10-Dysphagia, unspecified; Z74.09-Other reduced mobility       COMPARISON: 5/1/2024.     TECHNIQUE:  Frontal and lateral views of the chest submitted.     FINDINGS:    The lungs are grossly clear. There is enlargement of the cardiac  silhouette. No effusion or pneumothorax is seen.          Impression:      1. No active disease seen in the chest.     This report was signed and finalized on 5/3/2024 11:58 AM by Mayur Peterson.       US Renal Bilateral [[id number]] Collected: 05/03/24 1132     Updated: 05/03/24 1136    Narrative:      EXAMINATION: US RENAL BILATERAL-     5/3/2024 10:01 AM     HISTORY: Elevated creatinine; N17.9-Acute kidney failure, unspecified;  N39.0-Urinary tract infection, site not specified; R31.9-Hematuria,  unspecified; R41.82-Altered mental status, unspecified;  R13.10-Dysphagia, unspecified; Z74.09-Other reduced mobility     Grayscale and color-flow renal ultrasound.     Normal size and position of both kidneys.  Normal cortical thickness and normal cortical echogenicity.     No hydronephrosis or shadowing stone.     The right kidney measures 99 x 45 x 39 mm.  7 x 5 cm upper pole cyst is unchanged compared with a recent CT exam     The left kidney measures 95 x 48 x 41 mm.     Poorly visualized urinary bladder due to bowel loops and free pelvic  fluid.       Impression:      Impression:  1. No acute kidney abnormality is seen.           This report was signed and finalized on 5/3/2024 11:33 AM by Dr. João Cardona MD.       CT Abdomen Pelvis Without Contrast [[id number]] Collected: 05/01/24  2037     Updated: 05/01/24 2047    Narrative:      EXAM: CT ABDOMEN PELVIS WO CONTRAST-     INDICATION: hematuria       TECHNIQUE: Helically acquired CT images were obtained of the abdomen and  pelvis without intravenous contrast. Coronal and sagittal reformations  were performed.       DOSE LENGTH PRODUCT: 279.58 mGy.cm mGy cm. Automated exposure control  was also utilized to decrease patient radiation dose.     COMPARISON: 11/16/2018     FINDINGS:     Lower Chest: Cardiomegaly. Mitral annular calcifications. Aortic  valvular calcifications. Coronary artery calcifications. Trace right  pleural fluid. Minimal atelectasis in the lung bases.     Liver: Unremarkable.     Biliary Tree: Unremarkable.     Spleen: Unremarkable.     Pancreas: Unremarkable.     Adrenal Glands: Unremarkable.     Kidneys/Ureters/Bladder: Stable large exophytic right renal cyst.     Reproductive Organs: Hysterectomy.     Gastrointestinal Tract: Unremarkable.      Lymphatics: Unremarkable.     Vasculature: Severe atherosclerosis.     Peritoneum/Retroperitoneum: Small to moderate volume ascites. Mild  mesenteric edema.     Abdominal Wall/Soft Tissues: Mild body wall anasarca. Tiny  fat-containing right inguinal hernia.     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 No acute or suspicious osseous finding.       Impression:         Small to moderate volume ascites with mild mesenteric edema and body  wall anasarca. No obvious evidence of cirrhosis.      Cardiomegaly with trace right pleural effusion.        No CT evidence to explain hematuria on this nonenhanced study.        This report was signed and finalized on 5/1/2024 8:43 PM by Ludwin Simmons.       CT Head Without Contrast [[id number]] Collected: 05/01/24 1753     Updated: 05/01/24 1805    Narrative:      Exam: CT HEAD WO CONTRAST- 5/1/2024 4:46 PM     HISTORY: ams, altered mental status.     DOSE LENGTH PRODUCT: 667.7 mGy.cm mGy cm. Automated exposure  control was  also utilized to decrease patient radiation dose.     Technique:  Helically acquired CT of the brain without IV contrast was performed.  Sagittal and coronal reformations are also provided for review. Soft  tissue and bone kernels are available for interpretation.     Comparison: CT head 3/1/2021. MRI brain 1/7/2022.     Findings:     Ventricles and extra-axial CSF spaces are normal in size.     No intraparenchymal or extra-axial hemorrhage.     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 Gray-white matter differentiation is  otherwise preserved.     Orbits are grossly unremarkable. Left sphenoid sinus mucous retention  cyst. Mild left maxillary sinus mucosal thickening. Mastoid air cells  are grossly clear.     No suspicious calvarial or extracranial soft tissue abnormality.     Other:None.       Impression:      Impression:       No acute intracranial abnormality.     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     This report was signed and finalized on 5/1/2024 6:02 PM by Ludwin Simmons.       XR Chest 1 View [[id number]] Collected: 05/01/24 1741     Updated: 05/01/24 1745    Narrative:      EXAM: XR CHEST 1 VW- 5/1/2024 4:40 PM     HISTORY: Altered mental status.     COMPARISON: 3/13/2024.     TECHNIQUE: Single frontal radiograph of the chest was obtained.     FINDINGS:     Support Devices: None.     Cardiac and Mediastinal Silhouettes: Cardiomegaly, stable.     Lungs/Pleura: No focal consolidation. No sizable pleural effusion. No  visible pneumothorax.     Osseous structures: No acute osseous finding.     Other: None.       Impression:         No acute cardiopulmonary abnormality.           This report was signed and finalized on 5/1/2024 5:42 PM by Ludwin Simmons.               Intake/Output    Intake/Output Summary (Last 24 hours) at 5/3/2024 1314  Last data filed at 5/2/2024 1800  Gross per 24 hour   Intake 931 ml   Output 1 ml   Net 930 ml       Scheduled Meds  [Held by provider] apixaban, 2.5 mg, Oral,  BID  vitamin C, 500 mg, Oral, Daily  atorvastatin, 20 mg, Oral, Nightly  carvedilol, 6.25 mg, Oral, BID With Meals  cefTRIAXone, 1,000 mg, Intravenous, Q24H  cholecalciferol, 1,000 Units, Oral, Daily  [Held by provider] digoxin, 125 mcg, Oral, Every Other Day  empagliflozin, 10 mg, Oral, Daily  enoxaparin, 1 mg/kg, Subcutaneous, Q24H  ferrous sulfate, 325 mg, Oral, Daily With Breakfast  glipizide, 2.5 mg, Oral, BID AC  hydrALAZINE, 100 mg, Oral, TID  insulin regular, 2-7 Units, Subcutaneous, Q6H  losartan, 100 mg, Oral, Daily  NIFEdipine XL, 90 mg, Oral, Daily  pantoprazole, 40 mg, Oral, Q AM  sodium bicarbonate, 650 mg, Oral, Daily  sodium chloride, 10 mL, Intravenous, Q12H  spironolactone, 25 mg, Oral, Daily        I have reviewed the patient current medications.     Assessment/Plan     Active Hospital Problems    Diagnosis     **Acute kidney injury superimposed on stage 4 chronic kidney disease     Metabolic encephalopathy suspect due to elevated creatinine and abnormal urinalysis     Digoxin toxicity, accidental or unintentional, initial encounter     Altered mental status     Acute cystitis with hematuria     Elevated troponin     Severe pulmonary hypertension     Chronic anticoagulation     Chronic diastolic (congestive) heart failure     Benign essential HTN     Persistent atrial fibrillation     Type 2 diabetes mellitus with hyperglycemia, without long-term current use of insulin        Treatment Plan:    1.  Metabolic encephalopathy suspect due to elevated creatinine.  Presented with confusion and found to have elevated creatinine and abnormal urinalysis.  Hydrated with IV fluids.  Encephalopathy resolved and patient awake and alert today.    2.  Acute kidney injury superimposed on CKD stage IV.  Presented with creatinine 3.2.  Previous creatinine 2.49 on 3/15/2024.  IV fluids given.  Decrease IV fluids to 50 mL/h.  Creatinine 2.8 today.  Repeat CMP in AM.  Continue sodium bicarbonate.    3.  Abnormal  urinalysis.  Urinalysis positive nitrates, 3-5 WBC.  Continue Rocephin 3 days.  Urine culture negative.  Patient did present with mental status changes improved with IV fluids and antibiotics.     4.  Elevated digoxin level.  Digoxin level 1.4.  Hold digoxin.  Follow-up digoxin level 0.9 today    5.  Primary hypertension. Blood pressure 152/45.  Continue Coreg, losartan, hydralazine, Procardia    6.  Persistent atrial fibrillation.  Continue Coreg, hold digoxin.  Restart Eliquis on 5/4.  Continue Lovenox.    7.  Chronic diastolic heart failure.  No exacerbation of symptoms.  Continue beta-blocker, ARB, spironolactone, statin.  Will resume Bumex today.  Chest x-ray 5/3 no active disease.    8.  Diabetes mellitus type 2 without insulin.  Hemoglobin A1c 5.8 on 3/21/2024.  Accu-Cheks with sliding scale insulin coverage.  Glucoses 150, 155, 235.  Resume glipizide.    9.  Physical therapy Occupational Therapy consulted.  Recommends physical therapy at discharge.    10.  Consult nutrition      Medical Decision Making  Number and Complexity of problems: 8  Metabolic encephalopathy due to elevated creatinine and abnormal urinalysis: Acute, high complexity, resolved  Acute kidney injury superimposed on CKD stage IV: Acute, high complexity poses threat to life and bodily function, improving  Acute cystitis with hematuria: Acute, moderate complexity, stable  Elevated digoxin level: Acute, moderate complexity, improving  Primary hypertension: Chronic, moderate complexity, improved  Persistent atrial fibrillation: Chronic, moderate complexity, stable  Chronic diastolic heart failure: Chronic, moderate complexity, stable  Diabetes mellitus type 2 without insulin: Chronic, moderate complexity, stable    Differential Diagnosis: None    Conditions and Status        Condition is improving.     [facility] Data  External documents reviewed: Epic.  Reviewed hospitalization 3/13/2024.  Reviewed PCP Dr. Huffman office visit 3/21/2024  Cardiac  tracing (EKG, telemetry) interpretation: Normal sinus rhythm on bedside monitor  Radiology interpretation: Reviewed radiology interpretation CT abdomen pelvis, CT scan of the head  Labs reviewed:   CMP 5/3/2024.  Repeat CMP in AM.  CBC 5/3/24.  Repeat CBC in AM.  Urinalysis reviewed  Glucose 150, 114, 155    Any tests that were considered but not ordered: None     Decision rules/scores evaluated (example [id number], Wells, etc): None     Discussed with: Dr. Cisneros, patient, and son Davian     Care Planning  Shared decision making: Dr. Cisneros, patient, and son Wagner.  Patient and son agreed to IV antibiotics, IV fluids, follow-up lab work, chest x-ray, renal ultrasound, physical therapy consult, ambulate  Code status and discussions: No CPR, no intubation, no cardioversion, no dialysis  Patient surrogate decision maker is her sonDavian    Disposition  Social Determinants of Health that impact treatment or disposition: None  I expect the patient to be discharged to home in 1-2 days.     Electronically signed by YOBANY Rangel, 05/03/24, 13:14 CDT.    The above documentation resulted from a face-to-face encounter by me Mary HAYWOOD, [facility].

## 2024-05-03 NOTE — PLAN OF CARE
Goal Outcome Evaluation:  Plan of Care Reviewed With: patient, son, grandchild(poncho)        Progress: no change  Outcome Evaluation: OT eval complete.  Pt A&Ox4 asleep in chair. Family in room to clarify PLOF. Reported independence with ADLs & fxn mob, dep for home mngmnt & driving. She does have family to check on her daily. Family reports a fxnal decline as of recent. Today she demos decreased strength & is unsteady on her feet at times. She sit<>stand & amb to the BR w CGA using the RW. She required Denny for toileting tasks & LBD d/t decreased balance. Pt fatigued and wanting to lay down in the bed. Pt would benefit from skilled OT to address her deficits & maximize her safety given her high fall risk. At this time, recommend d/c to SNF vs home w S. Family & pt agreeable to this plan.

## 2024-05-03 NOTE — PLAN OF CARE
Goal Outcome Evaluation:  Plan of Care Reviewed With: patient, family, caregiver        Progress: no change  Outcome Evaluation: Nutrition assessment completed. MSA consult per NP. Pt seen by SLP diet advanced to Regular diet. Pt with generalized weakness and debillity on admission. Pt reports she eats all day long. Pt lives alone reports she is able to cook for herself. Family at bedside. Pt reports her UBW ranges from 133-140lbs pt reports sometimes depending if she is retaining fluid.Pt reports good appetite. Oral intake 75% of one meal. Boost Original BID. Con to follow for plan of care.

## 2024-05-03 NOTE — PROGRESS NOTES
Assessment tool to be used for patients with existing breathing treatments ordered by hospitalist                               Respiratory Therapist Driven Protocol - RT to Assess and Treat Algorithm    Item 0 Points 1 Point 2 Points 3 Points 4 Points Subtotal   Mental Status Alert, orientated, cooperative Lethargic, follows commands Confused, not following commands Obtunded or Somnolent Comatose 0   Respiratory Pattern Regular RR  8-16 breaths/minute Increased RR  18-25 breaths/minute Dyspnea on exertion, irregular RR  26-30/minute Shortness of breath,  RR 31-35/minute Accessory muscle use, severe SOB  RR > 35/minute 0   Breath Sounds Clear Decreased unilaterally Decreased bilaterally Basilar crackles Wheezing and/or rhonchi 0   Cough Strong, spontaneous non-productive Strong productive Weak, non-productive Weak productive or weak with rhonchi Absent or may require suctioning    Pulmonary Status Nonsmoker or no previous history > 1 year quit < 1 PPD  < 1 year quit >  or = 1 PPD Diagnosed pulmonary disease (severe or chronic) Severe or chronic pulmonary disease with exacerbation 0   Surgical Status None General surgery (non-abdominal or non-thoracic) Lower abdominal Thoracic or upper abdominal Thoracic with pulmonary disease 0   Chest X-ray Clear Chronic changes Infiltrates, atelectasis or pleural effusion Infiltrates > 1 lobe Diffuse infiltrates and atelectasis and/or effusions 0   Activity level Ambulatory Ambulatory with assistance Non-ambulatory Paraplegic Quadriplegic            1          Total Score 1   Score    Drug Therapy Frequency  20 or >    Q4 Duoneb & Q3 Albuterol PRN 15 - 19     Q6 Duoneb & Q4 Albuterol PRN 10 - 14    QID Duoneb & Q4 Albuterol PRN 5 - 9    TID Duoneb & Q6 Albuterol PRN 0 - 4    Q4 PRN Duoneb or Q4 PRN Albuterol    Incentive Spirometry - Initial RT instruct    Lung Expansion Therapy (PEP) Bronchopulmonary Hygiene (CPT)   Q4 & PRN - Severe atelectasis, poor  oxygenation Q4 - copious secretions, dyspnea, unable to sleep, mucus plugging   QID - High risk for persistent atelectasis, existence of atelectasis QID & Q4 PRN - Moderate secretion production   TID - At risk for developing atelectasis TID - small amounts of secretions with poor cough   BID - prevention of atelectasis BID - unable to breathe deeply and cough spontaneously   *RT Protocol patients will be re-assessed/re-evaluated every 48 hours.    *Patients who are home nebulizer treatments will be protocoled to no less than their home regimen and will remain     on their home regimen with re-evaluations as needed with changes in patient condition.    RT Comments/Recommendations: QID PRN ALBUTEROL PER PROTOCOL

## 2024-05-03 NOTE — CASE MANAGEMENT/SOCIAL WORK
Discharge Planning Assessment  [facility]     Patient Name: Libia Perales  MRN: [id number]  Today's Date: 5/3/2024    Admit Date: 5/1/2024        Discharge Needs Assessment       Row Name 05/03/24 1132       Living Environment    People in Home alone    Current Living Arrangements home    Potentially Unsafe Housing Conditions none    Primary Care Provided by self;child(poncho)    Provides Primary Care For no one    Family Caregiver if Needed child(poncho), adult    Quality of Family Relationships helpful;involved;supportive    Able to Return to Prior Arrangements yes       Resource/Environmental Concerns    Resource/Environmental Concerns none       Transition Planning    Patient/Family Anticipates Transition to other (see comments)    Patient/Family Anticipated Services at Transition skilled nursing    Transportation Anticipated family or friend will provide       Discharge Needs Assessment    Current Outpatient/Agency/Support Group skilled nursing facility    Equipment Currently Used at Home rollator;cane, straight    Concerns to be Addressed adjustment to diagnosis/illness;discharge planning    Discharge Coordination/Progress Patient was off the floor for testing. SW spoke to patient's granddaughter in room re discharge planning. Patient lives alone and has a cane and rollator to use if needed. Patient has PCP and Rx coverage. Patient's granddaughter says that patient/son have been considering SNF placement. SW explained process and informed of facility choices in [place]. Patient's son also wanted choices in [facility] so SW has provided those options as well. Patient's family is going to discuss options and call SW with answer on where to make SNF referrals. Will follow for call from patient's family.             SHANNON Hamm

## 2024-05-04 PROBLEM — E44.0 MODERATE MALNUTRITION: Status: ACTIVE | Noted: 2024-05-04

## 2024-05-04 LAB
ALBUMIN SERPL-MCNC: 3.6 G/DL (ref 3.5–5.2)
ALBUMIN/GLOB SERPL: 1.2 G/DL
ALP SERPL-CCNC: 60 U/L (ref 39–117)
ALT SERPL W P-5'-P-CCNC: 9 U/L (ref 1–33)
ANION GAP SERPL CALCULATED.3IONS-SCNC: 11 MMOL/L (ref 5–15)
AST SERPL-CCNC: 22 U/L (ref 1–32)
BASOPHILS # BLD AUTO: 0.01 10*3/MM3 (ref 0–0.2)
BASOPHILS NFR BLD AUTO: 0.2 % (ref 0–1.5)
BILIRUB SERPL-MCNC: 0.4 MG/DL (ref 0–1.2)
BUN SERPL-MCNC: 59 MG/DL (ref 8–23)
BUN/CREAT SERPL: 21.9 (ref 7–25)
CALCIUM SPEC-SCNC: 9 MG/DL (ref 8.6–10.5)
CHLORIDE SERPL-SCNC: 106 MMOL/L (ref 98–107)
CO2 SERPL-SCNC: 22 MMOL/L (ref 22–29)
CREAT SERPL-MCNC: 2.69 MG/DL (ref 0.57–1)
DEPRECATED RDW RBC AUTO: 49.9 FL (ref 37–54)
EGFRCR SERPLBLD CKD-EPI 2021: 16.5 ML/MIN/1.73
EOSINOPHIL # BLD AUTO: 0.07 10*3/MM3 (ref 0–0.4)
EOSINOPHIL NFR BLD AUTO: 1.6 % (ref 0.3–6.2)
ERYTHROCYTE [DISTWIDTH] IN BLOOD BY AUTOMATED COUNT: 14.5 % (ref 12.3–15.4)
GLOBULIN UR ELPH-MCNC: 3.1 GM/DL
GLUCOSE BLDC GLUCOMTR-MCNC: 147 MG/DL (ref 70–130)
GLUCOSE BLDC GLUCOMTR-MCNC: 150 MG/DL (ref 70–130)
GLUCOSE BLDC GLUCOMTR-MCNC: 177 MG/DL (ref 70–130)
GLUCOSE BLDC GLUCOMTR-MCNC: 189 MG/DL (ref 70–130)
GLUCOSE BLDC GLUCOMTR-MCNC: 235 MG/DL (ref 70–130)
GLUCOSE SERPL-MCNC: 178 MG/DL (ref 65–99)
HCT VFR BLD AUTO: 26.9 % (ref 34–46.6)
HGB BLD-MCNC: 8.4 G/DL (ref 12–15.9)
LYMPHOCYTES # BLD AUTO: 0.64 10*3/MM3 (ref 0.7–3.1)
LYMPHOCYTES NFR BLD AUTO: 14.7 % (ref 19.6–45.3)
MCH RBC QN AUTO: 29.6 PG (ref 26.6–33)
MCHC RBC AUTO-ENTMCNC: 31.2 G/DL (ref 31.5–35.7)
MCV RBC AUTO: 94.7 FL (ref 79–97)
MONOCYTES # BLD AUTO: 0.38 10*3/MM3 (ref 0.1–0.9)
MONOCYTES NFR BLD AUTO: 8.7 % (ref 5–12)
NEUTROPHILS NFR BLD AUTO: 3.23 10*3/MM3 (ref 1.7–7)
NEUTROPHILS NFR BLD AUTO: 74.3 % (ref 42.7–76)
PLATELET # BLD AUTO: 133 10*3/MM3 (ref 140–450)
PMV BLD AUTO: 11.7 FL (ref 6–12)
POTASSIUM SERPL-SCNC: 5.2 MMOL/L (ref 3.5–5.2)
PROT SERPL-MCNC: 6.7 G/DL (ref 6–8.5)
RBC # BLD AUTO: 2.84 10*6/MM3 (ref 3.77–5.28)
SODIUM SERPL-SCNC: 139 MMOL/L (ref 136–145)
WBC NRBC COR # BLD AUTO: 4.35 10*3/MM3 (ref 3.4–10.8)

## 2024-05-04 PROCEDURE — 82948 REAGENT STRIP/BLOOD GLUCOSE: CPT

## 2024-05-04 PROCEDURE — 85025 COMPLETE CBC W/AUTO DIFF WBC: CPT | Performed by: NURSE PRACTITIONER

## 2024-05-04 PROCEDURE — 80053 COMPREHEN METABOLIC PANEL: CPT | Performed by: NURSE PRACTITIONER

## 2024-05-04 PROCEDURE — 97116 GAIT TRAINING THERAPY: CPT

## 2024-05-04 PROCEDURE — 36415 COLL VENOUS BLD VENIPUNCTURE: CPT | Performed by: NURSE PRACTITIONER

## 2024-05-04 PROCEDURE — 63710000001 INSULIN REGULAR HUMAN PER 5 UNITS: Performed by: NURSE PRACTITIONER

## 2024-05-04 PROCEDURE — 63710000001 INSULIN REGULAR HUMAN PER 5 UNITS: Performed by: INTERNAL MEDICINE

## 2024-05-04 RX ORDER — BUMETANIDE 1 MG/1
1 TABLET ORAL DAILY
Status: DISCONTINUED | OUTPATIENT
Start: 2024-05-04 | End: 2024-05-06 | Stop reason: HOSPADM

## 2024-05-04 RX ADMIN — INSULIN HUMAN 3 UNITS: 100 INJECTION, SOLUTION PARENTERAL at 17:33

## 2024-05-04 RX ADMIN — INSULIN HUMAN 2 UNITS: 100 INJECTION, SOLUTION PARENTERAL at 12:39

## 2024-05-04 RX ADMIN — Medication 1000 UNITS: at 08:37

## 2024-05-04 RX ADMIN — PANTOPRAZOLE SODIUM 40 MG: 40 TABLET, DELAYED RELEASE ORAL at 05:30

## 2024-05-04 RX ADMIN — HYDRALAZINE HYDROCHLORIDE 100 MG: 50 TABLET ORAL at 20:46

## 2024-05-04 RX ADMIN — LOSARTAN POTASSIUM 100 MG: 50 TABLET, FILM COATED ORAL at 08:37

## 2024-05-04 RX ADMIN — GLIPIZIDE 2.5 MG: 5 TABLET ORAL at 16:34

## 2024-05-04 RX ADMIN — CARVEDILOL 6.25 MG: 6.25 TABLET, FILM COATED ORAL at 17:34

## 2024-05-04 RX ADMIN — EMPAGLIFLOZIN 10 MG: 10 TABLET, FILM COATED ORAL at 08:36

## 2024-05-04 RX ADMIN — ATORVASTATIN CALCIUM 20 MG: 10 TABLET, FILM COATED ORAL at 20:46

## 2024-05-04 RX ADMIN — SODIUM BICARBONATE 650 MG: 650 TABLET ORAL at 08:37

## 2024-05-04 RX ADMIN — HYDRALAZINE HYDROCHLORIDE 100 MG: 50 TABLET ORAL at 08:37

## 2024-05-04 RX ADMIN — INSULIN HUMAN 2 UNITS: 100 INJECTION, SOLUTION PARENTERAL at 05:39

## 2024-05-04 RX ADMIN — FERROUS SULFATE TAB 325 MG (65 MG ELEMENTAL FE) 325 MG: 325 (65 FE) TAB at 08:36

## 2024-05-04 RX ADMIN — OXYCODONE HYDROCHLORIDE AND ACETAMINOPHEN 500 MG: 500 TABLET ORAL at 08:37

## 2024-05-04 RX ADMIN — BUMETANIDE 1 MG: 1 TABLET ORAL at 12:39

## 2024-05-04 RX ADMIN — HYDRALAZINE HYDROCHLORIDE 100 MG: 50 TABLET ORAL at 16:34

## 2024-05-04 RX ADMIN — CARVEDILOL 6.25 MG: 6.25 TABLET, FILM COATED ORAL at 08:36

## 2024-05-04 RX ADMIN — APIXABAN 2.5 MG: 2.5 TABLET, FILM COATED ORAL at 20:46

## 2024-05-04 RX ADMIN — Medication 10 ML: at 08:38

## 2024-05-04 RX ADMIN — NIFEDIPINE 90 MG: 60 TABLET, FILM COATED, EXTENDED RELEASE ORAL at 08:36

## 2024-05-04 RX ADMIN — GLIPIZIDE 2.5 MG: 5 TABLET ORAL at 08:37

## 2024-05-04 RX ADMIN — GUAIFENESIN AND DEXTROMETHORPHAN 5 ML: 100; 10 SYRUP ORAL at 20:46

## 2024-05-04 NOTE — CASE MANAGEMENT/SOCIAL WORK
Continued Stay Note   [place]     Patient Name: Libia Perales  MRN: [id number]  Today's Date: 5/4/2024    Admit Date: 5/1/2024        Discharge Plan       Row Name 05/04/24 1197       Plan    Plan Comments Plan is either for home vs rehab. Son prefers [facility] as first choice, if snf needed. Referral has been sent to [facility]. Will follow up with admissions on Monday.                   Discharge Codes    No documentation.                       SHANNON Cuevas

## 2024-05-04 NOTE — NURSING NOTE
IV site continues to leak, explained to pt need for new IV site, pt refusing to have new IV placed.

## 2024-05-04 NOTE — PLAN OF CARE
Pt ambulated in [place] with walker and min staff assist. Up in chair most of afternoon.       Problem: Adult Inpatient Plan of Care  Goal: Plan of Care Review  Outcome: Ongoing, Not Progressing     Problem: Fluid and Electrolyte Imbalance (Acute Kidney Injury/Impairment)  Goal: Fluid and Electrolyte Balance  Outcome: Ongoing, Not Progressing     Problem: Oral Intake Inadequate (Acute Kidney Injury/Impairment)  Goal: Optimal Nutrition Intake  Outcome: Ongoing, Not Progressing     Problem: Renal Function Impairment (Acute Kidney Injury/Impairment)  Goal: Effective Renal Function  Outcome: Ongoing, Not Progressing     Problem: UTI (Urinary Tract Infection)  Goal: Improved Infection Symptoms  Outcome: Ongoing, Not Progressing     Problem: Fall Injury Risk  Goal: Absence of Fall and Fall-Related Injury  Outcome: Ongoing, Not Progressing  Intervention: Promote Injury-Free Environment  Recent Flowsheet Documentation  Taken 5/4/2024 0820 by Whitney Varela RN  Safety Promotion/Fall Prevention: safety round/check completed   Goal Outcome Evaluation:

## 2024-05-04 NOTE — PLAN OF CARE
Goal Outcome Evaluation:  Plan of Care Reviewed With: patient        Progress: no change  Outcome Evaluation: A&Ox4. No c/o pain. IVF. Purewick. and ambulates to RR with walker and standby assist. . RA. BG monitoring q 6. Sleeping some.

## 2024-05-04 NOTE — THERAPY TREATMENT NOTE
Acute Care - Physical Therapy Treatment Note  [facility]     Patient Name: Libia Perales  : 1934  MRN: [id number]  Today's Date: 2024      Visit Dx:     ICD-10-CM ICD-9-CM   1. MAARIS (acute kidney injury)  N17.9 584.9   2. Urinary tract infection with hematuria, site unspecified  N39.0 599.0    R31.9 599.70   3. Altered mental status, unspecified altered mental status type  R41.82 780.97   4. Dysphagia, unspecified type  R13.10 787.20   5. Impaired mobility [Z74.09]  Z74.09 799.89   6. High calcium levels  E83.52 275.42   7. 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    Benign essential HTN    Persistent atrial fibrillation    Asthma    Fibrocystic breast disease    Gastro-esophageal reflux    High calcium levels    OA (osteoarthritis) of ankle    Right renal mass    Vertigo, benign paroxysmal    Chronic fatigue    Chronic cough    Allergic rhinitis    Stage 4 chronic kidney disease    Strain of left rhomboid muscle    Chronic anticoagulation    Chronic diastolic (congestive) heart failure    Severe pulmonary hypertension    Acute on chronic heart failure with preserved ejection fraction (HFpEF)    Acute kidney injury superimposed on stage 4 chronic kidney disease    Digoxin toxicity, accidental or unintentional, initial encounter    Altered mental status    Acute cystitis with hematuria    Elevated troponin    Metabolic encephalopathy suspect due to elevated creatinine and abnormal urinalysis    Moderate malnutrition     Past Medical History:   Diagnosis Date    Atrial fibrillation     Diabetes mellitus     Difficulty walking Approximately 2 years    GERD (gastroesophageal reflux disease)     High cholesterol     Hypertension     OA  (osteoarthritis)     Pneumonia     few years ago    Stage 4 chronic kidney disease 08/18/2023     Past Surgical History:   Procedure Laterality Date    BREAST BIOPSY Right     BREAST CYST ASPIRATION      HYSTERECTOMY      OOPHORECTOMY      TONSILLECTOMY       PT Assessment (Last 12 Hours)       PT Evaluation and Treatment       Row Name 05/04/24 1337          Physical Therapy Time and Intention    Subjective Information no complaints  -LY     Document Type therapy note (daily note)  -LY     Mode of Treatment physical therapy  -LY       Row Name 05/04/24 1337          General Information    Existing Precautions/Restrictions fall  -LY       Row Name 05/04/24 1337          Pain    Pretreatment Pain Rating 0/10 - no pain  -LY     Posttreatment Pain Rating 0/10 - no pain  -LY       Row Name 05/04/24 1337          Bed Mobility    Comment, (Bed Mobility) chair  -LY       Row Name 05/04/24 1337          Sit-Stand Transfer    Sit-Stand [place] (Transfers) contact guard;verbal cues  -LY     Assistive Device (Sit-Stand Transfers) walker, front-wheeled  -LY       Row Name 05/04/24 1337          Stand-Sit Transfer    Stand-Sit [place] (Transfers) verbal cues;contact guard  -LY     Assistive Device (Stand-Sit Transfers) walker, front-wheeled  -LY       Row Name 05/04/24 1337          Gait/Stairs (Locomotion)    [place] Level (Gait) contact guard  -LY     Assistive Device (Gait) walker, front-wheeled  -LY     Distance in Feet (Gait) 50  -LY     Pattern (Gait) step-through  -LY     Deviations/Abnormal Patterns (Gait) gait speed decreased;stride length decreased  -LY     Bilateral Gait Deviations forward flexed posture  -LY     Comment, (Gait/Stairs) cues for posture and gait mechanics  -LY       Row Name 05/04/24 1337          Positioning and Restraints    Pre-Treatment Position sitting in chair/recliner  -LY     Post Treatment Position chair  -LY     In Chair sitting;call light within reach;encouraged to call for  assist  -LY               User Key  (r) = Recorded By, (t) = Taken By, (c) = Cosigned By      Initials Name Provider Type    LY Tangela Escobedo, PTA Physical Therapist Assistant                    Physical Therapy Education       Title: PT OT SLP Therapies (In Progress)       Topic: Physical Therapy (In Progress)       Point: Mobility training (Done)       Learning Progress Summary             Patient Acceptance, E, VU,DU by  at 5/3/2024 1012    Comment: Pt educated on therole of PT in her care and on completing mobility safely and efficiently.   Family Acceptance, E, VU,DU by  at 5/3/2024 1012    Comment: Pt educated on therole of PT in her care and on completing mobility safely and efficiently.                         Point: Home exercise program (Not Started)       Learner Progress:  Not documented in this visit.              Point: Body mechanics (Done)       Learning Progress Summary             Patient Acceptance, E, VU,DU by  at 5/3/2024 1012    Comment: Pt educated on therole of PT in her care and on completing mobility safely and efficiently.   Family Acceptance, E, VU,DU by  at 5/3/2024 1012    Comment: Pt educated on therole of PT in her care and on completing mobility safely and efficiently.                         Point: Precautions (Not Started)       Learner Progress:  Not documented in this visit.                              User Key       Initials Effective Dates Name Provider Type Discipline     02/22/24 -  Gladys Ramirez, PT Student PT Student PT                  PT Recommendation and Plan  Anticipated Discharge Disposition (PT): sub acute care setting      Outcome Measures       Row Name 05/03/24 1100             How much help from another is currently needed...    Putting on and taking off regular lower body clothing? 2  -EC      Bathing (including washing, rinsing, and drying) 3  -EC      Toileting (which includes using toilet bed pan or urinal) 3  -EC      Putting on and taking off  regular upper body clothing 3  -EC      Taking care of personal grooming (such as brushing teeth) 3  -EC      Eating meals 4  -EC      AM-PAC 6 Clicks Score (OT) 18  -EC         Functional Assessment    Outcome Measure Options AM-PAC 6 Clicks Daily Activity (OT)  -EC                User Key  (r) = Recorded By, (t) = Taken By, (c) = Cosigned By      Initials Name Provider Type    EC Lissette Salazar, OTR/L Occupational Therapist                     Time Calculation:    PT Charges       Row Name 05/04/24 1409             Time Calculation    Start Time 1337  -LY      Stop Time 1352  -LY      Time Calculation (min) 15 min  -LY      PT Received On 05/04/24  -LY         Time Calculation- PT    Total Timed Code Minutes- PT 15 minute(s)  -LY         Timed Charges    11810 - Gait Training Minutes  15  -LY         Total Minutes    Timed Charges Total Minutes 15  -LY       Total Minutes 15  -LY                User Key  (r) = Recorded By, (t) = Taken By, (c) = Cosigned By      Initials Name Provider Type    LY Tangela Escobedo PTA Physical Therapist Assistant                  Therapy Charges for Today       Code Description Service Date Service Provider Modifiers Qty    [id number] HC GAIT TRAINING EA 15 MIN 5/4/2024 Tangela Escobedo PTA GP 1            PT G-Codes  Outcome Measure Options: AM-PAC 6 Clicks Daily Activity (OT)  AM-PAC 6 Clicks Score (PT): 20  AM-PAC 6 Clicks Score (OT): 18    Tangela Escobedo PTA  5/4/2024

## 2024-05-04 NOTE — DISCHARGE SUMMARY
[facility] Medicine Services  DISCHARGE SUMMARY     Date of Admission: 5/1/2024  Date of Discharge:  5/5/2024  Primary Care Physician: Zachariah Huffman MD    Presenting Problem/History of Present Illness:  Mental status changes    Final Discharge Diagnoses:  Active Hospital Problems    Diagnosis     **Acute kidney injury superimposed on stage 4 chronic kidney disease     Moderate malnutrition     Metabolic encephalopathy suspect due to elevated creatinine and abnormal urinalysis     Digoxin toxicity, accidental or unintentional, initial encounter     Altered mental status     Acute cystitis with hematuria     Elevated troponin not due to acute coronary syndrome     Severe pulmonary hypertension     Chronic anticoagulation     Chronic diastolic (congestive) heart failure     Benign essential HTN     Persistent atrial fibrillation     Type 2 diabetes mellitus with hyperglycemia, without long-term current use of insulin        Consults: None    Procedures Performed: None    Pertinent Test Results:   Results for orders placed during the hospital encounter of 01/09/24    Adult Transthoracic Echo Complete W/ Cont if Necessary Per Protocol    Interpretation Summary    Left ventricular systolic function is normal. Left ventricular ejection fraction appears to be 56 - 60%.    The right ventricular cavity is mildly dilated. Borderline low right ventricular systolic function noted.    Biatrial enlargement is noted.    Mild to moderate tricuspid valve regurgitation is present. Estimated right ventricular systolic pressure from tricuspid regurgitation is markedly elevated (>55 mmHg).      Imaging Results (All)       Procedure Component Value Units Date/Time    XR Chest PA & Lateral [[id number]] Collected: 05/03/24 1158     Updated: 05/03/24 1201    Narrative:      EXAM: XR CHEST PA AND LATERAL-      DATE: 5/3/2024 10:56 AM     HISTORY: Shortness of breath; N17.9-Acute kidney failure,  unspecified;  N39.0-Urinary tract infection, site not specified; R31.9-Hematuria,  unspecified; R41.82-Altered mental status, unspecified;  R13.10-Dysphagia, unspecified; Z74.09-Other reduced mobility       COMPARISON: 5/1/2024.     TECHNIQUE:  Frontal and lateral views of the chest submitted.     FINDINGS:    The lungs are grossly clear. There is enlargement of the cardiac  silhouette. No effusion or pneumothorax is seen.          Impression:      1. No active disease seen in the chest.     This report was signed and finalized on 5/3/2024 11:58 AM by Mayur Peterson.       US Renal Bilateral [[id number]] Collected: 05/03/24 1132     Updated: 05/03/24 1136    Narrative:      EXAMINATION: US RENAL BILATERAL-     5/3/2024 10:01 AM     HISTORY: Elevated creatinine; N17.9-Acute kidney failure, unspecified;  N39.0-Urinary tract infection, site not specified; R31.9-Hematuria,  unspecified; R41.82-Altered mental status, unspecified;  R13.10-Dysphagia, unspecified; Z74.09-Other reduced mobility     Grayscale and color-flow renal ultrasound.     Normal size and position of both kidneys.  Normal cortical thickness and normal cortical echogenicity.     No hydronephrosis or shadowing stone.     The right kidney measures 99 x 45 x 39 mm.  7 x 5 cm upper pole cyst is unchanged compared with a recent CT exam     The left kidney measures 95 x 48 x 41 mm.     Poorly visualized urinary bladder due to bowel loops and free pelvic  fluid.       Impression:      Impression:  1. No acute kidney abnormality is seen.           This report was signed and finalized on 5/3/2024 11:33 AM by Dr. João Cardona MD.       CT Abdomen Pelvis Without Contrast [[id number]] Collected: 05/01/24 2037     Updated: 05/01/24 2047    Narrative:      EXAM: CT ABDOMEN PELVIS WO CONTRAST-     INDICATION: hematuria       TECHNIQUE: Helically acquired CT images were obtained of the abdomen and  pelvis without intravenous contrast. Coronal and sagittal  reformations  were performed.       DOSE LENGTH PRODUCT: 279.58 mGy.cm mGy cm. Automated exposure control  was also utilized to decrease patient radiation dose.     COMPARISON: 11/16/2018     FINDINGS:     Lower Chest: Cardiomegaly. Mitral annular calcifications. Aortic  valvular calcifications. Coronary artery calcifications. Trace right  pleural fluid. Minimal atelectasis in the lung bases.     Liver: Unremarkable.     Biliary Tree: Unremarkable.     Spleen: Unremarkable.     Pancreas: Unremarkable.     Adrenal Glands: Unremarkable.     Kidneys/Ureters/Bladder: Stable large exophytic right renal cyst.     Reproductive Organs: Hysterectomy.     Gastrointestinal Tract: Unremarkable.      Lymphatics: Unremarkable.     Vasculature: Severe atherosclerosis.     Peritoneum/Retroperitoneum: Small to moderate volume ascites. Mild  mesenteric edema.     Abdominal Wall/Soft Tissues: Mild body wall anasarca. Tiny  fat-containing right inguinal hernia.     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 No acute or suspicious osseous finding.       Impression:         Small to moderate volume ascites with mild mesenteric edema and body  wall anasarca. No obvious evidence of cirrhosis.      Cardiomegaly with trace right pleural effusion.        No CT evidence to explain hematuria on this nonenhanced study.        This report was signed and finalized on 5/1/2024 8:43 PM by Ludwin Simmons.       CT Head Without Contrast [[id number]] Collected: 05/01/24 1753     Updated: 05/01/24 1805    Narrative:      Exam: CT HEAD WO CONTRAST- 5/1/2024 4:46 PM     HISTORY: ams, altered mental status.     DOSE LENGTH PRODUCT: 667.7 mGy.cm mGy cm. Automated exposure control was  also utilized to decrease patient radiation dose.     Technique:  Helically acquired CT of the brain without IV contrast was performed.  Sagittal and coronal reformations are also provided for review. Soft  tissue and bone kernels are  available for interpretation.     Comparison: CT head 3/1/2021. MRI brain 1/7/2022.     Findings:     Ventricles and extra-axial CSF spaces are normal in size.     No intraparenchymal or extra-axial hemorrhage.     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 Gray-white matter differentiation is  otherwise preserved.     Orbits are grossly unremarkable. Left sphenoid sinus mucous retention  cyst. Mild left maxillary sinus mucosal thickening. Mastoid air cells  are grossly clear.     No suspicious calvarial or extracranial soft tissue abnormality.     Other:None.       Impression:      Impression:       No acute intracranial abnormality.     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     This report was signed and finalized on 5/1/2024 6:02 PM by Ludwin Simmons.       XR Chest 1 View [[id number]] Collected: 05/01/24 1741     Updated: 05/01/24 1745    Narrative:      EXAM: XR CHEST 1 VW- 5/1/2024 4:40 PM     HISTORY: Altered mental status.     COMPARISON: 3/13/2024.     TECHNIQUE: Single frontal radiograph of the chest was obtained.     FINDINGS:     Support Devices: None.     Cardiac and Mediastinal Silhouettes: Cardiomegaly, stable.     Lungs/Pleura: No focal consolidation. No sizable pleural effusion. No  visible pneumothorax.     Osseous structures: No acute osseous finding.     Other: None.       Impression:         No acute cardiopulmonary abnormality.           This report was signed and finalized on 5/1/2024 5:42 PM by Ludwin Simmons.             LAB RESULTS:      Lab 05/05/24  0601 05/04/24  0605 05/03/24  0524 05/01/24  2039 05/01/24  1841   WBC 3.76 4.35 3.95  --  4.76   HEMOGLOBIN 7.8* 8.4* 7.9*  --  10.4*   HEMATOCRIT 24.8* 26.9* 25.9*  --  33.0*   PLATELETS 120* 133* 134*  --  183   NEUTROS ABS 2.53 3.23 2.82  --  3.23   IMMATURE GRANS (ABS) 0.02  --   --   --  0.02   LYMPHS ABS 0.78 0.64* 0.70  --  1.02   MONOS ABS 0.34 0.38 0.34  --  0.36   EOS ABS 0.08 0.07 0.06  --  0.10   MCV 93.2 94.7 95.2  --  94.3   CRP  --   --    --  0.83*  --    PROCALCITONIN  --   --   --  0.23  --    PROTIME  --   --   --   --  17.5*         Lab 05/05/24 0600 05/04/24 0605 05/03/24 0524 05/01/24 2332 05/01/24 2039   SODIUM 140 139 140 144 138   POTASSIUM 4.8 5.2 4.9 4.0 4.8   CHLORIDE 107 106 107 109* 103   CO2 24.0 22.0 23.0 18.0* 23.0   ANION GAP 9.0 11.0 10.0 17.0* 12.0   BUN 56* 59* 63* 65* 73*   CREATININE 2.77* 2.69* 2.80* 2.75* 3.20*   EGFR 15.9* 16.5* 15.7* 16.0* 13.4*   GLUCOSE 70 178* 154* 101* 134*   CALCIUM 9.1 9.0 9.0 8.0* 9.6         Lab 05/05/24 0600 05/04/24 0605 05/03/24 0524 05/01/24 2039 05/01/24 1841   TOTAL PROTEIN 6.3 6.7 6.3 7.5  --    ALBUMIN 3.5 3.6 3.6 4.1  --    GLOBULIN 2.8 3.1 2.7 3.4  --    ALT (SGPT) 9 9 8 9  --    AST (SGOT) 20 22 18 19  --    BILIRUBIN 0.4 0.4 0.5 0.8  --    ALK PHOS 47 60 57 53  --    LIPASE  --   --   --   --  42         Lab 05/01/24 2332 05/01/24 2218 05/01/24 2039 05/01/24  1841   HSTROP T 61* 67* 66*  --    PROTIME  --   --   --  17.5*   INR  --   --   --  1.38*                 Brief Urine Lab Results  (Last result in the past 365 days)        Color   Clarity   Blood   Leuk Est   Nitrite   Protein   CREAT   Urine HCG        05/02/24 0536 Yellow   Clear   Small (1+)   Negative   Positive   30 mg/dL (1+)                 Microbiology Results (last 10 days)       Procedure Component Value - Date/Time    Urine Culture - Urine, Urine, Clean Catch [[id number]]  (Normal) Collected: 05/02/24 0536    Lab Status: Final result Specimen: Urine, Clean Catch Updated: 05/03/24 1055     Urine Culture No growth    Blood Culture With PAUL - Blood, Arm, Right [[id number]]  (Normal) Collected: 05/01/24 2218    Lab Status: Preliminary result Specimen: Blood from Arm, Right Updated: 05/04/24 2245     Blood Culture No growth at 3 days    Blood Culture With PAUL - Blood, Hand, Right [[id number]]  (Normal) Collected: 05/01/24 2218    Lab Status: Preliminary result Specimen: Blood from Hand, Right Updated: 05/04/24 3138      Blood Culture No growth at 3 days    Respiratory Panel PCR w/COVID-19(SARS-CoV-2) JIM/EMERALD/VELMA/[facility]/SHAYNA In-House, NP Swab in UTM/VTM, 2 HR TAT - Swab, Nasopharynx [[id number]]  (Normal) Collected: 05/01/24 1829    Lab Status: Final result Specimen: Swab from Nasopharynx Updated: 05/01/24 1956     ADENOVIRUS, PCR Not Detected     Coronavirus 229E Not Detected     Coronavirus HKU1 Not Detected     Coronavirus NL63 Not Detected     Coronavirus OC43 Not Detected     COVID19 Not Detected     Human Metapneumovirus Not Detected     Human Rhinovirus/Enterovirus Not Detected     Influenza A PCR Not Detected     Influenza B PCR Not Detected     Parainfluenza Virus 1 Not Detected     Parainfluenza Virus 2 Not Detected     Parainfluenza Virus 3 Not Detected     Parainfluenza Virus 4 Not Detected     RSV, PCR Not Detected     Bordetella pertussis pcr Not Detected     Bordetella parapertussis PCR Not Detected     Chlamydophila pneumoniae PCR Not Detected     Mycoplasma pneumo by PCR Not Detected    Narrative:      In the setting of a positive respiratory panel with a viral infection PLUS a negative procalcitonin without other underlying concern for bacterial infection, consider observing off antibiotics or discontinuation of antibiotics and continue supportive care. If the respiratory panel is positive for atypical bacterial infection (Bordetella pertussis, Chlamydophila pneumoniae, or Mycoplasma pneumoniae), consider antibiotic de-escalation to target atypical bacterial infection.            Hospital Course: Pepper Perales presented to [facility] emergency room 5/1/2024 with status changes and confusion. She has a history of stage IV chronic kidney disease, chronic diastolic heart failure, severe pulmonary hypertension and diabetes. Patient had recent hospitalization 3/13/2024 - 3/15/2024 for acute on chronic heart failure with preserved ejection fraction, treated with diuresis with good results. She followed up  "with her primary care provider. She was in her usual state of health the day of admission.  Approximately 8- 8:30 the morning she talked to her daughter on the phone. Son normally calls her around 2 PM but he was in [place] when she called him. Son could tell by the conversation that \"something was not right\". Patient tried to call her daughter who did not answer the phone. Due to some confusion EMS called and patient was brought to ED. Creatinine 3.2 with previous creatinine 2.49 on 3/15/2024, dig level 1.4, CRP 8.83, urinalysis 3-5 WBC, 1+ bacteria, negative nitrate. WBC 4.76. CT scan of the abdomen noted small to moderate volume ascites with mild mesenteric edema and body wall anasarca. No obvious evidence of cirrhosis. Cardiomegaly with trace right pleural effusion. No CT evidence to explain hematuria. CT scan of the head no acute intracranial abnormality. 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 Chest x-ray no acute cardiopulmonary abnormality. Lactated Ringer's fluid bolus, Rocephin given in ER.     She was admitted to the medical floor with metabolic encephalopathy, elevated digoxin level and acute kidney injury.  Metabolic encephalopathy suspect secondary to elevated creatinine, elevated digoxin level and abnormal urinalysis.  Patient presented with confusion and was found to have both elevated creatinine, abnormal urinalysis, and elevated digoxin level.  She was hydrated with IV fluids and encephalopathy resolved the following morning.    She has a history of chronic kidney disease stage IV.  She presented with creatinine of 3.2.  Previous creatinine 2.49 on 3/15/2024.  She was hydrated with IV fluids.  Creatinine trended down to 2.69 on 5/4.  Sodium bicarbonate continued.  Renal ultrasound 5/3/2024 noted no acute kidney abnormality.  Creatinine stable 2.77 on 5/5.    Urinalysis was abnormal with positive nitrates, 3-5 WBC.  Patient started on Rocephin on admission and completed the days of antibiotic treatment. " " Urine culture was negative; however, patient presented with mental status changes on admission that improved with IV fluids and antibiotics.    Digoxin level elevated 1.4 on admission.  Digoxin held throughout hospital stay.  Digoxin level trended down to 0.9 on 5/3 and digoxin level 0.8 on 5/5..  Patient indicated she had been \"poisoned with that medication before\".  Heart rate remained in the 60s during hospitalization.  As patient reported previous episode of elevated digoxin level and heart rate in the 60s we elected not to resume digoxin at discharge.  Both patient and son greed to discontinuation of digoxin.    Coreg, losartan, hydralazine, Procardia continued for primary hypertension.  Blood pressure 145/48, 175/82.    Patient with history of persistent atrial fibrillation.  Coreg continued on admission.  Digoxin held and Eliquis and held on admission due to elevated creatinine.  Lovenox ordered on admission.  As creatinine trended down Eliquis resumed at renal dosing on 5/4/2024.    Patient has a history of chronic diastolic heart failure with EF 56-60% per echo 1/9/2024.  No exacerbation of symptoms.  Beta-blocker, ARB, spironolactone, statin continued.  Bumex held on admission and resumed on 5/3.  Spironolactone held on 5/4 due to potassium of 5.2.  Recommend metabolic panel with follow-up appointment with primary care provider on 5/10/2024.    Hemoglobin A1c 5.8 on 3/21/2024.  Accu-Cheks with sliding scale insulin coverage ordered.  Glucoses 90, 70, 150.  Glipizide and Farxiga continued.    Physical therapy and Occupational Therapy consulted and recommended physical therapy continued at discharge.  Son was considering skilled nursing facility; however, prior to discharge patient was independent with ambulation using rolling walker in the hallway.  Patient desired to go home with home health physical therapy rather than skilled nursing facility and son agreeable.    Nutrition consulted as patient reported " "decreased appetite.    On 5/5/2024, she is stable for discharge home with home health and physical therapy.  She presented with creatinine of 3.2, digoxin level 1.4 and mental status changes.  Digoxin held and not resumed at discharge.  She was hydrated with IV fluids and creatinine improved to 2.77 prior to discharge.  Patient will follow-up with YOBANY Santos on 5/10/2024.  Recommend comprehensive metabolic panel with follow-up appointment.    Physical Exam on Discharge:  /82 (BP Location: Right arm, Patient Position: Sitting)   Pulse 77   Temp 98.1 °F (36.7 °C) (Oral)   Resp 18   Ht 157.5 cm (62\")   Wt 65.2 kg (143 lb 11.8 oz)   LMP  (LMP Unknown)   SpO2 97%   BMI 26.29 kg/m²   Physical Exam  Vitals and nursing note reviewed.   Constitutional:       Comments: Sitting up on side of bed.  No oxygen use.    HENT:      Head: Normocephalic and atraumatic.      Nose: No congestion.      Mouth/Throat:      Pharynx: Oropharynx is clear. No oropharyngeal exudate or posterior oropharyngeal erythema.   Eyes:      Extraocular Movements: Extraocular movements intact.      Pupils: Pupils are equal, round, and reactive to light.   Cardiovascular:      Rate and Rhythm: Normal rate and regular rhythm.      Heart sounds: Murmur heard.   Pulmonary:      Breath sounds: No wheezing, rhonchi or rales.      Comments: No oxygen in use.  Saturation 92-96% on room air.  Abdominal:      Palpations: Abdomen is soft.      Tenderness: There is no abdominal tenderness.   Genitourinary:     Comments: Voiding.  Musculoskeletal:         General: No swelling or tenderness.      Cervical back: Normal range of motion and neck supple.   Skin:     General: Skin is warm and dry.   Neurological:      General: No focal deficit present.      Mental Status: She is alert and oriented to person, place, and time.   Psychiatric:         Mood and Affect: Mood normal.         Behavior: Behavior normal.         Thought Content: Thought content " normal.         Judgment: Judgment normal.         Condition on Discharge: Stable for discharge home with home health    Discharge Disposition:  Home or Self Care    Discharge Medications:     Discharge Medications        Continue These Medications        Instructions Start Date   apixaban 2.5 MG tablet tablet  Commonly known as: ELIQUIS   2.5 mg, Oral, 2 Times Daily      bumetanide 1 MG tablet  Commonly known as: BUMEX   1 mg, Oral, Daily, Take 1 tablet by mouth Daily AND 0.5 tablets Every Afternoon. Take WHOLE tablet instead of HALF tablet in the afternoon with weight gain or increased fluid      carvedilol 6.25 MG tablet  Commonly known as: Coreg   6.25 mg, Oral, 2 Times Daily With Meals      cholecalciferol 25 MCG (1000 UT) tablet  Commonly known as: VITAMIN D3   1,000 Units, Oral, Daily      Cyanocobalamin 1000 MCG/ML kit   1 mL, Injection, Weekly, For 3 weeks then monthly thereafter.      dapagliflozin Propanediol 10 MG tablet  Commonly known as: Farxiga   10 mg, Oral, Daily      docusate sodium 100 MG capsule  Commonly known as: COLACE   100 mg, Oral, 2 Times Daily PRN      fenofibrate 145 MG tablet  Commonly known as: TRICOR   145 mg, Oral, Daily      ferrous sulfate 325 (65 FE) MG tablet   325 mg, Oral, Daily With Breakfast      glipizide 5 MG ER tablet  Commonly known as: GLUCOTROL XL   5 mg, Oral, Daily      hydrALAZINE 100 MG tablet  Commonly known as: APRESOLINE   100 mg, Oral, 3 Times Daily      losartan 100 MG tablet  Commonly known as: COZAAR   100 mg, Oral, Daily      magnesium oxide 400 (241.3 Mg) MG tablet tablet  Commonly known as: MAGOX   400 mg, Oral, Daily      NIFEdipine XL 90 MG 24 hr tablet  Commonly known as: PROCARDIA XL   90 mg, Oral, Daily      omeprazole 40 MG capsule  Commonly known as: priLOSEC   40 mg, Oral, Daily      simvastatin 40 MG tablet  Commonly known as: ZOCOR   40 mg, Oral, Nightly      sodium bicarbonate 650 MG tablet   650 mg, Oral, Daily      spironolactone 25 MG  tablet  Commonly known as: ALDACTONE   25 mg, Oral, Daily      vitamin C 500 MG tablet  Commonly known as: ASCORBIC ACID   500 mg, Oral, Daily             Stop These Medications      digoxin 125 MCG tablet  Commonly known as: LANOXIN              This patient has current or prior documentation of an left ventricular ejection fraction (LVEF) of less than or equal to 40%.    The patient was prescribed or already taking an ACE/ARB.    The patient was prescribed already taking bisoprolol, carvedilol, or sustained release metoprolol succinate.     Discharge Diet:   Diet Instructions       Diet: Diabetic Diets; Consistent Carbohydrate; Regular (IDDSI 7); Thin (IDDSI 0)      Discharge Diet: Diabetic Diets    Diabetic Diet: Consistent Carbohydrate    Texture: Regular (IDDSI 7)    Fluid Consistency: Thin (IDDSI 0)            Activity at Discharge:   Activity Instructions       Activity as Tolerated              Discharge instructions:  1.  For recurrent confusion and mental status changes seek medical attention.  2.  Discontinue digoxin due to recurrent elevated digoxin level.  3.  Home health at discharge  4.  Follow-up with YOBANY Santos on 5/10/2024 with comprehensive metabolic panel.    Follow-up Appointments:   Future Appointments   Date Time Provider Department Center   5/10/2024 11:00 AM Maribel Sy APRN [facility]   5/23/2024 10:30 AM Zachariah Huffman MD [facility]   9/30/2024  9:45 AM Tone Coello MD [facility]       Test Results Pending at Discharge: None    Electronically signed by YOBANY Rangel, 05/05/24, 08:56 CDT.    Time: 35 minutes.  Discussed with , patient, and son Davian.    The above documentation resulted from a face-to-face encounter by me Mary HAYWOOD, [facility].

## 2024-05-04 NOTE — PLAN OF CARE
Goal Outcome Evaluation:  Plan of Care Reviewed With: patient, son        Progress: no change  Outcome Evaluation: Patient appears more ill today then yesterday- patient lethargic and falling asleep easily. IVF going- Purewic in place due to diuretics- Skin intact. Occasional cough- family at bedside assisting at times. RA- VSS at this time. Continue to monitor labs.

## 2024-05-04 NOTE — DISCHARGE PLACEMENT REQUEST
"Libia Pennington \"Neli\" (89 y.o. Female)       Date of Birth   1934    Social Security Number       Address   [address] [place] [address]    Home Phone   [phone]    MRN   [id number]       Mormonism   Taoist    Marital Status                               Admission Date   5/1/24    Admission Type   Emergency    Admitting Provider   Chance Cisneros MD    Attending Provider   Chance Cisneros MD    Department, Room/Bed   [facility] 3A, 343/1       Discharge Date       Discharge Disposition       Discharge Destination                                 Attending Provider: Chance Cisneros MD    Allergies: Singulair [Montelukast], Nsaids, Ace Inhibitors    Isolation: None   Infection: None   Code Status: No CPR    Ht: 157.5 cm (62\")   Wt: 65.2 kg (143 lb 11.8 oz)    Admission Cmt: None   Principal Problem: Acute kidney injury superimposed on stage 4 chronic kidney disease [N17.9,N18.4]                   Active Insurance as of 5/1/2024       Primary Coverage       Payor Plan Insurance Group Employer/Plan Group    MEDICARE MEDICARE A & B        Payor Plan Address Payor Plan Phone Number Payor Plan Fax Number Effective Dates    [address] [phone]  12/1/1999 - None Entered    [facility] [id number]         Subscriber Name Subscriber Birth Date Member ID       LIBIA PENNINGTON 1934 [id number]               Secondary Coverage       Payor Plan Insurance Group Employer/Plan Group    [facility] SUPP [id number]       Payor Plan Address Payor Plan Phone Number Payor Plan Fax Number Effective Dates    [address]   12/1/2016 - None Entered    [facility] [id number]         Subscriber Name Subscriber Birth Date Member ID       LIBIA PENNINGTON 1934 [id number]                     Emergency Contacts        (Rel.) Home Phone Work Phone Mobile Phone    SherrillDavian welch (Son) -- -- [phone]    LUIS DANIELROSELYN SOLORZANO (Daughter) [phone] -- " [phone]              Insurance Information                  MEDICARE/MEDICARE A & B Phone: [phone]    Subscriber: Libia Perales Subscriber#: [id number]    Group#: -- Precert#: --        ANTHEM BLUE CROSS/ANTHEM BC  SUPP Phone: --    Subscriber: Libia Perales Subscriber#: [id number]    Group#: [id number] Precert#: --             History & Physical        Jayashree Fields APRN at 05/01/24 9085       Attestation signed by Enoc Narvaez DO at 05/02/24 1731    I have reviewed this documentation and agree.  I performed a substantive part of the MDM during the patient’s E/M visit. I personally evaluated   and examined the patient. I personally made or approved the documented management plan and acknowledge its risk   of complications.   (Independent Interpretation) My (EKG/X-Ray/US/CT) interpretation there is some ascites seen in the CT of the abdomen however she has no history of cirrhosis so it is unclear where this ascites is coming from.   (Discussion) Management/test interpretation discussed with patient.     Patient has significant AMARIS which could be from digoxin toxicity.  Her altered mental status has improved by the time I evaluated the patient.  She does have a UTI we will continue with antibiotics for this.  Follow-up blood and urine cultures.  Hold digoxin.  Will give a 500 cc bolus followed by LR.    Electronically signed by Enoc Narvaez DO, 5/2/2024, 03:55 CDT.                      [facility] Medicine Services  HISTORY AND PHYSICAL    Date of Admission: 5/1/2024  Primary Care Physician: Zachariah Huffman MD    Subjective   Primary Historian: Patient's son Davian    Chief Complaint: Altered mental status    History of Present Illness  Libia Perales is an 89-year-old female with past medical history of hypertension, stage IV chronic kidney disease, chronic diastolic heart failure, severe pulmonary hypertension, diabetes type 2, see below  "for complete list.  Patient recently admitted 3/13 - 3/15, 2024 with acute on chronic heart failure with preserved EF, patient was diuresed with good results.  She has been seen by her primary care provider since discharge.  She was in her usual state of health last known well approximately 8-8 30 this a.m. as she talked to her daughter on the phone.  Son states he usually calls around 2 PM however he was in [place] today when she called him.  He states he could tell by her conversation that \"something was not right\".  She did tell him she tried to call her daughter but daughter did not answer the phone.  Due to concerns EMS was called and patient transported to [facility] ED D for evaluation.  Workup reveals acute cystitis with hematuria, creatinine 3.2 as compared to baseline of 2.3.  Digoxin level slightly elevated 1.4.  Patient is awake.  She did fail her nurse swallowing evaluation.  She is complaining of feeling cold.  She denies pain.  When asked when she came to the emergency department she haltingly stated \"I do not know\".  We will admit for further evaluation and treatment.    Review of Systems   Unable to obtain meaningful review of systems due to patient's altered mental status    Past Medical History:   Past Medical History:   Diagnosis Date    Atrial fibrillation     Diabetes mellitus     Difficulty walking Approximately 2 years    GERD (gastroesophageal reflux disease)     High cholesterol     Hypertension     OA (osteoarthritis)     Pneumonia     few years ago    Stage 4 chronic kidney disease 08/18/2023     Past Surgical History:  Past Surgical History:   Procedure Laterality Date    BREAST BIOPSY Right     BREAST CYST ASPIRATION      HYSTERECTOMY      OOPHORECTOMY      TONSILLECTOMY       Social History:  reports that she has never smoked. She has never used smokeless tobacco. She reports that she does not drink alcohol and does not use drugs.    Family History: family history " includes Hypertension in her mother; No Known Problems in her brother, daughter, father, maternal aunt, maternal grandmother, paternal aunt, paternal grandmother, sister, son, and another family member.       Allergies:  Allergies   Allergen Reactions    Singulair [Montelukast] Cough    Nsaids Other (See Comments)     Due to kidney disease.      Ace Inhibitors Cough       Medications:  Prior to Admission medications    Medication Sig Start Date End Date Taking? Authorizing Provider   apixaban (ELIQUIS) 2.5 MG tablet tablet Take 1 tablet by mouth 2 (Two) Times a Day.    Raheel Underwood MD   bumetanide (Bumex) 1 MG tablet Take 1 tablet by mouth Daily AND 0.5 tablets Every Afternoon. Take WHOLE tablet instead of HALF tablet in the afternoon with weight gain or increased fluid 3/21/24   Zachariah Huffman MD   carvedilol (Coreg) 6.25 MG tablet Take 1 tablet by mouth 2 (Two) Times a Day With Meals. 3/21/24   Zachariah Huffman MD   Cholecalciferol 25 MCG (1000 UT) tablet Take 1 tablet by mouth Daily.    Raheel Underwood MD   Cyanocobalamin 1000 MCG/ML kit Inject 1 mL as directed 1 (One) Time Per Week.   For 3 weeks then monthly thereafter.    Raheel Underwood MD   dapagliflozin Propanediol (Farxiga) 10 MG tablet Take 10 mg by mouth Daily. 3/21/24   Zachariah Huffman MD   digoxin (LANOXIN) 125 MCG tablet Take 1 tablet by mouth Every Other Day. Sun, Tues, Thurs, and Sat    Raheel Underwood MD   docusate sodium (COLACE) 100 MG capsule Take 1 capsule by mouth 2 (Two) Times a Day As Needed for Constipation.    Raheel Underwood MD   fenofibrate (TRICOR) 145 MG tablet Take 1 tablet by mouth Daily.    Raheel Underwood MD   ferrous sulfate 325 (65 FE) MG tablet Take 1 tablet by mouth Daily With Breakfast.    Raheel Underwood MD   fluticasone (FLONASE) 50 MCG/ACT nasal spray 2 sprays into the nostril(s) as directed by provider Daily. 1/19/23   Sharon Corley APRN   glipizide (GLUCOTROL  "XL) 5 MG ER tablet Take 1 tablet by mouth Daily. 4/18/24   Zachariah Huffman MD   hydrALAZINE (APRESOLINE) 100 MG tablet Take 1 tablet by mouth 3 (Three) Times a Day.    Raheel Underwood MD   losartan (COZAAR) 100 MG tablet Take 1 tablet by mouth Daily.    Raheel Underwood MD   magnesium oxide (MAGOX) 400 (241.3 Mg) MG tablet tablet Take 1 tablet by mouth Daily.    Raheel Underwood MD   NIFEdipine XL (PROCARDIA XL) 90 MG 24 hr tablet Take 1 tablet by mouth Daily. 4/18/24   Zachariah Huffman MD   omeprazole (priLOSEC) 40 MG capsule Take 1 capsule by mouth Daily.    Raheel Underwood MD   simvastatin (ZOCOR) 40 MG tablet Take 1 tablet by mouth Every Night.    Raheel Underwood MD   sodium bicarbonate 650 MG tablet Take 1 tablet by mouth Daily. 10/9/23   Albina Padron APRN   spironolactone (ALDACTONE) 25 MG tablet Take 1 tablet by mouth Daily.    Raheel Underwood MD   vitamin C (ASCORBIC ACID) 500 MG tablet Take 1 tablet by mouth Daily. 5/17/22   Allyson Do DO     I have utilized all available immediate resources to obtain, update, or review the patient's current medications (including all prescriptions, over-the-counter products, herbals, cannabis/cannabidiol products, and vitamin/mineral/dietary (nutritional) supplements).    Objective     Vital Signs: /80   Pulse 72   Temp 97.7 °F (36.5 °C) (Oral)   Resp 18   Ht 157.5 cm (62\")   Wt 61.2 kg (135 lb)   LMP  (LMP Unknown)   SpO2 100%   BMI 24.69 kg/m²   Physical Exam  Vitals reviewed.   Constitutional:       Appearance: She is ill-appearing.   HENT:      Head: Normocephalic and atraumatic.      Mouth/Throat:      Mouth: Mucous membranes are moist.      Pharynx: Oropharynx is clear.   Eyes:      Conjunctiva/sclera: Conjunctivae normal.   Cardiovascular:      Rate and Rhythm: Normal rate and regular rhythm.   Pulmonary:      Effort: Pulmonary effort is normal.      Breath sounds: Normal breath sounds. "   Abdominal:      General: There is no distension.      Palpations: Abdomen is soft.   Musculoskeletal:      Cervical back: Neck supple.      Right lower leg: No edema.      Left lower leg: No edema.      Comments: Generalized weakness and debility   Skin:     General: Skin is warm and dry.   Neurological:      Mental Status: She is alert. She is disoriented.   Psychiatric:      Comments: Flat affect, no behavior outburst        Results Reviewed:  Lab Results (last 24 hours)       Procedure Component Value Units Date/Time    Procalcitonin [[id number]]  (Normal) Collected: 05/01/24 2039    Specimen: Blood from Arm, Left Updated: 05/01/24 2116     Procalcitonin 0.23 ng/mL     C-reactive Protein [[id number]]  (Abnormal) Collected: 05/01/24 2039    Specimen: Blood from Arm, Left Updated: 05/01/24 2113     C-Reactive Protein 0.83 mg/dL     Digoxin Level [[id number]]  (Abnormal) Collected: 05/01/24 2039    Specimen: Blood from Arm, Left Updated: 05/01/24 2113     Digoxin 1.40 ng/mL     Single High Sensitivity Troponin T [[id number]]  (Abnormal) Collected: 05/01/24 2039    Specimen: Blood from Arm, Left Updated: 05/01/24 2113     HS Troponin T 66 ng/L     Comprehensive Metabolic Panel [[id number]]  (Abnormal) Collected: 05/01/24 2039    Specimen: Blood from Arm, Left Updated: 05/01/24 2111     Glucose 134 mg/dL      BUN 73 mg/dL      Creatinine 3.20 mg/dL      Sodium 138 mmol/L      Potassium 4.8 mmol/L      Chloride 103 mmol/L      CO2 23.0 mmol/L      Calcium 9.6 mg/dL      Total Protein 7.5 g/dL      Albumin 4.1 g/dL      ALT (SGPT) 9 U/L      AST (SGOT) 19 U/L      Alkaline Phosphatase 53 U/L      Total Bilirubin 0.8 mg/dL      Globulin 3.4 gm/dL      A/G Ratio 1.2 g/dL      BUN/Creatinine Ratio 22.8     Anion Gap 12.0 mmol/L      eGFR 13.4 mL/min/1.73      Comment: <15 Indicative of kidney failure       POC Glucose Once [[id number]]  (Abnormal) Collected: 05/01/24 2039    Specimen: Blood Updated: 05/01/24 2050     Glucose  132 mg/dL      Comment: : [id number] Glenny Justice ID: [id number]       Respiratory Panel PCR w/COVID-19(SARS-CoV-2) JIM/EMERALD/VELMA/PAD/COR/SHAYNA In-House, NP Swab in UTM/VTM, 2 HR TAT - Swab, Nasopharynx [[id number]]  (Normal) Collected: 05/01/24 1829    Specimen: Swab from Nasopharynx Updated: 05/01/24 1956     ADENOVIRUS, PCR Not Detected     Coronavirus 229E Not Detected     Coronavirus HKU1 Not Detected     Coronavirus NL63 Not Detected     Coronavirus OC43 Not Detected     COVID19 Not Detected     Human Metapneumovirus Not Detected     Human Rhinovirus/Enterovirus Not Detected     Influenza A PCR Not Detected     Influenza B PCR Not Detected     Parainfluenza Virus 1 Not Detected     Parainfluenza Virus 2 Not Detected     Parainfluenza Virus 3 Not Detected     Parainfluenza Virus 4 Not Detected     RSV, PCR Not Detected     Bordetella pertussis pcr Not Detected     Bordetella parapertussis PCR Not Detected     Chlamydophila pneumoniae PCR Not Detected     Mycoplasma pneumo by PCR Not Detected    Urinalysis With Culture If Indicated - Straight Cath [[id number]]  (Abnormal) Collected: 05/01/24 1901    Specimen: Urine from Straight Cath Updated: 05/01/24 1919     Color, UA Yellow     Appearance, UA Clear     pH, UA 6.5     Specific Gravity, UA 1.012     Glucose,  mg/dL (2+)     Ketones, UA Negative     Bilirubin, UA Negative     Blood, UA Moderate (2+)     Protein, UA Trace     Leuk Esterase, UA Trace     Nitrite, UA Negative     Urobilinogen, UA 0.2 E.U./dL    Urinalysis, Microscopic Only - Straight Cath [[id number]]  (Abnormal) Collected: 05/01/24 1901    Specimen: Urine from Straight Cath Updated: 05/01/24 1919     RBC, UA Too Numerous to Count /HPF      WBC, UA 3-5 /HPF      Comment: Urine culture not indicated.        Bacteria, UA 1+ /HPF      Squamous Epithelial Cells, UA 0-2 /HPF      Hyaline Casts, UA 0-2 /LPF      Methodology Automated Microscopy    Lipase [[id number]]  (Normal) Collected:  05/01/24 1841    Specimen: Blood from Arm, Left Updated: 05/01/24 1910     Lipase 42 U/L     Protime-INR [[id number]]  (Abnormal) Collected: 05/01/24 1841    Specimen: Blood from Arm, Left Updated: 05/01/24 1904     Protime 17.5 Seconds      INR 1.38    CBC Auto Differential [[id number]]  (Abnormal) Collected: 05/01/24 1841    Specimen: Blood from Arm, Left Updated: 05/01/24 1854     WBC 4.76 10*3/mm3      RBC 3.50 10*6/mm3      Hemoglobin 10.4 g/dL      Hematocrit 33.0 %      MCV 94.3 fL      MCH 29.7 pg      MCHC 31.5 g/dL      RDW 14.4 %      RDW-SD 50.0 fl      MPV 11.8 fL      Platelets 183 10*3/mm3      Neutrophil % 67.9 %      Lymphocyte % 21.4 %      Monocyte % 7.6 %      Eosinophil % 2.1 %      Basophil % 0.6 %      Immature Grans % 0.4 %      Neutrophils, Absolute 3.23 10*3/mm3      Lymphocytes, Absolute 1.02 10*3/mm3      Monocytes, Absolute 0.36 10*3/mm3      Eosinophils, Absolute 0.10 10*3/mm3      Basophils, Absolute 0.03 10*3/mm3      Immature Grans, Absolute 0.02 10*3/mm3      nRBC 0.0 /100 WBC           Imaging Results (Last 24 Hours)       Procedure Component Value Units Date/Time    CT Abdomen Pelvis Without Contrast [[id number]] Collected: 05/01/24 2037     Updated: 05/01/24 2047    Narrative:      EXAM: CT ABDOMEN PELVIS WO CONTRAST-     INDICATION: hematuria       TECHNIQUE: Helically acquired CT images were obtained of the abdomen and  pelvis without intravenous contrast. Coronal and sagittal reformations  were performed.       DOSE LENGTH PRODUCT: 279.58 mGy.cm mGy cm. Automated exposure control  was also utilized to decrease patient radiation dose.     COMPARISON: 11/16/2018     FINDINGS:     Lower Chest: Cardiomegaly. Mitral annular calcifications. Aortic  valvular calcifications. Coronary artery calcifications. Trace right  pleural fluid. Minimal atelectasis in the lung bases.     Liver: Unremarkable.     Biliary Tree: Unremarkable.     Spleen: Unremarkable.     Pancreas: Unremarkable.      Adrenal Glands: Unremarkable.     Kidneys/Ureters/Bladder: Stable large exophytic right renal cyst.     Reproductive Organs: Hysterectomy.     Gastrointestinal Tract: Unremarkable.      Lymphatics: Unremarkable.     Vasculature: Severe atherosclerosis.     Peritoneum/Retroperitoneum: Small to moderate volume ascites. Mild  mesenteric edema.     Abdominal Wall/Soft Tissues: Mild body wall anasarca. Tiny  fat-containing right inguinal hernia.     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 No acute or suspicious osseous finding.       Impression:         Small to moderate volume ascites with mild mesenteric edema and body  wall anasarca. No obvious evidence of cirrhosis.      Cardiomegaly with trace right pleural effusion.        No CT evidence to explain hematuria on this nonenhanced study.        This report was signed and finalized on 5/1/2024 8:43 PM by Ludwin Simmons.       CT Head Without Contrast [[id number]] Collected: 05/01/24 1753     Updated: 05/01/24 1805    Narrative:      Exam: CT HEAD WO CONTRAST- 5/1/2024 4:46 PM     HISTORY: ams, altered mental status.     DOSE LENGTH PRODUCT: 667.7 mGy.cm mGy cm. Automated exposure control was  also utilized to decrease patient radiation dose.     Technique:  Helically acquired CT of the brain without IV contrast was performed.  Sagittal and coronal reformations are also provided for review. Soft  tissue and bone kernels are available for interpretation.     Comparison: CT head 3/1/2021. MRI brain 1/7/2022.     Findings:     Ventricles and extra-axial CSF spaces are normal in size.     No intraparenchymal or extra-axial hemorrhage.     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 Gray-white matter differentiation is  otherwise preserved.     Orbits are grossly unremarkable. Left sphenoid sinus mucous retention  cyst. Mild left maxillary sinus mucosal thickening. Mastoid air cells  are grossly clear.     No suspicious calvarial or  extracranial soft tissue abnormality.     Other:None.       Impression:      Impression:       No acute intracranial abnormality.     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     This report was signed and finalized on 5/1/2024 6:02 PM by Ludwin Simmons.       XR Chest 1 View [[id number]] Collected: 05/01/24 1741     Updated: 05/01/24 1745    Narrative:      EXAM: XR CHEST 1 VW- 5/1/2024 4:40 PM     HISTORY: Altered mental status.     COMPARISON: 3/13/2024.     TECHNIQUE: Single frontal radiograph of the chest was obtained.     FINDINGS:     Support Devices: None.     Cardiac and Mediastinal Silhouettes: Cardiomegaly, stable.     Lungs/Pleura: No focal consolidation. No sizable pleural effusion. No  visible pneumothorax.     Osseous structures: No acute osseous finding.     Other: None.       Impression:         No acute cardiopulmonary abnormality.           This report was signed and finalized on 5/1/2024 5:42 PM by Ludwin Simmons.                Assessment / Plan   Assessment:   Active Hospital Problems    Diagnosis     **Acute renal failure superimposed on stage 4 chronic kidney disease     Digoxin toxicity, accidental or unintentional, initial encounter     Altered mental status     Acute cystitis with hematuria     Elevated troponin     Severe pulmonary hypertension     Chronic anticoagulation     Chronic diastolic (congestive) heart failure     Benign essential HTN     Persistent atrial fibrillation     Type 2 diabetes mellitus with hyperglycemia, without long-term current use of insulin        Treatment Plan  1.  The patient will be admitted to Dr. Narvaez's service here at [facility].   2.  Continue Rocephin 1 g IV daily  3.  Home medications reviewed, will hold for now and evaluate swallowing safety in a.m.  4.  Hold digoxin, repeat dig level in a.m.  5.  N.p.o., oral care  6.  Monitor glucose every 6 hours with regular insulin sliding scale coverage  7.  Normal saline bolus 500 ml, follow with LR  at 50 ml/hour  8.  Supplemental oxygen as needed, turn every 2 hours, daily weights  10.  Labs in a.m., repeat troponin now  11.  Continue external catheter is placed on the emergency department  12.  Consult , PT/OT and speech therapy in a.m.    Medical Decision Making  Number and Complexity of problems: 11  Differential Diagnosis: None    Conditions and Status        Condition is worsening.     MDM Data  External documents reviewed: No  Cardiac tracing (EKG, telemetry) interpretation: Reviewed  Radiology interpretation: Reviewed  Labs reviewed: Yes  Any tests that were considered but not ordered: No     Decision rules/scores evaluated (example [id number], Wells, etc): No     Discussed with: Patient's son Davian and Dr. Narvaez     Care Planning  Shared decision making: Patient's son Davian and Dr. Narvaez  Code status and discussions: DNR/DNI    Disposition  Social Determinants of Health that impact treatment or disposition: Patient lives alone, may benefit from home health or rehab placement  Estimated length of stay is 1-2 days.     I confirmed that the patient's advanced care plan is present, code status is documented, and a surrogate decision maker is listed in the patient's medical record.     The patient's surrogate decision maker is dimitry Marcelo.     The patient was seen and examined by me on 5/1/2024 at 10:17 PM.    Electronically signed by YOBANY Miller, 05/01/24, 23:22 CDT.               Electronically signed by Enoc Narvaez DO at 05/02/24 0356       Current Facility-Administered Medications   Medication Dose Route Frequency Provider Last Rate Last Admin    apixaban (ELIQUIS) tablet 2.5 mg  2.5 mg Oral BID Mary Gallagher APRN        ascorbic acid (VITAMIN C) tablet 500 mg  500 mg Oral Daily Enoc Narvaez DO   500 mg at 05/04/24 0837    atorvastatin (LIPITOR) tablet 20 mg  20 mg Oral Nightly Enoc Narvaez DO   20 mg at 05/03/24 2016    benzonatate  (TESSALON) capsule 100 mg  100 mg Oral TID PRN Chance Cisneros MD        sennosides-docusate (PERICOLACE) 8.6-50 MG per tablet 2 tablet  2 tablet Oral BID PRN Jayashree Fields APRN        And    polyethylene glycol (MIRALAX) packet 17 g  17 g Oral Daily PRN Jayashree Fields APRN        And    bisacodyl (DULCOLAX) EC tablet 5 mg  5 mg Oral Daily PRN Jayashree Fields APRN        And    bisacodyl (DULCOLAX) suppository 10 mg  10 mg Rectal Daily PRN Jayashree Fields APRN        bumetanide (BUMEX) tablet 1 mg  1 mg Oral Daily Mary Gallagher APRN   1 mg at 05/04/24 1239    carvedilol (COREG) tablet 6.25 mg  6.25 mg Oral BID With Meals Mummaneni, Sundeep, DO   6.25 mg at 05/04/24 0836    cholecalciferol (VITAMIN D3) tablet 1,000 Units  1,000 Units Oral Daily Mummaneni, Sundeep, DO   1,000 Units at 05/04/24 0837    dextrose (D50W) (25 g/50 mL) IV injection 25 g  25 g Intravenous Q15 Min PRN Jayashree Fields APRN   25 g at 05/02/24 0543    dextrose (GLUTOSE) oral gel 15 g  15 g Oral Q15 Min PRN Jayashree Fields APRN        [Held by provider] digoxin (LANOXIN) tablet 125 mcg  125 mcg Oral Every Other Day Jayashree Fields APRN        empagliflozin (JARDIANCE) tablet 10 mg  10 mg Oral Daily Mummaneni, Sundeep, DO   10 mg at 05/04/24 0836    ferrous sulfate tablet 325 mg  325 mg Oral Daily With Breakfast Mummaneni, Sundeep, DO   325 mg at 05/04/24 0836    glipizide (GLUCOTROL) tablet 2.5 mg  2.5 mg Oral BID AC Mary Gallagher APRN   2.5 mg at 05/04/24 1634    glucagon (GLUCAGEN) injection 1 mg  1 mg Intramuscular Q15 Min PRN Jayashree Fields APRN        guaiFENesin-dextromethorphan (ROBITUSSIN DM) 100-10 MG/5ML syrup 5 mL  5 mL Oral Q4H PRN Chance Cisneros MD   5 mL at 05/03/24 1212    hydrALAZINE (APRESOLINE) injection 10 mg  10 mg Intravenous Q6H PRN Jayashree Fields APRN        hydrALAZINE (APRESOLINE) tablet 100 mg  100 mg Oral TID Enoc Narvaez DO   100 mg at 05/04/24 1634    insulin  regular (humuLIN R,novoLIN R) injection 2-7 Units  2-7 Units Subcutaneous 4x Daily With Meals & Nightly Chance Cisneros MD   2 Units at 05/04/24 1239    ipratropium-albuterol (DUO-NEB) nebulizer solution 3 mL  3 mL Nebulization 4x Daily PRN Chance Cisneros MD        lactated ringers infusion  50 mL/hr Intravenous Continuous Jayashree Fields APRN   Stopped at 05/04/24 1419    losartan (COZAAR) tablet 100 mg  100 mg Oral Daily Mummaneni, Sundeep, DO   100 mg at 05/04/24 0837    NIFEdipine XL (PROCARDIA XL) 24 hr tablet 90 mg  90 mg Oral Daily Mummaneni, Sundeep, DO   90 mg at 05/04/24 0836    ondansetron ODT (ZOFRAN-ODT) disintegrating tablet 4 mg  4 mg Oral Q6H PRN Jayashree Fields APRN        Or    ondansetron (ZOFRAN) injection 4 mg  4 mg Intravenous Q6H PRN Jayashree Fields APRN   4 mg at 05/03/24 1212    pantoprazole (PROTONIX) EC tablet 40 mg  40 mg Oral Q AM Mummaneni, Sundeep, DO   40 mg at 05/04/24 0530    sodium bicarbonate tablet 650 mg  650 mg Oral Daily Mummaneni, Sundeep, DO   650 mg at 05/04/24 0837    sodium chloride 0.9 % flush 10 mL  10 mL Intravenous PRN Mummaneni, Sundeep, DO   10 mL at 05/03/24 0814    sodium chloride 0.9 % flush 10 mL  10 mL Intravenous Q12H Jayashree Fields APRN   10 mL at 05/04/24 0838    sodium chloride 0.9 % flush 10 mL  10 mL Intravenous PRN Jayashree Fields APRN        sodium chloride 0.9 % infusion 40 mL  40 mL Intravenous PRN Jayashree Fields APRN        [Held by provider] spironolactone (ALDACTONE) tablet 25 mg  25 mg Oral Daily Mummaneni, Sundeep, DO   25 mg at 05/03/24 0813     Operative/Procedure Notes (last 7 days)  Notes from 04/27/24 1635 through 05/04/24 1635   No notes of this type exist for this encounter.          Physician Progress Notes (last 24 hours)        Mary Gallagher APRN at 05/04/24 1219       Attestation signed by Chance Cisneros MD at 05/04/24 1259    I have reviewed this documentation and agree.                      [facility]  "[facility] Medicine Services  INPATIENT PROGRESS NOTE    Length of Stay: 3  Date of Admission: 5/1/2024  Primary Care Physician: Zachariah Huffman MD    Subjective   Chief Complaint: Mental status changes    HPI   Pepper Perales presented to [facility] emergency room 5/1/2024 with status changes and confusion.  She has a history of stage IV chronic kidney disease, chronic diastolic heart failure, severe pulmonary hypertension and diabetes.  Patient had recent hospitalization 3/13/2024 - 3/15/2024 for acute on chronic heart failure with preserved ejection fraction, treated with diuresis with good results.  She followed up with her primary care provider.  She was in her usual state of health the day of admission approximately 8- 8:30 the morning she talked to her daughter on the phone.  Son normally calls her around 2 PM but he was in [place] when she called him.  Son could tell by the conversation that \"something was not right\".  Patient tried to call her daughter who did not answer the phone.  Due to some confusion EMS called and patient was brought to ED.  Creatinine 3.2 with previous creatinine 2.49 on 3/15/2024, dig level 1.5, CRP 8.83, urinalysis 3-5 WBC, 1+ bacteria, negative nitrate.  WBC 4.76.  CT scan of the abdomen noted small to moderate volume ascites with mild mesenteric edema and body wall anasarca.  No obvious evidence of cirrhosis.  Cardiomegaly with trace right pleural effusion.  No CT evidence to explain hematuria.  CT scan of the head no acute intracranial abnormality.  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  Chest x-ray no acute cardiopulmonary abnormality.  Lactated Ringer's fluid bolus, Rocephin given in ER.    Today  Sitting up in bed.  She tells me she feels much better today.  She is not requiring oxygen.  Son in room.  Patient denies any pain.  Denies chest pain or palpitations.  No complaints of shortness of breath.  Completed Rocephin x 3 days.  Urine " culture no growth.  Renal function with creatinine 2.69 near baseline 2.4.  Will restart patient's Bumex.  Check dig level in AM.  Heart rate remains in the 60s and will likely discontinue dig at discharge.  Son wants to see how patient does ambulating today and then will decide if home with home health is a better option or skilled nursing facility with [facility] as first choice.    Review of Systems   Constitutional:  Positive for activity change and fatigue.   HENT:  Negative for congestion and trouble swallowing.    Eyes:  Negative for photophobia and visual disturbance.   Respiratory:  Negative for cough, shortness of breath and wheezing.    Cardiovascular:  Negative for chest pain, palpitations and leg swelling.   Gastrointestinal:  Negative for constipation, diarrhea, nausea and vomiting.   Endocrine: Negative for cold intolerance, heat intolerance and polyuria.   Genitourinary:  Positive for frequency. Negative for dysuria and urgency.   Musculoskeletal:  Negative for gait problem.   Skin:  Negative for color change, pallor, rash and wound.   Allergic/Immunologic: Negative for immunocompromised state.   Neurological:  Positive for weakness. Negative for light-headedness.   Hematological:  Negative for adenopathy. Does not bruise/bleed easily.   Psychiatric/Behavioral:  Negative for agitation, behavioral problems and confusion.      All pertinent negatives and positives are as above. All other systems have been reviewed and are negative unless otherwise stated.     Objective    Temp:  [97.5 °F (36.4 °C)-98.6 °F (37 °C)] 98.6 °F (37 °C)  Heart Rate:  [53-67] 65  Resp:  [16-20] 18  BP: (121-158)/(41-69) 121/41    Physical Exam  Vitals and nursing note reviewed.   Constitutional:       Comments: Sitting up in bed.  No oxygen in use.  Son in room.   HENT:      Head: Normocephalic and atraumatic.      Nose: No congestion.      Mouth/Throat:      Pharynx: Oropharynx is clear. No oropharyngeal exudate or posterior  oropharyngeal erythema.   Eyes:      Extraocular Movements: Extraocular movements intact.      Pupils: Pupils are equal, round, and reactive to light.   Cardiovascular:      Rate and Rhythm: Normal rate and regular rhythm.   Pulmonary:      Breath sounds: No wheezing, rhonchi or rales.      Comments: No oxygen in use.  Saturation 95-98% on room air.  Abdominal:      Palpations: Abdomen is soft.      Tenderness: There is no abdominal tenderness.   Genitourinary:     Comments: Voiding.  Musculoskeletal:         General: No swelling or tenderness.      Cervical back: Normal range of motion and neck supple.   Skin:     General: Skin is warm and dry.   Neurological:      General: No focal deficit present.      Mental Status: She is alert and oriented to person, place, and time.      Comments: Moves extremities equally, answers questions appropriately.   Psychiatric:         Mood and Affect: Mood normal.         Behavior: Behavior normal.         Thought Content: Thought content normal.         Judgment: Judgment normal.           Results Review:  I have reviewed the labs, radiology results, and diagnostic studies.    Laboratory Data:      Results from last 7 days   Lab Units 05/04/24  0605 05/03/24  0524 05/01/24  1841   WBC 10*3/mm3 4.35 3.95 4.76   HEMOGLOBIN g/dL 8.4* 7.9* 10.4*   HEMATOCRIT % 26.9* 25.9* 33.0*   PLATELETS 10*3/mm3 133* 134* 183        Results from last 7 days   Lab Units 05/04/24  0605 05/03/24  0524 05/01/24  2332 05/01/24  2039   SODIUM mmol/L 139 140 144 138   POTASSIUM mmol/L 5.2 4.9 4.0 4.8   CHLORIDE mmol/L 106 107 109* 103   CO2 mmol/L 22.0 23.0 18.0* 23.0   BUN mg/dL 59* 63* 65* 73*   CREATININE mg/dL 2.69* 2.80* 2.75* 3.20*   GLUCOSE mg/dL 178* 154* 101* 134*   CALCIUM mg/dL 9.0 9.0 8.0* 9.6   ALT (SGPT) U/L 9 8  --  9         Culture Data:      Microbiology Results (last 10 days)       Procedure Component Value - Date/Time    Urine Culture - Urine, Urine, Clean Catch [[id number]]  (Normal)  Collected: 05/02/24 0536    Lab Status: Final result Specimen: Urine, Clean Catch Updated: 05/03/24 1055     Urine Culture No growth    Blood Culture With PAUL - Blood, Arm, Right [[id number]]  (Normal) Collected: 05/01/24 2218    Lab Status: Preliminary result Specimen: Blood from Arm, Right Updated: 05/03/24 2245     Blood Culture No growth at 2 days    Blood Culture With PAUL - Blood, Hand, Right [[id number]]  (Normal) Collected: 05/01/24 2218    Lab Status: Preliminary result Specimen: Blood from Hand, Right Updated: 05/03/24 2245     Blood Culture No growth at 2 days    Respiratory Panel PCR w/COVID-19(SARS-CoV-2) JIM/EMERALD/VELMA/PAD/COR/SHAYNA In-House, NP Swab in UTM/VTM, 2 HR TAT - Swab, Nasopharynx [[id number]]  (Normal) Collected: 05/01/24 1829    Lab Status: Final result Specimen: Swab from Nasopharynx Updated: 05/01/24 1956     ADENOVIRUS, PCR Not Detected     Coronavirus 229E Not Detected     Coronavirus HKU1 Not Detected     Coronavirus NL63 Not Detected     Coronavirus OC43 Not Detected     COVID19 Not Detected     Human Metapneumovirus Not Detected     Human Rhinovirus/Enterovirus Not Detected     Influenza A PCR Not Detected     Influenza B PCR Not Detected     Parainfluenza Virus 1 Not Detected     Parainfluenza Virus 2 Not Detected     Parainfluenza Virus 3 Not Detected     Parainfluenza Virus 4 Not Detected     RSV, PCR Not Detected     Bordetella pertussis pcr Not Detected     Bordetella parapertussis PCR Not Detected     Chlamydophila pneumoniae PCR Not Detected     Mycoplasma pneumo by PCR Not Detected    Narrative:      In the setting of a positive respiratory panel with a viral infection PLUS a negative procalcitonin without other underlying concern for bacterial infection, consider observing off antibiotics or discontinuation of antibiotics and continue supportive care. If the respiratory panel is positive for atypical bacterial infection (Bordetella pertussis, Chlamydophila pneumoniae, or Mycoplasma  pneumoniae), consider antibiotic de-escalation to target atypical bacterial infection.              Radiology Data:   Imaging Results (Last 72 Hours)       Procedure Component Value Units Date/Time    XR Chest PA & Lateral [[id number]] Collected: 05/03/24 1158     Updated: 05/03/24 1201    Narrative:      EXAM: XR CHEST PA AND LATERAL-      DATE: 5/3/2024 10:56 AM     HISTORY: Shortness of breath; N17.9-Acute kidney failure, unspecified;  N39.0-Urinary tract infection, site not specified; R31.9-Hematuria,  unspecified; R41.82-Altered mental status, unspecified;  R13.10-Dysphagia, unspecified; Z74.09-Other reduced mobility       COMPARISON: 5/1/2024.     TECHNIQUE:  Frontal and lateral views of the chest submitted.     FINDINGS:    The lungs are grossly clear. There is enlargement of the cardiac  silhouette. No effusion or pneumothorax is seen.          Impression:      1. No active disease seen in the chest.     This report was signed and finalized on 5/3/2024 11:58 AM by Mayur Peterson.       US Renal Bilateral [[id number]] Collected: 05/03/24 1132     Updated: 05/03/24 1136    Narrative:      EXAMINATION: US RENAL BILATERAL-     5/3/2024 10:01 AM     HISTORY: Elevated creatinine; N17.9-Acute kidney failure, unspecified;  N39.0-Urinary tract infection, site not specified; R31.9-Hematuria,  unspecified; R41.82-Altered mental status, unspecified;  R13.10-Dysphagia, unspecified; Z74.09-Other reduced mobility     Grayscale and color-flow renal ultrasound.     Normal size and position of both kidneys.  Normal cortical thickness and normal cortical echogenicity.     No hydronephrosis or shadowing stone.     The right kidney measures 99 x 45 x 39 mm.  7 x 5 cm upper pole cyst is unchanged compared with a recent CT exam     The left kidney measures 95 x 48 x 41 mm.     Poorly visualized urinary bladder due to bowel loops and free pelvic  fluid.       Impression:      Impression:  1. No acute kidney abnormality is seen.            This report was signed and finalized on 5/3/2024 11:33 AM by Dr. João Cardona MD.       CT Abdomen Pelvis Without Contrast [[id number]] Collected: 05/01/24 2037     Updated: 05/01/24 2047    Narrative:      EXAM: CT ABDOMEN PELVIS WO CONTRAST-     INDICATION: hematuria       TECHNIQUE: Helically acquired CT images were obtained of the abdomen and  pelvis without intravenous contrast. Coronal and sagittal reformations  were performed.       DOSE LENGTH PRODUCT: 279.58 mGy.cm mGy cm. Automated exposure control  was also utilized to decrease patient radiation dose.     COMPARISON: 11/16/2018     FINDINGS:     Lower Chest: Cardiomegaly. Mitral annular calcifications. Aortic  valvular calcifications. Coronary artery calcifications. Trace right  pleural fluid. Minimal atelectasis in the lung bases.     Liver: Unremarkable.     Biliary Tree: Unremarkable.     Spleen: Unremarkable.     Pancreas: Unremarkable.     Adrenal Glands: Unremarkable.     Kidneys/Ureters/Bladder: Stable large exophytic right renal cyst.     Reproductive Organs: Hysterectomy.     Gastrointestinal Tract: Unremarkable.      Lymphatics: Unremarkable.     Vasculature: Severe atherosclerosis.     Peritoneum/Retroperitoneum: Small to moderate volume ascites. Mild  mesenteric edema.     Abdominal Wall/Soft Tissues: Mild body wall anasarca. Tiny  fat-containing right inguinal hernia.     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 No acute or suspicious osseous finding.       Impression:         Small to moderate volume ascites with mild mesenteric edema and body  wall anasarca. No obvious evidence of cirrhosis.      Cardiomegaly with trace right pleural effusion.        No CT evidence to explain hematuria on this nonenhanced study.        This report was signed and finalized on 5/1/2024 8:43 PM by Ludwin Simmons.       CT Head Without Contrast [[id number]] Collected: 05/01/24 1753     Updated: 05/01/24 1805     Narrative:      Exam: CT HEAD WO CONTRAST- 5/1/2024 4:46 PM     HISTORY: ams, altered mental status.     DOSE LENGTH PRODUCT: 667.7 mGy.cm mGy cm. Automated exposure control was  also utilized to decrease patient radiation dose.     Technique:  Helically acquired CT of the brain without IV contrast was performed.  Sagittal and coronal reformations are also provided for review. Soft  tissue and bone kernels are available for interpretation.     Comparison: CT head 3/1/2021. MRI brain 1/7/2022.     Findings:     Ventricles and extra-axial CSF spaces are normal in size.     No intraparenchymal or extra-axial hemorrhage.     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 Gray-white matter differentiation is  otherwise preserved.     Orbits are grossly unremarkable. Left sphenoid sinus mucous retention  cyst. Mild left maxillary sinus mucosal thickening. Mastoid air cells  are grossly clear.     No suspicious calvarial or extracranial soft tissue abnormality.     Other:None.       Impression:      Impression:       No acute intracranial abnormality.     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     This report was signed and finalized on 5/1/2024 6:02 PM by Ludwin Simmons.       XR Chest 1 View [[id number]] Collected: 05/01/24 1741     Updated: 05/01/24 1745    Narrative:      EXAM: XR CHEST 1 VW- 5/1/2024 4:40 PM     HISTORY: Altered mental status.     COMPARISON: 3/13/2024.     TECHNIQUE: Single frontal radiograph of the chest was obtained.     FINDINGS:     Support Devices: None.     Cardiac and Mediastinal Silhouettes: Cardiomegaly, stable.     Lungs/Pleura: No focal consolidation. No sizable pleural effusion. No  visible pneumothorax.     Osseous structures: No acute osseous finding.     Other: None.       Impression:         No acute cardiopulmonary abnormality.           This report was signed and finalized on 5/1/2024 5:42 PM by Ludwin Simmons.               Intake/Output    Intake/Output Summary (Last 24 hours) at 5/4/2024  1219  Last data filed at 5/3/2024 2347  Gross per 24 hour   Intake 1693 ml   Output 700 ml   Net 993 ml       Scheduled Meds  apixaban, 2.5 mg, Oral, BID  vitamin C, 500 mg, Oral, Daily  atorvastatin, 20 mg, Oral, Nightly  bumetanide, 1 mg, Oral, Daily  carvedilol, 6.25 mg, Oral, BID With Meals  cholecalciferol, 1,000 Units, Oral, Daily  [Held by provider] digoxin, 125 mcg, Oral, Every Other Day  empagliflozin, 10 mg, Oral, Daily  ferrous sulfate, 325 mg, Oral, Daily With Breakfast  glipizide, 2.5 mg, Oral, BID AC  hydrALAZINE, 100 mg, Oral, TID  insulin regular, 2-7 Units, Subcutaneous, 4x Daily With Meals & Nightly  losartan, 100 mg, Oral, Daily  NIFEdipine XL, 90 mg, Oral, Daily  pantoprazole, 40 mg, Oral, Q AM  sodium bicarbonate, 650 mg, Oral, Daily  sodium chloride, 10 mL, Intravenous, Q12H  [Held by provider] spironolactone, 25 mg, Oral, Daily        I have reviewed the patient current medications.     Assessment/Plan     Active Hospital Problems    Diagnosis     **Acute kidney injury superimposed on stage 4 chronic kidney disease     Moderate malnutrition     Metabolic encephalopathy suspect due to elevated creatinine and abnormal urinalysis     Digoxin toxicity, accidental or unintentional, initial encounter     Altered mental status     Acute cystitis with hematuria     Elevated troponin     Severe pulmonary hypertension     Chronic anticoagulation     Chronic diastolic (congestive) heart failure     Benign essential HTN     Persistent atrial fibrillation     Type 2 diabetes mellitus with hyperglycemia, without long-term current use of insulin        Treatment Plan:    1.  Metabolic encephalopathy suspect due to elevated creatinine.  Presented with confusion and found to have elevated creatinine and abnormal urinalysis.  Hydrated with IV fluids.  Encephalopathy resolved and patient awake and alert.    2.  Acute kidney injury superimposed on CKD stage IV.  Presented with creatinine 3.2.  Previous creatinine  2.49 on 3/15/2024.  IV fluids given and decreased IV fluids to 50 mL/h.  Creatinine 2.69 today.  Repeat CMP in AM.  Continue sodium bicarbonate.  Renal ultrasound 5/3 no acute kidney abnormality.    3.  Abnormal urinalysis.  Urinalysis positive nitrates, 3-5 WBC. Completed 3 days of Rocephin.  Urine culture negative.  Patient did have mental status changes on admission that improved with IV fluids and antibiotics.    4.  Elevated digoxin level.  Digoxin level 1.4.  Hold digoxin.  Follow-up digoxin level 0.9 on 5/3.  Repeat digoxin level in AM.  Heart rate remains in the 60s.  Will likely hold digoxin at discharge.    5.  Primary hypertension. Blood pressure 121/41, 158/69. Coreg, losartan, hydralazine, Procardia continued.    6.  Persistent atrial fibrillation.  Continue Coreg, hold digoxin.  Restart Eliquis today.  Discontinue Lovenox.    7.  Chronic diastolic heart failure.  No exacerbation of symptoms.  Continue beta-blocker, ARB, spironolactone, statin.  Bumex restarted 5/3.  Spironolactone held today due to potassium 5.2.  Chest x-ray 5/3 no active disease.    8.  Diabetes mellitus type 2 without insulin.  Hemoglobin A1c 5.8 on 3/21/2024.  Accu-Cheks with sliding scale insulin coverage.  Glucoses 189, 178, continue glipizide.    9.  Physical therapy Occupational Therapy consulted.  Recommends physical therapy at discharge.  Son considering SNF wants referral to [facility] first choice, [facility] second choice.  Depending on how patient progresses today may consider home with home health.    10.  Nutrition consulted.      Medical Decision Making  Number and Complexity of problems: 8  Metabolic encephalopathy due to elevated creatinine and abnormal urinalysis: Acute, high complexity, resolved  Acute kidney injury superimposed on CKD stage IV: Acute, high complexity poses threat to life and bodily function, improving  Acute cystitis with hematuria: Acute, moderate complexity, stable  Elevated digoxin level: Acute,  moderate complexity, improving  Primary hypertension: Chronic, moderate complexity, improved  Persistent atrial fibrillation: Chronic, moderate complexity, stable  Chronic diastolic heart failure: Chronic, moderate complexity, stable  Diabetes mellitus type 2 without insulin: Chronic, moderate complexity, stable    Differential Diagnosis: None    Conditions and Status        Condition is improving.     [facility] Data  External documents reviewed: Epic.  Reviewed hospitalization 3/13/2024.  Reviewed PCP Dr. Huffman office visit 3/21/2024  Cardiac tracing (EKG, telemetry) interpretation: Normal sinus rhythm on bedside monitor  Radiology interpretation: Reviewed radiology interpretation CT abdomen pelvis, CT scan of the head  Labs reviewed:   CMP 5/4/2024.  Repeat CMP in AM.  CBC 5/4/24.  Repeat CBC in AM.  Urinalysis reviewed  Glucose 189, 147, 178.    Any tests that were considered but not ordered: None     Decision rules/scores evaluated (example [id number], Wells, etc): None     Discussed with: Dr. Cisneros, patient, and son Davian     Care Planning  Shared decision making: Dr. Cisneros, patient, and son Wagner.  Patient and son agreed to  IV fluids, follow-up lab work, chest x-ray, renal ultrasound, physical therapy consult, ambulate, considering SNF  Code status and discussions: No CPR, no intubation, no cardioversion, no dialysis  Patient surrogate decision maker is her son, Davian    Disposition  Social Determinants of Health that impact treatment or disposition: None  I expect the patient to be discharged to home home health or SNF in 1-2 days.     Electronically signed by YOBANY Rangel, 05/04/24, 12:19 CDT.    The above documentation resulted from a face-to-face encounter by me Mary HAYWOOD, [facility].        Electronically signed by Chance Cisneros MD at 05/04/24 1259       Consult Notes (last 24 hours)  Notes from 05/03/24 1635 through 05/04/24 1635   No notes of this type exist for this  encounter.        Tangela Escobedo PTA   Physical Therapist Assistant  Physical Therapy     Therapy Treatment Note     Signed     Date of Service: 24  Creation Time: 24     Signed       Expand All Collapse All[]Expand All by Default    Show:  []Written[]Templated[]Copied    Added by:  [x]Tangela Escobedo PTA    []Cherelle for details  Acute Care - Physical Therapy Treatment Note  [facility]     Patient Name: Libia Perales                       : 1934                    MRN: [id number]  Today's Date: 2024                                    Visit Dx:   Visit Diagnosis       ICD-10-CM ICD-9-CM   1. AMARIS (acute kidney injury)  N17.9 584.9   2. Urinary tract infection with hematuria, site unspecified  N39.0 599.0     R31.9 599.70   3. Altered mental status, unspecified altered mental status type  R41.82 780.97   4. Dysphagia, unspecified type  R13.10 787.20   5. Impaired mobility [Z74.09]  Z74.09 799.89   6. High calcium levels  E83.52 275.42   7. 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    Benign essential HTN    Persistent atrial fibrillation    Asthma    Fibrocystic breast disease    Gastro-esophageal reflux    High calcium levels    OA (osteoarthritis) of ankle    Right renal mass    Vertigo, benign paroxysmal    Chronic fatigue    Chronic cough    Allergic rhinitis    Stage 4 chronic kidney disease    Strain of left rhomboid muscle    Chronic anticoagulation    Chronic diastolic (congestive) heart failure    Severe pulmonary hypertension    Acute on chronic heart failure with preserved ejection fraction (HFpEF)    Acute kidney injury superimposed on stage 4 chronic kidney disease    Digoxin toxicity,  accidental or unintentional, initial encounter    Altered mental status    Acute cystitis with hematuria    Elevated troponin    Metabolic encephalopathy suspect due to elevated creatinine and abnormal urinalysis    Moderate malnutrition         Medical History        Past Medical History:   Diagnosis Date    Atrial fibrillation      Diabetes mellitus      Difficulty walking Approximately 2 years    GERD (gastroesophageal reflux disease)      High cholesterol      Hypertension      OA (osteoarthritis)      Pneumonia       few years ago    Stage 4 chronic kidney disease 08/18/2023         Surgical History         Past Surgical History:   Procedure Laterality Date    BREAST BIOPSY Right      BREAST CYST ASPIRATION        HYSTERECTOMY        OOPHORECTOMY        TONSILLECTOMY             PT Assessment (Last 12 Hours)            PT Evaluation and Treatment         Row Name 05/04/24 1337                 Physical Therapy Time and Intention     Subjective Information no complaints  -LY       Document Type therapy note (daily note)  -LY       Mode of Treatment physical therapy  -LY          Row Name 05/04/24 1337                 General Information     Existing Precautions/Restrictions fall  -LY          Row Name 05/04/24 1337                 Pain     Pretreatment Pain Rating 0/10 - no pain  -LY       Posttreatment Pain Rating 0/10 - no pain  -LY          Row Name 05/04/24 1337                 Bed Mobility     Comment, (Bed Mobility) chair  -LY          Row Name 05/04/24 1337                 Sit-Stand Transfer     Sit-Stand Becker (Transfers) contact guard;verbal cues  -LY       Assistive Device (Sit-Stand Transfers) walker, front-wheeled  -LY          Row Name 05/04/24 1337                 Stand-Sit Transfer     Stand-Sit Becker (Transfers) verbal cues;contact guard  -LY       Assistive Device (Stand-Sit Transfers) walker, front-wheeled  -LY          Row Name 05/04/24 1337                 Gait/Stairs  (Locomotion)     [place] Level (Gait) contact guard  -LY       Assistive Device (Gait) walker, front-wheeled  -LY       Distance in Feet (Gait) 50  -LY       Pattern (Gait) step-through  -LY       Deviations/Abnormal Patterns (Gait) gait speed decreased;stride length decreased  -LY       Bilateral Gait Deviations forward flexed posture  -LY       Comment, (Gait/Stairs) cues for posture and gait mechanics  -LY          Row Name 05/04/24 1337                 Positioning and Restraints     Pre-Treatment Position sitting in chair/recliner  -LY       Post Treatment Position chair  -LY       In Chair sitting;call light within reach;encouraged to call for assist  -LY                    User Key  (r) = Recorded By, (t) = Taken By, (c) = Cosigned By        Initials Name Provider Type     LY Tangela Escobedo, PTA Physical Therapist Assistant                             Physical Therapy Education            Title: PT OT SLP Therapies (In Progress)         Topic: Physical Therapy (In Progress)         Point: Mobility training (Done)         Learning Progress Summary               Patient Acceptance, E, ROE MARTINEZ by  at 5/3/2024 1012     Comment: Pt educated on therole of PT in her care and on completing mobility safely and efficiently.   Family Acceptance, E, ROE MARTINEZ by  at 5/3/2024 1012     Comment: Pt educated on therole of PT in her care and on completing mobility safely and efficiently.                               Point: Home exercise program (Not Started)         Learner Progress:  Not documented in this visit.                  Point: Body mechanics (Done)         Learning Progress Summary               Patient Acceptance, E, ROE MARTINEZ by  at 5/3/2024 1012     Comment: Pt educated on therole of PT in her care and on completing mobility safely and efficiently.   Family Acceptance, E, ROE MARTINEZ by  at 5/3/2024 1012     Comment: Pt educated on therole of PT in her care and on completing mobility safely and efficiently.                                Point: Precautions (Not Started)         Learner Progress:  Not documented in this visit.                                      User Key         Initials Effective Dates Name Provider Type Discipline      02/22/24 -  Gladys Ramirez, PT Student PT Student PT                          PT Recommendation and Plan  Anticipated Discharge Disposition (PT): sub acute care setting    Outcome Measures         Row Name 05/03/24 1100                       How much help from another is currently needed...     Putting on and taking off regular lower body clothing? 2  -EC         Bathing (including washing, rinsing, and drying) 3  -EC         Toileting (which includes using toilet bed pan or urinal) 3  -EC         Putting on and taking off regular upper body clothing 3  -EC         Taking care of personal grooming (such as brushing teeth) 3  -EC         Eating meals 4  -EC         AM-PAC 6 Clicks Score (OT) 18  -EC                 Functional Assessment     Outcome Measure Options AM-PAC 6 Clicks Daily Activity (OT)  -EC                         User Key  (r) = Recorded By, (t) = Taken By, (c) = Cosigned By        Initials Name Provider Type     EC Lissette Salazar, OTR/L Occupational Therapist                              Time Calculation:     PT Charges         Row Name 05/04/24 1409                       Time Calculation     Start Time 1337  -LY         Stop Time 1352  -LY         Time Calculation (min) 15 min  -LY         PT Received On 05/04/24  -LY                 Time Calculation- PT     Total Timed Code Minutes- PT 15 minute(s)  -LY                 Timed Charges     32258 - Gait Training Minutes  15  -LY                 Total Minutes     Timed Charges Total Minutes 15  -LY          Total Minutes 15  -LY                      User Key  (r) = Recorded By, (t) = Taken By, (c) = Cosigned By        Initials Name Provider Type     Tangela Talbot, PTA Physical Therapist Assistant                       Therapy  "Charges for Today         Code Description Service Date Service Provider Modifiers Qty     [id number] HC GAIT TRAINING EA 15 MIN 5/4/2024 Tangela Escobedo, GYPSY GP 1                PT G-Codes  Outcome Measure Options: AM-PAC 6 Clicks Daily Activity (OT)  AM-PAC 6 Clicks Score (PT): 20  AM-PAC 6 Clicks Score (OT): 18     Tangela STEPH GYPSY Escobedo              5/4/2024                                           ED to Hosp-Admission (Current) on 5/1/2024            Detailed Report            ROS Info          Note shared with patient  Additional Documentation    Vitals: /48 (BP Location: Right arm, Patient Position: Sitting)     Pulse 65     Temp 97.5 °F (36.4 °C) (Oral)     Resp 18     Ht 157.5 cm (62\")     Wt 65.2 kg (143 lb 11.8 oz)     LMP  (LMP Unknown)     SpO2 98%     BMI 26.29 kg/m²     BSA 1.66 m²          More Vitals   Flowsheets: Acuity/Destination,     HPI,     Sepsis Predictive Model,     Falls PA Model,     Pain,     Vital Signs,     ...(79 more)   Encounter Info: Billing Info,     History,     Allergies,     Detailed Report     Patient Summary Extracts    Lab Result Report 5/4/2024  4:35 PM     Encounter Information    Encounter Information   Department Encounter #   5/1/2024  5:00 PM  [facility] 3A [id number]     Care Timeline    05/02   Admitted from ED 1454     Clinical Impressions      AMARIS (acute kidney injury)    Urinary tract infection with hematuria, site unspecified    Altered mental status, unspecified altered mental status type  Disposition      Decision to Admit       Follow-Ups: Follow up with Zachariah Huffman MD (Internal Medicine) in 1 week (5/10/2024); Follow up on 5/10/2024 at 11:00 with Chasidy Sy  Orders Placed      Labs    High Sensitivity Troponin T 2Hr PROCEDURE ONCE    High Sensitivity Troponin T STAT  ...(41 more)    Imaging    CT Abdomen Pelvis Without Contrast 1 Time Imaging    CT Head Without Contrast 1 Time Imaging  ...(3 more)    Other Orders    ECG 12 Lead Other; generalized " weakness Once    ECG Scan No remaining occurrences  ...(30 more)  All Encounter Results  Care Timeline    05/01       ECG Scan   1700   Arrived   1740   XR Chest 1 View   1749   CT Head Without Contrast   1824   ECG 12 Lead Other; generalized weakness   1829   Respiratory Panel PCR w/COVID-19(SARS-CoV-2) JIM/EMERALD/VELMA/[facility]AYNA In-House, NP Swab in UTM/VTM, 2 HR TAT - Swab, Nasopharynx   1841   Lipase     CBC & Differential      Protime-INR    1901   Urinalysis With Culture If Indicated - Straight Cath      Urinalysis, Microscopic Only - Straight Cath    2025   CT Abdomen Pelvis Without Contrast   2039   Comprehensive Metabolic Panel      Digoxin Level      C-reactive Protein      POC Glucose Once      Procalcitonin     Single High Sensitivity Troponin T    2044   cefTRIAXone (ROCEPHIN) 1,000 mg in sodium chloride... 1000 mg   2131   Admitted (ED Boarder)   2218   Blood Culture With PAUL - Blood, Arm, Right     Blood Culture With APUL - Blood, Hand, Right     High Sensitivity Troponin T 2Hr    2331   Sodium Chloride 500 mL     Lactated Ringers 50 mL/hr   2332   Basic Metabolic Panel      Digoxin Level      High Sensitivity Troponin T    2339   Famotidine 20 mg   2340   Enoxaparin Sodium 60 mg   05/02     0011   Sodium Chloride Flush 10 mL   0532   POC Glucose Once    0536   Urine Culture - Urine, Urine, Clean Catch     Urinalysis With Culture If Indicated - Urine, Clean Catch      Urinalysis, Microscopic Only - Urine, Clean Catch    0543   Dextrose 25 g   0616   POC Glucose Once    0902   POC Glucose Once    1020   Carvedilol 6.25 mg     Cholecalciferol 1000 Units     Empagliflozin 10 mg     Ferrous Sulfate 325 mg     NIFEdipine XL (PROCARDIA XL) 24 hr tablet 90 mg 90 mg     Pantoprazole Sodium 40 mg     Sodium Bicarbonate 650 mg     Spironolactone 25 mg     Ascorbic Acid 500 mg   1021   Famotidine 20 mg   1151   POC Glucose Once    1454   Transferred to [facility] 3A     Medications Administered    All  Medications Administered (28)  Visit Diagnoses      AMARIS (acute kidney injury)    Urinary tract infection with hematuria, site unspecified    Altered mental status, unspecified altered mental status type    Dysphagia, unspecified type    Impaired mobility [Z74.09]    High calcium levels    Primary osteoarthritis of both knees  Problem List  Media  From this encounter  Scan on 5/3/2024 1456 by [employer], [place]: EMS TRANSFER, [facility], 05/01/2024   Scan on 5/1/2024 1735 by Fanta Bob RegSched Rep: obtained verbal from pt sonDavian   Scan on 5/1/2024 1735 by Fanta Bob RegSched Rep: obtained verbal from pt sonDavian   Electronic signature - Not e-signed     Committee Details    There is no Committee Review information to display for this encounter.     Committee Members    Emergency Medicine: Gisselle Galicia, RN Hospitalist: Chance Cisneros MD Sullivan, Carla J, YOBANY Fields, Jayashree GARCIA, YOBANY   Occupational Therapy: Lissette Salazar, OTR/L  Altagracia Monroy, OTR/L, Padilla Velasquez, OTR/L Physical Therapy: April Freitas, PTA  Gladys Ramirez, PT Student  Goran Kirk, PT  Surinder Rascon, PT DPT  Tangela Escobedo, PTA   Speech Pathology: Chantelle Donald, Speech Therapy Student  Danielle Hollingsworth, MS CCC-SLP

## 2024-05-04 NOTE — PROGRESS NOTES
Adult Nutrition  Assessment/PES    Patient Name:  Libia Perales  YOB: 1934  MRN: [id number]  Admit Date:  5/1/2024    Assessment Date:  5/4/2024       Reason for Assessment       Row Name 05/04/24 1259          Reason for Assessment    Reason For Assessment follow-up protocol                    Nutrition/Diet History       Row Name 05/04/24 1259          Nutrition/Diet History    Typical Intake (Food/Fluid/EN/PN) RN reports that pt's son would like to meet with dietitan today. Son reports pt does not eat much at home,son reports pt does not follow any type of schedule at home related to set meal times. Son reports pt does not follow a diet like she may need to. Discussed pt's last HgbA1c in March 2024 was down to 5.8%; when HgbA1c has been as high as 8% in the past.     Meal/Snack Patterns Son reports pt goes to sleep late, may not eat breakfast until 10-10:30, eating lunch around 1:00 or so and may not eat dinner until around 7:00. Pt reports she stays up watching cooking shows. RD encouraged pt to try and limit screen time prior to bedtime to promote regular sleep schedule.     Supplemental Drinks/Foods/Additives discussed adding oral supplements. Encouraged use of oral supplements between meals rather than replace meals.     Functional Status ambulatory     Additional Information Pt may eat convience type of foods, rather than three big meals. Pt snacks throuout the day.                 Problem/Interventions:   Problem 1       Row Name 05/04/24 1307          Nutrition Diagnoses Problem 1    Problem 1 Other (comment)  Moderate Chronic Malnutrition     Etiology (related to) Factors Affecting Nutrition     Other moderate muscle wasting and moderate fat loss; generalized weakness,fatigue     Signs/Symptoms (evidenced by) PO Intake;Report/Observation     Percent (%) intake recorded 75 %     Over number of meals 1     Reported/Observed By Patient;Family     Appetite Good                     Education/Evaluation       Row Name 05/04/24 1307          Education    Education No discharge needs identified at this time  discharge needs unknown at this time        Monitor/Evaluation    Monitor Per protocol                     Electronically signed by:  Kathie Smith RDN, LD  05/04/24 13:08 CDT

## 2024-05-04 NOTE — PROGRESS NOTES
"    [facility] Medicine Services  INPATIENT PROGRESS NOTE    Length of Stay: 3  Date of Admission: 5/1/2024  Primary Care Physician: Zachariah Huffman MD    Subjective   Chief Complaint: Mental status changes    HPI   Pepper Perales presented to [facility] emergency room 5/1/2024 with status changes and confusion.  She has a history of stage IV chronic kidney disease, chronic diastolic heart failure, severe pulmonary hypertension and diabetes.  Patient had recent hospitalization 3/13/2024 - 3/15/2024 for acute on chronic heart failure with preserved ejection fraction, treated with diuresis with good results.  She followed up with her primary care provider.  She was in her usual state of health the day of admission approximately 8- 8:30 the morning she talked to her daughter on the phone.  Son normally calls her around 2 PM but he was in [place] when she called him.  Son could tell by the conversation that \"something was not right\".  Patient tried to call her daughter who did not answer the phone.  Due to some confusion EMS called and patient was brought to ED.  Creatinine 3.2 with previous creatinine 2.49 on 3/15/2024, dig level 1.5, CRP 8.83, urinalysis 3-5 WBC, 1+ bacteria, negative nitrate.  WBC 4.76.  CT scan of the abdomen noted small to moderate volume ascites with mild mesenteric edema and body wall anasarca.  No obvious evidence of cirrhosis.  Cardiomegaly with trace right pleural effusion.  No CT evidence to explain hematuria.  CT scan of the head no acute intracranial abnormality.  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  Chest x-ray no acute cardiopulmonary abnormality.  Lactated Ringer's fluid bolus, Rocephin given in ER.    Today  Sitting up in bed.  She tells me she feels much better today.  She is not requiring oxygen.  Son in room.  Patient denies any pain.  Denies chest pain or palpitations.  No complaints of shortness of breath.  Completed Rocephin x 3 " days.  Urine culture no growth.  Renal function with creatinine 2.69 near baseline 2.4.  Will restart patient's Bumex.  Check dig level in AM.  Heart rate remains in the 60s and will likely discontinue dig at discharge.  Son wants to see how patient does ambulating today and then will decide if home with home health is a better option or skilled nursing facility with [facility] as first choice.    Review of Systems   Constitutional:  Positive for activity change and fatigue.   HENT:  Negative for congestion and trouble swallowing.    Eyes:  Negative for photophobia and visual disturbance.   Respiratory:  Negative for cough, shortness of breath and wheezing.    Cardiovascular:  Negative for chest pain, palpitations and leg swelling.   Gastrointestinal:  Negative for constipation, diarrhea, nausea and vomiting.   Endocrine: Negative for cold intolerance, heat intolerance and polyuria.   Genitourinary:  Positive for frequency. Negative for dysuria and urgency.   Musculoskeletal:  Negative for gait problem.   Skin:  Negative for color change, pallor, rash and wound.   Allergic/Immunologic: Negative for immunocompromised state.   Neurological:  Positive for weakness. Negative for light-headedness.   Hematological:  Negative for adenopathy. Does not bruise/bleed easily.   Psychiatric/Behavioral:  Negative for agitation, behavioral problems and confusion.      All pertinent negatives and positives are as above. All other systems have been reviewed and are negative unless otherwise stated.     Objective    Temp:  [97.5 °F (36.4 °C)-98.6 °F (37 °C)] 98.6 °F (37 °C)  Heart Rate:  [53-67] 65  Resp:  [16-20] 18  BP: (121-158)/(41-69) 121/41    Physical Exam  Vitals and nursing note reviewed.   Constitutional:       Comments: Sitting up in bed.  No oxygen in use.  Son in room.   HENT:      Head: Normocephalic and atraumatic.      Nose: No congestion.      Mouth/Throat:      Pharynx: Oropharynx is clear. No oropharyngeal exudate or  posterior oropharyngeal erythema.   Eyes:      Extraocular Movements: Extraocular movements intact.      Pupils: Pupils are equal, round, and reactive to light.   Cardiovascular:      Rate and Rhythm: Normal rate and regular rhythm.   Pulmonary:      Breath sounds: No wheezing, rhonchi or rales.      Comments: No oxygen in use.  Saturation 95-98% on room air.  Abdominal:      Palpations: Abdomen is soft.      Tenderness: There is no abdominal tenderness.   Genitourinary:     Comments: Voiding.  Musculoskeletal:         General: No swelling or tenderness.      Cervical back: Normal range of motion and neck supple.   Skin:     General: Skin is warm and dry.   Neurological:      General: No focal deficit present.      Mental Status: She is alert and oriented to person, place, and time.      Comments: Moves extremities equally, answers questions appropriately.   Psychiatric:         Mood and Affect: Mood normal.         Behavior: Behavior normal.         Thought Content: Thought content normal.         Judgment: Judgment normal.           Results Review:  I have reviewed the labs, radiology results, and diagnostic studies.    Laboratory Data:      Results from last 7 days   Lab Units 05/04/24  0605 05/03/24  0524 05/01/24  1841   WBC 10*3/mm3 4.35 3.95 4.76   HEMOGLOBIN g/dL 8.4* 7.9* 10.4*   HEMATOCRIT % 26.9* 25.9* 33.0*   PLATELETS 10*3/mm3 133* 134* 183        Results from last 7 days   Lab Units 05/04/24  0605 05/03/24  0524 05/01/24  2332 05/01/24  2039   SODIUM mmol/L 139 140 144 138   POTASSIUM mmol/L 5.2 4.9 4.0 4.8   CHLORIDE mmol/L 106 107 109* 103   CO2 mmol/L 22.0 23.0 18.0* 23.0   BUN mg/dL 59* 63* 65* 73*   CREATININE mg/dL 2.69* 2.80* 2.75* 3.20*   GLUCOSE mg/dL 178* 154* 101* 134*   CALCIUM mg/dL 9.0 9.0 8.0* 9.6   ALT (SGPT) U/L 9 8  --  9         Culture Data:      Microbiology Results (last 10 days)       Procedure Component Value - Date/Time    Urine Culture - Urine, Urine, Clean Catch [[id number]]   (Normal) Collected: 05/02/24 0536    Lab Status: Final result Specimen: Urine, Clean Catch Updated: 05/03/24 1055     Urine Culture No growth    Blood Culture With PAUL - Blood, Arm, Right [[id number]]  (Normal) Collected: 05/01/24 2218    Lab Status: Preliminary result Specimen: Blood from Arm, Right Updated: 05/03/24 2245     Blood Culture No growth at 2 days    Blood Culture With PAUL - Blood, Hand, Right [[id number]]  (Normal) Collected: 05/01/24 2218    Lab Status: Preliminary result Specimen: Blood from Hand, Right Updated: 05/03/24 2245     Blood Culture No growth at 2 days    Respiratory Panel PCR w/COVID-19(SARS-CoV-2) JIM/EMERALD/VELMA/PAD/COR/SHAYNA In-House, NP Swab in UTM/VTM, 2 HR TAT - Swab, Nasopharynx [[id number]]  (Normal) Collected: 05/01/24 1829    Lab Status: Final result Specimen: Swab from Nasopharynx Updated: 05/01/24 1956     ADENOVIRUS, PCR Not Detected     Coronavirus 229E Not Detected     Coronavirus HKU1 Not Detected     Coronavirus NL63 Not Detected     Coronavirus OC43 Not Detected     COVID19 Not Detected     Human Metapneumovirus Not Detected     Human Rhinovirus/Enterovirus Not Detected     Influenza A PCR Not Detected     Influenza B PCR Not Detected     Parainfluenza Virus 1 Not Detected     Parainfluenza Virus 2 Not Detected     Parainfluenza Virus 3 Not Detected     Parainfluenza Virus 4 Not Detected     RSV, PCR Not Detected     Bordetella pertussis pcr Not Detected     Bordetella parapertussis PCR Not Detected     Chlamydophila pneumoniae PCR Not Detected     Mycoplasma pneumo by PCR Not Detected    Narrative:      In the setting of a positive respiratory panel with a viral infection PLUS a negative procalcitonin without other underlying concern for bacterial infection, consider observing off antibiotics or discontinuation of antibiotics and continue supportive care. If the respiratory panel is positive for atypical bacterial infection (Bordetella pertussis, Chlamydophila pneumoniae, or  Mycoplasma pneumoniae), consider antibiotic de-escalation to target atypical bacterial infection.              Radiology Data:   Imaging Results (Last 72 Hours)       Procedure Component Value Units Date/Time    XR Chest PA & Lateral [[id number]] Collected: 05/03/24 1158     Updated: 05/03/24 1201    Narrative:      EXAM: XR CHEST PA AND LATERAL-      DATE: 5/3/2024 10:56 AM     HISTORY: Shortness of breath; N17.9-Acute kidney failure, unspecified;  N39.0-Urinary tract infection, site not specified; R31.9-Hematuria,  unspecified; R41.82-Altered mental status, unspecified;  R13.10-Dysphagia, unspecified; Z74.09-Other reduced mobility       COMPARISON: 5/1/2024.     TECHNIQUE:  Frontal and lateral views of the chest submitted.     FINDINGS:    The lungs are grossly clear. There is enlargement of the cardiac  silhouette. No effusion or pneumothorax is seen.          Impression:      1. No active disease seen in the chest.     This report was signed and finalized on 5/3/2024 11:58 AM by Mayur Peterson.       US Renal Bilateral [[id number]] Collected: 05/03/24 1132     Updated: 05/03/24 1136    Narrative:      EXAMINATION: US RENAL BILATERAL-     5/3/2024 10:01 AM     HISTORY: Elevated creatinine; N17.9-Acute kidney failure, unspecified;  N39.0-Urinary tract infection, site not specified; R31.9-Hematuria,  unspecified; R41.82-Altered mental status, unspecified;  R13.10-Dysphagia, unspecified; Z74.09-Other reduced mobility     Grayscale and color-flow renal ultrasound.     Normal size and position of both kidneys.  Normal cortical thickness and normal cortical echogenicity.     No hydronephrosis or shadowing stone.     The right kidney measures 99 x 45 x 39 mm.  7 x 5 cm upper pole cyst is unchanged compared with a recent CT exam     The left kidney measures 95 x 48 x 41 mm.     Poorly visualized urinary bladder due to bowel loops and free pelvic  fluid.       Impression:      Impression:  1. No acute kidney abnormality  is seen.           This report was signed and finalized on 5/3/2024 11:33 AM by Dr. João Cardona MD.       CT Abdomen Pelvis Without Contrast [[id number]] Collected: 05/01/24 2037     Updated: 05/01/24 2047    Narrative:      EXAM: CT ABDOMEN PELVIS WO CONTRAST-     INDICATION: hematuria       TECHNIQUE: Helically acquired CT images were obtained of the abdomen and  pelvis without intravenous contrast. Coronal and sagittal reformations  were performed.       DOSE LENGTH PRODUCT: 279.58 mGy.cm mGy cm. Automated exposure control  was also utilized to decrease patient radiation dose.     COMPARISON: 11/16/2018     FINDINGS:     Lower Chest: Cardiomegaly. Mitral annular calcifications. Aortic  valvular calcifications. Coronary artery calcifications. Trace right  pleural fluid. Minimal atelectasis in the lung bases.     Liver: Unremarkable.     Biliary Tree: Unremarkable.     Spleen: Unremarkable.     Pancreas: Unremarkable.     Adrenal Glands: Unremarkable.     Kidneys/Ureters/Bladder: Stable large exophytic right renal cyst.     Reproductive Organs: Hysterectomy.     Gastrointestinal Tract: Unremarkable.      Lymphatics: Unremarkable.     Vasculature: Severe atherosclerosis.     Peritoneum/Retroperitoneum: Small to moderate volume ascites. Mild  mesenteric edema.     Abdominal Wall/Soft Tissues: Mild body wall anasarca. Tiny  fat-containing right inguinal hernia.     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 No acute or suspicious osseous finding.       Impression:         Small to moderate volume ascites with mild mesenteric edema and body  wall anasarca. No obvious evidence of cirrhosis.      Cardiomegaly with trace right pleural effusion.        No CT evidence to explain hematuria on this nonenhanced study.        This report was signed and finalized on 5/1/2024 8:43 PM by Ludwin Simmons.       CT Head Without Contrast [[id number]] Collected: 05/01/24 1753     Updated:  05/01/24 1805    Narrative:      Exam: CT HEAD WO CONTRAST- 5/1/2024 4:46 PM     HISTORY: ams, altered mental status.     DOSE LENGTH PRODUCT: 667.7 mGy.cm mGy cm. Automated exposure control was  also utilized to decrease patient radiation dose.     Technique:  Helically acquired CT of the brain without IV contrast was performed.  Sagittal and coronal reformations are also provided for review. Soft  tissue and bone kernels are available for interpretation.     Comparison: CT head 3/1/2021. MRI brain 1/7/2022.     Findings:     Ventricles and extra-axial CSF spaces are normal in size.     No intraparenchymal or extra-axial hemorrhage.     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 Gray-white matter differentiation is  otherwise preserved.     Orbits are grossly unremarkable. Left sphenoid sinus mucous retention  cyst. Mild left maxillary sinus mucosal thickening. Mastoid air cells  are grossly clear.     No suspicious calvarial or extracranial soft tissue abnormality.     Other:None.       Impression:      Impression:       No acute intracranial abnormality.     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     This report was signed and finalized on 5/1/2024 6:02 PM by Ludwin Simmons.       XR Chest 1 View [[id number]] Collected: 05/01/24 1741     Updated: 05/01/24 1745    Narrative:      EXAM: XR CHEST 1 VW- 5/1/2024 4:40 PM     HISTORY: Altered mental status.     COMPARISON: 3/13/2024.     TECHNIQUE: Single frontal radiograph of the chest was obtained.     FINDINGS:     Support Devices: None.     Cardiac and Mediastinal Silhouettes: Cardiomegaly, stable.     Lungs/Pleura: No focal consolidation. No sizable pleural effusion. No  visible pneumothorax.     Osseous structures: No acute osseous finding.     Other: None.       Impression:         No acute cardiopulmonary abnormality.           This report was signed and finalized on 5/1/2024 5:42 PM by Ludwin Simmons.               Intake/Output    Intake/Output Summary (Last 24 hours) at  5/4/2024 1219  Last data filed at 5/3/2024 2347  Gross per 24 hour   Intake 1693 ml   Output 700 ml   Net 993 ml       Scheduled Meds  apixaban, 2.5 mg, Oral, BID  vitamin C, 500 mg, Oral, Daily  atorvastatin, 20 mg, Oral, Nightly  bumetanide, 1 mg, Oral, Daily  carvedilol, 6.25 mg, Oral, BID With Meals  cholecalciferol, 1,000 Units, Oral, Daily  [Held by provider] digoxin, 125 mcg, Oral, Every Other Day  empagliflozin, 10 mg, Oral, Daily  ferrous sulfate, 325 mg, Oral, Daily With Breakfast  glipizide, 2.5 mg, Oral, BID AC  hydrALAZINE, 100 mg, Oral, TID  insulin regular, 2-7 Units, Subcutaneous, 4x Daily With Meals & Nightly  losartan, 100 mg, Oral, Daily  NIFEdipine XL, 90 mg, Oral, Daily  pantoprazole, 40 mg, Oral, Q AM  sodium bicarbonate, 650 mg, Oral, Daily  sodium chloride, 10 mL, Intravenous, Q12H  [Held by provider] spironolactone, 25 mg, Oral, Daily        I have reviewed the patient current medications.     Assessment/Plan     Active Hospital Problems    Diagnosis     **Acute kidney injury superimposed on stage 4 chronic kidney disease     Moderate malnutrition     Metabolic encephalopathy suspect due to elevated creatinine and abnormal urinalysis     Digoxin toxicity, accidental or unintentional, initial encounter     Altered mental status     Acute cystitis with hematuria     Elevated troponin     Severe pulmonary hypertension     Chronic anticoagulation     Chronic diastolic (congestive) heart failure     Benign essential HTN     Persistent atrial fibrillation     Type 2 diabetes mellitus with hyperglycemia, without long-term current use of insulin        Treatment Plan:    1.  Metabolic encephalopathy suspect due to elevated creatinine.  Presented with confusion and found to have elevated creatinine and abnormal urinalysis.  Hydrated with IV fluids.  Encephalopathy resolved and patient awake and alert.    2.  Acute kidney injury superimposed on CKD stage IV.  Presented with creatinine 3.2.  Previous  creatinine 2.49 on 3/15/2024.  IV fluids given and decreased IV fluids to 50 mL/h.  Creatinine 2.69 today.  Repeat CMP in AM.  Continue sodium bicarbonate.  Renal ultrasound 5/3 no acute kidney abnormality.    3.  Abnormal urinalysis.  Urinalysis positive nitrates, 3-5 WBC. Completed 3 days of Rocephin.  Urine culture negative.  Patient did have mental status changes on admission that improved with IV fluids and antibiotics.    4.  Elevated digoxin level.  Digoxin level 1.4.  Hold digoxin.  Follow-up digoxin level 0.9 on 5/3.  Repeat digoxin level in AM.  Heart rate remains in the 60s.  Will likely hold digoxin at discharge.    5.  Primary hypertension. Blood pressure 121/41, 158/69. Coreg, losartan, hydralazine, Procardia continued.    6.  Persistent atrial fibrillation.  Continue Coreg, hold digoxin.  Restart Eliquis today.  Discontinue Lovenox.    7.  Chronic diastolic heart failure.  No exacerbation of symptoms.  Continue beta-blocker, ARB, spironolactone, statin.  Bumex restarted 5/3.  Spironolactone held today due to potassium 5.2.  Chest x-ray 5/3 no active disease.    8.  Diabetes mellitus type 2 without insulin.  Hemoglobin A1c 5.8 on 3/21/2024.  Accu-Cheks with sliding scale insulin coverage.  Glucoses 189, 178, continue glipizide.    9.  Physical therapy Occupational Therapy consulted.  Recommends physical therapy at discharge.  Son considering SNF wants referral to [facility] first choice, [facility] second choice.  Depending on how patient progresses today may consider home with home health.    10.  Nutrition consulted.      Medical Decision Making  Number and Complexity of problems: 8  Metabolic encephalopathy due to elevated creatinine and abnormal urinalysis: Acute, high complexity, resolved  Acute kidney injury superimposed on CKD stage IV: Acute, high complexity poses threat to life and bodily function, improving  Acute cystitis with hematuria: Acute, moderate complexity, stable  Elevated digoxin  level: Acute, moderate complexity, improving  Primary hypertension: Chronic, moderate complexity, improved  Persistent atrial fibrillation: Chronic, moderate complexity, stable  Chronic diastolic heart failure: Chronic, moderate complexity, stable  Diabetes mellitus type 2 without insulin: Chronic, moderate complexity, stable    Differential Diagnosis: None    Conditions and Status        Condition is improving.     [facility]  External documents reviewed: Epic.  Reviewed hospitalization 3/13/2024.  Reviewed PCP Dr. Huffman office visit 3/21/2024  Cardiac tracing (EKG, telemetry) interpretation: Normal sinus rhythm on bedside monitor  Radiology interpretation: Reviewed radiology interpretation CT abdomen pelvis, CT scan of the head  Labs reviewed:   CMP 5/4/2024.  Repeat CMP in AM.  CBC 5/4/24.  Repeat CBC in AM.  Urinalysis reviewed  Glucose 189, 147, 178.    Any tests that were considered but not ordered: None     Decision rules/scores evaluated (example [id number], Wells, etc): None     Discussed with: Dr. Cisneros, patient, and son Davian     Care Planning  Shared decision making: Dr. Cisneros, patient, and son Wagner.  Patient and son agreed to  IV fluids, follow-up lab work, chest x-ray, renal ultrasound, physical therapy consult, ambulate, considering SNF  Code status and discussions: No CPR, no intubation, no cardioversion, no dialysis  Patient surrogate decision maker is her son, Davian    Disposition  Social Determinants of Health that impact treatment or disposition: None  I expect the patient to be discharged to home home health or SNF in 1-2 days.     Electronically signed by YOBANY Rangel, 05/04/24, 12:19 CDT.    The above documentation resulted from a face-to-face encounter by me Mary HAYWOOD, [facility].

## 2024-05-05 LAB
ALBUMIN SERPL-MCNC: 3.5 G/DL (ref 3.5–5.2)
ALBUMIN/GLOB SERPL: 1.3 G/DL
ALP SERPL-CCNC: 47 U/L (ref 39–117)
ALT SERPL W P-5'-P-CCNC: 9 U/L (ref 1–33)
ANION GAP SERPL CALCULATED.3IONS-SCNC: 9 MMOL/L (ref 5–15)
AST SERPL-CCNC: 20 U/L (ref 1–32)
BASOPHILS # BLD AUTO: 0.01 10*3/MM3 (ref 0–0.2)
BASOPHILS NFR BLD AUTO: 0.3 % (ref 0–1.5)
BILIRUB SERPL-MCNC: 0.4 MG/DL (ref 0–1.2)
BUN SERPL-MCNC: 56 MG/DL (ref 8–23)
BUN/CREAT SERPL: 20.2 (ref 7–25)
CALCIUM SPEC-SCNC: 9.1 MG/DL (ref 8.6–10.5)
CHLORIDE SERPL-SCNC: 107 MMOL/L (ref 98–107)
CO2 SERPL-SCNC: 24 MMOL/L (ref 22–29)
CREAT SERPL-MCNC: 2.77 MG/DL (ref 0.57–1)
DEPRECATED RDW RBC AUTO: 48.7 FL (ref 37–54)
DIGOXIN SERPL-MCNC: 0.8 NG/ML (ref 0.6–1.2)
EGFRCR SERPLBLD CKD-EPI 2021: 15.9 ML/MIN/1.73
EOSINOPHIL # BLD AUTO: 0.08 10*3/MM3 (ref 0–0.4)
EOSINOPHIL NFR BLD AUTO: 2.1 % (ref 0.3–6.2)
ERYTHROCYTE [DISTWIDTH] IN BLOOD BY AUTOMATED COUNT: 14.5 % (ref 12.3–15.4)
GLOBULIN UR ELPH-MCNC: 2.8 GM/DL
GLUCOSE BLDC GLUCOMTR-MCNC: 198 MG/DL (ref 70–130)
GLUCOSE BLDC GLUCOMTR-MCNC: 244 MG/DL (ref 70–130)
GLUCOSE BLDC GLUCOMTR-MCNC: 306 MG/DL (ref 70–130)
GLUCOSE BLDC GLUCOMTR-MCNC: 90 MG/DL (ref 70–130)
GLUCOSE SERPL-MCNC: 70 MG/DL (ref 65–99)
HCT VFR BLD AUTO: 24.8 % (ref 34–46.6)
HGB BLD-MCNC: 7.8 G/DL (ref 12–15.9)
IMM GRANULOCYTES # BLD AUTO: 0.02 10*3/MM3 (ref 0–0.05)
IMM GRANULOCYTES NFR BLD AUTO: 0.5 % (ref 0–0.5)
LYMPHOCYTES # BLD AUTO: 0.78 10*3/MM3 (ref 0.7–3.1)
LYMPHOCYTES NFR BLD AUTO: 20.7 % (ref 19.6–45.3)
MCH RBC QN AUTO: 29.3 PG (ref 26.6–33)
MCHC RBC AUTO-ENTMCNC: 31.5 G/DL (ref 31.5–35.7)
MCV RBC AUTO: 93.2 FL (ref 79–97)
MONOCYTES # BLD AUTO: 0.34 10*3/MM3 (ref 0.1–0.9)
MONOCYTES NFR BLD AUTO: 9 % (ref 5–12)
NEUTROPHILS NFR BLD AUTO: 2.53 10*3/MM3 (ref 1.7–7)
NEUTROPHILS NFR BLD AUTO: 67.4 % (ref 42.7–76)
NRBC BLD AUTO-RTO: 0 /100 WBC (ref 0–0.2)
PLATELET # BLD AUTO: 120 10*3/MM3 (ref 140–450)
PMV BLD AUTO: 10.7 FL (ref 6–12)
POTASSIUM SERPL-SCNC: 4.8 MMOL/L (ref 3.5–5.2)
PROT SERPL-MCNC: 6.3 G/DL (ref 6–8.5)
RBC # BLD AUTO: 2.66 10*6/MM3 (ref 3.77–5.28)
SODIUM SERPL-SCNC: 140 MMOL/L (ref 136–145)
WBC NRBC COR # BLD AUTO: 3.76 10*3/MM3 (ref 3.4–10.8)

## 2024-05-05 PROCEDURE — 80053 COMPREHEN METABOLIC PANEL: CPT | Performed by: NURSE PRACTITIONER

## 2024-05-05 PROCEDURE — 94640 AIRWAY INHALATION TREATMENT: CPT

## 2024-05-05 PROCEDURE — 94799 UNLISTED PULMONARY SVC/PX: CPT

## 2024-05-05 PROCEDURE — 82948 REAGENT STRIP/BLOOD GLUCOSE: CPT

## 2024-05-05 PROCEDURE — 94664 DEMO&/EVAL PT USE INHALER: CPT

## 2024-05-05 PROCEDURE — 80162 ASSAY OF DIGOXIN TOTAL: CPT | Performed by: NURSE PRACTITIONER

## 2024-05-05 PROCEDURE — 85025 COMPLETE CBC W/AUTO DIFF WBC: CPT | Performed by: NURSE PRACTITIONER

## 2024-05-05 PROCEDURE — 63710000001 INSULIN REGULAR HUMAN PER 5 UNITS: Performed by: INTERNAL MEDICINE

## 2024-05-05 RX ADMIN — HYDRALAZINE HYDROCHLORIDE 100 MG: 50 TABLET ORAL at 16:47

## 2024-05-05 RX ADMIN — HYDRALAZINE HYDROCHLORIDE 100 MG: 50 TABLET ORAL at 20:42

## 2024-05-05 RX ADMIN — LOSARTAN POTASSIUM 100 MG: 50 TABLET, FILM COATED ORAL at 08:53

## 2024-05-05 RX ADMIN — IPRATROPIUM BROMIDE AND ALBUTEROL SULFATE 3 ML: .5; 3 SOLUTION RESPIRATORY (INHALATION) at 18:43

## 2024-05-05 RX ADMIN — NIFEDIPINE 90 MG: 60 TABLET, FILM COATED, EXTENDED RELEASE ORAL at 08:53

## 2024-05-05 RX ADMIN — OXYCODONE HYDROCHLORIDE AND ACETAMINOPHEN 500 MG: 500 TABLET ORAL at 08:53

## 2024-05-05 RX ADMIN — BUMETANIDE 1 MG: 1 TABLET ORAL at 08:53

## 2024-05-05 RX ADMIN — EMPAGLIFLOZIN 10 MG: 10 TABLET, FILM COATED ORAL at 08:53

## 2024-05-05 RX ADMIN — INSULIN HUMAN 2 UNITS: 100 INJECTION, SOLUTION PARENTERAL at 12:27

## 2024-05-05 RX ADMIN — BENZONATATE 100 MG: 100 CAPSULE ORAL at 18:17

## 2024-05-05 RX ADMIN — SODIUM BICARBONATE 650 MG: 650 TABLET ORAL at 08:53

## 2024-05-05 RX ADMIN — INSULIN HUMAN 3 UNITS: 100 INJECTION, SOLUTION PARENTERAL at 17:24

## 2024-05-05 RX ADMIN — INSULIN HUMAN 5 UNITS: 100 INJECTION, SOLUTION PARENTERAL at 20:42

## 2024-05-05 RX ADMIN — CARVEDILOL 6.25 MG: 6.25 TABLET, FILM COATED ORAL at 08:53

## 2024-05-05 RX ADMIN — Medication 1000 UNITS: at 08:53

## 2024-05-05 RX ADMIN — IPRATROPIUM BROMIDE AND ALBUTEROL SULFATE 3 ML: .5; 3 SOLUTION RESPIRATORY (INHALATION) at 14:39

## 2024-05-05 RX ADMIN — ATORVASTATIN CALCIUM 20 MG: 10 TABLET, FILM COATED ORAL at 20:42

## 2024-05-05 RX ADMIN — HYDRALAZINE HYDROCHLORIDE 100 MG: 50 TABLET ORAL at 08:53

## 2024-05-05 RX ADMIN — APIXABAN 2.5 MG: 2.5 TABLET, FILM COATED ORAL at 08:53

## 2024-05-05 RX ADMIN — GLIPIZIDE 2.5 MG: 5 TABLET ORAL at 17:25

## 2024-05-05 RX ADMIN — GLIPIZIDE 2.5 MG: 5 TABLET ORAL at 08:53

## 2024-05-05 RX ADMIN — APIXABAN 2.5 MG: 2.5 TABLET, FILM COATED ORAL at 20:42

## 2024-05-05 RX ADMIN — PANTOPRAZOLE SODIUM 40 MG: 40 TABLET, DELAYED RELEASE ORAL at 06:16

## 2024-05-05 RX ADMIN — CARVEDILOL 6.25 MG: 6.25 TABLET, FILM COATED ORAL at 17:25

## 2024-05-05 RX ADMIN — FERROUS SULFATE TAB 325 MG (65 MG ELEMENTAL FE) 325 MG: 325 (65 FE) TAB at 08:53

## 2024-05-05 NOTE — CASE MANAGEMENT/SOCIAL WORK
Continued Stay Note   [place]     Patient Name: Libia Perales  MRN: [id number]  Today's Date: 5/5/2024    Admit Date: 5/1/2024        Discharge Plan       Row Name 05/05/24 1006       Plan    Final Discharge Disposition Code 06 - home with home health care    Final Note Pt is being dcd home today with HH. HH orders placed in basket and message sent to [facility] intake to see if they can accept. Order for rolling walker received. SW has called [facility] and is waiting on a return call from  regarding delivery time.                   Discharge Codes    No documentation.                 Expected Discharge Date and Time       Expected Discharge Date Expected Discharge Time    May 5, 2024               SHANNON Cuevas

## 2024-05-05 NOTE — CASE MANAGEMENT/SOCIAL WORK
Continued Stay Note  [facility]     Patient Name: Libia Perales  MRN: [id number]  Today's Date: 5/5/2024    Admit Date: 5/1/2024        Discharge Plan       Row Name 05/05/24 1137       Plan    Final Note Pt son now [facility] of home with HH. HH orders cancelled. No one available in admissions today at [facility]. SW will follow up with admissions in AM to see if bed is available.      Row Name 05/05/24 1006       Plan    Final Discharge Disposition Code 06 - home with home health care    Final Note Pt is being dcd home today with HH. HH orders placed in basket and message sent to [facility] intake to see if they can accept. Order for rolling walker received. SW has called [facility] and is waiting on a return call from  regarding delivery time.                   Discharge Codes    No documentation.                 Expected Discharge Date and Time       Expected Discharge Date Expected Discharge Time    May 5, 2024               SHANNON Cuevas

## 2024-05-05 NOTE — PLAN OF CARE
Goal Outcome Evaluation:  Plan of Care Reviewed With: patient        Progress: improving  Outcome Evaluation: Patient A/O x 4. VSS. On room air. Up with standby assist and walker to BR. No PRNs requested. Slep well last night. No new concerns. Safety maintained.

## 2024-05-05 NOTE — PLAN OF CARE
Goal Outcome Evaluation:  Plan of Care Reviewed With: patient        Progress: no change     Pt alert and oriented x4. VSS. No c/o pain this shift. RILEY. PPP. Eliquis for VTE. , satting at 99% on room air. Tolerating regular diet. Skin intact. Voiding via bathroom. Up x1 with walker. Last BM 5/4. BS monitored, insulin given per order. Bed alarm set. Call light within reach. Safety maintained. Plan of care ongoing. No changes this shift.

## 2024-05-05 NOTE — PROGRESS NOTES
"    [facility] Medicine Services  INPATIENT PROGRESS NOTE    Length of Stay: 4  Date of Admission: 5/1/2024  Primary Care Physician: Zachariah Huffman MD    Subjective   Chief Complaint: Mental status changes    HPI   Pepper Perales presented to [facility] emergency room 5/1/2024 with status changes and confusion.  She has a history of stage IV chronic kidney disease, chronic diastolic heart failure, severe pulmonary hypertension and diabetes.  Patient had recent hospitalization 3/13/2024 - 3/15/2024 for acute on chronic heart failure with preserved ejection fraction, treated with diuresis with good results.  She followed up with her primary care provider.  She was in her usual state of health the day of admission approximately 8- 8:30 the morning she talked to her daughter on the phone.  Son normally calls her around 2 PM but he was in [place] when she called him.  Son could tell by the conversation that \"something was not right\".  Patient tried to call her daughter who did not answer the phone.  Due to some confusion EMS called and patient was brought to ED.  Creatinine 3.2 with previous creatinine 2.49 on 3/15/2024, dig level 1.5, CRP 8.83, urinalysis 3-5 WBC, 1+ bacteria, negative nitrate.  WBC 4.76.  CT scan of the abdomen noted small to moderate volume ascites with mild mesenteric edema and body wall anasarca.  No obvious evidence of cirrhosis.  Cardiomegaly with trace right pleural effusion.  No CT evidence to explain hematuria.  CT scan of the head no acute intracranial abnormality.  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  Chest x-ray no acute cardiopulmonary abnormality.  Lactated Ringer's fluid bolus, Rocephin given in ER.    Today  Sitting up in bed.  She tells me she feels much better today.  Not requiring oxygen.  She reported having a good night.  Son in room.  Plans this morning were for discharge to home with home health.  However, as the day has progressed " son would like patient to now go to skilled nursing facility with [facility] as first choice.  Urine culture no growth and patient completed 3 days of Rocephin.  Creatinine remained stable at 2.77.  Discussed discontinuation of digoxin with patient and son are both agreeable.    Review of Systems   Constitutional:  Positive for activity change and fatigue.   HENT:  Negative for congestion and trouble swallowing.    Eyes:  Negative for photophobia and visual disturbance.   Respiratory:  Negative for cough, shortness of breath and wheezing.    Cardiovascular:  Negative for chest pain, palpitations and leg swelling.   Gastrointestinal:  Negative for constipation, diarrhea, nausea and vomiting.   Endocrine: Negative for cold intolerance, heat intolerance and polyuria.   Genitourinary:  Positive for frequency. Negative for dysuria and urgency.   Musculoskeletal:  Negative for gait problem.   Skin:  Negative for color change, pallor, rash and wound.   Allergic/Immunologic: Negative for immunocompromised state.   Neurological:  Positive for weakness. Negative for light-headedness.   Hematological:  Negative for adenopathy. Does not bruise/bleed easily.   Psychiatric/Behavioral:  Negative for agitation, behavioral problems and confusion.      All pertinent negatives and positives are as above. All other systems have been reviewed and are negative unless otherwise stated.     Objective    Temp:  [97.5 °F (36.4 °C)-98.6 °F (37 °C)] 98.1 °F (36.7 °C)  Heart Rate:  [63-79] 77  Resp:  [16-18] 18  BP: (121-175)/(41-82) 175/82    Physical Exam  Vitals and nursing note reviewed.   Constitutional:       Comments: Sitting up on side of bed.  No oxygen in use.  Son in room.   HENT:      Head: Normocephalic and atraumatic.      Nose: No congestion.      Mouth/Throat:      Pharynx: Oropharynx is clear. No oropharyngeal exudate or posterior oropharyngeal erythema.   Eyes:      Extraocular Movements: Extraocular movements intact.       Pupils: Pupils are equal, round, and reactive to light.   Cardiovascular:      Rate and Rhythm: Normal rate. Rhythm irregular.      Heart sounds: Murmur heard.   Pulmonary:      Breath sounds: No wheezing, rhonchi or rales.      Comments: No oxygen in use.  Saturation 97% on room air.  Abdominal:      Palpations: Abdomen is soft.      Tenderness: There is no abdominal tenderness.   Genitourinary:     Comments: Voiding.  Musculoskeletal:         General: No swelling or tenderness.      Cervical back: Normal range of motion and neck supple.   Skin:     General: Skin is warm and dry.   Neurological:      General: No focal deficit present.      Mental Status: She is alert and oriented to person, place, and time.      Comments: Moves extremities equally, answers questions appropriately.   Psychiatric:         Mood and Affect: Mood normal.         Behavior: Behavior normal.         Thought Content: Thought content normal.         Judgment: Judgment normal.           Results Review:  I have reviewed the labs, radiology results, and diagnostic studies.    Laboratory Data:      Results from last 7 days   Lab Units 05/05/24  0601 05/04/24  0605 05/03/24 0524 05/01/24  1841   WBC 10*3/mm3 3.76 4.35 3.95 4.76   HEMOGLOBIN g/dL 7.8* 8.4* 7.9* 10.4*   HEMATOCRIT % 24.8* 26.9* 25.9* 33.0*   PLATELETS 10*3/mm3 120* 133* 134* 183        Results from last 7 days   Lab Units 05/05/24  0600 05/04/24  0605 05/03/24 0524 05/01/24  2332 05/01/24  2039   SODIUM mmol/L 140 139 140 144 138   POTASSIUM mmol/L 4.8 5.2 4.9 4.0 4.8   CHLORIDE mmol/L 107 106 107 109* 103   CO2 mmol/L 24.0 22.0 23.0 18.0* 23.0   BUN mg/dL 56* 59* 63* 65* 73*   CREATININE mg/dL 2.77* 2.69* 2.80* 2.75* 3.20*   GLUCOSE mg/dL 70 178* 154* 101* 134*   CALCIUM mg/dL 9.1 9.0 9.0 8.0* 9.6   ALT (SGPT) U/L 9 9 8  --  9         Culture Data:      Microbiology Results (last 10 days)       Procedure Component Value - Date/Time    Urine Culture - Urine, Urine, Clean Catch  [[id number]]  (Normal) Collected: 05/02/24 0536    Lab Status: Final result Specimen: Urine, Clean Catch Updated: 05/03/24 1055     Urine Culture No growth    Blood Culture With PAUL - Blood, Arm, Right [[id number]]  (Normal) Collected: 05/01/24 2218    Lab Status: Preliminary result Specimen: Blood from Arm, Right Updated: 05/04/24 2245     Blood Culture No growth at 3 days    Blood Culture With PAUL - Blood, Hand, Right [[id number]]  (Normal) Collected: 05/01/24 2218    Lab Status: Preliminary result Specimen: Blood from Hand, Right Updated: 05/04/24 2245     Blood Culture No growth at 3 days    Respiratory Panel PCR w/COVID-19(SARS-CoV-2) JIM/EMERALD/VELMA/PAD/COR/SHAYNA In-House, NP Swab in UTM/VTM, 2 HR TAT - Swab, Nasopharynx [[id number]]  (Normal) Collected: 05/01/24 1829    Lab Status: Final result Specimen: Swab from Nasopharynx Updated: 05/01/24 1956     ADENOVIRUS, PCR Not Detected     Coronavirus 229E Not Detected     Coronavirus HKU1 Not Detected     Coronavirus NL63 Not Detected     Coronavirus OC43 Not Detected     COVID19 Not Detected     Human Metapneumovirus Not Detected     Human Rhinovirus/Enterovirus Not Detected     Influenza A PCR Not Detected     Influenza B PCR Not Detected     Parainfluenza Virus 1 Not Detected     Parainfluenza Virus 2 Not Detected     Parainfluenza Virus 3 Not Detected     Parainfluenza Virus 4 Not Detected     RSV, PCR Not Detected     Bordetella pertussis pcr Not Detected     Bordetella parapertussis PCR Not Detected     Chlamydophila pneumoniae PCR Not Detected     Mycoplasma pneumo by PCR Not Detected    Narrative:      In the setting of a positive respiratory panel with a viral infection PLUS a negative procalcitonin without other underlying concern for bacterial infection, consider observing off antibiotics or discontinuation of antibiotics and continue supportive care. If the respiratory panel is positive for atypical bacterial infection (Bordetella pertussis, Chlamydophila  pneumoniae, or Mycoplasma pneumoniae), consider antibiotic de-escalation to target atypical bacterial infection.              Radiology Data:   Imaging Results (Last 72 Hours)       Procedure Component Value Units Date/Time    XR Chest PA & Lateral [[id number]] Collected: 05/03/24 1158     Updated: 05/03/24 1201    Narrative:      EXAM: XR CHEST PA AND LATERAL-      DATE: 5/3/2024 10:56 AM     HISTORY: Shortness of breath; N17.9-Acute kidney failure, unspecified;  N39.0-Urinary tract infection, site not specified; R31.9-Hematuria,  unspecified; R41.82-Altered mental status, unspecified;  R13.10-Dysphagia, unspecified; Z74.09-Other reduced mobility       COMPARISON: 5/1/2024.     TECHNIQUE:  Frontal and lateral views of the chest submitted.     FINDINGS:    The lungs are grossly clear. There is enlargement of the cardiac  silhouette. No effusion or pneumothorax is seen.          Impression:      1. No active disease seen in the chest.     This report was signed and finalized on 5/3/2024 11:58 AM by Mayur Peterson.       US Renal Bilateral [[id number]] Collected: 05/03/24 1132     Updated: 05/03/24 1136    Narrative:      EXAMINATION: US RENAL BILATERAL-     5/3/2024 10:01 AM     HISTORY: Elevated creatinine; N17.9-Acute kidney failure, unspecified;  N39.0-Urinary tract infection, site not specified; R31.9-Hematuria,  unspecified; R41.82-Altered mental status, unspecified;  R13.10-Dysphagia, unspecified; Z74.09-Other reduced mobility     Grayscale and color-flow renal ultrasound.     Normal size and position of both kidneys.  Normal cortical thickness and normal cortical echogenicity.     No hydronephrosis or shadowing stone.     The right kidney measures 99 x 45 x 39 mm.  7 x 5 cm upper pole cyst is unchanged compared with a recent CT exam     The left kidney measures 95 x 48 x 41 mm.     Poorly visualized urinary bladder due to bowel loops and free pelvic  fluid.       Impression:      Impression:  1. No acute  kidney abnormality is seen.           This report was signed and finalized on 5/3/2024 11:33 AM by Dr. João Cardona MD.               Intake/Output  No intake or output data in the 24 hours ending 05/05/24 1129      Scheduled Meds  apixaban, 2.5 mg, Oral, BID  vitamin C, 500 mg, Oral, Daily  atorvastatin, 20 mg, Oral, Nightly  bumetanide, 1 mg, Oral, Daily  carvedilol, 6.25 mg, Oral, BID With Meals  cholecalciferol, 1,000 Units, Oral, Daily  [Held by provider] digoxin, 125 mcg, Oral, Every Other Day  empagliflozin, 10 mg, Oral, Daily  ferrous sulfate, 325 mg, Oral, Daily With Breakfast  glipizide, 2.5 mg, Oral, BID AC  hydrALAZINE, 100 mg, Oral, TID  insulin regular, 2-7 Units, Subcutaneous, 4x Daily With Meals & Nightly  losartan, 100 mg, Oral, Daily  NIFEdipine XL, 90 mg, Oral, Daily  pantoprazole, 40 mg, Oral, Q AM  sodium bicarbonate, 650 mg, Oral, Daily  sodium chloride, 10 mL, Intravenous, Q12H  [Held by provider] spironolactone, 25 mg, Oral, Daily        I have reviewed the patient current medications.     Assessment/Plan     Active Hospital Problems    Diagnosis     **Acute kidney injury superimposed on stage 4 chronic kidney disease     Moderate malnutrition     Metabolic encephalopathy suspect due to elevated creatinine and abnormal urinalysis     Digoxin toxicity, accidental or unintentional, initial encounter     Altered mental status     Acute cystitis with hematuria     Elevated troponin not due to acute coronary syndrome     Severe pulmonary hypertension     Chronic anticoagulation     Chronic diastolic (congestive) heart failure     Benign essential HTN     Persistent atrial fibrillation     Type 2 diabetes mellitus with hyperglycemia, without long-term current use of insulin        Treatment Plan:    1.  Metabolic encephalopathy suspect due to elevated creatinine and elevated digoxin level.  Presented with confusion and found to have elevated creatinine and abnormal urinalysis.  Hydrated with IV  fluids.  Encephalopathy resolved and patient awake and alert.    2.  Acute kidney injury superimposed on CKD stage IV.  Presented with creatinine 3.2.  Previous creatinine 2.49 on 3/15/2024.  IV fluids given and decreased IV fluids to 50 mL/h.  Creatinine 2.77 today.  Repeat CMP in AM.  Continue sodium bicarbonate.  Renal ultrasound 5/3 no acute kidney abnormality.    3.  Abnormal urinalysis.  Urinalysis positive nitrates, 3-5 WBC. Completed 3 days of Rocephin.  Urine culture negative.  Patient did have mental status changes on admission that improved with IV fluids and antibiotics.    4.  Elevated digoxin level.  Digoxin level 1.4.  Hold digoxin.  Follow-up digoxin level 0.9 on 5/3 and 0.8 on 5/5.  Heart rate remains in the 60s.  Will not resume digoxin.  Discussed with patient and son and both agreeable.    5.  Primary hypertension. Blood pressure 175/82, 145/48.  Continue Coreg, losartan, hydralazine, Procardia.    6.  Persistent atrial fibrillation.  Continue Coreg, Eliquis.  Hold digoxin.      7.  Chronic diastolic heart failure.  No exacerbation of symptoms.  Continue beta-blocker, ARB, spironolactone, statin.  Bumex restarted 5/3. Chest x-ray 5/3 no active disease.    8.  Diabetes mellitus type 2 without insulin.  Hemoglobin A1c 5.8 on 3/21/2024.  Accu-Cheks with sliding scale insulin coverage.  Glucoses 90, 70, 150.  Continue glipizide.    9.  Physical therapy Occupational Therapy consulted.  Recommends physical therapy at discharge.  Son considering SNF wants referral to [facility] first choice, [facility] second choice.  Earlier today patient and son desired home with home health but now request [facility] at discharge.   notified.    10.  Nutrition consulted.      Medical Decision Making  Number and Complexity of problems: 8  Metabolic encephalopathy due to elevated creatinine and abnormal urinalysis: Acute, high complexity, resolved  Acute kidney injury superimposed on CKD stage IV: Acute, high  complexity poses threat to life and bodily function, improving  Acute cystitis with hematuria: Acute, moderate complexity, stable  Elevated digoxin level: Acute, moderate complexity, resolved  Primary hypertension: Chronic, moderate complexity, improved  Persistent atrial fibrillation: Chronic, moderate complexity, stable  Chronic diastolic heart failure: Chronic, moderate complexity, stable  Diabetes mellitus type 2 without insulin: Chronic, moderate complexity, stable    Differential Diagnosis: None    Conditions and Status        Condition is improving.     [facility] Data  External documents reviewed: Epic.  Reviewed hospitalization 3/13/2024.  Reviewed PCP Dr. Huffman office visit 3/21/2024  Cardiac tracing (EKG, telemetry) interpretation: None  Radiology interpretation: Reviewed radiology interpretation CT abdomen pelvis, CT scan of the head  Labs reviewed:   CMP 5/5/2024.  Repeat CMP in AM.  CBC 5/5/24.  Repeat CBC in AM.  Glucose 90, 70, 150    Any tests that were considered but not ordered: None     Decision rules/scores evaluated (example [id number], [place], etc): None     Discussed with: Dr. Cisneros, patient, and son Davian     Care Planning  Shared decision making: Dr. Cisneros, patient, and son Wagner.  Patient and son agreed to discontinuing digoxin, ambulate in hallway, physical therapy, request SNF  Code status and discussions: No CPR, no intubation, no cardioversion, no dialysis  Patient surrogate decision maker is her son, Davian    Disposition  Social Determinants of Health that impact treatment or disposition: None  I expect the patient to be discharged to [facility] when bed offered and insurance approves.    Electronically signed by YOBANY Rangel, 05/05/24, 11:29 CDT.    The above documentation resulted from a face-to-face encounter by me Mary HAYWOOD, [facility].

## 2024-05-06 VITALS
BODY MASS INDEX: 26.45 KG/M2 | TEMPERATURE: 97.7 F | HEIGHT: 62 IN | OXYGEN SATURATION: 99 % | DIASTOLIC BLOOD PRESSURE: 62 MMHG | SYSTOLIC BLOOD PRESSURE: 161 MMHG | WEIGHT: 143.74 LBS | RESPIRATION RATE: 16 BRPM | HEART RATE: 73 BPM

## 2024-05-06 PROBLEM — I50.32 CHRONIC HEART FAILURE WITH PRESERVED EJECTION FRACTION (HFPEF): Status: ACTIVE | Noted: 2024-05-06

## 2024-05-06 LAB
ALBUMIN SERPL-MCNC: 3.5 G/DL (ref 3.5–5.2)
ALBUMIN/GLOB SERPL: 1.3 G/DL
ALP SERPL-CCNC: 76 U/L (ref 39–117)
ALT SERPL W P-5'-P-CCNC: 9 U/L (ref 1–33)
ANION GAP SERPL CALCULATED.3IONS-SCNC: 9 MMOL/L (ref 5–15)
AST SERPL-CCNC: 21 U/L (ref 1–32)
BACTERIA SPEC AEROBE CULT: NORMAL
BACTERIA SPEC AEROBE CULT: NORMAL
BASOPHILS # BLD AUTO: 0.01 10*3/MM3 (ref 0–0.2)
BASOPHILS NFR BLD AUTO: 0.3 % (ref 0–1.5)
BILIRUB SERPL-MCNC: 0.4 MG/DL (ref 0–1.2)
BUN SERPL-MCNC: 60 MG/DL (ref 8–23)
BUN/CREAT SERPL: 23.3 (ref 7–25)
CALCIUM SPEC-SCNC: 9 MG/DL (ref 8.6–10.5)
CHLORIDE SERPL-SCNC: 105 MMOL/L (ref 98–107)
CO2 SERPL-SCNC: 24 MMOL/L (ref 22–29)
CREAT SERPL-MCNC: 2.58 MG/DL (ref 0.57–1)
DEPRECATED RDW RBC AUTO: 49.8 FL (ref 37–54)
EGFRCR SERPLBLD CKD-EPI 2021: 17.3 ML/MIN/1.73
EOSINOPHIL # BLD AUTO: 0.04 10*3/MM3 (ref 0–0.4)
EOSINOPHIL NFR BLD AUTO: 1.1 % (ref 0.3–6.2)
ERYTHROCYTE [DISTWIDTH] IN BLOOD BY AUTOMATED COUNT: 14.6 % (ref 12.3–15.4)
GLOBULIN UR ELPH-MCNC: 2.6 GM/DL
GLUCOSE BLDC GLUCOMTR-MCNC: 167 MG/DL (ref 70–130)
GLUCOSE BLDC GLUCOMTR-MCNC: 359 MG/DL (ref 70–130)
GLUCOSE SERPL-MCNC: 175 MG/DL (ref 65–99)
HCT VFR BLD AUTO: 24.4 % (ref 34–46.6)
HEMOCCULT STL QL: NEGATIVE
HGB BLD-MCNC: 7.7 G/DL (ref 12–15.9)
IMM GRANULOCYTES # BLD AUTO: 0.02 10*3/MM3 (ref 0–0.05)
IMM GRANULOCYTES NFR BLD AUTO: 0.5 % (ref 0–0.5)
IRON 24H UR-MRATE: 48 MCG/DL (ref 37–145)
IRON SATN MFR SERPL: 12 % (ref 20–50)
LYMPHOCYTES # BLD AUTO: 0.57 10*3/MM3 (ref 0.7–3.1)
LYMPHOCYTES NFR BLD AUTO: 15.5 % (ref 19.6–45.3)
MCH RBC QN AUTO: 29.6 PG (ref 26.6–33)
MCHC RBC AUTO-ENTMCNC: 31.6 G/DL (ref 31.5–35.7)
MCV RBC AUTO: 93.8 FL (ref 79–97)
MONOCYTES # BLD AUTO: 0.38 10*3/MM3 (ref 0.1–0.9)
MONOCYTES NFR BLD AUTO: 10.3 % (ref 5–12)
NEUTROPHILS NFR BLD AUTO: 2.66 10*3/MM3 (ref 1.7–7)
NEUTROPHILS NFR BLD AUTO: 72.3 % (ref 42.7–76)
NRBC BLD AUTO-RTO: 0 /100 WBC (ref 0–0.2)
PLATELET # BLD AUTO: 110 10*3/MM3 (ref 140–450)
PMV BLD AUTO: 11.1 FL (ref 6–12)
POTASSIUM SERPL-SCNC: 5.1 MMOL/L (ref 3.5–5.2)
PROT SERPL-MCNC: 6.1 G/DL (ref 6–8.5)
RBC # BLD AUTO: 2.6 10*6/MM3 (ref 3.77–5.28)
SODIUM SERPL-SCNC: 138 MMOL/L (ref 136–145)
TIBC SERPL-MCNC: 408 MCG/DL (ref 298–536)
TRANSFERRIN SERPL-MCNC: 274 MG/DL (ref 200–360)
WBC NRBC COR # BLD AUTO: 3.68 10*3/MM3 (ref 3.4–10.8)

## 2024-05-06 PROCEDURE — 97110 THERAPEUTIC EXERCISES: CPT

## 2024-05-06 PROCEDURE — 80053 COMPREHEN METABOLIC PANEL: CPT | Performed by: NURSE PRACTITIONER

## 2024-05-06 PROCEDURE — 94799 UNLISTED PULMONARY SVC/PX: CPT

## 2024-05-06 PROCEDURE — 82948 REAGENT STRIP/BLOOD GLUCOSE: CPT

## 2024-05-06 PROCEDURE — 82272 OCCULT BLD FECES 1-3 TESTS: CPT | Performed by: NURSE PRACTITIONER

## 2024-05-06 PROCEDURE — 83540 ASSAY OF IRON: CPT | Performed by: NURSE PRACTITIONER

## 2024-05-06 PROCEDURE — 97116 GAIT TRAINING THERAPY: CPT

## 2024-05-06 PROCEDURE — 84466 ASSAY OF TRANSFERRIN: CPT | Performed by: NURSE PRACTITIONER

## 2024-05-06 PROCEDURE — 97535 SELF CARE MNGMENT TRAINING: CPT

## 2024-05-06 PROCEDURE — 85025 COMPLETE CBC W/AUTO DIFF WBC: CPT | Performed by: NURSE PRACTITIONER

## 2024-05-06 PROCEDURE — 63710000001 INSULIN REGULAR HUMAN PER 5 UNITS: Performed by: INTERNAL MEDICINE

## 2024-05-06 PROCEDURE — 36415 COLL VENOUS BLD VENIPUNCTURE: CPT | Performed by: NURSE PRACTITIONER

## 2024-05-06 RX ORDER — ALBUTEROL SULFATE 2.5 MG/3ML
2.5 SOLUTION RESPIRATORY (INHALATION)
Status: DISCONTINUED | OUTPATIENT
Start: 2024-05-06 | End: 2024-05-06 | Stop reason: HOSPADM

## 2024-05-06 RX ORDER — SPIRONOLACTONE 25 MG/1
12.5 TABLET ORAL DAILY
Start: 2024-05-06

## 2024-05-06 RX ORDER — ALBUTEROL SULFATE 2.5 MG/3ML
2.5 SOLUTION RESPIRATORY (INHALATION)
Start: 2024-05-06

## 2024-05-06 RX ADMIN — SODIUM BICARBONATE 650 MG: 650 TABLET ORAL at 08:39

## 2024-05-06 RX ADMIN — BUMETANIDE 1 MG: 1 TABLET ORAL at 08:38

## 2024-05-06 RX ADMIN — APIXABAN 2.5 MG: 2.5 TABLET, FILM COATED ORAL at 08:37

## 2024-05-06 RX ADMIN — OXYCODONE HYDROCHLORIDE AND ACETAMINOPHEN 500 MG: 500 TABLET ORAL at 08:39

## 2024-05-06 RX ADMIN — GLIPIZIDE 2.5 MG: 5 TABLET ORAL at 08:38

## 2024-05-06 RX ADMIN — GUAIFENESIN AND DEXTROMETHORPHAN 5 ML: 100; 10 SYRUP ORAL at 01:44

## 2024-05-06 RX ADMIN — LOSARTAN POTASSIUM 100 MG: 50 TABLET, FILM COATED ORAL at 08:38

## 2024-05-06 RX ADMIN — BISACODYL 5 MG: 5 TABLET, COATED ORAL at 12:01

## 2024-05-06 RX ADMIN — HYDRALAZINE HYDROCHLORIDE 100 MG: 50 TABLET ORAL at 08:38

## 2024-05-06 RX ADMIN — NIFEDIPINE 90 MG: 60 TABLET, FILM COATED, EXTENDED RELEASE ORAL at 08:37

## 2024-05-06 RX ADMIN — Medication 1000 UNITS: at 08:38

## 2024-05-06 RX ADMIN — INSULIN HUMAN 6 UNITS: 100 INJECTION, SOLUTION PARENTERAL at 12:40

## 2024-05-06 RX ADMIN — PANTOPRAZOLE SODIUM 40 MG: 40 TABLET, DELAYED RELEASE ORAL at 05:21

## 2024-05-06 RX ADMIN — CARVEDILOL 6.25 MG: 6.25 TABLET, FILM COATED ORAL at 08:39

## 2024-05-06 RX ADMIN — EMPAGLIFLOZIN 10 MG: 10 TABLET, FILM COATED ORAL at 08:37

## 2024-05-06 RX ADMIN — ALBUTEROL SULFATE 2.5 MG: 2.5 SOLUTION RESPIRATORY (INHALATION) at 10:28

## 2024-05-06 RX ADMIN — FERROUS SULFATE TAB 325 MG (65 MG ELEMENTAL FE) 325 MG: 325 (65 FE) TAB at 08:39

## 2024-05-06 RX ADMIN — DOCUSATE SODIUM AND SENNOSIDES 2 TABLET: 8.6; 5 TABLET, FILM COATED ORAL at 12:01

## 2024-05-06 NOTE — THERAPY DISCHARGE NOTE
Acute Care - Occupational Therapy Discharge Summary  [facility]     Patient Name: Libia Perales  : 1934  MRN: [id number]    Today's Date: 2024       Date of Referral to OT: 24         Admit Date: 2024        OT Recommendation and Plan    Visit Dx:    ICD-10-CM ICD-9-CM   1. AMARIS (acute kidney injury)  N17.9 584.9   2. Urinary tract infection with hematuria, site unspecified  N39.0 599.0    R31.9 599.70   3. Altered mental status, unspecified altered mental status type  R41.82 780.97   4. Dysphagia, unspecified type  R13.10 787.20   5. Impaired mobility [Z74.09]  Z74.09 799.89   6. High calcium levels  E83.52 275.42   7. Primary osteoarthritis of both knees  M17.0 715.16   8. Metabolic encephalopathy suspect due to elevated creatinine and abnormal urinalysis  G93.41 348.31   9. Digoxin toxicity, accidental or unintentional, initial encounter  T46.0X1A 972.1     E858.3   10. Chronic anticoagulation  Z79.01 V58.61   11. Chronic fatigue  R53.82 780.79   12. Stage 4 chronic kidney disease  N18.4 585.4          Time Calculation- OT       Row Name 24 1008 24 0903          Time Calculation- OT    OT Start Time -- 0825  -EC     OT Stop Time -- 0903  -EC     OT Time Calculation (min) -- 38 min  -EC     Total Timed Code Minutes- OT -- 38 minute(s)  -EC     OT Received On -- 24  -EC        Timed Charges    26521 - OT Therapeutic Exercise Minutes -- 10  -EC     73755 - Gait Training Minutes  15  -RYAN --     34152 - OT Self Care/Mgmt Minutes -- 28  -EC        Total Minutes    Timed Charges Total Minutes 15  -RYAN 38  -EC      Total Minutes 15  -RYAN 38  -EC               User Key  (r) = Recorded By, (t) = Taken By, (c) = Cosigned By      Initials Name Provider Type    RYAN Mat Stoner, PTA Physical Therapist Assistant    EC Lissette Salazar, OTR/L Occupational Therapist                       OT Rehab Goals       Row Name 24 1500             Transfer Goal 1 (OT)    Activity/Assistive  Device (Transfer Goal 1, OT) sit-to-stand/stand-to-sit;bed-to-chair/chair-to-bed;toilet  -EC      [place] Level/Cues Needed (Transfer Goal 1, OT) supervision required  -EC      Time Frame (Transfer Goal 1, OT) long term goal (LTG)  -EC      Progress/Outcome (Transfer Goal 1, OT) goal not met  -EC         Bathing Goal 1 (OT)    Activity/Device (Bathing Goal 1, OT) upper body bathing;lower body bathing  -EC      [place] Level/Cues Needed (Bathing Goal 1, OT) supervision required  -EC      Time Frame (Bathing Goal 1, OT) long term goal (LTG)  -EC      Progress/Outcomes (Bathing Goal 1, OT) goal not met  -EC         Toileting Goal 1 (OT)    Activity/Device (Toileting Goal 1, OT) toileting skills, all  -EC      [place] Level/Cues Needed (Toileting Goal 1, OT) supervision required  -EC      Time Frame (Toileting Goal 1, OT) long term goal (LTG)  -EC      Progress/Outcome (Toileting Goal 1, OT) goal not met  -EC                User Key  (r) = Recorded By, (t) = Taken By, (c) = Cosigned By      Initials Name Provider Type Discipline    EC Lissette Salazar, OTR/L Occupational Therapist OT                     Outcome Measures       Row Name 05/06/24 1008 05/06/24 0800          How much help from another person do you currently need...    Turning from your back to your side while in flat bed without using bedrails? 4  -RYAN --     Moving from lying on back to sitting on the side of a flat bed without bedrails? 4  -RYAN --     Moving to and from a bed to a chair (including a wheelchair)? 3  -RYAN --     Standing up from a chair using your arms (e.g., wheelchair, bedside chair)? 3  -RYAN --     Climbing 3-5 steps with a railing? 3  -RYAN --     To walk in hospital room? 3  -RYAN --     AM-PAC 6 Clicks Score (PT) 20  -RYAN --     Highest Level of Mobility Goal 6 --> Walk 10 steps or more  -RYAN --        How much help from another is currently needed...    Putting on and taking off regular lower body clothing? -- 3  -EC     Bathing  (including washing, rinsing, and drying) -- 3  -EC     Toileting (which includes using toilet bed pan or urinal) -- 3  -EC     Putting on and taking off regular upper body clothing -- 3  -EC     Taking care of personal grooming (such as brushing teeth) -- 3  -EC     Eating meals -- 4  -EC     AM-PAC 6 Clicks Score (OT) -- 19  -EC        Functional Assessment    Outcome Measure Options AM-PAC 6 Clicks Basic Mobility (PT)  -RYAN AM-PAC 6 Clicks Daily Activity (OT)  -EC               User Key  (r) = Recorded By, (t) = Taken By, (c) = Cosigned By      Initials Name Provider Type    RYAN Mat Stoner, PTA Physical Therapist Assistant    EC Lissette Salazar OTR/L Occupational Therapist                    Timed Therapy Charges  Total Units: 3      Suggested Charges  Total Units: 3      Procedure Name Documented Minutes Units Code    HC OT SELF CARE/MGMT/TRAIN EA 15 MIN 28 2   11091 (CPT®)      HC OT THER PROC EA 15 MIN 10 1   00642 (CPT®)                 Documented Minutes  Total Minutes: 38      Therapy Provided Minutes    72978 - OT Self Care/Mgmt Minutes 28    34925 - OT Therapeutic Exercise Minutes 10                    Therapy Charges for Today       Code Description Service Date Service Provider Modifiers Qty    [id number] HC OT THER PROC EA 15 MIN 5/6/2024 Lissette Salazar OTR/L GO 1    97920376051 HC OT SELF CARE/MGMT/TRAIN EA 15 MIN 5/6/2024 Lissette Salazar OTR/L GO 2            OT Discharge Summary  Anticipated Discharge Disposition (OT): skilled nursing facility  Reason for Discharge: Discharge from facility  Outcomes Achieved: Refer to plan of care for updates on goals achieved  Discharge Destination: SNF      KAYLIN Cheng/KLARISSA  5/6/2024

## 2024-05-06 NOTE — THERAPY TREATMENT NOTE
Acute Care - Physical Therapy Treatment Note  [facility]     Patient Name: Libia Perales  : 1934  MRN: [id number]  Today's Date: 2024      Visit Dx:     ICD-10-CM ICD-9-CM   1. AMARIS (acute kidney injury)  N17.9 584.9   2. Urinary tract infection with hematuria, site unspecified  N39.0 599.0    R31.9 599.70   3. Altered mental status, unspecified altered mental status type  R41.82 780.97   4. Dysphagia, unspecified type  R13.10 787.20   5. Impaired mobility [Z74.09]  Z74.09 799.89   6. High calcium levels  E83.52 275.42   7. Primary osteoarthritis of both knees  M17.0 715.16   8. Metabolic encephalopathy suspect due to elevated creatinine and abnormal urinalysis  G93.41 348.31   9. Digoxin toxicity, accidental or unintentional, initial encounter  T46.0X1A 972.1     E858.3   10. Chronic anticoagulation  Z79.01 V58.61   11. Chronic fatigue  R53.82 780.79   12. 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    Benign essential HTN    Persistent atrial fibrillation    Asthma    Fibrocystic breast disease    Gastro-esophageal reflux    High calcium levels    OA (osteoarthritis) of ankle    Right renal mass    Vertigo, benign paroxysmal    Chronic fatigue    Chronic cough    Allergic rhinitis    Stage 4 chronic kidney disease    Strain of left rhomboid muscle    Chronic anticoagulation    Chronic diastolic (congestive) heart failure    Severe pulmonary hypertension    Acute on chronic heart failure with preserved ejection fraction (HFpEF)    Acute kidney injury superimposed on stage 4 chronic kidney disease    Digoxin toxicity, accidental or unintentional, initial encounter    Altered mental status    Acute cystitis with hematuria    Elevated troponin not due  to acute coronary syndrome    Metabolic encephalopathy suspect due to elevated creatinine and abnormal urinalysis    Moderate malnutrition    Chronic heart failure with preserved ejection fraction (HFpEF)     Past Medical History:   Diagnosis Date    Atrial fibrillation     Diabetes mellitus     Difficulty walking Approximately 2 years    GERD (gastroesophageal reflux disease)     High cholesterol     Hypertension     OA (osteoarthritis)     Pneumonia     few years ago    Stage 4 chronic kidney disease 08/18/2023     Past Surgical History:   Procedure Laterality Date    BREAST BIOPSY Right     BREAST CYST ASPIRATION      HYSTERECTOMY      OOPHORECTOMY      TONSILLECTOMY       PT Assessment (Last 12 Hours)       PT Evaluation and Treatment       Row Name 05/06/24 1008          Physical Therapy Time and Intention    Subjective Information complains of;weakness  -RYAN     Document Type therapy note (daily note)  -RYAN     Mode of Treatment physical therapy  -RYAN       Row Name 05/06/24 1008          General Information    Existing Precautions/Restrictions fall  -RYAN       Row Name 05/06/24 1008          Pain    Pretreatment Pain Rating 0/10 - no pain  -RYAN     Posttreatment Pain Rating 0/10 - no pain  -RYAN       Row Name 05/06/24 1008          Bed Mobility    Comment, (Bed Mobility) in the chair  -RYAN       Row Name 05/06/24 1008          Sit-Stand Transfer    Sit-Stand Scroggins (Transfers) verbal cues;contact guard  -RYAN     Assistive Device (Sit-Stand Transfers) walker, front-wheeled  -RYAN       Row Name 05/06/24 1008          Stand-Sit Transfer    Stand-Sit [place]ins (Transfers) verbal cues;contact guard  -RYAN     Assistive Device (Stand-Sit Transfers) walker, front-wheeled  -RYAN       Row Name 05/06/24 1008          Gait/Stairs (Locomotion)    Scroggins Level (Gait) verbal cues;contact guard  -RYAN     Assistive Device (Gait) walker, front-wheeled  -RYAN     Distance in Feet (Gait) 50  -RYAN     Pattern (Gait) step-through   -RYAN     Deviations/Abnormal Patterns (Gait) gait speed decreased  -RYAN     Bilateral Gait Deviations forward flexed posture  -RYAN       Row Name 05/06/24 1008          Motor Skills    Comments, Therapeutic Exercise sitting AROM BLE x 20  -RYAN     Additional Documentation Comments, Therapeutic Exercise (Row)  -RYAN       Row Name 05/06/24 1008          Positioning and Restraints    Pre-Treatment Position sitting in chair/recliner  -RYAN     Post Treatment Position chair  -RYAN     In Chair sitting;call light within reach;encouraged to call for assist  -RYAN               User Key  (r) = Recorded By, (t) = Taken By, (c) = Cosigned By      Initials Name Provider Type    RYAN Mat Stoner PTA Physical Therapist Assistant                    Physical Therapy Education       Title: PT OT SLP Therapies (Resolved)       Topic: Physical Therapy (Resolved)       Point: Mobility training (Resolved)       Learning Progress Summary             Patient Acceptance, E, VU,DU by  at 5/3/2024 1012    Comment: Pt educated on therole of PT in her care and on completing mobility safely and efficiently.   Family Acceptance, E, VU,DU by  at 5/3/2024 1012    Comment: Pt educated on therole of PT in her care and on completing mobility safely and efficiently.                         Point: Home exercise program (Resolved)       Learner Progress:  Not documented in this visit.              Point: Body mechanics (Resolved)       Learning Progress Summary             Patient Acceptance, E, VU,DU by  at 5/3/2024 1012    Comment: Pt educated on therole of PT in her care and on completing mobility safely and efficiently.   Family Acceptance, E, VU,DU by  at 5/3/2024 1012    Comment: Pt educated on therole of PT in her care and on completing mobility safely and efficiently.                         Point: Precautions (Resolved)       Learner Progress:  Not documented in this visit.                              User Key       Initials Effective Dates  Name Provider Type Discipline     02/22/24 -  Gladys Ramirez, PT Student PT Student PT                  PT Recommendation and Plan         Outcome Measures       Row Name 05/06/24 1008 05/06/24 0800 05/03/24 1100       How much help from another person do you currently need...    Turning from your back to your side while in flat bed without using bedrails? 4  -RYAN -- --    Moving from lying on back to sitting on the side of a flat bed without bedrails? 4  -RYAN -- --    Moving to and from a bed to a chair (including a wheelchair)? 3  -RYAN -- --    Standing up from a chair using your arms (e.g., wheelchair, bedside chair)? 3  -RYAN -- --    Climbing 3-5 steps with a railing? 3  -RYAN -- --    To walk in hospital room? 3  -RYAN -- --    AM-PAC 6 Clicks Score (PT) 20  -RYAN -- --    Highest Level of Mobility Goal 6 --> Walk 10 steps or more  -RYAN -- --       How much help from another is currently needed...    Putting on and taking off regular lower body clothing? -- 3  -EC 2  -EC    Bathing (including washing, rinsing, and drying) -- 3  -EC 3  -EC    Toileting (which includes using toilet bed pan or urinal) -- 3  -EC 3  -EC    Putting on and taking off regular upper body clothing -- 3  -EC 3  -EC    Taking care of personal grooming (such as brushing teeth) -- 3  -EC 3  -EC    Eating meals -- 4  -EC 4  -EC    AM-PAC 6 Clicks Score (OT) -- 19  -EC 18  -EC       Functional Assessment    Outcome Measure Options AM-PAC 6 Clicks Basic Mobility (PT)  -RYAN AM-PAC 6 Clicks Daily Activity (OT)  -EC AM-PAC 6 Clicks Daily Activity (OT)  -EC              User Key  (r) = Recorded By, (t) = Taken By, (c) = Cosigned By      Initials Name Provider Type    RYAN Mat Stoner, PTA Physical Therapist Assistant    EC Lissette Salazar OTR/L Occupational Therapist                     Time Calculation:    PT Charges       Row Name 05/06/24 1008             Time Calculation    Start Time 1008  -RYAN      Stop Time 1033  -RYAN      Time Calculation (min) 25  min  -RYAN      PT Received On 05/06/24  -RYAN         Time Calculation- PT    Total Timed Code Minutes- PT 25 minute(s)  -RYAN         Timed Charges    61625 - PT Therapeutic Exercise Minutes 10  -RYAN      89433 - Gait Training Minutes  15  -RYAN         Total Minutes    Timed Charges Total Minutes 25  -RYAN       Total Minutes 25  -RYAN                User Key  (r) = Recorded By, (t) = Taken By, (c) = Cosigned By      Initials Name Provider Type    Mat Amaya, PTA Physical Therapist Assistant                  Therapy Charges for Today       Code Description Service Date Service Provider Modifiers Qty    70876069773 HC GAIT TRAINING EA 15 MIN 5/6/2024 Mat Stoner, PTA GP 1    48431768650 HC PT THER PROC EA 15 MIN 5/6/2024 Mat Stoner, PTA GP 1            PT G-Codes  Outcome Measure Options: AM-PAC 6 Clicks Basic Mobility (PT)  AM-PAC 6 Clicks Score (PT): 20  AM-PAC 6 Clicks Score (OT): 19    Mat Stoner PTA  5/6/2024

## 2024-05-06 NOTE — DISCHARGE SUMMARY
[facility] Medicine Services  DISCHARGE SUMMARY     Date of Admission: 5/1/2024  Date of Discharge:  5/6/2024  Primary Care Physician: Zachariah Huffman MD    Presenting Problem/History of Present Illness:  Mental status changes    Final Discharge Diagnoses:  Active Hospital Problems    Diagnosis     **Acute kidney injury superimposed on stage 4 chronic kidney disease     Chronic heart failure with preserved ejection fraction (HFpEF)     Moderate malnutrition     Metabolic encephalopathy suspect due to elevated creatinine and abnormal urinalysis     Digoxin toxicity, accidental or unintentional, initial encounter     Altered mental status     Acute cystitis with hematuria     Elevated troponin not due to acute coronary syndrome     Severe pulmonary hypertension     Chronic anticoagulation     Chronic diastolic (congestive) heart failure     Benign essential HTN     Persistent atrial fibrillation     Type 2 diabetes mellitus with hyperglycemia, without long-term current use of insulin        Consults: None    Procedures Performed: None    Pertinent Test Results:   Results for orders placed during the hospital encounter of 01/09/24    Adult Transthoracic Echo Complete W/ Cont if Necessary Per Protocol    Interpretation Summary    Left ventricular systolic function is normal. Left ventricular ejection fraction appears to be 56 - 60%.    The right ventricular cavity is mildly dilated. Borderline low right ventricular systolic function noted.    Biatrial enlargement is noted.    Mild to moderate tricuspid valve regurgitation is present. Estimated right ventricular systolic pressure from tricuspid regurgitation is markedly elevated (>55 mmHg).      Imaging Results (All)       Procedure Component Value Units Date/Time    XR Chest PA & Lateral [[id number]] Collected: 05/03/24 1158     Updated: 05/03/24 1201    Narrative:      EXAM: XR CHEST PA AND LATERAL-      DATE: 5/3/2024 10:56  AM     HISTORY: Shortness of breath; N17.9-Acute kidney failure, unspecified;  N39.0-Urinary tract infection, site not specified; R31.9-Hematuria,  unspecified; R41.82-Altered mental status, unspecified;  R13.10-Dysphagia, unspecified; Z74.09-Other reduced mobility       COMPARISON: 5/1/2024.     TECHNIQUE:  Frontal and lateral views of the chest submitted.     FINDINGS:    The lungs are grossly clear. There is enlargement of the cardiac  silhouette. No effusion or pneumothorax is seen.          Impression:      1. No active disease seen in the chest.     This report was signed and finalized on 5/3/2024 11:58 AM by Mayur Peterson.       US Renal Bilateral [[id number]] Collected: 05/03/24 1132     Updated: 05/03/24 1136    Narrative:      EXAMINATION: US RENAL BILATERAL-     5/3/2024 10:01 AM     HISTORY: Elevated creatinine; N17.9-Acute kidney failure, unspecified;  N39.0-Urinary tract infection, site not specified; R31.9-Hematuria,  unspecified; R41.82-Altered mental status, unspecified;  R13.10-Dysphagia, unspecified; Z74.09-Other reduced mobility     Grayscale and color-flow renal ultrasound.     Normal size and position of both kidneys.  Normal cortical thickness and normal cortical echogenicity.     No hydronephrosis or shadowing stone.     The right kidney measures 99 x 45 x 39 mm.  7 x 5 cm upper pole cyst is unchanged compared with a recent CT exam     The left kidney measures 95 x 48 x 41 mm.     Poorly visualized urinary bladder due to bowel loops and free pelvic  fluid.       Impression:      Impression:  1. No acute kidney abnormality is seen.           This report was signed and finalized on 5/3/2024 11:33 AM by Dr. João Cardona MD.       CT Abdomen Pelvis Without Contrast [[id number]] Collected: 05/01/24 2037     Updated: 05/01/24 2047    Narrative:      EXAM: CT ABDOMEN PELVIS WO CONTRAST-     INDICATION: hematuria       TECHNIQUE: Helically acquired CT images were obtained of the abdomen  and  pelvis without intravenous contrast. Coronal and sagittal reformations  were performed.       DOSE LENGTH PRODUCT: 279.58 mGy.cm mGy cm. Automated exposure control  was also utilized to decrease patient radiation dose.     COMPARISON: 11/16/2018     FINDINGS:     Lower Chest: Cardiomegaly. Mitral annular calcifications. Aortic  valvular calcifications. Coronary artery calcifications. Trace right  pleural fluid. Minimal atelectasis in the lung bases.     Liver: Unremarkable.     Biliary Tree: Unremarkable.     Spleen: Unremarkable.     Pancreas: Unremarkable.     Adrenal Glands: Unremarkable.     Kidneys/Ureters/Bladder: Stable large exophytic right renal cyst.     Reproductive Organs: Hysterectomy.     Gastrointestinal Tract: Unremarkable.      Lymphatics: Unremarkable.     Vasculature: Severe atherosclerosis.     Peritoneum/Retroperitoneum: Small to moderate volume ascites. Mild  mesenteric edema.     Abdominal Wall/Soft Tissues: Mild body wall anasarca. Tiny  fat-containing right inguinal hernia.     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 No acute or suspicious osseous finding.       Impression:         Small to moderate volume ascites with mild mesenteric edema and body  wall anasarca. No obvious evidence of cirrhosis.      Cardiomegaly with trace right pleural effusion.        No CT evidence to explain hematuria on this nonenhanced study.        This report was signed and finalized on 5/1/2024 8:43 PM by Ludwin Simmons.       CT Head Without Contrast [[id number]] Collected: 05/01/24 1753     Updated: 05/01/24 1805    Narrative:      Exam: CT HEAD WO CONTRAST- 5/1/2024 4:46 PM     HISTORY: ams, altered mental status.     DOSE LENGTH PRODUCT: 667.7 mGy.cm mGy cm. Automated exposure control was  also utilized to decrease patient radiation dose.     Technique:  Helically acquired CT of the brain without IV contrast was performed.  Sagittal and coronal reformations are  also provided for review. Soft  tissue and bone kernels are available for interpretation.     Comparison: CT head 3/1/2021. MRI brain 1/7/2022.     Findings:     Ventricles and extra-axial CSF spaces are normal in size.     No intraparenchymal or extra-axial hemorrhage.     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 Gray-white matter differentiation is  otherwise preserved.     Orbits are grossly unremarkable. Left sphenoid sinus mucous retention  cyst. Mild left maxillary sinus mucosal thickening. Mastoid air cells  are grossly clear.     No suspicious calvarial or extracranial soft tissue abnormality.     Other:None.       Impression:      Impression:       No acute intracranial abnormality.     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     This report was signed and finalized on 5/1/2024 6:02 PM by Ludwin Simmons.       XR Chest 1 View [[id number]] Collected: 05/01/24 1741     Updated: 05/01/24 1745    Narrative:      EXAM: XR CHEST 1 VW- 5/1/2024 4:40 PM     HISTORY: Altered mental status.     COMPARISON: 3/13/2024.     TECHNIQUE: Single frontal radiograph of the chest was obtained.     FINDINGS:     Support Devices: None.     Cardiac and Mediastinal Silhouettes: Cardiomegaly, stable.     Lungs/Pleura: No focal consolidation. No sizable pleural effusion. No  visible pneumothorax.     Osseous structures: No acute osseous finding.     Other: None.       Impression:         No acute cardiopulmonary abnormality.           This report was signed and finalized on 5/1/2024 5:42 PM by Ludwin Simmons.             LAB RESULTS:      Lab 05/06/24  0438 05/05/24  0601 05/04/24  0605 05/03/24  0524 05/01/24  2039 05/01/24  1841   WBC 3.68 3.76 4.35 3.95  --  4.76   HEMOGLOBIN 7.7* 7.8* 8.4* 7.9*  --  10.4*   HEMATOCRIT 24.4* 24.8* 26.9* 25.9*  --  33.0*   PLATELETS 110* 120* 133* 134*  --  183   NEUTROS ABS 2.66 2.53 3.23 2.82  --  3.23   IMMATURE GRANS (ABS) 0.02 0.02  --   --   --  0.02   LYMPHS ABS 0.57* 0.78 0.64* 0.70  --  1.02   MONOS  ABS 0.38 0.34 0.38 0.34  --  0.36   EOS ABS 0.04 0.08 0.07 0.06  --  0.10   MCV 93.8 93.2 94.7 95.2  --  94.3   CRP  --   --   --   --  0.83*  --    PROCALCITONIN  --   --   --   --  0.23  --    PROTIME  --   --   --   --   --  17.5*         Lab 05/06/24 0438 05/05/24 0600 05/04/24 0605 05/03/24 0524 05/01/24 2332   SODIUM 138 140 139 140 144   POTASSIUM 5.1 4.8 5.2 4.9 4.0   CHLORIDE 105 107 106 107 109*   CO2 24.0 24.0 22.0 23.0 18.0*   ANION GAP 9.0 9.0 11.0 10.0 17.0*   BUN 60* 56* 59* 63* 65*   CREATININE 2.58* 2.77* 2.69* 2.80* 2.75*   EGFR 17.3* 15.9* 16.5* 15.7* 16.0*   GLUCOSE 175* 70 178* 154* 101*   CALCIUM 9.0 9.1 9.0 9.0 8.0*         Lab 05/06/24 0438 05/05/24 0600 05/04/24 0605 05/03/24 0524 05/01/24 2039 05/01/24  1841   TOTAL PROTEIN 6.1 6.3 6.7 6.3 7.5  --    ALBUMIN 3.5 3.5 3.6 3.6 4.1  --    GLOBULIN 2.6 2.8 3.1 2.7 3.4  --    ALT (SGPT) 9 9 9 8 9  --    AST (SGOT) 21 20 22 18 19  --    BILIRUBIN 0.4 0.4 0.4 0.5 0.8  --    ALK PHOS 76 47 60 57 53  --    LIPASE  --   --   --   --   --  42         Lab 05/01/24 2332 05/01/24 2218 05/01/24 2039 05/01/24  1841   HSTROP T 61* 67* 66*  --    PROTIME  --   --   --  17.5*   INR  --   --   --  1.38*             Lab 05/06/24  0438   IRON 48   IRON SATURATION (TSAT) 12*   TIBC 408   TRANSFERRIN 274         Brief Urine Lab Results  (Last result in the past 365 days)        Color   Clarity   Blood   Leuk Est   Nitrite   Protein   CREAT   Urine HCG        05/02/24 0536 Yellow   Clear   Small (1+)   Negative   Positive   30 mg/dL (1+)                 Microbiology Results (last 10 days)       Procedure Component Value - Date/Time    Urine Culture - Urine, Urine, Clean Catch [[id number]]  (Normal) Collected: 05/02/24 0536    Lab Status: Final result Specimen: Urine, Clean Catch Updated: 05/03/24 1055     Urine Culture No growth    Blood Culture With PAUL - Blood, Arm, Right [[id number]]  (Normal) Collected: 05/01/24 2218    Lab Status: Preliminary result  Specimen: Blood from Arm, Right Updated: 05/05/24 2245     Blood Culture No growth at 4 days    Blood Culture With PAUL - Blood, Hand, Right [[id number]]  (Normal) Collected: 05/01/24 2218    Lab Status: Preliminary result Specimen: Blood from Hand, Right Updated: 05/05/24 2245     Blood Culture No growth at 4 days    Respiratory Panel PCR w/COVID-19(SARS-CoV-2) JIM/EMERALD/VELMA/[facility]/SHAYNA In-House, NP Swab in UTM/VTM, 2 HR TAT - Swab, Nasopharynx [[id number]]  (Normal) Collected: 05/01/24 1829    Lab Status: Final result Specimen: Swab from Nasopharynx Updated: 05/01/24 1956     ADENOVIRUS, PCR Not Detected     Coronavirus 229E Not Detected     Coronavirus HKU1 Not Detected     Coronavirus NL63 Not Detected     Coronavirus OC43 Not Detected     COVID19 Not Detected     Human Metapneumovirus Not Detected     Human Rhinovirus/Enterovirus Not Detected     Influenza A PCR Not Detected     Influenza B PCR Not Detected     Parainfluenza Virus 1 Not Detected     Parainfluenza Virus 2 Not Detected     Parainfluenza Virus 3 Not Detected     Parainfluenza Virus 4 Not Detected     RSV, PCR Not Detected     Bordetella pertussis pcr Not Detected     Bordetella parapertussis PCR Not Detected     Chlamydophila pneumoniae PCR Not Detected     Mycoplasma pneumo by PCR Not Detected    Narrative:      In the setting of a positive respiratory panel with a viral infection PLUS a negative procalcitonin without other underlying concern for bacterial infection, consider observing off antibiotics or discontinuation of antibiotics and continue supportive care. If the respiratory panel is positive for atypical bacterial infection (Bordetella pertussis, Chlamydophila pneumoniae, or Mycoplasma pneumoniae), consider antibiotic de-escalation to target atypical bacterial infection.            Hospital Course: Pepper Perales presented to [facility] emergency room 5/1/2024 with status changes and confusion. She has a history of stage IV chronic  "kidney disease, chronic diastolic heart failure, severe pulmonary hypertension and diabetes. Patient had recent hospitalization 3/13/2024 - 3/15/2024 for acute on chronic heart failure with preserved ejection fraction, treated with diuresis with good results. She followed up with her primary care provider. She was in her usual state of health the day of admission.  Approximately 8- 8:30 the morning she talked to her daughter on the phone. Son normally calls her around 2 PM but he was in [place] when she called him. Son could tell by the conversation that \"something was not right\". Patient tried to call her daughter who did not answer the phone. Due to some confusion EMS called and patient was brought to ED. Creatinine 3.2 with previous creatinine 2.49 on 3/15/2024, dig level 1.4, CRP 8.83, urinalysis 3-5 WBC, 1+ bacteria, negative nitrate. WBC 4.76. CT scan of the abdomen noted small to moderate volume ascites with mild mesenteric edema and body wall anasarca. No obvious evidence of cirrhosis. Cardiomegaly with trace right pleural effusion. No CT evidence to explain hematuria. CT scan of the head no acute intracranial abnormality. 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 Chest x-ray no acute cardiopulmonary abnormality. Lactated Ringer's fluid bolus, Rocephin given in ER.     She was admitted to the medical floor with metabolic encephalopathy, elevated digoxin level and acute kidney injury.  Metabolic encephalopathy suspect secondary to elevated creatinine, elevated digoxin level and abnormal urinalysis.  Patient presented with confusion and was found to have both elevated creatinine, abnormal urinalysis, and elevated digoxin level.  She was hydrated with IV fluids and encephalopathy resolved the following morning.    She has a history of chronic kidney disease stage IV.  She presented with creatinine of 3.2.  Previous creatinine 2.49 on 3/15/2024.  She was hydrated with IV fluids.  Creatinine trended down to 2.69 on " "5/4.  Sodium bicarbonate continued.  Renal ultrasound 5/3/2024 noted no acute kidney abnormality.  Creatinine stable 2.58 on 5/6/2024 date of discharge.    Urinalysis was abnormal with positive nitrates, 3-5 WBC.  Patient started on Rocephin on admission and completed the days of antibiotic treatment.  Urine culture was negative; however, patient presented with mental status changes on admission that improved with IV fluids and antibiotics.    Digoxin level elevated 1.4 on admission.  Digoxin held throughout hospital stay.  Digoxin level trended down to 0.9 on 5/3 and digoxin level 0.8 on 5/5..  Patient indicated she had been \"poisoned with that medication before\".  Heart rate remained in the 60s during hospitalization.  As patient reported previous episode of elevated digoxin level and heart rate in the 60s we elected not to resume digoxin at discharge.  Both patient and son greed to discontinuation of digoxin.    Coreg, losartan, hydralazine, Procardia continued for primary hypertension.  Blood pressure 141/56, 161/62.    Patient with history of persistent atrial fibrillation.  Coreg continued on admission.  Digoxin held and Eliquis held on admission due to elevated creatinine.  Lovenox ordered on admission.  As creatinine trended down Eliquis resumed at renal dosing on 5/4/2024.    Patient has a history of chronic diastolic heart failure with EF 56-60% per echo 1/9/2024.  No exacerbation of symptoms.  Beta-blocker, ARB, spironolactone, statin continued.  Bumex held on admission and resumed on 5/3.  Spironolactone held on 5/4 due to potassium of 5.2.  Recommend metabolic panel with follow-up appointment with primary care provider on 5/10/2024.  Recommend decreasing spironolactone to 12.5 mg orally daily to potassium 4.8-5.1.    Hemoglobin A1c 5.8 on 3/21/2024.  Accu-Cheks with sliding scale insulin coverage ordered.  Glucoses 167, 175.  Glipizide and Farxiga continued.    She has chronic iron deficiency anemia.  " "Hemoglobin has remained stable 7.7 at discharge.  Fecal occult blood negative.  Iron panel iron 48, saturation 12%, transferrin 274, TIBC 408.  Oral iron, home medication continued.  No bright red bleeding per rectum or dark tarry stools noted.    Physical therapy and Occupational Therapy consulted and recommended physical therapy continued at discharge.  Son was considering skilled nursing facility; however, prior to discharge patient was independent with ambulation using rolling walker in the hallway.  Patient and son consider going home and then elected to go to skilled facility and referral made to [facility].    Nutrition consulted as patient reported decreased appetite.    On 5/6/2024, she is stable for discharge to [facility] for ongoing physical therapy and Occupational Therapy prior to consideration for discharge home. She presented with creatinine of 3.2, digoxin level 1.4 and mental status changes.  Digoxin held and not resumed at discharge.  She was hydrated with IV fluids and creatinine improved to 2.58 prior to discharge.  Patient will follow-up with YOBANY Santos on 5/10/2024.  Recommend comprehensive metabolic panel with follow-up appointment.  Recommend decreasing spironolactone to 12.5 mg orally daily and may consider discontinuing if hyperkalemia.    Physical Exam on Discharge:  /62 (BP Location: Right arm, Patient Position: Lying)   Pulse 69   Temp 97.7 °F (36.5 °C) (Oral)   Resp 16   Ht 157.5 cm (62\")   Wt 65.2 kg (143 lb 11.8 oz)   LMP  (LMP Unknown)   SpO2 99%   BMI 26.29 kg/m²   Physical Exam  Vitals and nursing note reviewed.   Constitutional:       Comments: Sitting up on side of bed.  No oxygen use.  Son in room.   HENT:      Head: Normocephalic and atraumatic.      Nose: No congestion.      Mouth/Throat:      Pharynx: Oropharynx is clear. No oropharyngeal exudate or posterior oropharyngeal erythema.   Eyes:      Extraocular Movements: Extraocular movements intact.      " Pupils: Pupils are equal, round, and reactive to light.   Cardiovascular:      Rate and Rhythm: Normal rate. Rhythm irregular.      Heart sounds: Murmur heard.   Pulmonary:      Breath sounds: No wheezing, rhonchi or rales.      Comments: No oxygen in use.  Saturation 93-98% on room air.  Abdominal:      Palpations: Abdomen is soft.      Tenderness: There is no abdominal tenderness.   Genitourinary:     Comments: Voiding.  Musculoskeletal:         General: No swelling or tenderness.      Cervical back: Normal range of motion and neck supple.   Skin:     General: Skin is warm and dry.   Neurological:      General: No focal deficit present.      Mental Status: She is alert and oriented to person, place, and time.   Psychiatric:         Mood and Affect: Mood normal.         Behavior: Behavior normal.         Thought Content: Thought content normal.         Judgment: Judgment normal.         Condition on Discharge: Stable for discharge home with home health    Discharge Disposition:  Skilled Nursing Facility (DC - External)    Discharge Medications:     Discharge Medications        New Medications        Instructions Start Date   albuterol (2.5 MG/3ML) 0.083% nebulizer solution  Commonly known as: PROVENTIL   2.5 mg, Nebulization, 4 Times Daily - RT             Changes to Medications        Instructions Start Date   spironolactone 25 MG tablet  Commonly known as: ALDACTONE  What changed:   how much to take  additional instructions   12.5 mg, Oral, Daily, Hold for elevated potassium             Continue These Medications        Instructions Start Date   apixaban 2.5 MG tablet tablet  Commonly known as: ELIQUIS   2.5 mg, Oral, 2 Times Daily      bumetanide 1 MG tablet  Commonly known as: BUMEX   1 mg, Oral, Daily, Take 1 tablet by mouth Daily AND 0.5 tablets Every Afternoon. Take WHOLE tablet instead of HALF tablet in the afternoon with weight gain or increased fluid      carvedilol 6.25 MG tablet  Commonly known as:  Coreg   6.25 mg, Oral, 2 Times Daily With Meals      cholecalciferol 25 MCG (1000 UT) tablet  Commonly known as: VITAMIN D3   1,000 Units, Oral, Daily      Cyanocobalamin 1000 MCG/ML kit   1 mL, Injection, Weekly, For 3 weeks then monthly thereafter.      dapagliflozin Propanediol 10 MG tablet  Commonly known as: Farxiga   10 mg, Oral, Daily      docusate sodium 100 MG capsule  Commonly known as: COLACE   100 mg, Oral, 2 Times Daily PRN      fenofibrate 145 MG tablet  Commonly known as: TRICOR   145 mg, Oral, Daily      ferrous sulfate 325 (65 FE) MG tablet   325 mg, Oral, Daily With Breakfast      glipizide 5 MG ER tablet  Commonly known as: GLUCOTROL XL   5 mg, Oral, Daily      hydrALAZINE 100 MG tablet  Commonly known as: APRESOLINE   100 mg, Oral, 3 Times Daily      losartan 100 MG tablet  Commonly known as: COZAAR   100 mg, Oral, Daily      magnesium oxide 400 (241.3 Mg) MG tablet tablet  Commonly known as: MAGOX   400 mg, Oral, Daily      NIFEdipine XL 90 MG 24 hr tablet  Commonly known as: PROCARDIA XL   90 mg, Oral, Daily      omeprazole 40 MG capsule  Commonly known as: priLOSEC   40 mg, Oral, Daily      simvastatin 40 MG tablet  Commonly known as: ZOCOR   40 mg, Oral, Nightly      sodium bicarbonate 650 MG tablet   650 mg, Oral, Daily      vitamin C 500 MG tablet  Commonly known as: ASCORBIC ACID   500 mg, Oral, Daily             Stop These Medications      digoxin 125 MCG tablet  Commonly known as: LANOXIN              This patient has current or prior documentation of an left ventricular ejection fraction (LVEF) of less than or equal to 40%.    The patient was prescribed or already taking an ACE/ARB.    The patient was prescribed already taking bisoprolol, carvedilol, or sustained release metoprolol succinate.     Discharge Diet:   Diet Instructions       Diet: Diabetic Diets; Consistent Carbohydrate; Regular (IDDSI 7); Thin (IDDSI 0)      Discharge Diet: Diabetic Diets    Diabetic Diet: Consistent  Carbohydrate    Texture: Regular (IDDSI 7)    Fluid Consistency: Thin (IDDSI 0)            Activity at Discharge:   Activity Instructions       Activity as Tolerated              Discharge instructions:  1.  For recurrent confusion and mental status changes seek medical attention.  2.  Discontinue digoxin due to recurrent elevated digoxin level.  3.  Decrease spironolactone to 12.5 mg orally daily.  Hold for elevated potassium.  4.  Follow-up with YOBANY Santos on 5/10/2024 with comprehensive metabolic panel.    Follow-up Appointments:   Future Appointments   Date Time Provider Department Center   5/10/2024 11:00 AM Maribel Sy APRN [facility]   5/23/2024 10:30 AM Zachariah Huffman MD [facility]   9/30/2024  9:45 AM Tone Coello MD [facility]       Test Results Pending at Discharge: None    Electronically signed by YOBANY Rangel, 05/06/24, 09:56 CDT.    Time: 35 minutes.  Discussed with Dr. Story, patient, and son Davian.    The above documentation resulted from a face-to-face encounter by me Mary HAYWOOD, [facility].

## 2024-05-06 NOTE — THERAPY DISCHARGE NOTE
Acute Care - Physical Therapy Discharge Summary  [facility]       Patient Name: Libia Perales  : 1934  MRN: [id number]    Today's Date: 2024                 Admit Date: 2024      PT Recommendation and Plan    Visit Dx:    ICD-10-CM ICD-9-CM   1. AMARIS (acute kidney injury)  N17.9 584.9   2. Urinary tract infection with hematuria, site unspecified  N39.0 599.0    R31.9 599.70   3. Altered mental status, unspecified altered mental status type  R41.82 780.97   4. Dysphagia, unspecified type  R13.10 787.20   5. Impaired mobility [Z74.09]  Z74.09 799.89   6. High calcium levels  E83.52 275.42   7. Primary osteoarthritis of both knees  M17.0 715.16   8. Metabolic encephalopathy suspect due to elevated creatinine and abnormal urinalysis  G93.41 348.31   9. Digoxin toxicity, accidental or unintentional, initial encounter  T46.0X1A 972.1     E858.3   10. Chronic anticoagulation  Z79.01 V58.61   11. Chronic fatigue  R53.82 780.79   12. Stage 4 chronic kidney disease  N18.4 585.4        Outcome Measures       Row Name 24 1008 24 0800          How much help from another person do you currently need...    Turning from your back to your side while in flat bed without using bedrails? 4  -RYAN --     Moving from lying on back to sitting on the side of a flat bed without bedrails? 4  -RYAN --     Moving to and from a bed to a chair (including a wheelchair)? 3  -RYAN --     Standing up from a chair using your arms (e.g., wheelchair, bedside chair)? 3  -RYAN --     Climbing 3-5 steps with a railing? 3  -RYAN --     To walk in hospital room? 3  -RYAN --     AM-PAC 6 Clicks Score (PT) 20  -RYAN --     Highest Level of Mobility Goal 6 --> Walk 10 steps or more  -RYAN --        How much help from another is currently needed...    Putting on and taking off regular lower body clothing? -- 3  -EC     Bathing (including washing, rinsing, and drying) -- 3  -EC     Toileting (which includes using toilet bed pan or urinal) -- 3   -EC     Putting on and taking off regular upper body clothing -- 3  -EC     Taking care of personal grooming (such as brushing teeth) -- 3  -EC     Eating meals -- 4  -EC     AM-PAC 6 Clicks Score (OT) -- 19  -EC        Functional Assessment    Outcome Measure Options AM-PAC 6 Clicks Basic Mobility (PT)  -RYAN AM-PAC 6 Clicks Daily Activity (OT)  -EC               User Key  (r) = Recorded By, (t) = Taken By, (c) = Cosigned By      Initials Name Provider Type    Mat Amaya PTA Physical Therapist Assistant    EC Lissette Salazar, OTR/L Occupational Therapist                     PT Charges       Row Name 05/06/24 1008             Time Calculation    Start Time 1008  -RYAN      Stop Time 1033  -RYAN      Time Calculation (min) 25 min  -RYAN      PT Received On 05/06/24  -RYAN         Time Calculation- PT    Total Timed Code Minutes- PT 25 minute(s)  -RYAN         Timed Charges    59578 - PT Therapeutic Exercise Minutes 10  -RYAN      12020 - Gait Training Minutes  15  -RYAN         Total Minutes    Timed Charges Total Minutes 25  -RYAN       Total Minutes 25  -RYAN                User Key  (r) = Recorded By, (t) = Taken By, (c) = Cosigned By      Initials Name Provider Type    Mat Amaya PTA Physical Therapist Assistant                     PT Rehab Goals       Row Name 05/06/24 1300             Bed Mobility Goal 1 (PT)    Activity/Assistive Device (Bed Mobility Goal 1, PT) sit to supine;supine to sit  -AB      [place] Level/Cues Needed (Bed Mobility Goal 1, PT) independent  -AB      Time Frame (Bed Mobility Goal 1, PT) long term goal (LTG);10 days  -AB      Progress/Outcomes (Bed Mobility Goal 1, PT) goal not met  -AB         Transfer Goal 1 (PT)    Activity/Assistive Device (Transfer Goal 1, PT) sit-to-stand/stand-to-sit;bed-to-chair/chair-to-bed;walker, rolling  -AB      [place] Level/Cues Needed (Transfer Goal 1, PT) modified independence  -AB      Time Frame (Transfer Goal 1, PT) long term goal (LTG);10  days  -AB      Progress/Outcome (Transfer Goal 1, PT) goal not met  -AB         Gait Training Goal 1 (PT)    Activity/Assistive Device (Gait Training Goal 1, PT) gait (walking locomotion);assistive device use;backward stepping;decrease fall risk;improve balance and speed;increase endurance/gait distance;sidestepping;turning, left;turning, right;walker, rolling  -AB      [place] Level (Gait Training Goal 1, PT) contact guard required  -AB      Distance (Gait Training Goal 1, PT) 50 ft  -AB      Time Frame (Gait Training Goal 1, PT) long term goal (LTG);10 days  -AB      Progress/Outcome (Gait Training Goal 1, PT) goal met  -AB                User Key  (r) = Recorded By, (t) = Taken By, (c) = Cosigned By      Initials Name Provider Type Discipline    Andie Martino, PTA Physical Therapist Assistant PT                        PT Discharge Summary  Anticipated Discharge Disposition (PT): sub acute care setting  Reason for Discharge: Discharge from facility  Outcomes Achieved: Refer to plan of care for updates on goals achieved  Discharge Destination: SNF      Andie Childs PTA   5/6/2024

## 2024-05-06 NOTE — PLAN OF CARE
Goal Outcome Evaluation:  Plan of Care Reviewed With: patient        Progress: improving  Outcome Evaluation: OT tx completed. Pt sitting EOB with son. Pt c/o weakness & fatigue but agreeable to OOB activity. Pt sit<>stand & amb to the BR with CGA using the RW. Pt completed oral hygiene, hair care, & hand hygiene standing at sink w SBA. Notable SOA on commode, improved with cue for plb. Pt sat on the commode to change her brief, required Denny for distal LE d/t increased exertion. Pt amb to the chair to perform BUE ther ex for increased UE strength & endurance. Pt post O2 sats were 99%. Pt would benefit from cont OT & rehab at d/c.

## 2024-05-06 NOTE — CASE MANAGEMENT/SOCIAL WORK
Continued Stay Note  [facility]     Patient Name: Libia Perales  MRN: [id number]  Today's Date: 5/6/2024    Admit Date: 5/1/2024        Discharge Plan       Row Name 05/06/24 0952       Plan    Final Discharge Disposition Code 03 - skilled nursing facility (SNF)      Row Name 05/06/24 0951       Plan    Final Note [facility] can accept pt today. Pharmacy updated in [place]. Call report number is [phone]. Fax number is [phone].                   Discharge Codes    No documentation.                 Expected Discharge Date and Time       Expected Discharge Date Expected Discharge Time    May 5, 2024               SHANNON Cuevas

## 2024-05-06 NOTE — PLAN OF CARE
Goal Outcome Evaluation: Pt is alert and oriented with some confusion, no s/s of distress, vitals are stable, up with walker and 1 assist, family is at bedside

## 2024-05-06 NOTE — THERAPY TREATMENT NOTE
Acute Care - Occupational Therapy Treatment Note  [facility]     Patient Name: Libia Perales  : 1934  MRN: [id number]  Today's Date: 2024     Date of Referral to OT: 24       Admit Date: 2024       ICD-10-CM ICD-9-CM   1. AMARIS (acute kidney injury)  N17.9 584.9   2. Urinary tract infection with hematuria, site unspecified  N39.0 599.0    R31.9 599.70   3. Altered mental status, unspecified altered mental status type  R41.82 780.97   4. Dysphagia, unspecified type  R13.10 787.20   5. Impaired mobility [Z74.09]  Z74.09 799.89   6. High calcium levels  E83.52 275.42   7. Primary osteoarthritis of both knees  M17.0 715.16   8. Metabolic encephalopathy suspect due to elevated creatinine and abnormal urinalysis  G93.41 348.31   9. Digoxin toxicity, accidental or unintentional, initial encounter  T46.0X1A 972.1     E858.3   10. Chronic anticoagulation  Z79.01 V58.61   11. Chronic fatigue  R53.82 780.79   12. 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    Benign essential HTN    Persistent atrial fibrillation    Asthma    Fibrocystic breast disease    Gastro-esophageal reflux    High calcium levels    OA (osteoarthritis) of ankle    Right renal mass    Vertigo, benign paroxysmal    Chronic fatigue    Chronic cough    Allergic rhinitis    Stage 4 chronic kidney disease    Strain of left rhomboid muscle    Chronic anticoagulation    Chronic diastolic (congestive) heart failure    Severe pulmonary hypertension    Acute on chronic heart failure with preserved ejection fraction (HFpEF)    Acute kidney injury superimposed on stage 4 chronic kidney disease    Digoxin toxicity, accidental or unintentional, initial encounter    Altered mental status     Acute cystitis with hematuria    Elevated troponin not due to acute coronary syndrome    Metabolic encephalopathy suspect due to elevated creatinine and abnormal urinalysis    Moderate malnutrition     Past Medical History:   Diagnosis Date    Atrial fibrillation     Diabetes mellitus     Difficulty walking Approximately 2 years    GERD (gastroesophageal reflux disease)     High cholesterol     Hypertension     OA (osteoarthritis)     Pneumonia     few years ago    Stage 4 chronic kidney disease 08/18/2023     Past Surgical History:   Procedure Laterality Date    BREAST BIOPSY Right     BREAST CYST ASPIRATION      HYSTERECTOMY      OOPHORECTOMY      TONSILLECTOMY           OT ASSESSMENT FLOWSHEET (Last 12 Hours)       OT Evaluation and Treatment       Row Name 05/06/24 0870                   OT Time and Intention    Subjective Information complains of;weakness;fatigue  -EC        Document Type therapy note (daily note)  -EC        Mode of Treatment occupational therapy  -EC        Patient Effort good  -EC           General Information    Patient Profile Reviewed yes  -EC        Existing Precautions/Restrictions fall  -EC           Pain Assessment    Pretreatment Pain Rating 0/10 - no pain  -EC        Posttreatment Pain Rating 0/10 - no pain  -EC        Pain Intervention(s) Repositioned;Ambulation/increased activity  -EC           Activities of Daily Living    BADL Assessment/Intervention grooming;toileting  -EC           Grooming Assessment/Training    [place] Level (Grooming) oral care regimen;hair care, combing/brushing;wash face, hands;standby assist  -EC        Position (Grooming) sink side;supported standing  -EC           Toileting Assessment/Training    [place] Level (Toileting) adjust/manage clothing;change pad/brief;minimum assist (75% patient effort);perform perineal hygiene;standby assist  -EC        Assistive Devices (Toileting) commode;grab bar/safety frame  -EC        Position (Toileting)  unsupported sitting;supported standing  -EC           Bed Mobility    Comment, (Bed Mobility) sitting EOB  -EC           Functional Mobility    Functional Mobility- Ind. Level contact guard assist  -EC        Functional Mobility- Device walker, front-wheeled  -EC        Functional Mobility- Comment bed>BR>chair  -EC           Transfer Assessment/Treatment    Transfers sit-stand transfer;stand-sit transfer;toilet transfer  -EC           Sit-Stand Transfer    Sit-Stand [place] (Transfers) contact guard  -EC        Assistive Device (Sit-Stand Transfers) walker, front-wheeled  -EC           Stand-Sit Transfer    Stand-Sit [place] (Transfers) contact guard;verbal cues  -EC        Assistive Device (Stand-Sit Transfers) walker, front-wheeled  -EC           Toilet Transfer    Type (Toilet Transfer) sit-stand;stand-sit  -EC        [place] Level (Toilet Transfer) contact guard  -EC        Assistive Device (Toilet Transfer) commode;grab bars/safety frame  -EC           Motor Skills    Therapeutic Exercise shoulder;elbow/forearm  B shoulder AROM 2 x 10 reps, B punchouts 2 x 20 reps  -EC           Balance    Balance Interventions occupation based/functional task;standing  -EC        Comment, Balance --  -EC           Plan of Care Review    Plan of Care Reviewed With patient  -EC        Progress improving  -EC        Outcome Evaluation OT tx completed. Pt sitting EOB with son. Pt c/o weakness & fatigue but agreeable to OOB activity. Pt sit<>stand & amb to the BR with CGA using the RW. Pt completed oral hygiene, hair care, & hand hygiene standing at sink w SBA. Notable SOA on commode, improved with cue for plb. Pt sat on the commode to change her brief, required Denny for distal LE d/t increased exertion. Pt amb to the chair to perform BUE ther ex for increased UE strength & endurance. Pt post O2 sats were 99%. Pt would benefit from cont OT & rehab at d/c.  -EC           Vital Signs    Post SpO2 (%) 99  -EC        O2  Delivery Post Treatment room air  -EC           Positioning and Restraints    Pre-Treatment Position in bed  -EC        Post Treatment Position chair  -EC        In Chair sitting;call light within reach;encouraged to call for assist;notified nsg;exit alarm on;with family/caregiver  -EC                  User Key  (r) = Recorded By, (t) = Taken By, (c) = Cosigned By      Initials Name Effective Dates    EC Lissette Salazar, OTR/L 10/13/23 -                      Occupational Therapy Education       Title: PT OT SLP Therapies (In Progress)       Topic: Occupational Therapy (Done)       Point: ADL training (Done)       Description:   Instruct learner(s) on proper safety adaptation and remediation techniques during self care or transfers.   Instruct in proper use of assistive devices.                  Learning Progress Summary             Patient Acceptance, E, VU,NR by EC at 5/6/2024 0852    Acceptance, E, VU,NR by EC at 5/3/2024 1159   Family Acceptance, E, VU,NR by EC at 5/3/2024 1159                         Point: Home exercise program (Done)       Description:   Instruct learner(s) on appropriate technique for monitoring, assisting and/or progressing therapeutic exercises/activities.                  Learning Progress Summary             Patient Acceptance, E, VU,NR by EC at 5/6/2024 0852                         Point: Precautions (Done)       Description:   Instruct learner(s) on prescribed precautions during self-care and functional transfers.                  Learning Progress Summary             Patient Acceptance, E, VU,NR by EC at 5/6/2024 0852    Acceptance, E, VU,NR by EC at 5/3/2024 1159   Family Acceptance, E, VU,NR by EC at 5/3/2024 1159                         Point: Body mechanics (Done)       Description:   Instruct learner(s) on proper positioning and spine alignment during self-care, functional mobility activities and/or exercises.                  Learning Progress Summary             Patient  Acceptance, E, VU,NR by  at 5/3/2024 1159   Family Acceptance, E, VU,NR by  at 5/3/2024 1159                                         User Key       Initials Effective Dates Name Provider Type Discipline     10/13/23 -  Lissette Salazar, OTR/L Occupational Therapist OT                      OT Recommendation and Plan  Planned Therapy Interventions (OT): activity tolerance training, adaptive equipment training, BADL retraining, functional balance retraining, occupation/activity based interventions, patient/caregiver education/training, strengthening exercise, transfer/mobility retraining  Therapy Frequency (OT): 5 times/wk  Plan of Care Review  Plan of Care Reviewed With: patient  Progress: improving  Outcome Evaluation: OT tx completed. Pt sitting EOB with son. Pt c/o weakness & fatigue but agreeable to OOB activity. Pt sit<>stand & amb to the BR with CGA using the RW. Pt completed oral hygiene, hair care, & hand hygiene standing at sink w SBA. Notable SOA on commode, improved with cue for plb. Pt sat on the commode to change her brief, required Denny for distal LE d/t increased exertion. Pt amb to the chair to perform BUE ther ex for increased UE strength & endurance. Pt post O2 sats were 99%. Pt would benefit from cont OT & rehab at d/c.  Plan of Care Reviewed With: patient  Outcome Evaluation: OT tx completed. Pt sitting EOB with son. Pt c/o weakness & fatigue but agreeable to OOB activity. Pt sit<>stand & amb to the BR with CGA using the RW. Pt completed oral hygiene, hair care, & hand hygiene standing at sink w SBA. Notable SOA on commode, improved with cue for plb. Pt sat on the commode to change her brief, required Denny for distal LE d/t increased exertion. Pt amb to the chair to perform BUE ther ex for increased UE strength & endurance. Pt post O2 sats were 99%. Pt would benefit from cont OT & rehab at d/c.     Outcome Measures       Row Name 05/06/24 0800 05/03/24 1100          How much help from another  is currently needed...    Putting on and taking off regular lower body clothing? 3  -EC 2  -EC     Bathing (including washing, rinsing, and drying) 3  -EC 3  -EC     Toileting (which includes using toilet bed pan or urinal) 3  -EC 3  -EC     Putting on and taking off regular upper body clothing 3  -EC 3  -EC     Taking care of personal grooming (such as brushing teeth) 3  -EC 3  -EC     Eating meals 4  -EC 4  -EC     AM-PAC 6 Clicks Score (OT) 19  -EC 18  -EC        Functional Assessment    Outcome Measure Options AM-PAC 6 Clicks Daily Activity (OT)  -EC AM-PAC 6 Clicks Daily Activity (OT)  -EC               User Key  (r) = Recorded By, (t) = Taken By, (c) = Cosigned By      Initials Name Provider Type    EC Lissette Salazar, OTR/L Occupational Therapist                    Time Calculation:    Time Calculation- OT       Row Name 05/06/24 0903             Time Calculation- OT    OT Start Time 0825  -EC      OT Stop Time 0903  -EC      OT Time Calculation (min) 38 min  -EC      Total Timed Code Minutes- OT 38 minute(s)  -EC      OT Received On 05/06/24  -EC         Timed Charges    44350 - OT Therapeutic Exercise Minutes 10  -EC      72399 - OT Self Care/Mgmt Minutes 28  -EC         Total Minutes    Timed Charges Total Minutes 38  -EC       Total Minutes 38  -EC                User Key  (r) = Recorded By, (t) = Taken By, (c) = Cosigned By      Initials Name Provider Type    EC Lissette Salazar, OTR/L Occupational Therapist                  Therapy Charges for Today       Code Description Service Date Service Provider Modifiers Qty    06153520092  OT THER PROC EA 15 MIN 5/6/2024 Lissette Salazar, OTR/L GO 1    [id number]  OT SELF CARE/MGMT/TRAIN EA 15 MIN 5/6/2024 Lissette Salazar, OTR/L GO 2                 Lissette Salazar OTR/L  5/6/2024

## 2024-05-06 NOTE — PLAN OF CARE
Goal Outcome Evaluation:  Plan of Care Reviewed With: patient        Progress: improving  Outcome Evaluation: Patient A/O x 4. VSS. On room air. Up with standby assist and walker to BR. PRN for cough requested once with good results. Son at bedside throughout the night. No new concerns. Safety maintained.

## 2024-05-10 ENCOUNTER — HOME HEALTH ADMISSION (OUTPATIENT)
Dept: HOME HEALTH SERVICES | Facility: HOME HEALTHCARE | Age: 89
End: 2024-05-10
Payer: MEDICARE

## 2024-05-13 ENCOUNTER — HOSPITAL ENCOUNTER (EMERGENCY)
Facility: HOSPITAL | Age: 89
Discharge: HOME | End: 2024-05-14
Attending: EMERGENCY MEDICINE
Payer: MEDICARE

## 2024-05-13 ENCOUNTER — APPOINTMENT (OUTPATIENT)
Dept: GENERAL RADIOLOGY | Facility: HOSPITAL | Age: 89
End: 2024-05-13
Payer: MEDICARE

## 2024-05-13 DIAGNOSIS — R60.0 PERIPHERAL EDEMA: ICD-10-CM

## 2024-05-13 DIAGNOSIS — N18.4 STAGE 4 CHRONIC KIDNEY DISEASE: ICD-10-CM

## 2024-05-13 DIAGNOSIS — R06.00 DYSPNEA, UNSPECIFIED TYPE: Primary | ICD-10-CM

## 2024-05-13 DIAGNOSIS — I50.9 OTHER CONGESTIVE HEART FAILURE: ICD-10-CM

## 2024-05-13 LAB
ALBUMIN SERPL-MCNC: 4.2 G/DL (ref 3.5–5.2)
ALBUMIN/GLOB SERPL: 1.2 G/DL
ALP SERPL-CCNC: 105 U/L (ref 39–117)
ALT SERPL W P-5'-P-CCNC: 17 U/L (ref 1–33)
ANION GAP SERPL CALCULATED.3IONS-SCNC: 14 MMOL/L (ref 5–15)
AST SERPL-CCNC: 26 U/L (ref 1–32)
ATMOSPHERIC PRESS: 747 MMHG
BASE EXCESS BLDV CALC-SCNC: -0.2 MMOL/L (ref 0–2)
BASOPHILS # BLD AUTO: 0.02 10*3/MM3 (ref 0–0.2)
BASOPHILS NFR BLD AUTO: 0.3 % (ref 0–1.5)
BDY SITE: ABNORMAL
BILIRUB SERPL-MCNC: 0.6 MG/DL (ref 0–1.2)
BODY TEMPERATURE: 37
BUN SERPL-MCNC: 62 MG/DL (ref 8–23)
BUN/CREAT SERPL: 24.9 (ref 7–25)
CALCIUM SPEC-SCNC: 9.3 MG/DL (ref 8.6–10.5)
CHLORIDE SERPL-SCNC: 101 MMOL/L (ref 98–107)
CO2 SERPL-SCNC: 21 MMOL/L (ref 22–29)
COHGB MFR BLD: 1.4 % (ref 0–5)
CREAT SERPL-MCNC: 2.49 MG/DL (ref 0.57–1)
DEPRECATED RDW RBC AUTO: 54.6 FL (ref 37–54)
EGFRCR SERPLBLD CKD-EPI 2021: 18.1 ML/MIN/1.73
EOSINOPHIL # BLD AUTO: 0.12 10*3/MM3 (ref 0–0.4)
EOSINOPHIL NFR BLD AUTO: 1.9 % (ref 0.3–6.2)
ERYTHROCYTE [DISTWIDTH] IN BLOOD BY AUTOMATED COUNT: 16 % (ref 12.3–15.4)
GLOBULIN UR ELPH-MCNC: 3.5 GM/DL
GLUCOSE SERPL-MCNC: 249 MG/DL (ref 65–99)
HCO3 BLDV-SCNC: 24.2 MMOL/L (ref 22–28)
HCT VFR BLD AUTO: 28.1 % (ref 34–46.6)
HGB BLD-MCNC: 8.9 G/DL (ref 12–15.9)
HGB BLDA-MCNC: 9 G/DL (ref 12–16)
HOLD SPECIMEN: NORMAL
IMM GRANULOCYTES # BLD AUTO: 0.04 10*3/MM3 (ref 0–0.05)
IMM GRANULOCYTES NFR BLD AUTO: 0.6 % (ref 0–0.5)
INR PPP: 1.49 (ref 0.91–1.09)
LYMPHOCYTES # BLD AUTO: 0.83 10*3/MM3 (ref 0.7–3.1)
LYMPHOCYTES NFR BLD AUTO: 13.3 % (ref 19.6–45.3)
Lab: ABNORMAL
MAGNESIUM SERPL-MCNC: 2.4 MG/DL (ref 1.6–2.4)
MCH RBC QN AUTO: 29.8 PG (ref 26.6–33)
MCHC RBC AUTO-ENTMCNC: 31.7 G/DL (ref 31.5–35.7)
MCV RBC AUTO: 94 FL (ref 79–97)
METHGB BLD QL: 0.5 % (ref 0–3)
MODALITY: ABNORMAL
MONOCYTES # BLD AUTO: 0.6 10*3/MM3 (ref 0.1–0.9)
MONOCYTES NFR BLD AUTO: 9.6 % (ref 5–12)
NEUTROPHILS NFR BLD AUTO: 4.62 10*3/MM3 (ref 1.7–7)
NEUTROPHILS NFR BLD AUTO: 74.3 % (ref 42.7–76)
NRBC BLD AUTO-RTO: 0 /100 WBC (ref 0–0.2)
NT-PROBNP SERPL-MCNC: 8672 PG/ML (ref 0–1800)
OXYHGB MFR BLDV: 69.4 % (ref 60–85)
PCO2 BLDV: 37.5 MM HG (ref 41–51)
PH BLDV: 7.42 PH UNITS (ref 7.32–7.42)
PLATELET # BLD AUTO: 243 10*3/MM3 (ref 140–450)
PMV BLD AUTO: 11.6 FL (ref 6–12)
PO2 BLDV: 37.7 MM HG (ref 27–53)
POTASSIUM BLDV-SCNC: 4.6 MMOL/L (ref 3.5–5.2)
POTASSIUM SERPL-SCNC: 4.6 MMOL/L (ref 3.5–5.2)
PROT SERPL-MCNC: 7.7 G/DL (ref 6–8.5)
PROTHROMBIN TIME: 18.6 SECONDS (ref 11.8–14.8)
RBC # BLD AUTO: 2.99 10*6/MM3 (ref 3.77–5.28)
SAO2 % BLDCOV: 70.7 % (ref 45–75)
SODIUM BLDV-SCNC: 139 MMOL/L (ref 136–145)
SODIUM SERPL-SCNC: 136 MMOL/L (ref 136–145)
TROPONIN T SERPL HS-MCNC: 64 NG/L
VENTILATOR MODE: ABNORMAL
WBC NRBC COR # BLD AUTO: 6.23 10*3/MM3 (ref 3.4–10.8)

## 2024-05-13 PROCEDURE — 85610 PROTHROMBIN TIME: CPT | Performed by: EMERGENCY MEDICINE

## 2024-05-13 PROCEDURE — 71045 X-RAY EXAM CHEST 1 VIEW: CPT

## 2024-05-13 PROCEDURE — 84484 ASSAY OF TROPONIN QUANT: CPT | Performed by: EMERGENCY MEDICINE

## 2024-05-13 PROCEDURE — 82820 HEMOGLOBIN-OXYGEN AFFINITY: CPT

## 2024-05-13 PROCEDURE — 99284 EMERGENCY DEPT VISIT MOD MDM: CPT

## 2024-05-13 PROCEDURE — 96374 THER/PROPH/DIAG INJ IV PUSH: CPT

## 2024-05-13 PROCEDURE — 82805 BLOOD GASES W/O2 SATURATION: CPT

## 2024-05-13 PROCEDURE — 83735 ASSAY OF MAGNESIUM: CPT | Performed by: EMERGENCY MEDICINE

## 2024-05-13 PROCEDURE — 80053 COMPREHEN METABOLIC PANEL: CPT | Performed by: EMERGENCY MEDICINE

## 2024-05-13 PROCEDURE — 85025 COMPLETE CBC W/AUTO DIFF WBC: CPT | Performed by: EMERGENCY MEDICINE

## 2024-05-13 PROCEDURE — 93005 ELECTROCARDIOGRAM TRACING: CPT | Performed by: EMERGENCY MEDICINE

## 2024-05-13 PROCEDURE — 83880 ASSAY OF NATRIURETIC PEPTIDE: CPT | Performed by: EMERGENCY MEDICINE

## 2024-05-13 PROCEDURE — 25010000002 FUROSEMIDE PER 20 MG: Performed by: EMERGENCY MEDICINE

## 2024-05-13 RX ORDER — SODIUM CHLORIDE 0.9 % (FLUSH) 0.9 %
10 SYRINGE (ML) INJECTION AS NEEDED
Status: DISCONTINUED | OUTPATIENT
Start: 2024-05-13 | End: 2024-05-14 | Stop reason: HOSPADM

## 2024-05-13 RX ORDER — FUROSEMIDE 10 MG/ML
40 INJECTION INTRAMUSCULAR; INTRAVENOUS ONCE
Status: COMPLETED | OUTPATIENT
Start: 2024-05-13 | End: 2024-05-13

## 2024-05-13 RX ADMIN — FUROSEMIDE 40 MG: 10 INJECTION, SOLUTION INTRAVENOUS at 22:45

## 2024-05-14 VITALS
OXYGEN SATURATION: 98 % | DIASTOLIC BLOOD PRESSURE: 66 MMHG | TEMPERATURE: 98 F | RESPIRATION RATE: 18 BRPM | HEART RATE: 88 BPM | SYSTOLIC BLOOD PRESSURE: 142 MMHG

## 2024-05-14 LAB
GEN 5 2HR TROPONIN T REFLEX: 65 NG/L
TROPONIN T DELTA: 1 NG/L

## 2024-05-14 PROCEDURE — 36415 COLL VENOUS BLD VENIPUNCTURE: CPT

## 2024-05-14 PROCEDURE — 84484 ASSAY OF TROPONIN QUANT: CPT | Performed by: EMERGENCY MEDICINE

## 2024-05-14 NOTE — ED PROVIDER NOTES
Subjective   History of Present Illness  89-year-old female presents to the ED with complaint of shortness of breath and lower extremity edema.  She has a history of heart failure with preserved EF, stage IV CKD, hypertension, hyperlipidemia, diabetes, A-fib on anticoagulation.  Recent admission to the hospital for altered mental status secondary to digoxin toxicity, digoxin has since been discontinued.  Patient states she has been feeling short of breath for the past couple of days.  She reports associated lower extremity edema.  Has been using neb treatments at inpatient rehab with minimal relief.  No chest pain, abdominal pain, nausea or vomiting.  Fever or chills, sore throat or rhinorrhea.    History provided by:  Patient      Review of Systems   All other systems reviewed and are negative.      Past Medical History:   Diagnosis Date    Atrial fibrillation     Diabetes mellitus     Difficulty walking Approximately 2 years    GERD (gastroesophageal reflux disease)     High cholesterol     Hypertension     OA (osteoarthritis)     Pneumonia     few years ago    Stage 4 chronic kidney disease 08/18/2023       Allergies   Allergen Reactions    Singulair [Montelukast] Cough    Nsaids Other (See Comments)     Due to kidney disease.      Ace Inhibitors Cough       Past Surgical History:   Procedure Laterality Date    BREAST BIOPSY Right     BREAST CYST ASPIRATION      HYSTERECTOMY      OOPHORECTOMY      TONSILLECTOMY         Family History   Problem Relation Age of Onset    No Known Problems Father     Hypertension Mother     No Known Problems Sister     No Known Problems Brother     No Known Problems Daughter     No Known Problems Son     No Known Problems Maternal Grandmother     No Known Problems Paternal Grandmother     No Known Problems Maternal Aunt     No Known Problems Paternal Aunt     No Known Problems Other     Colon cancer Neg Hx     BRCA 1/2 Neg Hx     Breast cancer Neg Hx     Endometrial cancer Neg Hx      Ovarian cancer Neg Hx        Social History     Socioeconomic History    Marital status:    Tobacco Use    Smoking status: Never    Smokeless tobacco: Never   Vaping Use    Vaping status: Never Used   Substance and Sexual Activity    Alcohol use: No    Drug use: No    Sexual activity: Not Currently     Partners: Female     Birth control/protection: Surgical           Objective   Physical Exam  Vitals and nursing note reviewed.   Constitutional:       Appearance: She is well-developed and normal weight.   HENT:      Head: Normocephalic and atraumatic.   Neck:      Vascular: No JVD.   Cardiovascular:      Rate and Rhythm: Normal rate and regular rhythm.   Pulmonary:      Effort: Pulmonary effort is normal. No tachypnea or accessory muscle usage.      Breath sounds: Normal breath sounds. No wheezing, rhonchi or rales.   Abdominal:      General: Bowel sounds are normal.      Palpations: Abdomen is soft.   Musculoskeletal:         General: Normal range of motion.      Cervical back: Normal range of motion.      Right lower leg: Edema present.      Left lower leg: Edema present.      Comments: Symmetric 2+ bilateral lower extremity pitting edema   Skin:     General: Skin is warm and dry.      Capillary Refill: Capillary refill takes less than 2 seconds.   Neurological:      General: No focal deficit present.      Mental Status: She is alert and oriented to person, place, and time. She is disoriented.         Procedures         Lab Results (last 24 hours)       Procedure Component Value Units Date/Time    CBC & Differential [[id number]]  (Abnormal) Collected: 05/13/24 2205    Specimen: Blood Updated: 05/13/24 2216    Narrative:      The following orders were created for panel order CBC & Differential.  Procedure                               Abnormality         Status                     ---------                               -----------         ------                     CBC Auto Differential[[id number]]         Abnormal            Final result                 Please view results for these tests on the individual orders.    Protime-INR [[id number]]  (Abnormal) Collected: 05/13/24 2205    Specimen: Blood Updated: 05/13/24 2230     Protime 18.6 Seconds      INR 1.49    CBC Auto Differential [[id number]]  (Abnormal) Collected: 05/13/24 2205    Specimen: Blood Updated: 05/13/24 2216     WBC 6.23 10*3/mm3      RBC 2.99 10*6/mm3      Hemoglobin 8.9 g/dL      Hematocrit 28.1 %      MCV 94.0 fL      MCH 29.8 pg      MCHC 31.7 g/dL      RDW 16.0 %      RDW-SD 54.6 fl      MPV 11.6 fL      Platelets 243 10*3/mm3      Neutrophil % 74.3 %      Lymphocyte % 13.3 %      Monocyte % 9.6 %      Eosinophil % 1.9 %      Basophil % 0.3 %      Immature Grans % 0.6 %      Neutrophils, Absolute 4.62 10*3/mm3      Lymphocytes, Absolute 0.83 10*3/mm3      Monocytes, Absolute 0.60 10*3/mm3      Eosinophils, Absolute 0.12 10*3/mm3      Basophils, Absolute 0.02 10*3/mm3      Immature Grans, Absolute 0.04 10*3/mm3      nRBC 0.0 /100 WBC     Blood Gas, Venous With Co-Ox [[id number]]  (Abnormal) Collected: 05/13/24 2233    Specimen: Venous Blood Updated: 05/13/24 2239     Site Right Radial     pH, Venous 7.419 pH Units      pCO2, Venous 37.5 mm Hg      Comment: 84 Value below reference range        pO2, Venous 37.7 mm Hg      HCO3, Venous 24.2 mmol/L      Base Excess, Venous -0.2 mmol/L      Comment: 84 Value below reference range        O2 Saturation, Venous 70.7 %      Hemoglobin, Blood Gas 9.0 g/dL      Comment: 84 Value below reference range        Oxyhemoglobin Venous 69.4 %      Methemoglobin Venous 0.5 %      Carboxyhemoglobin Venous 1.4 %      Temperature 37.0     Sodium, Venous 139 mmol/L      Potassium, Venous 4.6 mmol/L      Barometric Pressure for Blood Gas 747 mmHg      Modality Room Air     Ventilator Mode NA     Collected by [id number]     Comment: Meter: [id number]     :  Elder Harrell, CRT       Comprehensive Metabolic Panel  [[id number]]  (Abnormal) Collected: 05/13/24 2251    Specimen: Blood Updated: 05/13/24 2319     Glucose 249 mg/dL      BUN 62 mg/dL      Creatinine 2.49 mg/dL      Sodium 136 mmol/L      Potassium 4.6 mmol/L      Chloride 101 mmol/L      CO2 21.0 mmol/L      Calcium 9.3 mg/dL      Total Protein 7.7 g/dL      Albumin 4.2 g/dL      ALT (SGPT) 17 U/L      AST (SGOT) 26 U/L      Alkaline Phosphatase 105 U/L      Total Bilirubin 0.6 mg/dL      Globulin 3.5 gm/dL      A/G Ratio 1.2 g/dL      BUN/Creatinine Ratio 24.9     Anion Gap 14.0 mmol/L      eGFR 18.1 mL/min/1.73     Narrative:      GFR Normal >60  Chronic Kidney Disease <60  Kidney Failure <15    The GFR formula is only valid for adults with stable renal function between ages 18 and 70.    Magnesium [[id number]]  (Normal) Collected: 05/13/24 2251    Specimen: Blood Updated: 05/13/24 2319     Magnesium 2.4 mg/dL     BNP [[id number]]  (Abnormal) Collected: 05/13/24 2251    Specimen: Blood Updated: 05/13/24 2319     proBNP 8,672.0 pg/mL     Narrative:      This assay is used as an aid in the diagnosis of individuals suspected of having heart failure. It can be used as an aid in the diagnosis of acute decompensated heart failure (ADHF) in patients presenting with signs and symptoms of ADHF to the emergency department (ED). In addition, NT-proBNP of <300 pg/mL indicates ADHF is not likely.    Age Range Result Interpretation  NT-proBNP Concentration (pg/mL:      <50             Positive            >450                   Gray                 300-450                    Negative             <300    50-75           Positive            >900                  Gray                300-900                  Negative            <300      >75             Positive            >1800                  Gray                300-1800                  Negative            <300    High Sensitivity Troponin T [[id number]]  (Abnormal) Collected: 05/13/24 2251    Specimen: Blood Updated: 05/13/24 2328      HS Troponin T 64 ng/L     Narrative:      High Sensitive Troponin T Reference Range:  <14.0 ng/L- Negative Female for AMI  <22.0 ng/L- Negative Male for AMI  >=14 - Abnormal Female indicating possible myocardial injury.  >=22 - Abnormal Male indicating possible myocardial injury.   Clinicians would have to utilize clinical acumen, EKG, Troponin, and serial changes to determine if it is an Acute Myocardial Infarction or myocardial injury due to an underlying chronic condition.         High Sensitivity Troponin T 2Hr [[id number]]  (Abnormal) Collected: 05/14/24 0050    Specimen: Blood Updated: 05/14/24 0124     HS Troponin T 65 ng/L      Troponin T Delta 1 ng/L     Narrative:      High Sensitive Troponin T Reference Range:  <14.0 ng/L- Negative Female for AMI  <22.0 ng/L- Negative Male for AMI  >=14 - Abnormal Female indicating possible myocardial injury.  >=22 - Abnormal Male indicating possible myocardial injury.   Clinicians would have to utilize clinical acumen, EKG, Troponin, and serial changes to determine if it is an Acute Myocardial Infarction or myocardial injury due to an underlying chronic condition.              No Radiology Exams Resulted Within Past 24 Hours   ED Course  ED Course as of 05/14/24 0223   Tue May 14, 2024   0218 89-year-old female presents to the emergency department from inpatient rehab with complaint of shortness of breath and lower extremity edema.  She has a history of heart failure with preserved EF, stage IV CKD, hypertension, hyperlipidemia, diabetes, A-fib on anticoagulation.  Oxygen saturations 100% on room air when patient arrived to the ED.  Lungs are clear.  She does have symmetric 2+ bilateral lower extremity pitting edema.  BNP 8600, down from 10,000.  Initial high-sensitivity troponin 64, repeat 65.  This is around her baseline and on a flat trajectory, do not suspect acute coronary syndrome.  Hemoglobin 8.9, which is slightly up from baseline.  Creatinine 2.49, which is  improved from baseline.  Lungs clear on chest x-ray.  Received dose of Lasix in ED for her peripheral edema.  She does not need admission to the hospital for inpatient treatment.  Will discharge back to rehab for ongoing physical therapy and Occupational Therapy  [AW]      ED Course User Index  [AW] Kevin Sanchez MD                                             Medical Decision Making  Amount and/or Complexity of Data Reviewed  Labs: ordered.  Radiology: ordered.  ECG/medicine tests: ordered.    Risk  Prescription drug management.        Final diagnoses:   Dyspnea, unspecified type   Other congestive heart failure   Stage 4 chronic kidney disease   Peripheral edema       ED Disposition  ED Disposition       ED Disposition   Discharge    Condition   Stable    Comment   --               Zachariah Huffman MD  [address] [place] [address] [address] [place] [address]  [phone]    Schedule an appointment as soon as possible for a visit in 5 days           Medication List      No changes were made to your prescriptions during this visit.            Kevin Sanchez MD  05/14/24 022

## 2024-05-18 LAB
QT INTERVAL: 356 MS
QTC INTERVAL: 418 MS

## 2024-05-22 ENCOUNTER — APPOINTMENT (OUTPATIENT)
Dept: GENERAL RADIOLOGY | Facility: HOSPITAL | Age: 89
DRG: 683 | End: 2024-05-22
Payer: MEDICARE

## 2024-05-22 ENCOUNTER — HOSPITAL ENCOUNTER (INPATIENT)
Facility: HOSPITAL | Age: 89
LOS: 4 days | Discharge: NURSING, REHAB OR LONG-TERM CARE | DRG: 683 | End: 2024-05-26
Attending: EMERGENCY MEDICINE | Admitting: INTERNAL MEDICINE
Payer: MEDICARE

## 2024-05-22 ENCOUNTER — APPOINTMENT (OUTPATIENT)
Dept: ULTRASOUND IMAGING | Facility: HOSPITAL | Age: 89
DRG: 683 | End: 2024-05-22
Payer: MEDICARE

## 2024-05-22 DIAGNOSIS — Z74.09 IMPAIRED MOBILITY: ICD-10-CM

## 2024-05-22 DIAGNOSIS — I50.9 CONGESTIVE HEART FAILURE, UNSPECIFIED HF CHRONICITY, UNSPECIFIED HEART FAILURE TYPE: ICD-10-CM

## 2024-05-22 DIAGNOSIS — E87.5 HYPERKALEMIA: ICD-10-CM

## 2024-05-22 DIAGNOSIS — N17.9 ACUTE KIDNEY INJURY: Primary | ICD-10-CM

## 2024-05-22 DIAGNOSIS — E83.41 HYPERMAGNESEMIA: ICD-10-CM

## 2024-05-22 LAB
ALBUMIN SERPL-MCNC: 3.7 G/DL (ref 3.5–5.2)
ALBUMIN SERPL-MCNC: 3.8 G/DL (ref 3.5–5.2)
ALBUMIN/GLOB SERPL: 1.2 G/DL
ALBUMIN/GLOB SERPL: 1.2 G/DL
ALP SERPL-CCNC: 81 U/L (ref 39–117)
ALP SERPL-CCNC: 86 U/L (ref 39–117)
ALT SERPL W P-5'-P-CCNC: 14 U/L (ref 1–33)
ALT SERPL W P-5'-P-CCNC: 15 U/L (ref 1–33)
ANION GAP SERPL CALCULATED.3IONS-SCNC: 14 MMOL/L (ref 5–15)
ANION GAP SERPL CALCULATED.3IONS-SCNC: 16 MMOL/L (ref 5–15)
AST SERPL-CCNC: 25 U/L (ref 1–32)
AST SERPL-CCNC: 31 U/L (ref 1–32)
BASOPHILS # BLD AUTO: 0.01 10*3/MM3 (ref 0–0.2)
BASOPHILS NFR BLD AUTO: 0.2 % (ref 0–1.5)
BILIRUB SERPL-MCNC: 0.5 MG/DL (ref 0–1.2)
BILIRUB SERPL-MCNC: 0.5 MG/DL (ref 0–1.2)
BUN SERPL-MCNC: 100 MG/DL (ref 8–23)
BUN SERPL-MCNC: 102 MG/DL (ref 8–23)
BUN/CREAT SERPL: 26.9 (ref 7–25)
BUN/CREAT SERPL: 27.2 (ref 7–25)
CALCIUM SPEC-SCNC: 8.8 MG/DL (ref 8.6–10.5)
CALCIUM SPEC-SCNC: 9 MG/DL (ref 8.6–10.5)
CHLORIDE SERPL-SCNC: 102 MMOL/L (ref 98–107)
CHLORIDE SERPL-SCNC: 104 MMOL/L (ref 98–107)
CO2 SERPL-SCNC: 19 MMOL/L (ref 22–29)
CO2 SERPL-SCNC: 20 MMOL/L (ref 22–29)
CREAT SERPL-MCNC: 3.67 MG/DL (ref 0.57–1)
CREAT SERPL-MCNC: 3.79 MG/DL (ref 0.57–1)
DEPRECATED RDW RBC AUTO: 56.2 FL (ref 37–54)
EGFRCR SERPLBLD CKD-EPI 2021: 10.9 ML/MIN/1.73
EGFRCR SERPLBLD CKD-EPI 2021: 11.3 ML/MIN/1.73
EOSINOPHIL # BLD AUTO: 0.07 10*3/MM3 (ref 0–0.4)
EOSINOPHIL NFR BLD AUTO: 1.6 % (ref 0.3–6.2)
ERYTHROCYTE [DISTWIDTH] IN BLOOD BY AUTOMATED COUNT: 15.9 % (ref 12.3–15.4)
GEN 5 2HR TROPONIN T REFLEX: 66 NG/L
GLOBULIN UR ELPH-MCNC: 3 GM/DL
GLOBULIN UR ELPH-MCNC: 3.1 GM/DL
GLUCOSE BLDC GLUCOMTR-MCNC: 238 MG/DL (ref 70–130)
GLUCOSE SERPL-MCNC: 138 MG/DL (ref 65–99)
GLUCOSE SERPL-MCNC: 187 MG/DL (ref 65–99)
HCT VFR BLD AUTO: 26.3 % (ref 34–46.6)
HGB BLD-MCNC: 8.1 G/DL (ref 12–15.9)
IMM GRANULOCYTES # BLD AUTO: 0.01 10*3/MM3 (ref 0–0.05)
IMM GRANULOCYTES NFR BLD AUTO: 0.2 % (ref 0–0.5)
LYMPHOCYTES # BLD AUTO: 0.57 10*3/MM3 (ref 0.7–3.1)
LYMPHOCYTES NFR BLD AUTO: 12.8 % (ref 19.6–45.3)
MAGNESIUM SERPL-MCNC: 2.8 MG/DL (ref 1.6–2.4)
MCH RBC QN AUTO: 29.8 PG (ref 26.6–33)
MCHC RBC AUTO-ENTMCNC: 30.8 G/DL (ref 31.5–35.7)
MCV RBC AUTO: 96.7 FL (ref 79–97)
MONOCYTES # BLD AUTO: 0.46 10*3/MM3 (ref 0.1–0.9)
MONOCYTES NFR BLD AUTO: 10.4 % (ref 5–12)
NEUTROPHILS NFR BLD AUTO: 3.32 10*3/MM3 (ref 1.7–7)
NEUTROPHILS NFR BLD AUTO: 74.8 % (ref 42.7–76)
NRBC BLD AUTO-RTO: 0 /100 WBC (ref 0–0.2)
NT-PROBNP SERPL-MCNC: 9331 PG/ML (ref 0–1800)
PLATELET # BLD AUTO: 175 10*3/MM3 (ref 140–450)
PMV BLD AUTO: 11.6 FL (ref 6–12)
POTASSIUM SERPL-SCNC: 5.2 MMOL/L (ref 3.5–5.2)
POTASSIUM SERPL-SCNC: 5.5 MMOL/L (ref 3.5–5.2)
PROT SERPL-MCNC: 6.7 G/DL (ref 6–8.5)
PROT SERPL-MCNC: 6.9 G/DL (ref 6–8.5)
RBC # BLD AUTO: 2.72 10*6/MM3 (ref 3.77–5.28)
SODIUM SERPL-SCNC: 137 MMOL/L (ref 136–145)
SODIUM SERPL-SCNC: 138 MMOL/L (ref 136–145)
TROPONIN T DELTA: 1 NG/L
TROPONIN T SERPL HS-MCNC: 65 NG/L
WBC NRBC COR # BLD AUTO: 4.44 10*3/MM3 (ref 3.4–10.8)

## 2024-05-22 PROCEDURE — 84484 ASSAY OF TROPONIN QUANT: CPT | Performed by: EMERGENCY MEDICINE

## 2024-05-22 PROCEDURE — 93005 ELECTROCARDIOGRAM TRACING: CPT | Performed by: EMERGENCY MEDICINE

## 2024-05-22 PROCEDURE — 63710000001 INSULIN GLARGINE PER 5 UNITS: Performed by: INTERNAL MEDICINE

## 2024-05-22 PROCEDURE — 85025 COMPLETE CBC W/AUTO DIFF WBC: CPT | Performed by: EMERGENCY MEDICINE

## 2024-05-22 PROCEDURE — 93971 EXTREMITY STUDY: CPT

## 2024-05-22 PROCEDURE — 93010 ELECTROCARDIOGRAM REPORT: CPT | Performed by: INTERNAL MEDICINE

## 2024-05-22 PROCEDURE — 25810000003 LACTATED RINGERS SOLUTION: Performed by: EMERGENCY MEDICINE

## 2024-05-22 PROCEDURE — 80053 COMPREHEN METABOLIC PANEL: CPT | Performed by: EMERGENCY MEDICINE

## 2024-05-22 PROCEDURE — 82948 REAGENT STRIP/BLOOD GLUCOSE: CPT

## 2024-05-22 PROCEDURE — 83880 ASSAY OF NATRIURETIC PEPTIDE: CPT | Performed by: EMERGENCY MEDICINE

## 2024-05-22 PROCEDURE — 83735 ASSAY OF MAGNESIUM: CPT | Performed by: EMERGENCY MEDICINE

## 2024-05-22 PROCEDURE — 71045 X-RAY EXAM CHEST 1 VIEW: CPT

## 2024-05-22 PROCEDURE — 94799 UNLISTED PULMONARY SVC/PX: CPT

## 2024-05-22 PROCEDURE — 80053 COMPREHEN METABOLIC PANEL: CPT | Performed by: INTERNAL MEDICINE

## 2024-05-22 PROCEDURE — 36415 COLL VENOUS BLD VENIPUNCTURE: CPT

## 2024-05-22 PROCEDURE — 99285 EMERGENCY DEPT VISIT HI MDM: CPT

## 2024-05-22 PROCEDURE — 93971 EXTREMITY STUDY: CPT | Performed by: SURGERY

## 2024-05-22 PROCEDURE — 25810000003 SODIUM CHLORIDE 0.9 % SOLUTION: Performed by: EMERGENCY MEDICINE

## 2024-05-22 PROCEDURE — 25810000003 SODIUM CHLORIDE 0.9 % SOLUTION: Performed by: INTERNAL MEDICINE

## 2024-05-22 RX ORDER — BISACODYL 10 MG
10 SUPPOSITORY, RECTAL RECTAL DAILY PRN
Status: DISCONTINUED | OUTPATIENT
Start: 2024-05-22 | End: 2024-05-26 | Stop reason: HOSPADM

## 2024-05-22 RX ORDER — SODIUM CHLORIDE 0.9 % (FLUSH) 0.9 %
10 SYRINGE (ML) INJECTION AS NEEDED
Status: DISCONTINUED | OUTPATIENT
Start: 2024-05-22 | End: 2024-05-26 | Stop reason: HOSPADM

## 2024-05-22 RX ORDER — SODIUM BICARBONATE 650 MG/1
650 TABLET ORAL 3 TIMES DAILY
Status: DISCONTINUED | OUTPATIENT
Start: 2024-05-22 | End: 2024-05-26 | Stop reason: HOSPADM

## 2024-05-22 RX ORDER — SODIUM CHLORIDE 9 MG/ML
50 INJECTION, SOLUTION INTRAVENOUS CONTINUOUS
Status: DISCONTINUED | OUTPATIENT
Start: 2024-05-22 | End: 2024-05-25

## 2024-05-22 RX ORDER — DOCUSATE SODIUM 100 MG/1
100 CAPSULE, LIQUID FILLED ORAL 2 TIMES DAILY PRN
Status: DISCONTINUED | OUTPATIENT
Start: 2024-05-22 | End: 2024-05-26 | Stop reason: HOSPADM

## 2024-05-22 RX ORDER — ONDANSETRON 2 MG/ML
4 INJECTION INTRAMUSCULAR; INTRAVENOUS EVERY 6 HOURS PRN
Status: DISCONTINUED | OUTPATIENT
Start: 2024-05-22 | End: 2024-05-26 | Stop reason: HOSPADM

## 2024-05-22 RX ORDER — IBUPROFEN 600 MG/1
1 TABLET ORAL
Status: DISCONTINUED | OUTPATIENT
Start: 2024-05-22 | End: 2024-05-26 | Stop reason: HOSPADM

## 2024-05-22 RX ORDER — FUROSEMIDE 40 MG/1
40 TABLET ORAL 2 TIMES DAILY
COMMUNITY
End: 2024-05-26 | Stop reason: HOSPADM

## 2024-05-22 RX ORDER — SODIUM CHLORIDE 9 MG/ML
100 INJECTION, SOLUTION INTRAVENOUS CONTINUOUS
Status: DISCONTINUED | OUTPATIENT
Start: 2024-05-22 | End: 2024-05-22

## 2024-05-22 RX ORDER — ACETAMINOPHEN 325 MG/1
650 TABLET ORAL EVERY 4 HOURS PRN
Status: DISCONTINUED | OUTPATIENT
Start: 2024-05-22 | End: 2024-05-26 | Stop reason: HOSPADM

## 2024-05-22 RX ORDER — BISACODYL 5 MG/1
5 TABLET, DELAYED RELEASE ORAL DAILY PRN
Status: DISCONTINUED | OUTPATIENT
Start: 2024-05-22 | End: 2024-05-26 | Stop reason: HOSPADM

## 2024-05-22 RX ORDER — HYDRALAZINE HYDROCHLORIDE 50 MG/1
100 TABLET, FILM COATED ORAL 3 TIMES DAILY
Status: DISCONTINUED | OUTPATIENT
Start: 2024-05-22 | End: 2024-05-26 | Stop reason: HOSPADM

## 2024-05-22 RX ORDER — CARVEDILOL 6.25 MG/1
6.25 TABLET ORAL 2 TIMES DAILY WITH MEALS
Status: DISCONTINUED | OUTPATIENT
Start: 2024-05-22 | End: 2024-05-26 | Stop reason: HOSPADM

## 2024-05-22 RX ORDER — FENOFIBRATE 145 MG/1
145 TABLET, COATED ORAL DAILY
Status: DISCONTINUED | OUTPATIENT
Start: 2024-05-22 | End: 2024-05-23

## 2024-05-22 RX ORDER — SODIUM CHLORIDE 0.9 % (FLUSH) 0.9 %
10 SYRINGE (ML) INJECTION EVERY 12 HOURS SCHEDULED
Status: DISCONTINUED | OUTPATIENT
Start: 2024-05-22 | End: 2024-05-26 | Stop reason: HOSPADM

## 2024-05-22 RX ORDER — PANTOPRAZOLE SODIUM 40 MG/1
40 TABLET, DELAYED RELEASE ORAL
Status: DISCONTINUED | OUTPATIENT
Start: 2024-05-23 | End: 2024-05-26 | Stop reason: HOSPADM

## 2024-05-22 RX ORDER — MELATONIN
1000 DAILY
Status: DISCONTINUED | OUTPATIENT
Start: 2024-05-22 | End: 2024-05-26 | Stop reason: HOSPADM

## 2024-05-22 RX ORDER — ALBUTEROL SULFATE 2.5 MG/3ML
2.5 SOLUTION RESPIRATORY (INHALATION) EVERY 4 HOURS PRN
Status: DISCONTINUED | OUTPATIENT
Start: 2024-05-22 | End: 2024-05-23

## 2024-05-22 RX ORDER — SODIUM CHLORIDE 9 MG/ML
40 INJECTION, SOLUTION INTRAVENOUS AS NEEDED
Status: DISCONTINUED | OUTPATIENT
Start: 2024-05-22 | End: 2024-05-26 | Stop reason: HOSPADM

## 2024-05-22 RX ORDER — DEXTROSE MONOHYDRATE 25 G/50ML
10-50 INJECTION, SOLUTION INTRAVENOUS
Status: DISCONTINUED | OUTPATIENT
Start: 2024-05-22 | End: 2024-05-26 | Stop reason: HOSPADM

## 2024-05-22 RX ORDER — INSULIN LISPRO 100 [IU]/ML
1-200 INJECTION, SOLUTION INTRAVENOUS; SUBCUTANEOUS
Status: DISCONTINUED | OUTPATIENT
Start: 2024-05-22 | End: 2024-05-26 | Stop reason: HOSPADM

## 2024-05-22 RX ORDER — ACETAMINOPHEN 160 MG/5ML
650 SOLUTION ORAL EVERY 4 HOURS PRN
Status: DISCONTINUED | OUTPATIENT
Start: 2024-05-22 | End: 2024-05-26 | Stop reason: HOSPADM

## 2024-05-22 RX ORDER — INSULIN LISPRO 100 [IU]/ML
1-200 INJECTION, SOLUTION INTRAVENOUS; SUBCUTANEOUS AS NEEDED
Status: DISCONTINUED | OUTPATIENT
Start: 2024-05-22 | End: 2024-05-26 | Stop reason: HOSPADM

## 2024-05-22 RX ORDER — POLYETHYLENE GLYCOL 3350 17 G/17G
17 POWDER, FOR SOLUTION ORAL DAILY PRN
Status: DISCONTINUED | OUTPATIENT
Start: 2024-05-22 | End: 2024-05-26 | Stop reason: HOSPADM

## 2024-05-22 RX ORDER — ALBUTEROL SULFATE 2.5 MG/3ML
2.5 SOLUTION RESPIRATORY (INHALATION)
Status: DISCONTINUED | OUTPATIENT
Start: 2024-05-22 | End: 2024-05-22

## 2024-05-22 RX ORDER — ASCORBIC ACID 500 MG
500 TABLET ORAL DAILY
Status: DISCONTINUED | OUTPATIENT
Start: 2024-05-22 | End: 2024-05-26 | Stop reason: HOSPADM

## 2024-05-22 RX ORDER — ATORVASTATIN CALCIUM 10 MG/1
20 TABLET, FILM COATED ORAL DAILY
Status: DISCONTINUED | OUTPATIENT
Start: 2024-05-22 | End: 2024-05-26 | Stop reason: HOSPADM

## 2024-05-22 RX ORDER — ALBUTEROL SULFATE 2.5 MG/3ML
2.5 SOLUTION RESPIRATORY (INHALATION)
Status: DISCONTINUED | OUTPATIENT
Start: 2024-05-23 | End: 2024-05-26

## 2024-05-22 RX ORDER — NICOTINE POLACRILEX 4 MG
15 LOZENGE BUCCAL
Status: DISCONTINUED | OUTPATIENT
Start: 2024-05-22 | End: 2024-05-26 | Stop reason: HOSPADM

## 2024-05-22 RX ORDER — ACETAMINOPHEN 650 MG/1
650 SUPPOSITORY RECTAL EVERY 4 HOURS PRN
Status: DISCONTINUED | OUTPATIENT
Start: 2024-05-22 | End: 2024-05-26 | Stop reason: HOSPADM

## 2024-05-22 RX ORDER — AMOXICILLIN 250 MG
2 CAPSULE ORAL 2 TIMES DAILY PRN
Status: DISCONTINUED | OUTPATIENT
Start: 2024-05-22 | End: 2024-05-26 | Stop reason: HOSPADM

## 2024-05-22 RX ORDER — FERROUS SULFATE 325(65) MG
325 TABLET ORAL
Status: DISCONTINUED | OUTPATIENT
Start: 2024-05-23 | End: 2024-05-26 | Stop reason: HOSPADM

## 2024-05-22 RX ADMIN — SODIUM CHLORIDE 100 ML/HR: 9 INJECTION, SOLUTION INTRAVENOUS at 17:35

## 2024-05-22 RX ADMIN — MAGNESIUM GLUCONATE 500 MG ORAL TABLET 400 MG: 500 TABLET ORAL at 20:36

## 2024-05-22 RX ADMIN — HYDRALAZINE HYDROCHLORIDE 100 MG: 50 TABLET ORAL at 20:35

## 2024-05-22 RX ADMIN — SODIUM CHLORIDE 50 ML/HR: 9 INJECTION, SOLUTION INTRAVENOUS at 20:10

## 2024-05-22 RX ADMIN — Medication 1000 UNITS: at 20:34

## 2024-05-22 RX ADMIN — APIXABAN 2.5 MG: 2.5 TABLET, FILM COATED ORAL at 22:45

## 2024-05-22 RX ADMIN — Medication 10 ML: at 20:10

## 2024-05-22 RX ADMIN — SODIUM BICARBONATE 650 MG: 650 TABLET ORAL at 20:36

## 2024-05-22 RX ADMIN — ALBUTEROL SULFATE 2.5 MG: 2.5 SOLUTION RESPIRATORY (INHALATION) at 21:21

## 2024-05-22 RX ADMIN — OXYCODONE HYDROCHLORIDE AND ACETAMINOPHEN 500 MG: 500 TABLET ORAL at 20:33

## 2024-05-22 RX ADMIN — SODIUM CHLORIDE, POTASSIUM CHLORIDE, SODIUM LACTATE AND CALCIUM CHLORIDE 500 ML: 600; 310; 30; 20 INJECTION, SOLUTION INTRAVENOUS at 16:07

## 2024-05-22 RX ADMIN — CARVEDILOL 6.25 MG: 6.25 TABLET, FILM COATED ORAL at 20:33

## 2024-05-22 RX ADMIN — ATORVASTATIN CALCIUM 20 MG: 10 TABLET, FILM COATED ORAL at 20:10

## 2024-05-22 RX ADMIN — INSULIN GLARGINE 12 UNITS: 100 INJECTION, SOLUTION SUBCUTANEOUS at 22:47

## 2024-05-22 NOTE — H&P
[facility] Medicine Services  HISTORY AND PHYSICAL    Date of Admission: 5/22/2024  Primary Care Physician: Gume Ann DO Subjective   Primary Historian: patient    Chief Complaint: swelling and sob    History of Present Illness  I am asked admit this 89-year-old for acute kidney injury on chronic kidney disease.  Patient was recently hospitalized and seen by NP Mary on May 1 to May 6 with following diagnosis:    Date of Admission: 5/1/2024  Date of Discharge:  5/6/2024  Primary Care Physician: Zachariah Huffman MD     Presenting Problem/History of Present Illness:  Mental status changes     Final Discharge Diagnoses:       Active Hospital Problems     Diagnosis      **Acute kidney injury superimposed on stage 4 chronic kidney disease      Chronic heart failure with preserved ejection fraction (HFpEF)      Moderate malnutrition      Metabolic encephalopathy suspect due to elevated creatinine and abnormal urinalysis      Digoxin toxicity, accidental or unintentional, initial encounter      Altered mental status      Acute cystitis with hematuria      Elevated troponin not due to acute coronary syndrome      Severe pulmonary hypertension      Chronic anticoagulation      Chronic diastolic (congestive) heart failure      Benign essential HTN      Persistent atrial fibrillation      Type 2 diabetes mellitus with hyperglycemia, without long-term current use of insulin        I am told by Dr. Sanchez that her medications were further adjusted while at the nursing facility because she was complaining of more swelling.    Diagnostic studies today showed   proBNP of 9331<--8672, troponin of 66<--65<--65  Hemoglobin 8.1 with hematocrit of 26  Patient's creatinine on May 13 was 2.49 --> now at 3.79  Potassium 5.5, bicarb of 19.    Patient based on echocardiogram January 2024 showed severe elevation of RV systolic pressure of greater than 55.  Otherwise EF is normal at 56 to  "60%.    Results for orders placed during the hospital encounter of 01/09/24    Adult Transthoracic Echo Complete W/ Cont if Necessary Per Protocol    Interpretation Summary    Left ventricular systolic function is normal. Left ventricular ejection fraction appears to be 56 - 60%.    The right ventricular cavity is mildly dilated. Borderline low right ventricular systolic function noted.    Biatrial enlargement is noted.    Mild to moderate tricuspid valve regurgitation is present. Estimated right ventricular systolic pressure from tricuspid regurgitation is markedly elevated (>55 mmHg).    Today she had a venous duplex ultrasound of right upper extremity that showed no evidence of DVT or superficial thrombophlebitis.  Her chest x-ray interpreted as mild cardiomegaly with pulmonary vascular congestion but no chavo edema.      This does not appear to be significantly changed from May 13 chest x-ray.    He had renal ultrasound on May 3 is read as no acute kidney abnormality.  Her CT scan of the abdomen and pelvis indicates small to moderate volume ascites and mild mesenteric edema.  Her prior CODE STATUS indicates DO NOT RESUSCITATE on May 1 through May 6 hospital admission.    I spoke to the son over the phone.  As mentioned, her diuretics has been increased due to swelling.  Patient does remain swollen anyway.  Now has worsened renal function  When I spoke to the patient, she confirmed that she does not want to be resuscitated or intubated nor does she want any cardioversion or dialysis    \"Nobody tells me anything.\"    Review of Systems   Otherwise complete ROS reviewed and negative except as mentioned in the HPI.    Past Medical History:   Past Medical History:   Diagnosis Date    Atrial fibrillation     Diabetes mellitus     Difficulty walking Approximately 2 years    GERD (gastroesophageal reflux disease)     High cholesterol     Hypertension     OA (osteoarthritis)     Pneumonia     few years ago    Stage 4 " chronic kidney disease 08/18/2023     Past Surgical History:  Past Surgical History:   Procedure Laterality Date    BREAST BIOPSY Right     BREAST CYST ASPIRATION      HYSTERECTOMY      OOPHORECTOMY      TONSILLECTOMY       Social History:  reports that she has never smoked. She has never used smokeless tobacco. She reports that she does not drink alcohol and does not use drugs.    Family History: family history includes Hypertension in her mother; No Known Problems in her brother, daughter, father, maternal aunt, maternal grandmother, paternal aunt, paternal grandmother, sister, son, and another family member.       Allergies:  Allergies   Allergen Reactions    Singulair [Montelukast] Cough    Nsaids Other (See Comments)     Due to kidney disease.      Ace Inhibitors Cough       Medications:  Prior to Admission medications    Medication Sig Start Date End Date Taking? Authorizing Provider   albuterol (PROVENTIL) (2.5 MG/3ML) 0.083% nebulizer solution Take 2.5 mg by nebulization 4 (Four) Times a Day. 5/6/24   Mary Gallagher APRN   apixaban (ELIQUIS) 2.5 MG tablet tablet Take 1 tablet by mouth 2 (Two) Times a Day.    ProviderRaheel MD   bumetanide (BUMEX) 1 MG tablet Take 1 tablet by mouth Daily. Take 1 tablet by mouth Daily AND 0.5 tablets Every Afternoon. Take WHOLE tablet instead of HALF tablet in the afternoon with weight gain or increased fluid    Raheel Underwood MD   carvedilol (Coreg) 6.25 MG tablet Take 1 tablet by mouth 2 (Two) Times a Day With Meals. 3/21/24   Zachariah Huffman MD   Cholecalciferol 25 MCG (1000 UT) tablet Take 1 tablet by mouth Daily.    Raheel Underwood MD   Cyanocobalamin 1000 MCG/ML kit Inject 1 mL as directed 1 (One) Time Per Week. For 3 weeks then monthly thereafter.    Raheel Underwood MD   dapagliflozin Propanediol (Farxiga) 10 MG tablet Take 10 mg by mouth Daily. 3/21/24   Zachariah Huffman MD   docusate sodium (COLACE) 100 MG capsule Take 1 capsule by  "mouth 2 (Two) Times a Day As Needed for Constipation.    Raheel Underwood MD   fenofibrate (TRICOR) 145 MG tablet Take 1 tablet by mouth Daily.    Raheel Underwood MD   ferrous sulfate 325 (65 FE) MG tablet Take 1 tablet by mouth Daily With Breakfast.    Raheel Underwood MD   glipizide (GLUCOTROL XL) 5 MG ER tablet Take 1 tablet by mouth Daily. 4/18/24   Zachariah Huffman MD   hydrALAZINE (APRESOLINE) 100 MG tablet Take 1 tablet by mouth 3 (Three) Times a Day.    Raheel Underwood MD   losartan (COZAAR) 100 MG tablet Take 1 tablet by mouth Daily.    Raheel Underwood MD   magnesium oxide (MAGOX) 400 (241.3 Mg) MG tablet tablet Take 1 tablet by mouth Daily.    Raheel Underwood MD   NIFEdipine XL (PROCARDIA XL) 90 MG 24 hr tablet Take 1 tablet by mouth Daily. 4/18/24   Zachariah Huffman MD   omeprazole (priLOSEC) 40 MG capsule Take 1 capsule by mouth Daily.    Raheel Underwood MD   simvastatin (ZOCOR) 40 MG tablet Take 1 tablet by mouth Every Night.    Raheel Underwood MD   sodium bicarbonate 650 MG tablet Take 1 tablet by mouth Daily. 10/9/23   Albina Padron APRN   spironolactone (ALDACTONE) 25 MG tablet Take 0.5 tablets by mouth Daily. Hold for elevated potassium 5/6/24   Mary Gallagher APRN   vitamin C (ASCORBIC ACID) 500 MG tablet Take 1 tablet by mouth Daily. 5/17/22   Allyson Do,      I have utilized all available immediate resources to obtain, update, or review the patient's current medications (including all prescriptions, over-the-counter products, herbals, cannabis/cannabidiol products, and vitamin/mineral/dietary (nutritional) supplements).    Objective     Vital Signs: /61   Pulse 75   Temp 97.8 °F (36.6 °C) (Oral)   Resp 18   Ht 157.5 cm (62\")   Wt 71.4 kg (157 lb 4.8 oz)   LMP  (LMP Unknown)   SpO2 98%   BMI 28.77 kg/m²   Physical Exam   GEN: Awake, alert, interactive, in NAD  HEENT: Atraumatic, PERRLA, EOMI, Anicteric, Trachea " midline  Lungs: CTAB, no wheezing/rales/rhonchi  Heart: Irregularly irregular +S1/s2, no gross murmur   ABD: soft, nt/nd, +BS, no guarding/rebound; presence of ventral hernia  Extremities: atraumatic, no cyanosis, nephric and pitting edema of extremities; osteoarthritic changes of fingers noted  Skin: no rashes or lesions  Neuro: AAOx3, no focal deficits  Psych: normal mood & affect\        Results Reviewed:  Lab Results (last 24 hours)       Procedure Component Value Units Date/Time    High Sensitivity Troponin T 2Hr [[id number]]  (Abnormal) Collected: 05/22/24 1606    Specimen: Blood Updated: 05/22/24 1643     HS Troponin T 66 ng/L      Troponin T Delta 1 ng/L     Narrative:      High Sensitive Troponin T Reference Range:  <14.0 ng/L- Negative Female for AMI  <22.0 ng/L- Negative Male for AMI  >=14 - Abnormal Female indicating possible myocardial injury.  >=22 - Abnormal Male indicating possible myocardial injury.   Clinicians would have to utilize clinical acumen, EKG, Troponin, and serial changes to determine if it is an Acute Myocardial Infarction or myocardial injury due to an underlying chronic condition.         High Sensitivity Troponin T [[id number]]  (Abnormal) Collected: 05/22/24 1400    Specimen: Blood Updated: 05/22/24 1438     HS Troponin T 65 ng/L     Narrative:      High Sensitive Troponin T Reference Range:  <14.0 ng/L- Negative Female for AMI  <22.0 ng/L- Negative Male for AMI  >=14 - Abnormal Female indicating possible myocardial injury.  >=22 - Abnormal Male indicating possible myocardial injury.   Clinicians would have to utilize clinical acumen, EKG, Troponin, and serial changes to determine if it is an Acute Myocardial Infarction or myocardial injury due to an underlying chronic condition.         Comprehensive Metabolic Panel [[id number]]  (Abnormal) Collected: 05/22/24 1400    Specimen: Blood Updated: 05/22/24 1436     Glucose 187 mg/dL       mg/dL      Creatinine 3.79 mg/dL      Sodium  137 mmol/L      Potassium 5.5 mmol/L      Comment: Slight hemolysis detected by analyzer. Result may be falsely elevated.        Chloride 102 mmol/L      CO2 19.0 mmol/L      Calcium 8.8 mg/dL      Total Protein 6.7 g/dL      Albumin 3.7 g/dL      ALT (SGPT) 15 U/L      AST (SGOT) 31 U/L      Comment: Slight hemolysis detected by analyzer. Result may be falsely elevated.        Alkaline Phosphatase 86 U/L      Total Bilirubin 0.5 mg/dL      Globulin 3.0 gm/dL      A/G Ratio 1.2 g/dL      BUN/Creatinine Ratio 26.9     Anion Gap 16.0 mmol/L      eGFR 10.9 mL/min/1.73      Comment: <15 Indicative of kidney failure       Narrative:      GFR Normal >60  Chronic Kidney Disease <60  Kidney Failure <15    The GFR formula is only valid for adults with stable renal function between ages 18 and 70.    Magnesium [[id number]]  (Abnormal) Collected: 05/22/24 1400    Specimen: Blood Updated: 05/22/24 1436     Magnesium 2.8 mg/dL     BNP [[id number]]  (Abnormal) Collected: 05/22/24 1400    Specimen: Blood Updated: 05/22/24 1430     proBNP 9,331.0 pg/mL     Narrative:      This assay is used as an aid in the diagnosis of individuals suspected of having heart failure. It can be used as an aid in the diagnosis of acute decompensated heart failure (ADHF) in patients presenting with signs and symptoms of ADHF to the emergency department (ED). In addition, NT-proBNP of <300 pg/mL indicates ADHF is not likely.    Age Range Result Interpretation  NT-proBNP Concentration (pg/mL:      <50             Positive            >450                   Gray                 300-450                    Negative             <300    50-75           Positive            >900                  Gray                300-900                  Negative            <300      >75             Positive            >1800                  Gray                300-1800                  Negative            <300    CBC & Differential [[id number]]  (Abnormal) Collected: 05/22/24  1400    Specimen: Blood Updated: 05/22/24 1409    Narrative:      The following orders were created for panel order CBC & Differential.  Procedure                               Abnormality         Status                     ---------                               -----------         ------                     CBC Auto Differential[[id number]]        Abnormal            Final result                 Please view results for these tests on the individual orders.    CBC Auto Differential [[id number]]  (Abnormal) Collected: 05/22/24 1400    Specimen: Blood Updated: 05/22/24 1409     WBC 4.44 10*3/mm3      RBC 2.72 10*6/mm3      Hemoglobin 8.1 g/dL      Hematocrit 26.3 %      MCV 96.7 fL      MCH 29.8 pg      MCHC 30.8 g/dL      RDW 15.9 %      RDW-SD 56.2 fl      MPV 11.6 fL      Platelets 175 10*3/mm3      Neutrophil % 74.8 %      Lymphocyte % 12.8 %      Monocyte % 10.4 %      Eosinophil % 1.6 %      Basophil % 0.2 %      Immature Grans % 0.2 %      Neutrophils, Absolute 3.32 10*3/mm3      Lymphocytes, Absolute 0.57 10*3/mm3      Monocytes, Absolute 0.46 10*3/mm3      Eosinophils, Absolute 0.07 10*3/mm3      Basophils, Absolute 0.01 10*3/mm3      Immature Grans, Absolute 0.01 10*3/mm3      nRBC 0.0 /100 WBC           Imaging Results (Last 24 Hours)       Procedure Component Value Units Date/Time    US Venous Doppler Upper Extremity Right (duplex) [[id number]] Collected: 05/22/24 1539     Updated: 05/22/24 1542    Narrative:      History: Pain and swelling       Impression:      Impression: There is no evidence of deep venous thrombosis or  superficial thrombophlebitis of the right upper extremity.     Comments: Right upper extremity venous duplex exam was performed using  color Doppler flow, Doppler wave form analysis, and grayscale imaging,  with and without compression. There is no evidence of deep venous  thrombosis of the internal jugular, subclavian, axillary, brachial,  radial, and ulnar veins. There is no evidence  of superficial  thrombophlebitis involving the cephalic or basilic veins.         This report was signed and finalized on 5/22/2024 3:39 PM by Dr. Williams Lopez MD.       XR Chest 1 View [[id number]] Collected: 05/22/24 1412     Updated: 05/22/24 1416    Narrative:      EXAMINATION: XR CHEST 1 VW-  5/22/2024 2:12 PM 1 view     HISTORY: dyspnea     COMPARISON: 5/13/2024     TECHNIQUE: A single frontal view of the chest was obtained.     FINDINGS:  Mild pulmonary vascular congestion and mild cardiomegaly. No chavo  edema, large effusion, airspace consolidation, or pneumothorax.  Calcified lymph nodes in the mediastinum on the LEFT. The osseous  structures and surrounding soft tissues demonstrate no acute  abnormality.       Impression:         1.  Mild cardiomegaly and pulmonary vascular congestion but no chavo  edema.           This report was signed and finalized on 5/22/2024 2:13 PM by Dr. Bon Tracey MD.             I have personally reviewed and interpreted the radiology studies and ECG obtained at time of admission.     Assessment / Plan   Assessment:   Active Hospital Problems    Diagnosis     **Acute kidney injury          Medical Decision Making  Number and Complexity of problems:  Anasarca  Acute kidney injury on chronic kidney disease  Pulmonary hypertension  Chronic heart failure with preserved ejection fraction  Moderate malnutrition  Chronic anticoagulation for atrial fibrillation (persistent)  Hypertension  Diabetes mellitus.      Treatment Plan  The patient will be admitted to my service here at [facility].   Continue IV fluid at a lower rate  The problem remains that she has fluid retention and with fluid she can have further fluid retention.  The next idea will be to get dialysis for fluid overload in the setting of acute kidney injury on chronic kidney disease however patient declined and shows no interest.  She is currently a DO NOT RESUSCITATE DO NOT INTUBATE.  Discussed this  with his son on a separate occasion.  Even with dialysis, this is just 1 part of the issue as she has pulmonary hypertension.  Will continue oxygen  Patient probably is best suited for palliative care/hospice  I consulted palliative care.          Conditions and Status  Fair  Prognosis poor     MDM Data  External documents reviewed: Reviewed epic record  Cardiac tracing (EKG, telemetry) interpretation: Atrial fibrillation rate controlled at 79  Radiology interpretation: Reviewed as outlined above  Labs reviewed: yes  Any tests that were considered but not ordered:  none     Decision rules/scores evaluated (example [id number], Wells, etc): None     Discussed with: Patient and son     Care Planning  Shared decision making: Patient with family  Code status and discussions: DO NOT RESUSCITATE DO NOT INTUBATE, no dialysis no cardioversion    Disposition  Social Determinants of Health that impact treatment or disposition: Nursing home resident  Estimated length of stay is to be determined.     I confirmed that the patient's advanced care plan is present, code status is documented, and a surrogate decision maker is listed in the patient's medical record.     The patient's surrogate decision maker is son Wagner.     The patient was seen and examined by me on (?)    Electronically signed by Tato Story MD, 05/22/24, 17:18 CDT.

## 2024-05-22 NOTE — ED PROVIDER NOTES
Subjective   History of Present Illness  89-year-old female presents to the ED with complaint of shortness of breath, bilateral lower extremity swelling, right arm swelling.  She has a history of heart failure with preserved EF, stage IV CKD, hypertension, hyperlipidemia, diabetes, A-fib on anticoagulation.  Recent admission to the hospital 5/1-5/6 for AMARIS, altered mental status secondary to digoxin toxicity, digoxin has since been discontinued.  Patient has been at [facility] following since May 6.  She was seen in the ED May/14/24 after discharge with complaint of shortness of breath and bilateral lower extremity swelling.  ER workup performed and no evidence for CHF exacerbation or acute renal failure, had minimal peripheral edema on exam.  She is accompanied by her son, he states they have increased her diuretics over the past week since being seen in the ED.  She has had persistent bilateral lower extremity edema and persistent shortness of breath with exertion despite increasing diuretics and today nurse practitioner at [facility] noted she had increased swelling of the right arm so sent her to the ED for further evaluation.  She denies chest pain.  No reports of confusion/altered mental status.    History provided by:  Patient and medical records      Review of Systems   All other systems reviewed and are negative.      Past Medical History:   Diagnosis Date    Atrial fibrillation     Diabetes mellitus     Difficulty walking Approximately 2 years    GERD (gastroesophageal reflux disease)     High cholesterol     Hypertension     OA (osteoarthritis)     Pneumonia     few years ago    Stage 4 chronic kidney disease 08/18/2023       Allergies   Allergen Reactions    Singulair [Montelukast] Cough    Nsaids Other (See Comments)     Due to kidney disease.      Ace Inhibitors Cough       Past Surgical History:   Procedure Laterality Date    BREAST BIOPSY Right     BREAST CYST ASPIRATION       HYSTERECTOMY      OOPHORECTOMY      TONSILLECTOMY         Family History   Problem Relation Age of Onset    No Known Problems Father     Hypertension Mother     No Known Problems Sister     No Known Problems Brother     No Known Problems Daughter     No Known Problems Son     No Known Problems Maternal Grandmother     No Known Problems Paternal Grandmother     No Known Problems Maternal Aunt     No Known Problems Paternal Aunt     No Known Problems Other     Colon cancer Neg Hx     BRCA 1/2 Neg Hx     Breast cancer Neg Hx     Endometrial cancer Neg Hx     Ovarian cancer Neg Hx        Social History     Socioeconomic History    Marital status:    Tobacco Use    Smoking status: Never    Smokeless tobacco: Never   Vaping Use    Vaping status: Never Used   Substance and Sexual Activity    Alcohol use: No    Drug use: No    Sexual activity: Not Currently     Partners: Female     Birth control/protection: Surgical           Objective   Physical Exam  Vitals and nursing note reviewed.   Constitutional:       Appearance: She is well-developed and normal weight.      Comments: Generally weak appearing elderly female, no distress   HENT:      Head: Normocephalic and atraumatic.      Nose: Nose normal. No congestion or rhinorrhea.      Mouth/Throat:      Mouth: Mucous membranes are moist.      Pharynx: No oropharyngeal exudate or posterior oropharyngeal erythema.   Eyes:      Extraocular Movements: Extraocular movements intact.      Conjunctiva/sclera: Conjunctivae normal.      Pupils: Pupils are equal, round, and reactive to light.   Neck:      Vascular: No JVD.   Cardiovascular:      Rate and Rhythm: Normal rate. Rhythm irregular.      Pulses: Normal pulses.      Heart sounds: Normal heart sounds.   Pulmonary:      Breath sounds: Normal breath sounds. No wheezing, rhonchi or rales.   Abdominal:      General: Abdomen is flat. Bowel sounds are normal.      Palpations: Abdomen is soft.   Musculoskeletal:      Cervical  back: Normal range of motion and neck supple.      Right lower leg: Edema present.      Left lower leg: Edema present.      Comments: 1+ symmetric bilateral lower extremity pretibial and pedal pitting edema  Asymmetric right upper extremity swelling   Skin:     General: Skin is warm and dry.      Capillary Refill: Capillary refill takes less than 2 seconds.   Neurological:      General: No focal deficit present.      Mental Status: She is alert and oriented to person, place, and time. Mental status is at baseline.         Procedures       Lab Results (last 24 hours)       Procedure Component Value Units Date/Time    CBC & Differential [[id number]]  (Abnormal) Collected: 05/22/24 1400    Specimen: Blood Updated: 05/22/24 1409    Narrative:      The following orders were created for panel order CBC & Differential.  Procedure                               Abnormality         Status                     ---------                               -----------         ------                     CBC Auto Differential[[id number]]        Abnormal            Final result                 Please view results for these tests on the individual orders.    Comprehensive Metabolic Panel [[id number]]  (Abnormal) Collected: 05/22/24 1400    Specimen: Blood Updated: 05/22/24 1436     Glucose 187 mg/dL       mg/dL      Creatinine 3.79 mg/dL      Sodium 137 mmol/L      Potassium 5.5 mmol/L      Comment: Slight hemolysis detected by analyzer. Result may be falsely elevated.        Chloride 102 mmol/L      CO2 19.0 mmol/L      Calcium 8.8 mg/dL      Total Protein 6.7 g/dL      Albumin 3.7 g/dL      ALT (SGPT) 15 U/L      AST (SGOT) 31 U/L      Comment: Slight hemolysis detected by analyzer. Result may be falsely elevated.        Alkaline Phosphatase 86 U/L      Total Bilirubin 0.5 mg/dL      Globulin 3.0 gm/dL      A/G Ratio 1.2 g/dL      BUN/Creatinine Ratio 26.9     Anion Gap 16.0 mmol/L      eGFR 10.9 mL/min/1.73      Comment: <15  Indicative of kidney failure       Narrative:      GFR Normal >60  Chronic Kidney Disease <60  Kidney Failure <15    The GFR formula is only valid for adults with stable renal function between ages 18 and 70.    Magnesium [[id number]]  (Abnormal) Collected: 05/22/24 1400    Specimen: Blood Updated: 05/22/24 1436     Magnesium 2.8 mg/dL     BNP [[id number]]  (Abnormal) Collected: 05/22/24 1400    Specimen: Blood Updated: 05/22/24 1430     proBNP 9,331.0 pg/mL     Narrative:      This assay is used as an aid in the diagnosis of individuals suspected of having heart failure. It can be used as an aid in the diagnosis of acute decompensated heart failure (ADHF) in patients presenting with signs and symptoms of ADHF to the emergency department (ED). In addition, NT-proBNP of <300 pg/mL indicates ADHF is not likely.    Age Range Result Interpretation  NT-proBNP Concentration (pg/mL:      <50             Positive            >450                   Gray                 300-450                    Negative             <300    50-75           Positive            >900                  Gray                300-900                  Negative            <300      >75             Positive            >1800                  Gray                300-1800                  Negative            <300    High Sensitivity Troponin T [[id number]]  (Abnormal) Collected: 05/22/24 1400    Specimen: Blood Updated: 05/22/24 1438     HS Troponin T 65 ng/L     Narrative:      High Sensitive Troponin T Reference Range:  <14.0 ng/L- Negative Female for AMI  <22.0 ng/L- Negative Male for AMI  >=14 - Abnormal Female indicating possible myocardial injury.  >=22 - Abnormal Male indicating possible myocardial injury.   Clinicians would have to utilize clinical acumen, EKG, Troponin, and serial changes to determine if it is an Acute Myocardial Infarction or myocardial injury due to an underlying chronic condition.         CBC Auto Differential [[id number]]   (Abnormal) Collected: 05/22/24 1400    Specimen: Blood Updated: 05/22/24 1409     WBC 4.44 10*3/mm3      RBC 2.72 10*6/mm3      Hemoglobin 8.1 g/dL      Hematocrit 26.3 %      MCV 96.7 fL      MCH 29.8 pg      MCHC 30.8 g/dL      RDW 15.9 %      RDW-SD 56.2 fl      MPV 11.6 fL      Platelets 175 10*3/mm3      Neutrophil % 74.8 %      Lymphocyte % 12.8 %      Monocyte % 10.4 %      Eosinophil % 1.6 %      Basophil % 0.2 %      Immature Grans % 0.2 %      Neutrophils, Absolute 3.32 10*3/mm3      Lymphocytes, Absolute 0.57 10*3/mm3      Monocytes, Absolute 0.46 10*3/mm3      Eosinophils, Absolute 0.07 10*3/mm3      Basophils, Absolute 0.01 10*3/mm3      Immature Grans, Absolute 0.01 10*3/mm3      nRBC 0.0 /100 WBC     High Sensitivity Troponin T 2Hr [[id number]]  (Abnormal) Collected: 05/22/24 1606    Specimen: Blood Updated: 05/22/24 1643     HS Troponin T 66 ng/L      Troponin T Delta 1 ng/L     Narrative:      High Sensitive Troponin T Reference Range:  <14.0 ng/L- Negative Female for AMI  <22.0 ng/L- Negative Male for AMI  >=14 - Abnormal Female indicating possible myocardial injury.  >=22 - Abnormal Male indicating possible myocardial injury.   Clinicians would have to utilize clinical acumen, EKG, Troponin, and serial changes to determine if it is an Acute Myocardial Infarction or myocardial injury due to an underlying chronic condition.              US Venous Doppler Upper Extremity Right (duplex)    Result Date: 5/22/2024  History: Pain and swelling      Impression: There is no evidence of deep venous thrombosis or superficial thrombophlebitis of the right upper extremity.  Comments: Right upper extremity venous duplex exam was performed using color Doppler flow, Doppler wave form analysis, and grayscale imaging, with and without compression. There is no evidence of deep venous thrombosis of the internal jugular, subclavian, axillary, brachial, radial, and ulnar veins. There is no evidence of superficial  thrombophlebitis involving the cephalic or basilic veins.   This report was signed and finalized on 5/22/2024 3:39 PM by Dr. Williams Lopez MD.      XR Chest 1 View    Result Date: 5/22/2024  EXAMINATION: XR CHEST 1 VW-  5/22/2024 2:12 PM 1 view  HISTORY: dyspnea  COMPARISON: 5/13/2024  TECHNIQUE: A single frontal view of the chest was obtained.  FINDINGS: Mild pulmonary vascular congestion and mild cardiomegaly. No chavo edema, large effusion, airspace consolidation, or pneumothorax. Calcified lymph nodes in the mediastinum on the LEFT. The osseous structures and surrounding soft tissues demonstrate no acute abnormality.       1.  Mild cardiomegaly and pulmonary vascular congestion but no chavo edema.    This report was signed and finalized on 5/22/2024 2:13 PM by Dr. Bon Tracey MD.        ED Course  ED Course as of 05/22/24 1659   Wed May 22, 2024   1634 89-year-old female presents to the emergency department from inpatient rehab with complaint of shortness of breath, right upper extremity swelling and bilateral lower extremity edema.  She has a history of heart failure with preserved EF, stage IV CKD, hypertension, hyperlipidemia, diabetes, A-fib on anticoagulation.  Has been dealing with shortness of breath and extremity swelling for the past couple of weeks since discharge from the hospital.  Seen in the ED for similar complaints of 5/14/2024, was able to be discharged back to skilled nursing.  After talking to son, it seems they have increased her diuretics twice since returning to [facility] with no improvement in the patient's edema or dyspnea.  Sats in the ED were high 90s on room air.  She has 1+ symmetric bilateral lower extremity pretibial and pedal pitting edema, lungs are clear on exam.  BNP 9300, up from 8600 last week.  She does have evidence of acute kidney injury.  , creatinine 3.79.  Up from 62 and 2.6.  Potassium 5.5, magnesium 2.8.  Initial high-sensitivity troponin 65, this is  around her baseline and do not suspect acute coronary syndrome.    Hemoglobin 8.1, which is around baseline.  Did complain of right upper extremity swelling.  She did have right upper extremity my exam, ultrasound 10, negative for DVT.  Given patient's history of heart failure, will need gentle hydration for what appears to be a prerenal acute kidney injury from overdiuresis.  Will give small bolus of fluid in the ED, started on maintenance fluids plan for admission to the hospitalist for inpatient treatment of her acute kidney injury and hyperkalemia. [AW]      ED Course User Index  [AW] Kevin Sanchez MD                                             Medical Decision Making  Amount and/or Complexity of Data Reviewed  Labs: ordered.  Radiology: ordered.  ECG/medicine tests: ordered.    Risk  Prescription drug management.        Final diagnoses:   Acute kidney injury   Congestive heart failure, unspecified HF chronicity, unspecified heart failure type   Hyperkalemia   Hypermagnesemia       ED Disposition  ED Disposition       ED Disposition   Decision to Admit    Condition   --    Comment   Level of Care: Remote Telemetry [26]   Diagnosis: Acute kidney injury [[id number]]   Admitting Physician: KASEY FELDER [[id number]]   Attending Physician: KASEY FELDER [[id number]]   Certification: I Certify That Inpatient Hospital Services Are Medically Necessary For Greater Than 2 Midnights                 No follow-up provider specified.       Medication List      No changes were made to your prescriptions during this visit.            Kevin Sanchez MD  05/22/24 6049

## 2024-05-23 LAB
ALBUMIN SERPL-MCNC: 3.7 G/DL (ref 3.5–5.2)
ALBUMIN/GLOB SERPL: 1.3 G/DL
ALP SERPL-CCNC: 68 U/L (ref 39–117)
ALT SERPL W P-5'-P-CCNC: 13 U/L (ref 1–33)
ANION GAP SERPL CALCULATED.3IONS-SCNC: 14 MMOL/L (ref 5–15)
AST SERPL-CCNC: 23 U/L (ref 1–32)
BILIRUB SERPL-MCNC: 0.5 MG/DL (ref 0–1.2)
BUN SERPL-MCNC: 102 MG/DL (ref 8–23)
BUN/CREAT SERPL: 26.2 (ref 7–25)
CALCIUM SPEC-SCNC: 9.2 MG/DL (ref 8.6–10.5)
CHLORIDE SERPL-SCNC: 105 MMOL/L (ref 98–107)
CO2 SERPL-SCNC: 21 MMOL/L (ref 22–29)
CREAT SERPL-MCNC: 3.89 MG/DL (ref 0.57–1)
EGFRCR SERPLBLD CKD-EPI 2021: 10.6 ML/MIN/1.73
GLOBULIN UR ELPH-MCNC: 2.9 GM/DL
GLUCOSE BLDC GLUCOMTR-MCNC: 138 MG/DL (ref 70–130)
GLUCOSE BLDC GLUCOMTR-MCNC: 150 MG/DL (ref 70–130)
GLUCOSE BLDC GLUCOMTR-MCNC: 195 MG/DL (ref 70–130)
GLUCOSE BLDC GLUCOMTR-MCNC: 74 MG/DL (ref 70–130)
GLUCOSE SERPL-MCNC: 80 MG/DL (ref 65–99)
POTASSIUM SERPL-SCNC: 4.9 MMOL/L (ref 3.5–5.2)
PROT SERPL-MCNC: 6.6 G/DL (ref 6–8.5)
QT INTERVAL: 372 MS
QTC INTERVAL: 426 MS
SODIUM SERPL-SCNC: 140 MMOL/L (ref 136–145)

## 2024-05-23 PROCEDURE — 97165 OT EVAL LOW COMPLEX 30 MIN: CPT | Performed by: OCCUPATIONAL THERAPIST

## 2024-05-23 PROCEDURE — 94799 UNLISTED PULMONARY SVC/PX: CPT

## 2024-05-23 PROCEDURE — 97116 GAIT TRAINING THERAPY: CPT

## 2024-05-23 PROCEDURE — 80053 COMPREHEN METABOLIC PANEL: CPT | Performed by: INTERNAL MEDICINE

## 2024-05-23 PROCEDURE — 36415 COLL VENOUS BLD VENIPUNCTURE: CPT | Performed by: INTERNAL MEDICINE

## 2024-05-23 PROCEDURE — 82948 REAGENT STRIP/BLOOD GLUCOSE: CPT

## 2024-05-23 PROCEDURE — 63710000001 INSULIN LISPRO (HUMAN) PER 5 UNITS: Performed by: INTERNAL MEDICINE

## 2024-05-23 PROCEDURE — 97161 PT EVAL LOW COMPLEX 20 MIN: CPT | Performed by: PHYSICAL THERAPIST

## 2024-05-23 PROCEDURE — 99222 1ST HOSP IP/OBS MODERATE 55: CPT

## 2024-05-23 PROCEDURE — 63710000001 INSULIN GLARGINE PER 5 UNITS: Performed by: INTERNAL MEDICINE

## 2024-05-23 RX ORDER — NICOTINE POLACRILEX 4 MG
15 LOZENGE BUCCAL
COMMUNITY

## 2024-05-23 RX ORDER — ALBUTEROL SULFATE 2.5 MG/3ML
2.5 SOLUTION RESPIRATORY (INHALATION) EVERY 4 HOURS PRN
Status: DISCONTINUED | OUTPATIENT
Start: 2024-05-23 | End: 2024-05-26 | Stop reason: HOSPADM

## 2024-05-23 RX ORDER — IPRATROPIUM BROMIDE AND ALBUTEROL SULFATE 2.5; .5 MG/3ML; MG/3ML
3 SOLUTION RESPIRATORY (INHALATION) EVERY 6 HOURS PRN
COMMUNITY

## 2024-05-23 RX ORDER — POLYETHYLENE GLYCOL 3350 17 G/17G
17 POWDER, FOR SOLUTION ORAL DAILY
COMMUNITY

## 2024-05-23 RX ORDER — CETIRIZINE HYDROCHLORIDE 10 MG/1
10 TABLET ORAL DAILY
COMMUNITY

## 2024-05-23 RX ADMIN — Medication 1000 UNITS: at 09:08

## 2024-05-23 RX ADMIN — SODIUM BICARBONATE 650 MG: 650 TABLET ORAL at 17:28

## 2024-05-23 RX ADMIN — ALBUTEROL SULFATE 2.5 MG: 2.5 SOLUTION RESPIRATORY (INHALATION) at 07:15

## 2024-05-23 RX ADMIN — HYDRALAZINE HYDROCHLORIDE 100 MG: 50 TABLET ORAL at 20:54

## 2024-05-23 RX ADMIN — INSULIN LISPRO 2 UNITS: 100 INJECTION, SOLUTION INTRAVENOUS; SUBCUTANEOUS at 17:28

## 2024-05-23 RX ADMIN — OXYCODONE HYDROCHLORIDE AND ACETAMINOPHEN 500 MG: 500 TABLET ORAL at 09:08

## 2024-05-23 RX ADMIN — APIXABAN 2.5 MG: 2.5 TABLET, FILM COATED ORAL at 09:09

## 2024-05-23 RX ADMIN — ALBUTEROL SULFATE 2.5 MG: 2.5 SOLUTION RESPIRATORY (INHALATION) at 19:40

## 2024-05-23 RX ADMIN — ALBUTEROL SULFATE 2.5 MG: 2.5 SOLUTION RESPIRATORY (INHALATION) at 14:21

## 2024-05-23 RX ADMIN — APIXABAN 2.5 MG: 2.5 TABLET, FILM COATED ORAL at 20:54

## 2024-05-23 RX ADMIN — SODIUM BICARBONATE 650 MG: 650 TABLET ORAL at 09:08

## 2024-05-23 RX ADMIN — ATORVASTATIN CALCIUM 20 MG: 10 TABLET, FILM COATED ORAL at 09:08

## 2024-05-23 RX ADMIN — PANTOPRAZOLE SODIUM 40 MG: 40 TABLET, DELAYED RELEASE ORAL at 05:59

## 2024-05-23 RX ADMIN — SODIUM BICARBONATE 650 MG: 650 TABLET ORAL at 20:54

## 2024-05-23 RX ADMIN — HYDRALAZINE HYDROCHLORIDE 100 MG: 50 TABLET ORAL at 09:08

## 2024-05-23 RX ADMIN — FERROUS SULFATE TAB 325 MG (65 MG ELEMENTAL FE) 325 MG: 325 (65 FE) TAB at 09:09

## 2024-05-23 RX ADMIN — INSULIN GLARGINE 10 UNITS: 100 INJECTION, SOLUTION SUBCUTANEOUS at 20:54

## 2024-05-23 RX ADMIN — CARVEDILOL 6.25 MG: 6.25 TABLET, FILM COATED ORAL at 09:09

## 2024-05-23 RX ADMIN — FENOFIBRATE 145 MG: 145 TABLET ORAL at 09:09

## 2024-05-23 NOTE — ED NOTES
Nursing report ED to floor  Libia Perales  89 y.o.  female    HPI:   Chief Complaint   Patient presents with    Shortness of Breath    Edema       Admitting doctor:   Tato Story MD    Consulting provider(s):  Consults       No orders found from 4/23/2024 to 5/23/2024.             Admitting diagnosis:   The primary encounter diagnosis was Acute kidney injury. Diagnoses of Congestive heart failure, unspecified HF chronicity, unspecified heart failure type, Hyperkalemia, and Hypermagnesemia were also pertinent to this visit.    Code status:   Current Code Status       Date Active Code Status Order ID Comments User Context       5/22/2024 1817 No CPR (Do Not Attempt to Resuscitate) [id number]  Tato Story MD ED        Question Answer    Code Status (Patient has no pulse and is not breathing) No CPR (Do Not Attempt to Resuscitate)    Medical Interventions (Patient has pulse or is breathing) Limited Support    Medical Intervention Limits: NO intubation (DNI)     NO dialysis     NO cardioversion    Level Of Support Discussed With Patient                    Allergies:   Singulair [montelukast], Nsaids, and Ace inhibitors    Intake and Output    Intake/Output Summary (Last 24 hours) at 5/22/2024 2037  Last data filed at 5/22/2024 1855  Gross per 24 hour   Intake 633.33 ml   Output --   Net 633.33 ml       Weight:       05/22/24  1319   Weight: 71.4 kg (157 lb 4.8 oz)       Most recent vitals:   Vitals:    05/22/24 1715 05/22/24 1904 05/22/24 1909 05/22/24 2001   BP: 120/66 130/78  121/67   BP Location:       Patient Position:       Pulse: 80 84 77 73   Resp:       Temp:       TempSrc:       SpO2: 97% 99% 99% 98%   Weight:       Height:         Oxygen Therapy: .    Active LDAs/IV Access:   Lines, Drains & Airways       Active LDAs       Name Placement date Placement time Site Days    Peripheral IV 05/22/24 1401 Posterior;Right Hand 05/22/24  1401  Hand  less than 1                    Labs  (abnormal labs have a star):   Labs Reviewed   COMPREHENSIVE METABOLIC PANEL - Abnormal; Notable for the following components:       Result Value    Glucose 187 (*)      (*)     Creatinine 3.79 (*)     Potassium 5.5 (*)     CO2 19.0 (*)     BUN/Creatinine Ratio 26.9 (*)     Anion Gap 16.0 (*)     eGFR 10.9 (*)     All other components within normal limits    Narrative:     GFR Normal >60  Chronic Kidney Disease <60  Kidney Failure <15    The GFR formula is only valid for adults with stable renal function between ages 18 and 70.   MAGNESIUM - Abnormal; Notable for the following components:    Magnesium 2.8 (*)     All other components within normal limits   BNP (IN-HOUSE) - Abnormal; Notable for the following components:    proBNP 9,331.0 (*)     All other components within normal limits    Narrative:     This assay is used as an aid in the diagnosis of individuals suspected of having heart failure. It can be used as an aid in the diagnosis of acute decompensated heart failure (ADHF) in patients presenting with signs and symptoms of ADHF to the emergency department (ED). In addition, NT-proBNP of <300 pg/mL indicates ADHF is not likely.    Age Range Result Interpretation  NT-proBNP Concentration (pg/mL:      <50             Positive            >450                   Gray                 300-450                    Negative             <300    50-75           Positive            >900                  Gray                300-900                  Negative            <300      >75             Positive            >1800                  Gray                300-1800                  Negative            <300   TROPONIN - Abnormal; Notable for the following components:    HS Troponin T 65 (*)     All other components within normal limits    Narrative:     High Sensitive Troponin T Reference Range:  <14.0 ng/L- Negative Female for AMI  <22.0 ng/L- Negative Male for AMI  >=14 - Abnormal Female indicating possible  myocardial injury.  >=22 - Abnormal Male indicating possible myocardial injury.   Clinicians would have to utilize clinical acumen, EKG, Troponin, and serial changes to determine if it is an Acute Myocardial Infarction or myocardial injury due to an underlying chronic condition.        CBC WITH AUTO DIFFERENTIAL - Abnormal; Notable for the following components:    RBC 2.72 (*)     Hemoglobin 8.1 (*)     Hematocrit 26.3 (*)     MCHC 30.8 (*)     RDW 15.9 (*)     RDW-SD 56.2 (*)     Lymphocyte % 12.8 (*)     Lymphocytes, Absolute 0.57 (*)     All other components within normal limits   HIGH SENSITIVITIY TROPONIN T 2HR - Abnormal; Notable for the following components:    HS Troponin T 66 (*)     All other components within normal limits    Narrative:     High Sensitive Troponin T Reference Range:  <14.0 ng/L- Negative Female for AMI  <22.0 ng/L- Negative Male for AMI  >=14 - Abnormal Female indicating possible myocardial injury.  >=22 - Abnormal Male indicating possible myocardial injury.   Clinicians would have to utilize clinical acumen, EKG, Troponin, and serial changes to determine if it is an Acute Myocardial Infarction or myocardial injury due to an underlying chronic condition.        COMPREHENSIVE METABOLIC PANEL - Abnormal; Notable for the following components:    Glucose 138 (*)      (*)     Creatinine 3.67 (*)     CO2 20.0 (*)     BUN/Creatinine Ratio 27.2 (*)     eGFR 11.3 (*)     All other components within normal limits    Narrative:     GFR Normal >60  Chronic Kidney Disease <60  Kidney Failure <15    The GFR formula is only valid for adults with stable renal function between ages 18 and 70.   POCT GLUCOSE FINGERSTICK   POCT GLUCOSE FINGERSTICK   POCT GLUCOSE FINGERSTICK   POCT GLUCOSE FINGERSTICK   CBC AND DIFFERENTIAL    Narrative:     The following orders were created for panel order CBC & Differential.  Procedure                               Abnormality         Status                      ---------                               -----------         ------                     CBC Auto Differential[[id number]]        Abnormal            Final result                 Please view results for these tests on the individual orders.       Meds given in ED:   Medications   sodium chloride 0.9 % flush 10 mL (has no administration in time range)   apixaban (ELIQUIS) tablet 2.5 mg (has no administration in time range)   carvedilol (COREG) tablet 6.25 mg (6.25 mg Oral Given 5/22/24 2033)   cholecalciferol (VITAMIN D3) tablet 1,000 Units (1,000 Units Oral Given 5/22/24 2034)   docusate sodium (COLACE) capsule 100 mg (has no administration in time range)   fenofibrate (TRICOR) tablet 145 mg (has no administration in time range)   ferrous sulfate tablet 325 mg (has no administration in time range)   hydrALAZINE (APRESOLINE) tablet 100 mg (100 mg Oral Given 5/22/24 2035)   pantoprazole (PROTONIX) EC tablet 40 mg (has no administration in time range)   sodium bicarbonate tablet 650 mg (650 mg Oral Given 5/22/24 2036)   ascorbic acid (VITAMIN C) tablet 500 mg (500 mg Oral Given 5/22/24 2033)   sodium chloride 0.9 % infusion (50 mL/hr Intravenous New Bag 5/22/24 2010)   albuterol (PROVENTIL) nebulizer solution 0.083% 2.5 mg/3mL (has no administration in time range)   atorvastatin (LIPITOR) tablet 20 mg (20 mg Oral Given 5/22/24 2010)   sodium chloride 0.9 % flush 10 mL (10 mL Intravenous Given 5/22/24 2010)   sodium chloride 0.9 % flush 10 mL (has no administration in time range)   sodium chloride 0.9 % infusion 40 mL (has no administration in time range)   sennosides-docusate (PERICOLACE) 8.6-50 MG per tablet 2 tablet (has no administration in time range)     And   polyethylene glycol (MIRALAX) packet 17 g (has no administration in time range)     And   bisacodyl (DULCOLAX) EC tablet 5 mg (has no administration in time range)     And   bisacodyl (DULCOLAX) suppository 10 mg (has no administration in time range)    acetaminophen (TYLENOL) tablet 650 mg (has no administration in time range)     Or   acetaminophen (TYLENOL) 160 MG/5ML oral solution 650 mg (has no administration in time range)     Or   acetaminophen (TYLENOL) suppository 650 mg (has no administration in time range)   ondansetron (ZOFRAN) injection 4 mg (has no administration in time range)   insulin glargine (LANTUS, SEMGLEE) injection 1-200 Units (has no administration in time range)   insulin lispro (humaLOG) injection 1-200 Units (has no administration in time range)   insulin lispro (humaLOG) injection 1-200 Units (has no administration in time range)   dextrose (GLUTOSE) oral gel 15 g (has no administration in time range)   dextrose (D50W) (25 g/50 mL) IV injection 10-50 mL (has no administration in time range)   Glucagon (GLUCAGEN) injection 1 mg (has no administration in time range)   lactated ringers bolus 500 mL (0 mL Intravenous Stopped 5/22/24 1809)   magnesium oxide (MAG-OX) tablet 400 mg (400 mg Oral Given 5/22/24 2036)     sodium chloride, 50 mL/hr, Last Rate: 50 mL/hr (05/22/24 2010)         NIH Stroke Scale:       Isolation/Infection(s):  No active isolations   No active infections     COVID Testing  Collected .  Resulted .    Nursing report ED to floor:  Mental status: .  Ambulatory status: .  Precautions: .    ED nurse phone extentsion- ..

## 2024-05-23 NOTE — PROGRESS NOTES
[facility] Medicine Services  INPATIENT PROGRESS NOTE    Patient Name: Libia Perales  Date of Admission: 5/22/2024  Today's Date: 05/23/24  Length of Stay: 1  Primary Care Physician: Gume Ann DO    Subjective   Chief Complaint:   HPI   Patient has no new complaints  She does not have any shortness of breath or difficulty breathing  She is eating ice.  She is an IV fluid running at 50 cc/h    I encouraged them to ask questions.    Review of Systems   All pertinent negatives and positives are as above. All other systems have been reviewed and are negative unless otherwise stated.     Objective    Temp:  [97.3 °F (36.3 °C)-97.8 °F (36.6 °C)] 97.3 °F (36.3 °C)  Heart Rate:  [70-84] 82  Resp:  [16-18] 18  BP: (109-131)/(45-78) 112/53  Physical Exam  GEN: Awake, alert, interactive, in NAD  HEENT: Atraumatic, PERRLA, EOMI, Anicteric, Trachea midline  Lungs: CTAB, no wheezing/rales/rhonchi  Heart: Irregularly irregular +S1/s2, no gross murmur   ABD: soft, nt/nd, +BS, no guarding/rebound; presence of ventral hernia  Extremities: atraumatic, no cyanosis, nephric and pitting edema of extremities; osteoarthritic changes of fingers noted  Skin: no rashes or lesions  Neuro: AAOx3, no focal deficits  Psych: normal mood & affect            Results Review:  I have reviewed the labs, radiology results, and diagnostic studies.    Laboratory Data:   Results from last 7 days   Lab Units 05/22/24  1400   WBC 10*3/mm3 4.44   HEMOGLOBIN g/dL 8.1*   HEMATOCRIT % 26.3*   PLATELETS 10*3/mm3 175        Results from last 7 days   Lab Units 05/23/24  0451 05/22/24  1907 05/22/24  1400   SODIUM mmol/L 140 138 137   POTASSIUM mmol/L 4.9 5.2 5.5*   CHLORIDE mmol/L 105 104 102   CO2 mmol/L 21.0* 20.0* 19.0*   BUN mg/dL 102* 100* 102*   CREATININE mg/dL 3.89* 3.67* 3.79*   CALCIUM mg/dL 9.2 9.0 8.8   BILIRUBIN mg/dL 0.5 0.5 0.5   ALK PHOS U/L 68 81 86   ALT (SGPT) U/L 13 14 15   AST (SGOT) U/L 23  "25 31   GLUCOSE mg/dL 80 138* 187*       Culture Data:   No results found for: \"BLOODCX\", \"URINECX\", \"WOUNDCX\", \"MRSACX\", \"RESPCX\", \"STOOLCX\"    Radiology Data:   Imaging Results (Last 24 Hours)       Procedure Component Value Units Date/Time    US Venous Doppler Upper Extremity Right (duplex) [[id number]] Collected: 05/22/24 1539     Updated: 05/22/24 1542    Narrative:      History: Pain and swelling       Impression:      Impression: There is no evidence of deep venous thrombosis or  superficial thrombophlebitis of the right upper extremity.     Comments: Right upper extremity venous duplex exam was performed using  color Doppler flow, Doppler wave form analysis, and grayscale imaging,  with and without compression. There is no evidence of deep venous  thrombosis of the internal jugular, subclavian, axillary, brachial,  radial, and ulnar veins. There is no evidence of superficial  thrombophlebitis involving the cephalic or basilic veins.         This report was signed and finalized on 5/22/2024 3:39 PM by Dr. Williams Lopez MD.       XR Chest 1 View [[id number]] Collected: 05/22/24 1412     Updated: 05/22/24 1416    Narrative:      EXAMINATION: XR CHEST 1 VW-  5/22/2024 2:12 PM 1 view     HISTORY: dyspnea     COMPARISON: 5/13/2024     TECHNIQUE: A single frontal view of the chest was obtained.     FINDINGS:  Mild pulmonary vascular congestion and mild cardiomegaly. No chavo  edema, large effusion, airspace consolidation, or pneumothorax.  Calcified lymph nodes in the mediastinum on the LEFT. The osseous  structures and surrounding soft tissues demonstrate no acute  abnormality.       Impression:         1.  Mild cardiomegaly and pulmonary vascular congestion but no chavo  edema.           This report was signed and finalized on 5/22/2024 2:13 PM by Dr. Bon Tracey MD.               I have reviewed the patient's current medications.     Assessment/Plan   Assessment  Active Hospital Problems    Diagnosis     " **Acute kidney injury              Medical Decision Making  Number and Complexity of problems:  Anasarca  Acute kidney injury on chronic kidney disease  [id number] bicarb tablets.    Pulmonary hypertension based on echocardiogram  Chronic heart failure with preserved ejection fraction  Moderate malnutrition  Chronic anticoagulation for atrial fibrillation (persistent)  Hypertension  Diabetes mellitus.        Treatment Plan    Continue IV fluid at 50.  The problem remains that she has fluid retention and with fluid she can have further fluid retention.  The next idea will be to get dialysis for fluid overload in the setting of acute kidney injury on chronic kidney disease however patient declined and shows no interest.  She is currently a DO NOT RESUSCITATE DO NOT INTUBATE.  Discussed this with his son on a separate occasion.  Even with dialysis, this is just 1 part of the issue as she has pulmonary hypertension.  Will continue oxygen--patient not requiring any oxygen.  Her O2 saturation is in the mid 90s in room air.  Patient probably is best suited for palliative care/hospice  I consulted palliative care.  They are yet to see her.    I spoke to her son at bedside.  They would like information regarding home hospice.  She does not want to return to prior skilled nursing facility.         Medications reviewed.  albuterol, 2.5 mg, Nebulization, TID - RT  apixaban, 2.5 mg, Oral, BID  vitamin C, 500 mg, Oral, Daily  atorvastatin, 20 mg, Oral, Daily  carvedilol, 6.25 mg, Oral, BID With Meals  cholecalciferol, 1,000 Units, Oral, Daily  fenofibrate, 145 mg, Oral, Daily  ferrous sulfate, 325 mg, Oral, Daily With Breakfast  hydrALAZINE, 100 mg, Oral, TID  insulin glargine, 1-200 Units, Subcutaneous, Nightly - Glucommander  insulin lispro, 1-200 Units, Subcutaneous, 4x Daily With Meals & Nightly  pantoprazole, 40 mg, Oral, Q AM  sodium bicarbonate, 650 mg, Oral, TID  sodium chloride, 10 mL, Intravenous,  Q12H              Conditions and Status  Fair  Prognosis poor     [facility] Data  External documents reviewed: Reviewed epic record  Cardiac tracing (EKG, telemetry) interpretation: Atrial fibrillation rate controlled at 79  Radiology interpretation: Reviewed as outlined above  Labs reviewed: yes  Any tests that were considered but not ordered:  none     Decision rules/scores evaluated (example [id number], Wells, etc): None     Discussed with: Patient and son     Care Planning  Shared decision making: Patient with family  Code status and discussions: DO NOT RESUSCITATE DO NOT INTUBATE, no dialysis no cardioversion     Disposition  Social Determinants of Health that impact treatment or disposition: Nursing home resident  Estimated length of stay is to be determined.      I confirmed that the patient's advanced care plan is present, code status is documented, and a surrogate decision maker is listed in the patient's medical record.      The patient's surrogate decision maker is dimitry Edward.      The patient was seen and examined by me on (?)     Electronically signed by Tato Story MD, 05/23/24, 11:23 CDT.

## 2024-05-23 NOTE — PLAN OF CARE
Goal Outcome Evaluation:  Plan of Care Reviewed With: patient  Progress: no change         Pt admitted from ER this shift with AMARIS, creat 3.79, . IVF infusing per order. Incont throughout shift. No c/o pain. Room air with O2 sat maintained in mid to high 90's on room air. VSS. Safety maintained.

## 2024-05-23 NOTE — PLAN OF CARE
Goal Outcome Evaluation:  Plan of Care Reviewed With: patient, son        Progress: no change  Outcome Evaluation: The patient presents alert and oriented x4 but seems confused at times in conversation requiring cues from her son. She reports feeling SOA at times, but SpO2 was in the 90's on RA. She was able to ambulate to the restroom with use of RW and guidance for safety. Recommend discharge with 24/7 supervison whether it is home or to SNF.      Anticipated Discharge Disposition (PT): home with 24/7 care, skilled nursing facility

## 2024-05-23 NOTE — CASE MANAGEMENT/SOCIAL WORK
Discharge Planning Assessment  [facility]     Patient Name: Libia Perales  MRN: [id number]  Today's Date: 5/23/2024    Admit Date: 5/22/2024        Discharge Needs Assessment       Row Name 05/23/24 1515       Living Environment    People in Home facility resident    Current Living Arrangements extended care facility    Potentially Unsafe Housing Conditions none    Primary Care Provided by other (see comments)    Provides Primary Care For no one    Family Caregiver if Needed child(poncho), adult    Quality of Family Relationships helpful;involved;supportive       Discharge Needs Assessment    Discharge Coordination/Progress Pt is from [facility]. Spoke with her son Davian [phone]. He is considering hospice for pt. Gave him information about hospice at home vs hospice at [facility]. He wants to speak with hospice along with his wife, who is a retired nurse. Gave the referral to Ladonna with [facility] and she is going to speak with son.                   Discharge Plan    No documentation.                 Continued Care and Services - Admitted Since 5/22/2024    No active coordination exists for this encounter.       Selected Continued Care - Prior Encounters Includes continued care and service providers with selected services from prior encounters from 2/22/2024 to 5/23/2024      Discharged on 5/6/2024 Admission date: 5/1/2024 - Discharge disposition: Skilled Nursing Facility (DC - External)      Destination       Service Provider Selected Services Address Phone Fax Patient Preferred    [facility] [address] [address] [phone] --              Home Medical Care       Service Provider Selected Services Address Phone Fax Patient Preferred    [facility] [address] [address] [phone] --                             Demographic Summary    No documentation.                  Functional  Status    No documentation.                  Psychosocial    No documentation.                  Abuse/Neglect    No documentation.                  Legal    No documentation.                  Substance Abuse    No documentation.                  Patient Forms    No documentation.                     HEATHER Orlando

## 2024-05-23 NOTE — PROGRESS NOTES
Assessment tool to be used for patients with existing breathing treatments ordered by hospitalist                               Respiratory Therapist Driven Protocol - RT to Assess and Treat Algorithm    Item 0 Points 1 Point 2 Points 3 Points 4 Points Subtotal   Mental Status Alert, orientated, cooperative Lethargic, follows commands Confused, not following commands Obtunded or Somnolent Comatose 0   Respiratory Pattern Regular RR  8-16 breaths/minute Increased RR  18-25 breaths/minute Dyspnea on exertion, irregular RR  26-30/minute Shortness of breath,  RR 31-35/minute Accessory muscle use, severe SOB  RR > 35/minute 0   Breath Sounds Clear Decreased unilaterally Decreased bilaterally Basilar crackles Wheezing and/or rhonchi 4   Cough Strong, spontaneous non-productive Strong productive Weak, non-productive Weak productive or weak with rhonchi Absent or may require suctioning 0   Pulmonary Status Nonsmoker or no previous history > 1 year quit < 1 PPD  < 1 year quit >  or = 1 PPD Diagnosed pulmonary disease (severe or chronic) Severe or chronic pulmonary disease with exacerbation 0   Surgical Status None General surgery (non-abdominal or non-thoracic) Lower abdominal Thoracic or upper abdominal Thoracic with pulmonary disease 0   Chest X-ray Clear Chronic changes Infiltrates, atelectasis or pleural effusion Infiltrates > 1 lobe Diffuse infiltrates and atelectasis and/or effusions 0   Activity level Ambulatory Ambulatory with assistance Non-ambulatory Paraplegic Quadriplegic 1                     Total Score 5   Score    Drug Therapy Frequency  20 or >    Q4 Duoneb & Q3 Albuterol PRN 15 - 19     Q6 Duoneb & Q4 Albuterol PRN 10 - 14    QID Duoneb & Q4 Albuterol PRN 5 - 9    TID Duoneb & Q6 Albuterol PRN 0 - 4    Q4 PRN Duoneb or Q4 PRN Albuterol    Incentive Spirometry - Initial RT instruct    Lung Expansion Therapy (PEP) Bronchopulmonary Hygiene (CPT)   Q4 & PRN - Severe atelectasis, poor  oxygenation Q4 - copious secretions, dyspnea, unable to sleep, mucus plugging   QID - High risk for persistent atelectasis, existence of atelectasis QID & Q4 PRN - Moderate secretion production   TID - At risk for developing atelectasis TID - small amounts of secretions with poor cough   BID - prevention of atelectasis BID - unable to breathe deeply and cough spontaneously   *RT Protocol patients will be re-assessed/re-evaluated every 48 hours.    *Patients who are home nebulizer treatments will be protocoled to no less than their home regimen and will remain     on their home regimen with re-evaluations as needed with changes in patient condition.    RT Comments/Recommendations: TID Duoneb & Q6 Albuterol PRN

## 2024-05-23 NOTE — PLAN OF CARE
Goal Outcome Evaluation:           Progress: no change  Outcome Evaluation: Patient alert and oriented. Purewick in place. vss.

## 2024-05-23 NOTE — CONSULTS
[facility] Palliative Care Services  Initial Consult    Attending Physician: Tato Story MD  Referring Provider: Tato Story MD     Patient Name: Libia Perales  Date of Admission: 5/22/2024  Today's Date: 05/23/24     Reason for Referral: Goals of Care/Advance Care Planning    Code Status and Medical Interventions:   Ordered at: 05/22/24 1817     Medical Intervention Limits:    NO intubation (DNI)    NO dialysis    NO cardioversion     Level Of Support Discussed With:    Patient     Code Status (Patient has no pulse and is not breathing):    No CPR (Do Not Attempt to Resuscitate)     Medical Interventions (Patient has pulse or is breathing):    Limited Support        Subjective     HPI: 89 y.o. female with past medical history including atrial fibrillation, chronic diastolic heart failure, diabetes mellitus, GERD, high cholesterol, hypertension, osteoarthritis and chronic kidney disease stage IV.   Additional past medical history listed below.  According to chart review she has been hospitalized multiple times within the last 3 months.  She was hospitalized in March for heart failure exacerbation.  Most recently hospitalized from 5/1/2024-5/6/2024 due to mental status change.  Also noted concerns for digoxin toxicity and this has since been discontinued.  According to chart review she was treated with IV fluids and antibiotics with improvement in mental status.  Noted to have some weakness and was discharged to [facility] skilled nursing facility for further rehabilitation.  Chart review reveals she had visit to ED on 5/13/2024 due to shortness of breath and lower extremity edema and was given Lasix and discharged back to skilled nursing facility.  Patient presented to [facility] on 5/22/2024 related to shortness of breath, bilateral lower extremity swelling and right arm swelling.  Diuretics were increased at SNF however reportedly continue to experience swelling and  "shortness of breath per chart review.  Work-up in ED revealed multiple abnormalities in labs including troponin 65, BNP 9331, potassium 5.5, creatinine 3.79, , GFR 10.9, magnesium 2.8, RBC 2.72, hemoglobin 8.1 and hematocrit 26.3.  Chest x-ray revealed mild cardiomegaly and pulmonary vascular congestion but no chavo edema.  Venous Doppler ultrasound of RUE negative for evidence of DVT or superficial thrombophlebitis.  She was admitted to the medical floor for further work-up and treatment.  Labs collected this morning reveal further decline in renal function with creatinine 3.89, BUN 1-2 and GFR 10.6.  Potassium has normalized.  Palliative care has been consulted to discuss goals of care/advance care planning.  She is sitting up in the chair, alert and in no apparent distress.  Just finished working with PT/OT.  Denies any pain.  Reports her breathing is \"fair.\"  Explored what has been effective in symptom management.  Reports breathing treatments appear to be the most effective.  No visitors currently present.  Hospice consult placed per attending.    Advance Care Planning   Advanced Directives: No advance directive on file.     Advance Care Planning Discussion: Spoke with Ms. Perales regarding goals of care.  Explored previous discussions regarding condition and prognosis.  She reflected on experience with her heart failure and difficulty breathing.  Reports she had been trialing increased diuretics however did not notice much difference.  We discussed concerns for further decline in renal function.  She expressed she would not wish to undergo hemodialysis if indicated.  We discussed treatment options further including transition to focus more on symptom management rather than life-prolonging measures.  Explored whether she discussed hospice previously and shared she had previously.  She appears to have good understanding and expressed interest in discussing further as her main concern is treating her " shortness of breath.  Expressed she would like to avoid rehospitalization.   Shared plans to discuss further with her family.  Offered to call and speak with her children as well and she expressed wishes for her son to be updated.  Ms. Perales denied any questions and/or concerns at this time.  She was appreciative of discussion.  Call placed to her son, Davian.  He reflected on previous discussions with providers.  Provided additional details regarding his mother's overall decline and recent hospitalizations.  Shared they have discussed hospice and he is interested in meeting with hospice liaison to discuss further.  Secure chat sent to  for assistance.  He appears to have good prognostic awareness.  Denied any questions and/or concerns at this time.  Encouraged if arise.    The patient receives support from her children and extended family. Patient's children are her next of kin.    Due to the palliative care topics discussed including goals of care, treatment options, and hospice services we will establish an advance care plan.      Review of Systems   Cardiovascular:  Positive for dyspnea on exertion and leg swelling.   Respiratory:  Positive for shortness of breath.         Past Medical History:   Diagnosis Date    Atrial fibrillation     Diabetes mellitus     Difficulty walking Approximately 2 years    GERD (gastroesophageal reflux disease)     High cholesterol     Hypertension     OA (osteoarthritis)     Pneumonia     few years ago    Stage 4 chronic kidney disease 08/18/2023      Past Surgical History:   Procedure Laterality Date    BREAST BIOPSY Right     BREAST CYST ASPIRATION      HYSTERECTOMY      OOPHORECTOMY      TONSILLECTOMY        Social History     Socioeconomic History    Marital status:    Tobacco Use    Smoking status: Never    Smokeless tobacco: Never   Vaping Use    Vaping status: Never Used   Substance and Sexual Activity    Alcohol use: No    Drug use: No    Sexual activity: Not  Currently     Partners: Female     Birth control/protection: Surgical     Family History   Problem Relation Age of Onset    No Known Problems Father     Hypertension Mother     No Known Problems Sister     No Known Problems Brother     No Known Problems Daughter     No Known Problems Son     No Known Problems Maternal Grandmother     No Known Problems Paternal Grandmother     No Known Problems Maternal Aunt     No Known Problems Paternal Aunt     No Known Problems Other     Colon cancer Neg Hx     BRCA 1/2 Neg Hx     Breast cancer Neg Hx     Endometrial cancer Neg Hx     Ovarian cancer Neg Hx       Allergies   Allergen Reactions    Singulair [Montelukast] Cough    Nsaids Other (See Comments)     Due to kidney disease.      Ace Inhibitors Cough       Objective   Diagnostics: Reviewed    Intake/Output Summary (Last 24 hours) at 5/23/2024 0916  Last data filed at 5/22/2024 1855  Gross per 24 hour   Intake 633.33 ml   Output --   Net 633.33 ml       Current medications patient is presently taking including all prescriptions, over-the-counter, herbals and vitamin/mineral/dietary (nutritional) supplements with reviewed including route, type, dose and frequency and are current per MAR at time of dictation.  Current Facility-Administered Medications   Medication Dose Route Frequency Provider Last Rate Last Admin    acetaminophen (TYLENOL) tablet 650 mg  650 mg Oral Q4H PRN Tato Story MD        Or    acetaminophen (TYLENOL) 160 MG/5ML oral solution 650 mg  650 mg Oral Q4H PRN Tato Story MD        Or    acetaminophen (TYLENOL) suppository 650 mg  650 mg Rectal Q4H PRN Tato Story MD        albuterol (PROVENTIL) nebulizer solution 0.083% 2.5 mg/3mL  2.5 mg Nebulization Q4H PRN Tato Story MD        albuterol (PROVENTIL) nebulizer solution 0.083% 2.5 mg/3mL  2.5 mg Nebulization TID - RT Tato Story MD   2.5 mg at 05/23/24 0715    apixaban (ELIQUIS) tablet  2.5 mg  2.5 mg Oral BID Tato Story MD   2.5 mg at 05/23/24 0909    ascorbic acid (VITAMIN C) tablet 500 mg  500 mg Oral Daily Tato Story MD   500 mg at 05/23/24 0908    atorvastatin (LIPITOR) tablet 20 mg  20 mg Oral Daily Tato Story MD   20 mg at 05/23/24 0908    sennosides-docusate (PERICOLACE) 8.6-50 MG per tablet 2 tablet  2 tablet Oral BID PRN Tato Story MD        And    polyethylene glycol (MIRALAX) packet 17 g  17 g Oral Daily PRTato Wilson MD        And    bisacodyl (DULCOLAX) EC tablet 5 mg  5 mg Oral Daily Tato Moralez MD        And    bisacodyl (DULCOLAX) suppository 10 mg  10 mg Rectal Daily PRTato Wilson MD        carvedilol (COREG) tablet 6.25 mg  6.25 mg Oral BID With Meals Tato Story MD   6.25 mg at 05/23/24 0909    cholecalciferol (VITAMIN D3) tablet 1,000 Units  1,000 Units Oral Daily Tato Story MD   1,000 Units at 05/23/24 0908    dextrose (D50W) (25 g/50 mL) IV injection 10-50 mL  10-50 mL Intravenous Q15 Min Tato Moralez MD        dextrose (GLUTOSE) oral gel 15 g  15 g Oral Q15 Min Tato Moralez MD        docusate sodium (COLACE) capsule 100 mg  100 mg Oral BID PRN Tato Story MD        fenofibrate (TRICOR) tablet 145 mg  145 mg Oral Daily Tato Story MD   145 mg at 05/23/24 0909    ferrous sulfate tablet 325 mg  325 mg Oral Daily With Breakfast Tato Story MD   325 mg at 05/23/24 0909    Glucagon (GLUCAGEN) injection 1 mg  1 mg Intramuscular Q15 Min Tato Moralez MD        hydrALAZINE (APRESOLINE) tablet 100 mg  100 mg Oral TID Tato Story MD   100 mg at 05/23/24 0908    insulin glargine (LANTUS, SEMGLEE) injection 1-200 Units  1-200 Units Subcutaneous Nightly - Glucommander Tato Story MD   12 Units at 05/22/24 2247    insulin lispro  "(humaLOG) injection 1-200 Units  1-200 Units Subcutaneous 4x Daily With Meals & Nightly Tato Story MD        insulin lispro (humaLOG) injection 1-200 Units  1-200 Units Subcutaneous PRN Tato Story MD        ondansetron (ZOFRAN) injection 4 mg  4 mg Intravenous Q6H PRN Tato Story MD        pantoprazole (PROTONIX) EC tablet 40 mg  40 mg Oral Q AM Tato Story MD   40 mg at 05/23/24 0559    sodium bicarbonate tablet 650 mg  650 mg Oral TID Tato Story MD   650 mg at 05/23/24 0908    sodium chloride 0.9 % flush 10 mL  10 mL Intravenous PRN Tato Story MD        sodium chloride 0.9 % flush 10 mL  10 mL Intravenous Q12H Tato Story MD   10 mL at 05/22/24 2010    sodium chloride 0.9 % flush 10 mL  10 mL Intravenous PRN Tato Story MD        sodium chloride 0.9 % infusion 40 mL  40 mL Intravenous PRN Tato Story MD        sodium chloride 0.9 % infusion  50 mL/hr Intravenous Continuous Tato Story MD 50 mL/hr at 05/22/24 2010 50 mL/hr at 05/22/24 2010    sodium chloride, 50 mL/hr, Last Rate: 50 mL/hr (05/22/24 2010)       acetaminophen **OR** acetaminophen **OR** acetaminophen    albuterol    senna-docusate sodium **AND** polyethylene glycol **AND** bisacodyl **AND** bisacodyl    dextrose    dextrose    docusate sodium    glucagon (human recombinant)    insulin lispro    ondansetron    [COMPLETED] Insert Peripheral IV **AND** sodium chloride    sodium chloride    sodium chloride  Assessment   /53 (BP Location: Left arm, Patient Position: Lying)   Pulse 82   Temp 97.3 °F (36.3 °C) (Oral)   Resp 18   Ht 157.5 cm (62\")   Wt 75.8 kg (167 lb)   LMP  (LMP Unknown)   SpO2 97%   BMI 30.54 kg/m²     Physical Exam  Vitals and nursing note reviewed.   Constitutional:       General: She is not in acute distress.  HENT:      Head: Normocephalic and atraumatic.   Eyes:      General: " Lids are normal.      Extraocular Movements: Extraocular movements intact.   Neck:      Vascular: No JVD.      Trachea: Trachea normal.   Cardiovascular:      Rate and Rhythm: Normal rate.      Heart sounds: Murmur heard.   Pulmonary:      Effort: Pulmonary effort is normal.      Breath sounds: Decreased breath sounds present.   Musculoskeletal:      Cervical back: Neck supple.   Skin:     General: Skin is warm and dry.   Neurological:      Mental Status: She is alert and oriented to person, place, and time.   Psychiatric:         Mood and Affect: Mood normal.         Behavior: Behavior is cooperative.     Functional status: Palliative Performance Scale Score: Performance 60% based on the following measures: Ambulation: Reduced, Activity and Evidence of Disease: Unable to do hobby or some work, significant evidence of disease, Self-Care: Occasional assist necessary,  Intake: Normal or reduced, LOC: Full or confusion.  Nutritional status: Albumin 3.7. Body mass index is 30.54 kg/m².  Patient status: Disease state: Controlled with current treatments.    Active Hospital Problems    Diagnosis     **Acute kidney injury      Impression/Problem List:  Acute kidney injury - Expressed would not wish to pursue dialysis   Chronic kidney disease stage IV     Chronic heart failure with preserved ejection fraction  Dyspnea  Anasarca  Pulmonary hypertension  Atrial fibrillation on chronic anticoagulation  Hypertension  Diabetes mellitus  Advanced age    Plan / Recommendations     Palliative Care Encounter   Goals of care include CODE STATUS NO CPR with limited support interventions.    Prognosis is poor long-term secondary to AMARIS on CKD stage IV, chronic heart failure, anasarca, pulmonary hypertension, dyspnea, atrial fibrillation, advanced age and other comorbidities listed above.    Spoke with Ms. Perales regarding goals of care.  Also discussed with her son, Davian, via telephone.  Details of discussions above.     Ms. Perales  and her son reflected on overall decline and recent hospitalizations.  Reports she has had increasing shortness of breath and edema which have impacted her ADLs.     We discussed treatment options further and she expressed wishes to focus more on symptom management rather than life-prolonging measures.       Interested in discussing hospice services further.  Hospice consult has been placed per attending.  Son has requested hospice liaison to call and arrange meeting so he can be here to discuss.  Secure chat sent to  for assistance.      Ms. Perales and son denied any questions and/or concerns at this time.  Appreciative of discussion.       Would benefit from MOST form prior to discharge.  Will plan to see if patient/family are interested in completing after discussing with hospice.    Thank you for allowing us to participate in patient's plan of care. Palliative Care Team will continue to follow patient.     Time spent:70 minutes spent reviewing medical and medication records, assessing and examining patient, discussing with family, answering questions, providing some guidance about a plan and documentation of care, and coordinating care with other healthcare members, with > 50% time spent face to face.     Electronically signed by, YOBANY Keene, 05/23/24.

## 2024-05-23 NOTE — THERAPY TREATMENT NOTE
Acute Care - Physical Therapy Treatment Note  [facility]     Patient Name: Libia Perales  : 1934  MRN: [id number]  Today's Date: 2024      Visit Dx:     ICD-10-CM ICD-9-CM   1. Acute kidney injury  N17.9 584.9   2. Congestive heart failure, unspecified HF chronicity, unspecified heart failure type  I50.9 428.0   3. Hyperkalemia  E87.5 276.7   4. Hypermagnesemia  E83.41 275.2   5. 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    Benign essential HTN    Persistent atrial fibrillation    Asthma    Fibrocystic breast disease    Gastro-esophageal reflux    High calcium levels    OA (osteoarthritis) of ankle    Right renal mass    Vertigo, benign paroxysmal    Chronic fatigue    Chronic cough    Allergic rhinitis    Stage 4 chronic kidney disease    Strain of left rhomboid muscle    Chronic anticoagulation    Chronic diastolic (congestive) heart failure    Severe pulmonary hypertension    Acute on chronic heart failure with preserved ejection fraction (HFpEF)    Acute kidney injury superimposed on stage 4 chronic kidney disease    Digoxin toxicity, accidental or unintentional, initial encounter    Altered mental status    Acute cystitis with hematuria    Elevated troponin not due to acute coronary syndrome    Metabolic encephalopathy suspect due to elevated creatinine and abnormal urinalysis    Moderate malnutrition    Chronic heart failure with preserved ejection fraction (HFpEF)    Acute kidney injury     Past Medical History:   Diagnosis Date    Atrial fibrillation     Diabetes mellitus     Difficulty walking Approximately 2 years    GERD (gastroesophageal reflux disease)     High cholesterol     Hypertension     OA (osteoarthritis)     Pneumonia     few  years ago    Stage 4 chronic kidney disease 08/18/2023     Past Surgical History:   Procedure Laterality Date    BREAST BIOPSY Right     BREAST CYST ASPIRATION      HYSTERECTOMY      OOPHORECTOMY      TONSILLECTOMY       PT Assessment (Last 12 Hours)       PT Evaluation and Treatment       Row Name 05/23/24 1315          Physical Therapy Time and Intention    Subjective Information complains of;weakness;fatigue  -LY     Document Type therapy note (daily note)  -LY     Mode of Treatment physical therapy  -LY       Row Name 05/23/24 1315          General Information    Existing Precautions/Restrictions fall  -LY       Row Name 05/23/24 1315          Pain    Pretreatment Pain Rating 0/10 - no pain  -LY       Row Name 05/23/24 1315          Bed Mobility    Bed Mobility sit-supine  -LY     Sit-Supine Wirt (Bed Mobility) verbal cues;contact guard  -LY     Assistive Device (Bed Mobility) head of bed elevated;bed rails  -LY       Row Name 05/23/24 1315          Sit-Stand Transfer    Sit-Stand Wirt (Transfers) contact guard;minimum assist (75% patient effort);verbal cues;nonverbal cues (demo/gesture)  -LY     Assistive Device (Sit-Stand Transfers) walker, front-wheeled  -LY       Row Name 05/23/24 1315          Stand-Sit Transfer    Stand-Sit Wirt (Transfers) contact guard  -LY     Assistive Device (Stand-Sit Transfers) walker, front-wheeled  -LY       Row Name 05/23/24 1315          Gait/Stairs (Locomotion)    Wirt Level (Gait) verbal cues;contact guard  -LY     Assistive Device (Gait) walker, front-wheeled  -LY     Distance in Feet (Gait) 40  x2 reps  -LY     Pattern (Gait) step-through  -LY     Deviations/Abnormal Patterns (Gait) gait speed decreased;stride length decreased  -LY     Bilateral Gait Deviations forward flexed posture  -LY       Row Name 05/23/24 1315          Positioning and Restraints    Pre-Treatment Position sitting in chair/recliner  -LY     Post Treatment Position bed   -LY     In Bed fowlers;call light within reach;encouraged to call for assist  -LY               User Key  (r) = Recorded By, (t) = Taken By, (c) = Cosigned By      Initials Name Provider Type    Tangela Talbot, PTA Physical Therapist Assistant                    Physical Therapy Education       Title: PT OT SLP Therapies (In Progress)       Topic: Physical Therapy (In Progress)       Point: Mobility training (Done)       Learning Progress Summary             Patient Acceptance, E, VU by MS at 5/23/2024 1125    Comment: role of PT in her care                         Point: Home exercise program (Not Started)       Learner Progress:  Not documented in this visit.              Point: Body mechanics (Not Started)       Learner Progress:  Not documented in this visit.              Point: Precautions (Not Started)       Learner Progress:  Not documented in this visit.                              User Key       Initials Effective Dates Name Provider Type Discipline    MS 07/11/23 -  Danielle Benjamin, PT, DPT, NCS Physical Therapist PT                  PT Recommendation and Plan             Time Calculation:    PT Charges       Row Name 05/23/24 1339 05/23/24 1122          Time Calculation    Start Time 1313  -LY 1120  -MS     Stop Time 1336  -LY 1158  -MS     Time Calculation (min) 23 min  -LY 38 min  -MS     PT Received On 05/23/24  -LY 05/23/24  -MS     PT Goal Re-Cert Due Date -- 06/02/24  -MS        Time Calculation- PT    Total Timed Code Minutes- PT 23 minute(s)  -LY --        Timed Charges    99592 - Gait Training Minutes  23  -LY --        Untimed Charges    PT Eval/Re-eval Minutes -- 38  -MS        Total Minutes    Timed Charges Total Minutes 23  -LY --     Untimed Charges Total Minutes -- 38  -MS      Total Minutes 23  -LY 38  -MS               User Key  (r) = Recorded By, (t) = Taken By, (c) = Cosigned By      Initials Name Provider Type    MS Danielle Benjamin, PT, DPT, NCS Physical Therapist    CAMILLA Escobedo  Tangela CHOI PTA Physical Therapist Assistant                  Therapy Charges for Today       Code Description Service Date Service Provider Modifiers Qty    [id number] HC GAIT TRAINING EA 15 MIN 5/23/2024 Tangela Escobedo PTA GP 2            PT G-Codes  Outcome Measure Options: AM-PAC 6 Clicks Daily Activity (OT)  AM-PAC 6 Clicks Score (PT): 17  AM-PAC 6 Clicks Score (OT): 19    Tangela Escobedo PTA  5/23/2024

## 2024-05-23 NOTE — THERAPY EVALUATION
Patient Name: Libia Perales  : 1934    MRN: [id number]                              Today's Date: 2024       Admit Date: 2024    Visit Dx:     ICD-10-CM ICD-9-CM   1. Acute kidney injury  N17.9 584.9   2. Congestive heart failure, unspecified HF chronicity, unspecified heart failure type  I50.9 428.0   3. Hyperkalemia  E87.5 276.7   4. Hypermagnesemia  E83.41 275.2   5. 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    Benign essential HTN    Persistent atrial fibrillation    Asthma    Fibrocystic breast disease    Gastro-esophageal reflux    High calcium levels    OA (osteoarthritis) of ankle    Right renal mass    Vertigo, benign paroxysmal    Chronic fatigue    Chronic cough    Allergic rhinitis    Stage 4 chronic kidney disease    Strain of left rhomboid muscle    Chronic anticoagulation    Chronic diastolic (congestive) heart failure    Severe pulmonary hypertension    Acute on chronic heart failure with preserved ejection fraction (HFpEF)    Acute kidney injury superimposed on stage 4 chronic kidney disease    Digoxin toxicity, accidental or unintentional, initial encounter    Altered mental status    Acute cystitis with hematuria    Elevated troponin not due to acute coronary syndrome    Metabolic encephalopathy suspect due to elevated creatinine and abnormal urinalysis    Moderate malnutrition    Chronic heart failure with preserved ejection fraction (HFpEF)    Acute kidney injury     Past Medical History:   Diagnosis Date    Atrial fibrillation     Diabetes mellitus     Difficulty walking Approximately 2 years    GERD (gastroesophageal reflux disease)     High cholesterol     Hypertension     OA (osteoarthritis)     Pneumonia     few  years ago    Stage 4 chronic kidney disease 08/18/2023     Past Surgical History:   Procedure Laterality Date    BREAST BIOPSY Right     BREAST CYST ASPIRATION      HYSTERECTOMY      OOPHORECTOMY      TONSILLECTOMY        General Information       Row Name 05/23/24 1122          Physical Therapy Time and Intention    Document Type evaluation  swelling and SOA, acute kidney injury  -MS     Mode of Treatment physical therapy;co-treatment  -MS       Row Name 05/23/24 1122          General Information    Patient Profile Reviewed yes  -MS     Prior Level of Function independent:;all household mobility;ADL's;dependent:;driving  walks with a cane  -MS     Existing Precautions/Restrictions fall  -MS     Barriers to Rehab medically complex;physical barrier  -MS       Row Name 05/23/24 1122          Living Environment    People in Home alone  -MS       Row Name 05/23/24 1122          Home Main Entrance    Number of Stairs, Main Entrance one  walk in shower with seat  -MS     Stair Railings, Main Entrance none  -MS       Row Name 05/23/24 1122          Stairs Within Home, Primary    Number of Stairs, Within Home, Primary none  -MS       Row Name 05/23/24 1122          Cognition    Orientation Status (Cognition) oriented x 4  -MS       Row Name 05/23/24 1122          Safety Issues, Functional Mobility    Safety Issues Affecting Function (Mobility) awareness of need for assistance;insight into deficits/self-awareness  -MS     Impairments Affecting Function (Mobility) balance;endurance/activity tolerance  -MS               User Key  (r) = Recorded By, (t) = Taken By, (c) = Cosigned By      Initials Name Provider Type    MS Danielle Benjamin, PT, DPT, NCS Physical Therapist                   Mobility       Row Name 05/23/24 1122          Bed Mobility    Bed Mobility supine-sit  -MS     Supine-Sit [place] (Bed Mobility) contact guard  -MS     Assistive Device (Bed Mobility) head of bed elevated;bed rails  -MS       Row Name  05/23/24 1122          Sit-Stand Transfer    Sit-Stand [place] (Transfers) contact guard;minimum assist (75% patient effort);verbal cues;nonverbal cues (demo/gesture)  -MS     Assistive Device (Sit-Stand Transfers) walker, front-wheeled  -MS     Comment, (Sit-Stand Transfer) CGA from bed and min A from toliet  -MS       Row Name 05/23/24 1122          Gait/Stairs (Locomotion)    [place] Level (Gait) contact guard  -MS     Assistive Device (Gait) walker, front-wheeled  -MS     Distance in Feet (Gait) 20  and 25ft  -MS     Comment, (Gait/Stairs) forward flexed posture and impulsive  -MS               User Key  (r) = Recorded By, (t) = Taken By, (c) = Cosigned By      Initials Name Provider Type    Danielle Ventura, PT, DPT, NCS Physical Therapist                   Obj/Interventions       Row Name 05/23/24 1122          Range of Motion Comprehensive    General Range of Motion bilateral upper extremity ROM WFL;bilateral lower extremity ROM WFL  -MS       Row Name 05/23/24 1122          Strength Comprehensive (MMT)    Comment, General Manual Muscle Testing (MMT) Assessment B LEs grossly 4/5  -MS       Row Name 05/23/24 1122          Balance    Balance Assessment sitting static balance;sitting dynamic balance;standing static balance;standing dynamic balance  -MS     Static Sitting Balance supervision  -MS     Dynamic Sitting Balance standby assist  -MS     Position, Sitting Balance unsupported;sitting edge of bed  -MS     Static Standing Balance contact guard  -MS     Dynamic Standing Balance contact guard  -MS     Position/Device Used, Standing Balance walker, rolling  -MS               User Key  (r) = Recorded By, (t) = Taken By, (c) = Cosigned By      Initials Name Provider Type    Danielle Ventura, PT, DPT, NCS Physical Therapist                   Goals/Plan       Row Name 05/23/24 1122          Bed Mobility Goal 1 (PT)    Activity/Assistive Device (Bed Mobility Goal 1, PT) bed mobility activities, all   -MS     [place] Level/Cues Needed (Bed Mobility Goal 1, PT) independent  -MS     Time Frame (Bed Mobility Goal 1, PT) long term goal (LTG);by discharge  -MS     Progress/Outcomes (Bed Mobility Goal 1, PT) new goal  -MS       Row Name 05/23/24 1122          Transfer Goal 1 (PT)    Activity/Assistive Device (Transfer Goal 1, PT) sit-to-stand/stand-to-sit;bed-to-chair/chair-to-bed;walker, rolling  -MS     [place] Level/Cues Needed (Transfer Goal 1, PT) standby assist  -MS     Time Frame (Transfer Goal 1, PT) long term goal (LTG);by discharge  -MS     Progress/Outcome (Transfer Goal 1, PT) new goal  -MS       Row Name 05/23/24 1122          Gait Training Goal 1 (PT)    Activity/Assistive Device (Gait Training Goal 1, PT) gait (walking locomotion);assistive device use;decrease fall risk;diminish gait deviation;increase endurance/gait distance;improve balance and speed;walker, rolling  -MS     [place] Level (Gait Training Goal 1, PT) modified independence  -MS     Distance (Gait Training Goal 1, PT) 75ft maintaining SpO2 90% or greater  -MS     Time Frame (Gait Training Goal 1, PT) long term goal (LTG);by discharge  -MS     Progress/Outcome (Gait Training Goal 1, PT) new goal  -MS       Row Name 05/23/24 1122          Therapy Assessment/Plan (PT)    Planned Therapy Interventions (PT) balance training;bed mobility training;gait training;patient/family education;transfer training  -MS               User Key  (r) = Recorded By, (t) = Taken By, (c) = Cosigned By      Initials Name Provider Type    Danielle Ventura, PT, DPT, NCS Physical Therapist                   Clinical Impression       Row Name 05/23/24 1122          Pain    Pretreatment Pain Rating 0/10 - no pain  -MS     Posttreatment Pain Rating 0/10 - no pain  -MS       Row Name 05/23/24 1122          Plan of Care Review    Plan of Care Reviewed With patient;son  -MS     Progress no change  -MS     Outcome Evaluation The patient presents alert and  oriented x4 but seems confused at times in conversation requiring cues from her son. She reports feeling SOA at times, but SpO2 was in the 90's on RA. She was able to ambulate to the restroom with use of RW and guidance for safety. Recommend discharge with 24/7 supervison whether it is home or to SNF.  -MS       Row Name 05/23/24 1122          Therapy Assessment/Plan (PT)    Patient/Family Therapy Goals Statement (PT) go home  -MS     Rehab Potential (PT) good, to achieve stated therapy goals  -MS     Criteria for Skilled Interventions Met (PT) yes;meets criteria;skilled treatment is necessary  -MS     Therapy Frequency (PT) 2 times/day  -MS     Predicted Duration of Therapy Intervention (PT) until discharge  -MS       Row Name 05/23/24 1122          Vital Signs    Pre SpO2 (%) 94  -MS     O2 Delivery Pre Treatment room air  -MS     Intra SpO2 (%) 92  -MS     O2 Delivery Intra Treatment room air  -MS     O2 Delivery Post Treatment room air  -MS     Pre Patient Position Supine  -MS     Intra Patient Position Sitting  -MS       Row Name 05/23/24 1122          Positioning and Restraints    Post Treatment Position chair  -MS     In Chair sitting;call light within reach;encouraged to call for assist;with other staff  -MS               User Key  (r) = Recorded By, (t) = Taken By, (c) = Cosigned By      Initials Name Provider Type    MS SouravDanielle MACK, PT, DPT, NCS Physical Therapist                   Outcome Measures       Row Name 05/23/24 1122          How much help from another person do you currently need...    Turning from your back to your side while in flat bed without using bedrails? 3  -MS     Moving from lying on back to sitting on the side of a flat bed without bedrails? 3  -MS     Moving to and from a bed to a chair (including a wheelchair)? 3  -MS     Standing up from a chair using your arms (e.g., wheelchair, bedside chair)? 3  -MS     Climbing 3-5 steps with a railing? 2  -MS     To walk in hospital room?  3  -MS     AM-PAC 6 Clicks Score (PT) 17  -MS     Highest Level of Mobility Goal 5 --> Static standing  -MS       Row Name 05/23/24 1122          Functional Assessment    Outcome Measure Options AM-PAC 6 Clicks Basic Mobility (PT)  -MS               User Key  (r) = Recorded By, (t) = Taken By, (c) = Cosigned By      Initials Name Provider Type    MS Sourav Danielle GIRON, PT, DPT, NCS Physical Therapist                                 Physical Therapy Education       Title: PT OT SLP Therapies (In Progress)       Topic: Physical Therapy (In Progress)       Point: Mobility training (Done)       Learning Progress Summary             Patient Acceptance, E, VU by MS at 5/23/2024 1125    Comment: role of PT in her care                         Point: Home exercise program (Not Started)       Learner Progress:  Not documented in this visit.              Point: Body mechanics (Not Started)       Learner Progress:  Not documented in this visit.              Point: Precautions (Not Started)       Learner Progress:  Not documented in this visit.                              User Key       Initials Effective Dates Name Provider Type Discipline    MS 07/11/23 -  Danielle Benjamin, PT, DPT, NCS Physical Therapist PT                  PT Recommendation and Plan  Planned Therapy Interventions (PT): balance training, bed mobility training, gait training, patient/family education, transfer training  Plan of Care Reviewed With: patient, son  Progress: no change  Outcome Evaluation: The patient presents alert and oriented x4 but seems confused at times in conversation requiring cues from her son. She reports feeling SOA at times, but SpO2 was in the 90's on RA. She was able to ambulate to the restroom with use of RW and guidance for safety. Recommend discharge with 24/7 supervison whether it is home or to SNF.     Time Calculation:         PT Charges       Row Name 05/23/24 1122             Time Calculation    Start Time 1120  -MS      Stop  Time 1158  -MS      Time Calculation (min) 38 min  -MS      PT Received On 05/23/24  -MS      PT Goal Re-Cert Due Date 06/02/24  -MS         Untimed Charges    PT Eval/Re-eval Minutes 38  -MS         Total Minutes    Untimed Charges Total Minutes 38  -MS       Total Minutes 38  -MS                User Key  (r) = Recorded By, (t) = Taken By, (c) = Cosigned By      Initials Name Provider Type    Danielle Ventura, PT, DPT, NCS Physical Therapist                      PT G-Codes  Outcome Measure Options: AM-PAC 6 Clicks Basic Mobility (PT)  AM-PAC 6 Clicks Score (PT): 17  PT Discharge Summary  Anticipated Discharge Disposition (PT): home with 24/7 care, skilled nursing facility    Danielle Benjamin, PT, DPT, NCS  5/23/2024

## 2024-05-23 NOTE — PLAN OF CARE
Goal Outcome Evaluation:  Plan of Care Reviewed With: patient, son        Progress: no change  Outcome Evaluation: OT eval complete. Ms. Perales is AxO x 4  & pleasant.  She is on RA, appears to have mild swelling at BUEs, and demo's generalized weakness & decreased act esvin.  The pt requires use of rwx for mobility and CGA-Min A for t/fs.  Of note she reports incontinence of bowel and bladder prior to reaching the commode.  OTR provided Min A-Mod A for toileting to ensure her cleanliness and to change her soiled brief.  Ms. Perales would benefit from cont'd OT tx to improve her indep in self care & fxl mob. Anticipate she will DC to SNF for further rehab.      Anticipated Discharge Disposition (OT): sub acute care setting

## 2024-05-23 NOTE — THERAPY EVALUATION
Patient Name: Libia Perales  : 1934    MRN: [id number]                              Today's Date: 2024       Admit Date: 2024    Visit Dx:     ICD-10-CM ICD-9-CM   1. Acute kidney injury  N17.9 584.9   2. Congestive heart failure, unspecified HF chronicity, unspecified heart failure type  I50.9 428.0   3. Hyperkalemia  E87.5 276.7   4. Hypermagnesemia  E83.41 275.2   5. 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    Benign essential HTN    Persistent atrial fibrillation    Asthma    Fibrocystic breast disease    Gastro-esophageal reflux    High calcium levels    OA (osteoarthritis) of ankle    Right renal mass    Vertigo, benign paroxysmal    Chronic fatigue    Chronic cough    Allergic rhinitis    Stage 4 chronic kidney disease    Strain of left rhomboid muscle    Chronic anticoagulation    Chronic diastolic (congestive) heart failure    Severe pulmonary hypertension    Acute on chronic heart failure with preserved ejection fraction (HFpEF)    Acute kidney injury superimposed on stage 4 chronic kidney disease    Digoxin toxicity, accidental or unintentional, initial encounter    Altered mental status    Acute cystitis with hematuria    Elevated troponin not due to acute coronary syndrome    Metabolic encephalopathy suspect due to elevated creatinine and abnormal urinalysis    Moderate malnutrition    Chronic heart failure with preserved ejection fraction (HFpEF)    Acute kidney injury     Past Medical History:   Diagnosis Date    Atrial fibrillation     Diabetes mellitus     Difficulty walking Approximately 2 years    GERD (gastroesophageal reflux disease)     High cholesterol     Hypertension     OA (osteoarthritis)     Pneumonia     few  years ago    Stage 4 chronic kidney disease 08/18/2023     Past Surgical History:   Procedure Laterality Date    BREAST BIOPSY Right     BREAST CYST ASPIRATION      HYSTERECTOMY      OOPHORECTOMY      TONSILLECTOMY        General Information       Row Name 05/23/24 1123          OT Time and Intention    Document Type evaluation  -     Mode of Treatment occupational therapy  -       Row Name 05/23/24 1123          General Information    Patient Profile Reviewed yes  -     Existing Precautions/Restrictions fall  -     Barriers to Rehab medically complex  -       Row Name 05/23/24 1123          Living Environment    People in Home alone  -       Row Name 05/23/24 1123          Home Main Entrance    Number of Stairs, Main Entrance one  -     Stair Railings, Main Entrance none  -       Row Name 05/23/24 1123          Stairs Within Home, Primary    Number of Stairs, Within Home, Primary none  -       Row Name 05/23/24 1123          Cognition    Orientation Status (Cognition) oriented x 4  -       Row Name 05/23/24 1123          Safety Issues, Functional Mobility    Safety Issues Affecting Function (Mobility) awareness of need for assistance;insight into deficits/self-awareness  -     Impairments Affecting Function (Mobility) balance;endurance/activity tolerance  -               User Key  (r) = Recorded By, (t) = Taken By, (c) = Cosigned By      Initials Name Provider Type     Flor Bliss, OTR/L Occupational Therapist                     Mobility/ADL's       Row Name 05/23/24 1123          Bed Mobility    Bed Mobility supine-sit  -     Supine-Sit [place] (Bed Mobility) contact guard  -     Assistive Device (Bed Mobility) head of bed elevated;bed rails  -       Row Name 05/23/24 1123          Transfers    Transfers sit-stand transfer;stand-sit transfer;toilet transfer  -       Row Name 05/23/24 1123          Sit-Stand Transfer    Sit-Stand [place] (Transfers) contact  guard;minimum assist (75% patient effort);verbal cues;nonverbal cues (demo/gesture)  -     Assistive Device (Sit-Stand Transfers) walker, front-wheeled  -       Row Name 05/23/24 [facility]          Stand-Sit Transfer    Stand-Sit [place] (Transfers) contact guard  -     Assistive Device (Stand-Sit Transfers) walker, front-wheeled  -       Row Name 05/23/24 112          Toilet Transfer    Type (Toilet Transfer) sit-stand;stand-sit;stand pivot/stand step  -     [place] Level (Toilet Transfer) contact guard;minimum assist (75% patient effort);verbal cues;nonverbal cues (demo/gesture)  -     Assistive Device (Toilet Transfer) commode;walker, front-wheeled;grab bars/safety frame  -       Row Name 05/23/24 [facility]          Functional Mobility    Functional Mobility- Ind. Level contact guard assist  -     Functional Mobility- Device walker, front-wheeled  -     Functional Mobility- Comment in room from bed > BR > chair  -       Row Name 05/23/24 [facility]          Activities of Daily Living    BADL Assessment/Intervention lower body dressing;toileting  -       Row Name 05/23/24 [facility]          Lower Body Dressing Assessment/Training    [place] Level (Lower Body Dressing) supervision;verbal cues;doff;minimum assist (75% patient effort);don;pants/bottoms  -     Position (Lower Body Dressing) supported standing;unsupported sitting  -     Comment, (Lower Body Dressing) brief  -       Row Name 05/23/24 [facility]          Toileting Assessment/Training    [place] Level (Toileting) minimum assist (75% patient effort);adjust/manage clothing;moderate assist (50% patient effort);change pad/brief;perform perineal hygiene  -     Assistive Devices (Toileting) commode;grab bar/safety frame  -     Position (Toileting) unsupported sitting;supported standing  -               User Key  (r) = Recorded By, (t) = Taken By, (c) = Cosigned By      Initials Name Provider Type     Flor Bliss, OTR/L  Occupational Therapist                   Obj/Interventions       Row Name 05/23/24 1123          Range of Motion Comprehensive    General Range of Motion bilateral upper extremity ROM WNL  -       Row Name 05/23/24 1123          Strength Comprehensive (MMT)    General Manual Muscle Testing (MMT) Assessment no strength deficits identified  -[facility] Name 05/23/24 1123          Balance    Balance Assessment sitting static balance;sitting dynamic balance;sit to stand dynamic balance;standing static balance;standing dynamic balance  -     Static Sitting Balance supervision  -     Dynamic Sitting Balance standby assist  -     Position, Sitting Balance unsupported;sitting edge of bed  -     Sit to Stand Dynamic Balance contact guard  -     Static Standing Balance contact guard  -     Dynamic Standing Balance contact guard  -     Position/Device Used, Standing Balance walker, rolling  -     Balance Interventions sitting;standing;sit to stand;supported;static;dynamic;occupation based/functional task  -               User Key  (r) = Recorded By, (t) = Taken By, (c) = Cosigned By      Initials Name Provider Type     Flor Bliss, OTR/L Occupational Therapist                   Goals/Plan       Row Name 05/23/24 1123          Transfer Goal 1 (OT)    Activity/Assistive Device (Transfer Goal 1, OT) transfers, all  -     [place] Level/Cues Needed (Transfer Goal 1, OT) supervision required  -     Time Frame (Transfer Goal 1, OT) long term goal (LTG);by discharge  -     Progress/Outcome (Transfer Goal 1, OT) new goal  -[facility] Name 05/23/24 1123          Dressing Goal 1 (OT)    Activity/Device (Dressing Goal 1, OT) lower body dressing  -     [place]/Cues Needed (Dressing Goal 1, OT) minimum assist (75% or more patient effort)  -     Time Frame (Dressing Goal 1, OT) long term goal (LTG);by discharge  -     Progress/Outcome (Dressing Goal 1, OT) new goal  -[facility] Name  05/23/24 1123          Toileting Goal 1 (OT)    Activity/Device (Toileting Goal 1, OT) toileting skills, all;adjust/manage clothing;perform perineal hygiene;commode  -     [place] Level/Cues Needed (Toileting Goal 1, OT) contact guard required  -     Time Frame (Toileting Goal 1, OT) long term goal (LTG);by discharge  -     Progress/Outcome (Toileting Goal 1, OT) new goal  -       Row Name 05/23/24 1123          Therapy Assessment/Plan (OT)    Planned Therapy Interventions (OT) activity tolerance training;adaptive equipment training;BADL retraining;functional balance retraining;edema control/reduction;patient/caregiver education/training;strengthening exercise;occupation/activity based interventions;transfer/mobility retraining  -               User Key  (r) = Recorded By, (t) = Taken By, (c) = Cosigned By      Initials Name Provider Type     Flor Bliss, OTR/L Occupational Therapist                   Clinical Impression       Row Name 05/23/24 1123          Pain Assessment    Pretreatment Pain Rating 0/10 - no pain  -     Posttreatment Pain Rating 0/10 - no pain  -       Row Name 05/23/24 1123          Plan of Care Review    Plan of Care Reviewed With patient;son  -     Progress no change  -     Outcome Evaluation OT eval complete. Ms. Perales is AxO x 4  & pleasant.  She is on RA, appears to have mild swelling at BUEs, and demo's generalized weakness & decreased act esvin.  The pt requires use of rwx for mobility and CGA-Min A for t/fs.  Of note she reports incontinence of bowel and bladder prior to reaching the commode.  OTR provided Min A-Mod A for toileting to ensure her cleanliness and to change her soiled brief.  Ms. Perales would benefit from cont'd OT tx to improve her indep in self care & fxl mob. Anticipate she will DC to SNF for further rehab.  -       Row Name 05/23/24 1123          Therapy Assessment/Plan (OT)    Patient/Family Therapy Goal Statement (OT) Improve fxn  -CH      Rehab Potential (OT) good, to achieve stated therapy goals  -     Criteria for Skilled Therapeutic Interventions Met (OT) yes;skilled treatment is necessary  -     Therapy Frequency (OT) 5 times/wk  -     Predicted Duration of Therapy Intervention (OT) until DC  -       Row Name 05/23/24 1123          Therapy Plan Review/Discharge Plan (OT)    Anticipated Discharge Disposition (OT) sub acute care setting  -       Row Name 05/23/24 1123          Positioning and Restraints    Pre-Treatment Position in bed  -     Post Treatment Position chair  -     In Chair sitting;call light within reach;encouraged to call for assist;with family/caregiver  -               User Key  (r) = Recorded By, (t) = Taken By, (c) = Cosigned By      Initials Name Provider Type     Flor Bliss, OTR/L Occupational Therapist                   Outcome Measures       Row Name 05/23/24 1123          How much help from another is currently needed...    Putting on and taking off regular lower body clothing? 2  -CH     Bathing (including washing, rinsing, and drying) 3  -CH     Toileting (which includes using toilet bed pan or urinal) 3  -CH     Putting on and taking off regular upper body clothing 3  -CH     Taking care of personal grooming (such as brushing teeth) 4  -CH     Eating meals 4  -CH     AM-PAC 6 Clicks Score (OT) 19  -       Row Name 05/23/24 1122          How much help from another person do you currently need...    Turning from your back to your side while in flat bed without using bedrails? 3  -MS     Moving from lying on back to sitting on the side of a flat bed without bedrails? 3  -MS     Moving to and from a bed to a chair (including a wheelchair)? 3  -MS     Standing up from a chair using your arms (e.g., wheelchair, bedside chair)? 3  -MS     Climbing 3-5 steps with a railing? 2  -MS     To walk in hospital room? 3  -MS     AM-PAC 6 Clicks Score (PT) 17  -MS     Highest Level of Mobility Goal 5 --> Static  standing  -MS       Row Name 05/23/24 1123 05/23/24 1122       Functional Assessment    Outcome Measure Options AM-PAC 6 Clicks Daily Activity (OT)  - AM-PAC 6 Clicks Basic Mobility (PT)  -MS              User Key  (r) = Recorded By, (t) = Taken By, (c) = Cosigned By      Initials Name Provider Type     Flor Bliss, OTR/L Occupational Therapist    MS Sourav Danielle R, PT, DPT, NCS Physical Therapist                    Occupational Therapy Education       Title: PT OT SLP Therapies (In Progress)       Topic: Occupational Therapy (In Progress)       Point: ADL training (Done)       Description:   Instruct learner(s) on proper safety adaptation and remediation techniques during self care or transfers.   Instruct in proper use of assistive devices.                  Learning Progress Summary             Patient Acceptance, E, VU,NR by  at 5/23/2024 1234                         Point: Home exercise program (Not Started)       Description:   Instruct learner(s) on appropriate technique for monitoring, assisting and/or progressing therapeutic exercises/activities.                  Learner Progress:  Not documented in this visit.              Point: Precautions (Done)       Description:   Instruct learner(s) on prescribed precautions during self-care and functional transfers.                  Learning Progress Summary             Patient Acceptance, E, VU,NR by  at 5/23/2024 1234                         Point: Body mechanics (Done)       Description:   Instruct learner(s) on proper positioning and spine alignment during self-care, functional mobility activities and/or exercises.                  Learning Progress Summary             Patient Acceptance, E, VU,NR by  at 5/23/2024 1234                                         User Key       Initials Effective Dates Name Provider Type [facility] 07/11/23 -  Flor Bliss, OTR/L Occupational Therapist OT                  OT Recommendation and Plan  Planned  Therapy Interventions (OT): activity tolerance training, adaptive equipment training, BADL retraining, functional balance retraining, edema control/reduction, patient/caregiver education/training, strengthening exercise, occupation/activity based interventions, transfer/mobility retraining  Therapy Frequency (OT): 5 times/wk  Plan of Care Review  Plan of Care Reviewed With: patient, son  Progress: no change  Outcome Evaluation: OT eval complete. Ms. Perales is AxO x 4  & pleasant.  She is on RA, appears to have mild swelling at BUEs, and demo's generalized weakness & decreased act esvin.  The pt requires use of rwx for mobility and CGA-Min A for t/fs.  Of note she reports incontinence of bowel and bladder prior to reaching the commode.  OTR provided Min A-Mod A for toileting to ensure her cleanliness and to change her soiled brief.  Ms. Perales would benefit from cont'd OT tx to improve her indep in self care & fxl mob. Anticipate she will DC to SNF for further rehab.     Time Calculation:         Time Calculation- OT       Row Name 05/23/24 1123             Time Calculation- OT    OT Start Time 1123  -CH      OT Stop Time 1202  -CH      OT Time Calculation (min) 39 min  -CH      OT Received On 05/23/24  -CH      OT Goal Re-Cert Due Date 06/02/24  -CH         Untimed Charges    OT Eval/Re-eval Minutes 39  -CH         Total Minutes    Untimed Charges Total Minutes 39  -CH       Total Minutes 39  -CH                User Key  (r) = Recorded By, (t) = Taken By, (c) = Cosigned By      Initials Name Provider Type     Flor Bliss OTR/L Occupational Therapist                  Therapy Charges for Today       Code Description Service Date Service Provider Modifiers Qty    52410210302  OT EVAL LOW COMPLEXITY 3 5/23/2024 Flor Bliss OTR/L GO 1                 KAYLIN Renner/KLARISSA  5/23/2024

## 2024-05-24 LAB
ALBUMIN SERPL-MCNC: 3.8 G/DL (ref 3.5–5.2)
ALBUMIN/GLOB SERPL: 1.2 G/DL
ALP SERPL-CCNC: 60 U/L (ref 39–117)
ALT SERPL W P-5'-P-CCNC: 15 U/L (ref 1–33)
ANION GAP SERPL CALCULATED.3IONS-SCNC: 15 MMOL/L (ref 5–15)
AST SERPL-CCNC: 25 U/L (ref 1–32)
BILIRUB SERPL-MCNC: 0.6 MG/DL (ref 0–1.2)
BUN SERPL-MCNC: 98 MG/DL (ref 8–23)
BUN/CREAT SERPL: 28.8 (ref 7–25)
CALCIUM SPEC-SCNC: 9.1 MG/DL (ref 8.6–10.5)
CHLORIDE SERPL-SCNC: 106 MMOL/L (ref 98–107)
CO2 SERPL-SCNC: 19 MMOL/L (ref 22–29)
CREAT SERPL-MCNC: 3.4 MG/DL (ref 0.57–1)
EGFRCR SERPLBLD CKD-EPI 2021: 12.4 ML/MIN/1.73
GLOBULIN UR ELPH-MCNC: 3.1 GM/DL
GLUCOSE BLDC GLUCOMTR-MCNC: 232 MG/DL (ref 70–130)
GLUCOSE BLDC GLUCOMTR-MCNC: 254 MG/DL (ref 70–130)
GLUCOSE SERPL-MCNC: 106 MG/DL (ref 65–99)
POTASSIUM SERPL-SCNC: 4.9 MMOL/L (ref 3.5–5.2)
PROT SERPL-MCNC: 6.9 G/DL (ref 6–8.5)
SODIUM SERPL-SCNC: 140 MMOL/L (ref 136–145)

## 2024-05-24 PROCEDURE — 94761 N-INVAS EAR/PLS OXIMETRY MLT: CPT

## 2024-05-24 PROCEDURE — 94799 UNLISTED PULMONARY SVC/PX: CPT

## 2024-05-24 PROCEDURE — 97530 THERAPEUTIC ACTIVITIES: CPT

## 2024-05-24 PROCEDURE — 82948 REAGENT STRIP/BLOOD GLUCOSE: CPT

## 2024-05-24 PROCEDURE — 36415 COLL VENOUS BLD VENIPUNCTURE: CPT | Performed by: INTERNAL MEDICINE

## 2024-05-24 PROCEDURE — 97110 THERAPEUTIC EXERCISES: CPT

## 2024-05-24 PROCEDURE — 94664 DEMO&/EVAL PT USE INHALER: CPT

## 2024-05-24 PROCEDURE — 63710000001 INSULIN GLARGINE PER 5 UNITS: Performed by: INTERNAL MEDICINE

## 2024-05-24 PROCEDURE — 80053 COMPREHEN METABOLIC PANEL: CPT | Performed by: INTERNAL MEDICINE

## 2024-05-24 PROCEDURE — 25810000003 SODIUM CHLORIDE 0.9 % SOLUTION: Performed by: INTERNAL MEDICINE

## 2024-05-24 RX ADMIN — HYDRALAZINE HYDROCHLORIDE 100 MG: 50 TABLET ORAL at 21:45

## 2024-05-24 RX ADMIN — FERROUS SULFATE TAB 325 MG (65 MG ELEMENTAL FE) 325 MG: 325 (65 FE) TAB at 09:13

## 2024-05-24 RX ADMIN — ALBUTEROL SULFATE 2.5 MG: 2.5 SOLUTION RESPIRATORY (INHALATION) at 15:06

## 2024-05-24 RX ADMIN — SODIUM BICARBONATE 650 MG: 650 TABLET ORAL at 21:45

## 2024-05-24 RX ADMIN — Medication 10 ML: at 09:17

## 2024-05-24 RX ADMIN — APIXABAN 2.5 MG: 2.5 TABLET, FILM COATED ORAL at 21:46

## 2024-05-24 RX ADMIN — PANTOPRAZOLE SODIUM 40 MG: 40 TABLET, DELAYED RELEASE ORAL at 06:20

## 2024-05-24 RX ADMIN — ATORVASTATIN CALCIUM 20 MG: 10 TABLET, FILM COATED ORAL at 09:12

## 2024-05-24 RX ADMIN — SODIUM BICARBONATE 650 MG: 650 TABLET ORAL at 16:25

## 2024-05-24 RX ADMIN — CARVEDILOL 6.25 MG: 6.25 TABLET, FILM COATED ORAL at 09:13

## 2024-05-24 RX ADMIN — ALBUTEROL SULFATE 2.5 MG: 2.5 SOLUTION RESPIRATORY (INHALATION) at 07:17

## 2024-05-24 RX ADMIN — CARVEDILOL 6.25 MG: 6.25 TABLET, FILM COATED ORAL at 18:59

## 2024-05-24 RX ADMIN — INSULIN GLARGINE 8 UNITS: 100 INJECTION, SOLUTION SUBCUTANEOUS at 21:45

## 2024-05-24 RX ADMIN — HYDRALAZINE HYDROCHLORIDE 100 MG: 50 TABLET ORAL at 09:12

## 2024-05-24 RX ADMIN — ACETAMINOPHEN 650 MG: 325 TABLET, FILM COATED ORAL at 21:45

## 2024-05-24 RX ADMIN — HYDRALAZINE HYDROCHLORIDE 100 MG: 50 TABLET ORAL at 16:25

## 2024-05-24 RX ADMIN — SODIUM BICARBONATE 650 MG: 650 TABLET ORAL at 09:12

## 2024-05-24 RX ADMIN — ACETAMINOPHEN 650 MG: 325 TABLET, FILM COATED ORAL at 13:39

## 2024-05-24 RX ADMIN — Medication 1000 UNITS: at 09:12

## 2024-05-24 RX ADMIN — SODIUM CHLORIDE 50 ML/HR: 9 INJECTION, SOLUTION INTRAVENOUS at 19:00

## 2024-05-24 RX ADMIN — APIXABAN 2.5 MG: 2.5 TABLET, FILM COATED ORAL at 09:13

## 2024-05-24 RX ADMIN — OXYCODONE HYDROCHLORIDE AND ACETAMINOPHEN 500 MG: 500 TABLET ORAL at 09:12

## 2024-05-24 RX ADMIN — ALBUTEROL SULFATE 2.5 MG: 2.5 SOLUTION RESPIRATORY (INHALATION) at 20:22

## 2024-05-24 NOTE — THERAPY TREATMENT NOTE
Acute Care - Physical Therapy Treatment Note  [facility]     Patient Name: Libia Perales  : 1934  MRN: [id number]  Today's Date: 2024      Visit Dx:     ICD-10-CM ICD-9-CM   1. Acute kidney injury  N17.9 584.9   2. Congestive heart failure, unspecified HF chronicity, unspecified heart failure type  I50.9 428.0   3. Hyperkalemia  E87.5 276.7   4. Hypermagnesemia  E83.41 275.2   5. 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    Benign essential HTN    Persistent atrial fibrillation    Asthma    Fibrocystic breast disease    Gastro-esophageal reflux    High calcium levels    OA (osteoarthritis) of ankle    Right renal mass    Vertigo, benign paroxysmal    Chronic fatigue    Chronic cough    Allergic rhinitis    Stage 4 chronic kidney disease    Strain of left rhomboid muscle    Chronic anticoagulation    Chronic diastolic (congestive) heart failure    Severe pulmonary hypertension    Acute on chronic heart failure with preserved ejection fraction (HFpEF)    Acute kidney injury superimposed on stage 4 chronic kidney disease    Digoxin toxicity, accidental or unintentional, initial encounter    Altered mental status    Acute cystitis with hematuria    Elevated troponin not due to acute coronary syndrome    Metabolic encephalopathy suspect due to elevated creatinine and abnormal urinalysis    Moderate malnutrition    Chronic heart failure with preserved ejection fraction (HFpEF)    Acute kidney injury     Past Medical History:   Diagnosis Date    Atrial fibrillation     Diabetes mellitus     Difficulty walking Approximately 2 years    GERD (gastroesophageal reflux disease)     High cholesterol     Hypertension     OA (osteoarthritis)     Pneumonia     few  years ago    Stage 4 chronic kidney disease 08/18/2023     Past Surgical History:   Procedure Laterality Date    BREAST BIOPSY Right     BREAST CYST ASPIRATION      HYSTERECTOMY      OOPHORECTOMY      TONSILLECTOMY       PT Assessment (Last 12 Hours)       PT Evaluation and Treatment       Row Name 05/24/24 1318          Physical Therapy Time and Intention    Subjective Information complains of;weakness;fatigue;pain;swelling;dyspnea  -LY     Document Type therapy note (daily note)  -LY     Mode of Treatment physical therapy  -LY     Comment increased swelling noted this date with increased work of breathing  -LY       Row Name 05/24/24 1318          General Information    Existing Precautions/Restrictions fall  -LY       Row Name 05/24/24 1318          Pain    Pretreatment Pain Rating 2/10  -LY     Posttreatment Pain Rating 2/10  -LY     Pain Location - Side/Orientation Left  -LY     Pain Location upper  -LY     Pain Location - extremity  -LY     Pain Intervention(s) Medication (See MAR);Ambulation/increased activity;Repositioned  -LY       [facility] Name 05/24/24 1318          Bed Mobility    Supine-Sit [place] (Bed Mobility) verbal cues;moderate assist (50% patient effort)  -LY     Sit-Supine [place] (Bed Mobility) verbal cues;moderate assist (50% patient effort)  -LY     Assistive Device (Bed Mobility) bed rails;head of bed elevated  -LY       Row Name 05/24/24 1318          Transfers    Comment, (Transfers) performed hygiene care and bed change while in standing  -LY       [facility] Name 05/24/24 1318          Sit-Stand Transfer    Sit-Stand [place] (Transfers) contact guard;minimum assist (75% patient effort);verbal cues;nonverbal cues (demo/gesture)  -LY     Assistive Device (Sit-Stand Transfers) walker, front-wheeled  -LY     Comment, (Sit-Stand Transfer) x3 reps  -LY       Row Name 05/24/24 1318          Stand-Sit Transfer    Stand-Sit [place] (Transfers) contact guard  -LY     Assistive Device  (Stand-Sit Transfers) walker, front-wheeled  -LY       Row Name 05/24/24 1318          Gait/Stairs (Locomotion)    Comment, (Gait/Stairs) deferred ambulation d/t fatigue  -LY       Row Name 05/24/24 1318          Motor Skills    Comments, Therapeutic Exercise BLE AROM x10 reps seated  -LY     Additional Documentation Comments, Therapeutic Exercise (Row)  -LY       Row Name 05/24/24 1318          Plan of Care Review    Plan of Care Reviewed With patient;family  -LY     Progress declining  -LY     Outcome Evaluation PT tx completed. Pt initially refuses therapy d/t fatigue and weakness. Agreed after given education on benefits of activity. Pt does demo increased swelling this date compared to yesterday with increased work of breathing. Required mod x1 for supine to sit. Completed BLE AROM x10 reps seated. She did agree to stand x3 reps with CGA/min x1 and RWX. Performed hygiene care and bed change with pt in standing. Returned to bed with mod x1. BUEs elevated on pillows. Will follow.  -LY       Row Name 05/24/24 1318          Positioning and Restraints    Pre-Treatment Position in bed  -LY     Post Treatment Position bed  -LY     In Bed fowlers;call light within reach;encouraged to call for assist;exit alarm on;with family/caregiver  -LY               User Key  (r) = Recorded By, (t) = Taken By, (c) = Cosigned By      Initials Name Provider Type    Tangela Talbot, PTA Physical Therapist Assistant                    Physical Therapy Education       Title: PT OT SLP Therapies (In Progress)       Topic: Physical Therapy (In Progress)       Point: Mobility training (Done)       Learning Progress Summary             Patient AcceptanceCLARIBEL VU by MS at 5/23/2024 1125    Comment: role of PT in her care                         Point: Home exercise program (Not Started)       Learner Progress:  Not documented in this visit.              Point: Body mechanics (Not Started)       Learner Progress:  Not documented in this  visit.              Point: Precautions (Not Started)       Learner Progress:  Not documented in this visit.                              User Key       Initials Effective Dates Name Provider Type Discipline    MS 07/11/23 -  Danielle Benjamin, PT, DPT, NCS Physical Therapist PT                  PT Recommendation and Plan     Plan of Care Reviewed With: patient, family  Progress: declining  Outcome Evaluation: PT tx completed. Pt initially refuses therapy d/t fatigue and weakness. Agreed after given education on benefits of activity. Pt does demo increased swelling this date compared to yesterday with increased work of breathing. Required mod x1 for supine to sit. Completed BLE AROM x10 reps seated. She did agree to stand x3 reps with CGA/min x1 and RWX. Performed hygiene care and bed change with pt in standing. Returned to bed with mod x1. BUEs elevated on pillows. Will follow.       Time Calculation:    PT Charges       Row Name 05/24/24 1442             Time Calculation    Start Time 1318  -LY      Stop Time 1359  -LY      Time Calculation (min) 41 min  -LY      PT Received On 05/24/24  -LY         Time Calculation- PT    Total Timed Code Minutes- PT 41 minute(s)  -LY         Timed Charges    12971 - PT Therapeutic Exercise Minutes 17  -LY      88731 - PT Therapeutic Activity Minutes 24  -LY         Total Minutes    Timed Charges Total Minutes 41  -LY       Total Minutes 41  -LY                User Key  (r) = Recorded By, (t) = Taken By, (c) = Cosigned By      Initials Name Provider Type    Tangela Talbot PTA Physical Therapist Assistant                  Therapy Charges for Today       Code Description Service Date Service Provider Modifiers Qty    24115640844 HC GAIT TRAINING EA 15 MIN 5/23/2024 Tangela Escobedo PTA GP 2    71804038438 HC PT THER PROC EA 15 MIN 5/24/2024 Tangela Escobedo PTA GP 1    58742083249 HC PT THERAPEUTIC ACT EA 15 MIN 5/24/2024 Tangela Escobedo PTA GP 2            PT G-Codes  Outcome  Measure Options: AM-PAC 6 Clicks Daily Activity (OT)  AM-PAC 6 Clicks Score (PT): 17  AM-PAC 6 Clicks Score (OT): 19    Tangela Escobedo, PTA  5/24/2024

## 2024-05-24 NOTE — PLAN OF CARE
Goal Outcome Evaluation:  Plan of Care Reviewed With: patient  Progress: no change         Pt with no c/o pain this shift. IVF cont to infuse per orders. Purewick in place with output noted. Pt with c/o SOA earlier in shift, O2 sat noted to be in the high 90's on room air; repositioned pt in bed and noted to be sleeping comfortably. VSS. Safety maintained.

## 2024-05-24 NOTE — PROGRESS NOTES
1         [facility]  INPATIENT PROGRESS NOTE    Patient Name: Libia Perales  Date of Admission: 5/22/2024  Today's Date: 05/24/24  Length of Stay: 2  Primary Care Physician: Gume Ann DO    Subjective   Chief Complaint: Follow-up  HPI   No new event  Discussed with nurse Duran  I requested MOST form  From palliative care.  I reviewed their note    Noted creatinine had gone down to 3.4 (3.89) with bicarb of 19 (continuing sodium bicarbonate).  Potassium 4.9    Home hospice information as per social service note bed.      Review of Systems   All pertinent negatives and positives are as above. All other systems have been reviewed and are negative unless otherwise stated.     Objective    Temp:  [97.1 °F (36.2 °C)-98 °F (36.7 °C)] 97.1 °F (36.2 °C)  Heart Rate:  [67-84] 81  Resp:  [18] 18  BP: (112-136)/(53-77) 124/77  Physical Exam  Comfortably resting  GEN:  in NAD  HEENT: Atraumatic, Trachea midline  Lungs: CTAB, no wheezing/rales/rhonchi  Heart: Irregularly irregular +S1/s2, no gross murmur   ABD: soft, nt/nd, +BS, no guarding/rebound; presence of ventral hernia  Extremities: atraumatic, no cyanosis, significant pitting edema of extremities; osteoarthritic changes of fingers noted  Skin: no rashes or lesions  Psych: normal mood & affect                Results Review:  I have reviewed the labs, radiology results, and diagnostic studies.    Laboratory Data:   Results from last 7 days   Lab Units 05/22/24  1400   WBC 10*3/mm3 4.44   HEMOGLOBIN g/dL 8.1*   HEMATOCRIT % 26.3*   PLATELETS 10*3/mm3 175        Results from last 7 days   Lab Units 05/24/24  0501 05/23/24  0451 05/22/24  1907   SODIUM mmol/L 140 140 138   POTASSIUM mmol/L 4.9 4.9 5.2   CHLORIDE mmol/L 106 105 104   CO2 mmol/L 19.0* 21.0* 20.0*   BUN mg/dL 98* 102* 100*   CREATININE mg/dL 3.40* 3.89* 3.67*   CALCIUM mg/dL 9.1 9.2 9.0   BILIRUBIN mg/dL 0.6 0.5 0.5   ALK PHOS U/L 60 68 81   ALT  "(SGPT) U/L 15 13 14   AST (SGOT) U/L 25 23 25   GLUCOSE mg/dL 106* 80 138*       Culture Data:   No results found for: \"BLOODCX\", \"URINECX\", \"WOUNDCX\", \"MRSACX\", \"RESPCX\", \"STOOLCX\"    Radiology Data:   Imaging Results (Last 24 Hours)       ** No results found for the last 24 hours. **            I have reviewed the patient's current medications.     Assessment/Plan   Assessment  Active Hospital Problems    Diagnosis     **Acute kidney injury        Medical Decision Making  Number and Complexity of problems:  Anasarca  Acute kidney injury on chronic kidney disease  Sbqurkzwilux-iodibgei-yz bicarb tablets.    Pulmonary hypertension based on echocardiogram  Chronic heart failure with preserved ejection fraction  Moderate malnutrition  Chronic anticoagulation for atrial fibrillation (persistent)  Hypertension  Diabetes mellitus.  Elevated troponin/BNP in the setting of pulmonary hypertension and acute kidney injury        Treatment Plan     Continue IV fluid at 50.  The problem remains that she has fluid retention and with fluid she can have further fluid retention.  Baseline creatinine from May 13 was 2.49.    The next idea will be to get dialysis for fluid overload in the setting of acute kidney injury on chronic kidney disease however patient declined and shows no interest.  She is currently a DO NOT RESUSCITATE DO NOT INTUBATE.    Even with dialysis, this is just 1 part of the issue as she has pulmonary hypertension.  Patient maintaining adequate oxygenation in room air.  Patient probably is best suited for palliative care/hospice  I consulted palliative care.      Home hospice information discussed by social service  She does not want to return to prior skilled nursing facility.     Continue present management     Medications reviewed.    Scheduled Medication   albuterol, 2.5 mg, Nebulization, TID - RT  apixaban, 2.5 mg, Oral, BID  vitamin C, 500 mg, Oral, Daily  atorvastatin, 20 mg, Oral, Daily  carvedilol, 6.25 " mg, Oral, BID With Meals  cholecalciferol, 1,000 Units, Oral, Daily  ferrous sulfate, 325 mg, Oral, Daily With Breakfast  hydrALAZINE, 100 mg, Oral, TID  insulin glargine, 1-200 Units, Subcutaneous, Nightly - Glucommander  insulin lispro, 1-200 Units, Subcutaneous, 4x Daily With Meals & Nightly  pantoprazole, 40 mg, Oral, Q AM  sodium bicarbonate, 650 mg, Oral, TID  sodium chloride, 10 mL, Intravenous, Q12H                  OT note reviewed.  Patient requires use of rolling walker for mobility and assist to minimum assist for transfers.    PT note reviewed: Short of breath.  SpO2 in the 90s on room air.  Ambulated to the restroom with use of rolling walker.  They are recommending 24/7 supervision whether this is home to skilled nursing facility.    Conditions and Status  Fair  Prognosis poor     MDM Data  External documents reviewed: Reviewed epic record    Results for orders placed during the hospital encounter of 01/09/24    Adult Transthoracic Echo Complete W/ Cont if Necessary Per Protocol    Interpretation Summary    Left ventricular systolic function is normal. Left ventricular ejection fraction appears to be 56 - 60%.    The right ventricular cavity is mildly dilated. Borderline low right ventricular systolic function noted.    Biatrial enlargement is noted.    Mild to moderate tricuspid valve regurgitation is present. Estimated right ventricular systolic pressure from tricuspid regurgitation is markedly elevated (>55 mmHg).      Cardiac tracing (EKG, telemetry) interpretation: Atrial fibrillation rate controlled at 79  Radiology interpretation: Reviewed as outlined above  Labs reviewed: yes  Any tests that were considered but not ordered:  none     Decision rules/scores evaluated (example [id number], Wells, etc): None     Discussed with: Patient and son     Care Planning  Shared decision making: Patient with family  Code status and discussions: DO NOT RESUSCITATE DO NOT INTUBATE, no dialysis no  cardioversion  Disposition  Social Determinants of Health that impact treatment or disposition: Sitting home resident.  Does not want to go back there.  Considering home hospice.  I expect the patient to be discharged to (?)    Electronically signed by Tato Story MD, 05/24/24, 08:15 CDT.

## 2024-05-24 NOTE — CASE MANAGEMENT/SOCIAL WORK
Continued Stay Note   Ashok     Patient Name: Libia Perales  MRN: [id number]  Today's Date: 5/24/2024    Admit Date: 5/22/2024    Plan: [facility]   Discharge Plan       Row Name 05/24/24 1113       Plan    Plan [facility]    Patient/Family in Agreement with Plan yes    Plan Comments Spoke with pt's son Davian [phone] and they have decided to wait on hospice. They want to talk as a family and meet with hospice next week to make arrangements. He says at this time, plan will be for pt to return to [facility] at d/c. Updated Nohemy with [facility] [phone].                   Discharge Codes    No documentation.                       HEATHER Orlando

## 2024-05-24 NOTE — PLAN OF CARE
Problem: Palliative Care  Goal: Enhanced Quality of Life  Outcome: Ongoing, Progressing  Intervention: Optimize Psychosocial Wellbeing  Flowsheets (Taken 5/24/2024 1107)  Supportive Measures:   decision-making supported   active listening utilized    Patient is alert and oriented. Room air. VSS. No family at bedside. Signed MOST document reflecting current code status. She is awaiting discharge to SNF.    Per sw, pt's family is continuing to discuss hospice and will have further discussion next week. They do not wish to transition at this time.    MOST signed by attending. Scanned into HIM.    Will continue to follow until discharge.    Eve Frances, JOANNE, APHSW-C  5/24/2024

## 2024-05-24 NOTE — PLAN OF CARE
Goal Outcome Evaluation:  Plan of Care Reviewed With: patient, family        Progress: declining  Outcome Evaluation: PT tx completed. Pt initially refuses therapy d/t fatigue and weakness. Agreed after given education on benefits of activity. Pt does demo increased swelling this date compared to yesterday with increased work of breathing. Required mod x1 for supine to sit. Completed BLE AROM x10 reps seated. She did agree to stand x3 reps with CGA/min x1 and RWX. Performed hygiene care and bed change with pt in standing. Returned to bed with mod x1. BUEs elevated on pillows. Will follow.

## 2024-05-25 LAB
ALBUMIN SERPL-MCNC: 3.7 G/DL (ref 3.5–5.2)
ALBUMIN/GLOB SERPL: 1.2 G/DL
ALP SERPL-CCNC: 66 U/L (ref 39–117)
ALT SERPL W P-5'-P-CCNC: 16 U/L (ref 1–33)
ANION GAP SERPL CALCULATED.3IONS-SCNC: 14 MMOL/L (ref 5–15)
AST SERPL-CCNC: 26 U/L (ref 1–32)
BILIRUB SERPL-MCNC: 0.5 MG/DL (ref 0–1.2)
BUN SERPL-MCNC: 96 MG/DL (ref 8–23)
BUN/CREAT SERPL: 28.8 (ref 7–25)
CALCIUM SPEC-SCNC: 8.8 MG/DL (ref 8.6–10.5)
CHLORIDE SERPL-SCNC: 105 MMOL/L (ref 98–107)
CO2 SERPL-SCNC: 20 MMOL/L (ref 22–29)
CREAT SERPL-MCNC: 3.33 MG/DL (ref 0.57–1)
EGFRCR SERPLBLD CKD-EPI 2021: 12.7 ML/MIN/1.73
GLOBULIN UR ELPH-MCNC: 3.1 GM/DL
GLUCOSE BLDC GLUCOMTR-MCNC: 109 MG/DL (ref 70–130)
GLUCOSE BLDC GLUCOMTR-MCNC: 129 MG/DL (ref 70–130)
GLUCOSE BLDC GLUCOMTR-MCNC: 246 MG/DL (ref 70–130)
GLUCOSE BLDC GLUCOMTR-MCNC: 278 MG/DL (ref 70–130)
GLUCOSE SERPL-MCNC: 161 MG/DL (ref 65–99)
POTASSIUM SERPL-SCNC: 5 MMOL/L (ref 3.5–5.2)
PROT SERPL-MCNC: 6.8 G/DL (ref 6–8.5)
SODIUM SERPL-SCNC: 139 MMOL/L (ref 136–145)

## 2024-05-25 PROCEDURE — 94799 UNLISTED PULMONARY SVC/PX: CPT

## 2024-05-25 PROCEDURE — 63710000001 INSULIN LISPRO (HUMAN) PER 5 UNITS: Performed by: INTERNAL MEDICINE

## 2024-05-25 PROCEDURE — 63710000001 INSULIN GLARGINE PER 5 UNITS: Performed by: INTERNAL MEDICINE

## 2024-05-25 PROCEDURE — 82948 REAGENT STRIP/BLOOD GLUCOSE: CPT

## 2024-05-25 PROCEDURE — 80053 COMPREHEN METABOLIC PANEL: CPT | Performed by: INTERNAL MEDICINE

## 2024-05-25 RX ADMIN — APIXABAN 2.5 MG: 2.5 TABLET, FILM COATED ORAL at 20:16

## 2024-05-25 RX ADMIN — APIXABAN 2.5 MG: 2.5 TABLET, FILM COATED ORAL at 08:49

## 2024-05-25 RX ADMIN — HYDRALAZINE HYDROCHLORIDE 100 MG: 50 TABLET ORAL at 20:16

## 2024-05-25 RX ADMIN — INSULIN LISPRO 3 UNITS: 100 INJECTION, SOLUTION INTRAVENOUS; SUBCUTANEOUS at 18:07

## 2024-05-25 RX ADMIN — INSULIN LISPRO 3 UNITS: 100 INJECTION, SOLUTION INTRAVENOUS; SUBCUTANEOUS at 08:46

## 2024-05-25 RX ADMIN — POLYETHYLENE GLYCOL 3350 17 G: 17 POWDER, FOR SOLUTION ORAL at 08:54

## 2024-05-25 RX ADMIN — ATORVASTATIN CALCIUM 20 MG: 10 TABLET, FILM COATED ORAL at 08:48

## 2024-05-25 RX ADMIN — PANTOPRAZOLE SODIUM 40 MG: 40 TABLET, DELAYED RELEASE ORAL at 06:57

## 2024-05-25 RX ADMIN — ALBUTEROL SULFATE 2.5 MG: 2.5 SOLUTION RESPIRATORY (INHALATION) at 19:26

## 2024-05-25 RX ADMIN — Medication 1000 UNITS: at 08:48

## 2024-05-25 RX ADMIN — ALBUTEROL SULFATE 2.5 MG: 2.5 SOLUTION RESPIRATORY (INHALATION) at 07:50

## 2024-05-25 RX ADMIN — CARVEDILOL 6.25 MG: 6.25 TABLET, FILM COATED ORAL at 08:48

## 2024-05-25 RX ADMIN — ACETAMINOPHEN 650 MG: 325 TABLET, FILM COATED ORAL at 20:15

## 2024-05-25 RX ADMIN — Medication 10 ML: at 20:31

## 2024-05-25 RX ADMIN — INSULIN GLARGINE 7 UNITS: 100 INJECTION, SOLUTION SUBCUTANEOUS at 20:15

## 2024-05-25 RX ADMIN — HYDRALAZINE HYDROCHLORIDE 100 MG: 50 TABLET ORAL at 17:00

## 2024-05-25 RX ADMIN — FERROUS SULFATE TAB 325 MG (65 MG ELEMENTAL FE) 325 MG: 325 (65 FE) TAB at 08:48

## 2024-05-25 RX ADMIN — SODIUM BICARBONATE 650 MG: 650 TABLET ORAL at 20:16

## 2024-05-25 RX ADMIN — SODIUM BICARBONATE 650 MG: 650 TABLET ORAL at 17:00

## 2024-05-25 RX ADMIN — OXYCODONE HYDROCHLORIDE AND ACETAMINOPHEN 500 MG: 500 TABLET ORAL at 08:48

## 2024-05-25 RX ADMIN — HYDRALAZINE HYDROCHLORIDE 100 MG: 50 TABLET ORAL at 08:48

## 2024-05-25 RX ADMIN — ACETAMINOPHEN 650 MG: 325 TABLET, FILM COATED ORAL at 03:17

## 2024-05-25 RX ADMIN — CARVEDILOL 6.25 MG: 6.25 TABLET, FILM COATED ORAL at 17:21

## 2024-05-25 RX ADMIN — ALBUTEROL SULFATE 2.5 MG: 2.5 SOLUTION RESPIRATORY (INHALATION) at 15:04

## 2024-05-25 RX ADMIN — SODIUM BICARBONATE 650 MG: 650 TABLET ORAL at 08:49

## 2024-05-25 NOTE — PROGRESS NOTES
1         [facility] Medicine Services  INPATIENT PROGRESS NOTE    Patient Name: Libia Perales  Date of Admission: 5/22/2024  Today's Date: 05/25/24  Length of Stay: 3  Primary Care Physician: Gume Ann DO    Subjective   Chief Complaint: Follow-up  HPI   Patient feels very uncomfortable going back to skilled nursing facility at this time  No new complaints  Has not decided on pursuing hospice as of yet.  Renal function slowly improving with IV fluid.  Unfortunately patient also has significant fluid retention and has known pulmonary hypertension which can cause swelling in lower extremities.        Review of Systems   All pertinent negatives and positives are as above. All other systems have been reviewed and are negative unless otherwise stated.     Objective    Temp:  [97.3 °F (36.3 °C)-98 °F (36.7 °C)] 97.5 °F (36.4 °C)  Heart Rate:  [75-97] 80  Resp:  [16] 16  BP: (110-130)/(47-70) 110/64  Physical Exam  Looks well  No distress  Seated comfortably on recliner  Not on supplemental oxygen  O2 saturation in the mid to upper 90s.    GEN:  in NAD  HEENT: Atraumatic, Trachea midline  Lungs: CTAB, no wheezing/rales/rhonchi  Heart: Irregularly irregular +S1/s2, no gross murmur   ABD: soft, nt/nd, +BS, no guarding/rebound; presence of ventral hernia  Extremities: atraumatic, no cyanosis, significant pitting edema of extremities; osteoarthritic changes of fingers noted  Skin: no rashes or lesions  Psych: normal mood & affect         Results Review:  I have reviewed the labs, radiology results, and diagnostic studies.    Laboratory Data:   Results from last 7 days   Lab Units 05/22/24  1400   WBC 10*3/mm3 4.44   HEMOGLOBIN g/dL 8.1*   HEMATOCRIT % 26.3*   PLATELETS 10*3/mm3 175        Results from last 7 days   Lab Units 05/25/24  0320 05/24/24  0501 05/23/24  0451   SODIUM mmol/L 139 140 140   POTASSIUM mmol/L 5.0 4.9 4.9   CHLORIDE mmol/L 105 106 105   CO2 mmol/L 20.0*  "19.0* 21.0*   BUN mg/dL 96* 98* 102*   CREATININE mg/dL 3.33* 3.40* 3.89*   CALCIUM mg/dL 8.8 9.1 9.2   BILIRUBIN mg/dL 0.5 0.6 0.5   ALK PHOS U/L 66 60 68   ALT (SGPT) U/L 16 15 13   AST (SGOT) U/L 26 25 23   GLUCOSE mg/dL 161* 106* 80       Culture Data:   No results found for: \"BLOODCX\", \"URINECX\", \"WOUNDCX\", \"MRSACX\", \"RESPCX\", \"STOOLCX\"    Radiology Data:   Imaging Results (Last 24 Hours)       ** No results found for the last 24 hours. **            I have reviewed the patient's current medications.     Assessment/Plan   Assessment  Active Hospital Problems    Diagnosis     **Acute kidney injury            Medical Decision Making  Number and Complexity of problems:     Anasarca  Acute kidney injury (slowly improving) on chronic kidney disease  Csboghifuhdo-uebupcch-fo bicarb tablets.    Pulmonary hypertension based on echocardiogram  Chronic heart failure with preserved ejection fraction  Moderate malnutrition  Chronic anticoagulation for atrial fibrillation (persistent)  Hypertension  Diabetes mellitus.  Elevated troponin/BNP in the setting of pulmonary hypertension and acute kidney injury    Treatment Plan  Unfortunate situation.  Palliative care candidate/hospice  Currently indecisive.  Anticipating return to skilled nursing facility.  DC IV fluid.  Recheck BMP in the morning  Encouraged to increase p.o. water intake.    Continue present management  Medications reviewed  albuterol, 2.5 mg, Nebulization, TID - RT  apixaban, 2.5 mg, Oral, BID  vitamin C, 500 mg, Oral, Daily  atorvastatin, 20 mg, Oral, Daily  carvedilol, 6.25 mg, Oral, BID With Meals  cholecalciferol, 1,000 Units, Oral, Daily  ferrous sulfate, 325 mg, Oral, Daily With Breakfast  hydrALAZINE, 100 mg, Oral, TID  insulin glargine, 1-200 Units, Subcutaneous, Nightly - Glucommander  insulin lispro, 1-200 Units, Subcutaneous, 4x Daily With Meals & Nightly  pantoprazole, 40 mg, Oral, Q AM  sodium bicarbonate, 650 mg, Oral, TID  sodium chloride, 10 " mL, Intravenous, Q12H        Conditions and Status  Fair  Prognosis poor     MDM Data  External documents reviewed: Reviewed epic record     Results for orders placed during the hospital encounter of 01/09/24     Adult Transthoracic Echo Complete W/ Cont if Necessary Per Protocol     Interpretation Summary    Left ventricular systolic function is normal. Left ventricular ejection fraction appears to be 56 - 60%.    The right ventricular cavity is mildly dilated. Borderline low right ventricular systolic function noted.    Biatrial enlargement is noted.    Mild to moderate tricuspid valve regurgitation is present. Estimated right ventricular systolic pressure from tricuspid regurgitation is markedly elevated (>55 mmHg).        Cardiac tracing (EKG, telemetry) interpretation: Atrial fibrillation rate controlled   Radiology interpretation: Reviewed venous ultrasound from May 2022 showed no evidence of DVT.,  Chest x-ray, cardiomegaly Taye congestion but no chavo edema    Labs reviewed: yes  Any tests that were considered but not ordered:  none     Decision rules/scores evaluated (example [id number], Wells, etc): None     Discussed with: Patient and son     Care Planning  Shared decision making: Patient with family  Code status and discussions: DO NOT RESUSCITATE DO NOT INTUBATE, no dialysis no cardioversion  Disposition  Social Determinants of Health that impact treatment or disposition: Sitting home resident.  Does not want to go back there.  Considering home hospice.  I expect the patient to be discharged to (?)    Electronically signed by Tato Story MD, 05/25/24, 12:03 CDT.

## 2024-05-25 NOTE — PLAN OF CARE
Goal Outcome Evaluation:  Plan of Care Reviewed With: patient, family           Outcome Evaluation: Patient resting comfortably. no c/o pain/nausea. IVF, voiding/incontinent, regular diet. BG elevated SS insulin given. Edema noted to BUE - elevated. Family at bedside. Safety maintained

## 2024-05-25 NOTE — PLAN OF CARE
Goal Outcome Evaluation:  Plan of Care Reviewed With: patient  Progress: no change         Pt medicated with prn Tylenol x2 thus far this shift. IVF cont to infuse per orders. Purewick in place throughout night with urine output noted. Ambulated to bathroom with asst x1 and walker this shift. Tele in place running afib rate controlled. Son at bedside. Resting between care. VSS. Safety maintained.

## 2024-05-26 VITALS
WEIGHT: 167 LBS | BODY MASS INDEX: 30.73 KG/M2 | SYSTOLIC BLOOD PRESSURE: 132 MMHG | RESPIRATION RATE: 16 BRPM | TEMPERATURE: 97.7 F | HEIGHT: 62 IN | OXYGEN SATURATION: 97 % | HEART RATE: 74 BPM | DIASTOLIC BLOOD PRESSURE: 67 MMHG

## 2024-05-26 LAB
ANION GAP SERPL CALCULATED.3IONS-SCNC: 14 MMOL/L (ref 5–15)
BUN SERPL-MCNC: 91 MG/DL (ref 8–23)
BUN/CREAT SERPL: 30.5 (ref 7–25)
CALCIUM SPEC-SCNC: 8.5 MG/DL (ref 8.6–10.5)
CHLORIDE SERPL-SCNC: 104 MMOL/L (ref 98–107)
CO2 SERPL-SCNC: 19 MMOL/L (ref 22–29)
CREAT SERPL-MCNC: 2.98 MG/DL (ref 0.57–1)
EGFRCR SERPLBLD CKD-EPI 2021: 14.6 ML/MIN/1.73
GLUCOSE BLDC GLUCOMTR-MCNC: 140 MG/DL (ref 70–130)
GLUCOSE SERPL-MCNC: 162 MG/DL (ref 65–99)
POTASSIUM SERPL-SCNC: 4.8 MMOL/L (ref 3.5–5.2)
SODIUM SERPL-SCNC: 137 MMOL/L (ref 136–145)

## 2024-05-26 PROCEDURE — 94799 UNLISTED PULMONARY SVC/PX: CPT

## 2024-05-26 PROCEDURE — 82948 REAGENT STRIP/BLOOD GLUCOSE: CPT

## 2024-05-26 PROCEDURE — 80048 BASIC METABOLIC PNL TOTAL CA: CPT | Performed by: INTERNAL MEDICINE

## 2024-05-26 PROCEDURE — 97110 THERAPEUTIC EXERCISES: CPT

## 2024-05-26 PROCEDURE — 97116 GAIT TRAINING THERAPY: CPT

## 2024-05-26 RX ORDER — ALBUTEROL SULFATE 2.5 MG/3ML
2.5 SOLUTION RESPIRATORY (INHALATION) EVERY 4 HOURS PRN
Status: DISCONTINUED | OUTPATIENT
Start: 2024-05-26 | End: 2024-05-26 | Stop reason: HOSPADM

## 2024-05-26 RX ORDER — ALBUTEROL SULFATE 2.5 MG/3ML
2.5 SOLUTION RESPIRATORY (INHALATION)
Start: 2024-05-26

## 2024-05-26 RX ADMIN — APIXABAN 2.5 MG: 2.5 TABLET, FILM COATED ORAL at 09:05

## 2024-05-26 RX ADMIN — Medication 1000 UNITS: at 09:05

## 2024-05-26 RX ADMIN — ALBUTEROL SULFATE 2.5 MG: 2.5 SOLUTION RESPIRATORY (INHALATION) at 06:05

## 2024-05-26 RX ADMIN — ATORVASTATIN CALCIUM 20 MG: 10 TABLET, FILM COATED ORAL at 09:04

## 2024-05-26 RX ADMIN — PANTOPRAZOLE SODIUM 40 MG: 40 TABLET, DELAYED RELEASE ORAL at 06:07

## 2024-05-26 RX ADMIN — Medication 10 ML: at 09:06

## 2024-05-26 RX ADMIN — HYDRALAZINE HYDROCHLORIDE 100 MG: 50 TABLET ORAL at 09:04

## 2024-05-26 RX ADMIN — FERROUS SULFATE TAB 325 MG (65 MG ELEMENTAL FE) 325 MG: 325 (65 FE) TAB at 09:05

## 2024-05-26 RX ADMIN — CARVEDILOL 6.25 MG: 6.25 TABLET, FILM COATED ORAL at 09:05

## 2024-05-26 RX ADMIN — SODIUM BICARBONATE 650 MG: 650 TABLET ORAL at 09:05

## 2024-05-26 RX ADMIN — ALBUTEROL SULFATE 2.5 MG: 2.5 SOLUTION RESPIRATORY (INHALATION) at 01:42

## 2024-05-26 RX ADMIN — OXYCODONE HYDROCHLORIDE AND ACETAMINOPHEN 500 MG: 500 TABLET ORAL at 09:05

## 2024-05-26 NOTE — DISCHARGE PLACEMENT REQUEST
"To: Any Carrizales \"Neli\" (89 y.o. Female)       Date of Birth   1934    Social Security Number       Address   [address] [place] [address]    Home Phone   [phone]    MRN   [id number]       Religious   Anabaptist    Marital Status                               Admission Date   5/22/24    Admission Type   Emergency    Admitting Provider   Tato Story MD    Attending Provider   Tato Story MD    Department, Room/Bed   [facility] 3C, 396/1       Discharge Date       Discharge Disposition   Rehab Facility or Unit (DC - External)    Discharge Destination                                 Attending Provider: Tato Story MD    Allergies: Singulair [Montelukast], Nsaids, Ace Inhibitors    Isolation: None   Infection: None   Code Status: No CPR    Ht: 157.5 cm (62\")   Wt: 75.8 kg (167 lb)    Admission Cmt: None   Principal Problem: Acute kidney injury [N17.9]                   Active Insurance as of 5/22/2024       Primary Coverage       Payor Plan Insurance Group Employer/Plan Group    MEDICARE MEDICARE A & B        Payor Plan Address Payor Plan Phone Number Payor Plan Fax Number Effective Dates    [address] [phone]  12/1/1999 - None Entered    [facility] [id number]         Subscriber Name Subscriber Birth Date Member ID       ANY PENNINGTON 1934 [id number]               Secondary Coverage       Payor Plan Insurance Group Employer/Plan Group    [facility] SUPP [id number]       Payor Plan Address Payor Plan Phone Number Payor Plan Fax Number Effective Dates    [address]   12/1/2016 - None Entered    [facility] [id number]         Subscriber Name Subscriber Birth Date Member ID       ANY PENNINGTON 1934 [id number]                     Emergency Contacts        (Rel.) Home Phone Work Phone Mobile Phone    BhargavijacintoDavian welch (Son) -- -- [phone]    ROSELYN PENNINGTON " (Daughter) [phone] -- [phone]                 Discharge Summary        Tato Story MD at 05/26/24 0952                [facility] Medicine Services  DISCHARGE SUMMARY       Date of Admission: 5/22/2024  Date of Discharge:  5/26/2024  Primary Care Physician: Gume Ann DO    Presenting Problem/History of Present Illness:  Swelling and shortness of breath    Final Discharge Diagnoses:    Anasarca  Acute kidney injury (slowly improving) on chronic kidney disease  Mdoyzytzwkqf-maybsbqv-in bicarb tablets.    Pulmonary hypertension based on echocardiogram  Chronic heart failure with preserved ejection fraction  Moderate malnutrition  Chronic anticoagulation for atrial fibrillation (persistent)  Hypertension  Diabetes mellitus.  Elevated troponin/BNP in the setting of pulmonary hypertension and acute kidney injury      Consults:  Palliative care    Procedures Performed: None    Pertinent Test Results:   Results for orders placed during the hospital encounter of 01/09/24    Adult Transthoracic Echo Complete W/ Cont if Necessary Per Protocol    Interpretation Summary    Left ventricular systolic function is normal. Left ventricular ejection fraction appears to be 56 - 60%.    The right ventricular cavity is mildly dilated. Borderline low right ventricular systolic function noted.    Biatrial enlargement is noted.    Mild to moderate tricuspid valve regurgitation is present. Estimated right ventricular systolic pressure from tricuspid regurgitation is markedly elevated (>55 mmHg).      Imaging Results (All)       Procedure Component Value Units Date/Time    US Venous Doppler Upper Extremity Right (duplex) [[id number]] Collected: 05/22/24 1539     Updated: 05/22/24 1542    Narrative:      History: Pain and swelling       Impression:      Impression: There is no evidence of deep venous thrombosis or  superficial thrombophlebitis of the right upper extremity.      Comments: Right upper extremity venous duplex exam was performed using  color Doppler flow, Doppler wave form analysis, and grayscale imaging,  with and without compression. There is no evidence of deep venous  thrombosis of the internal jugular, subclavian, axillary, brachial,  radial, and ulnar veins. There is no evidence of superficial  thrombophlebitis involving the cephalic or basilic veins.         This report was signed and finalized on 5/22/2024 3:39 PM by Dr. Williams Lopez MD.       XR Chest 1 View [[id number]] Collected: 05/22/24 1412     Updated: 05/22/24 1416    Narrative:      EXAMINATION: XR CHEST 1 VW-  5/22/2024 2:12 PM 1 view     HISTORY: dyspnea     COMPARISON: 5/13/2024     TECHNIQUE: A single frontal view of the chest was obtained.     FINDINGS:  Mild pulmonary vascular congestion and mild cardiomegaly. No chavo  edema, large effusion, airspace consolidation, or pneumothorax.  Calcified lymph nodes in the mediastinum on the LEFT. The osseous  structures and surrounding soft tissues demonstrate no acute  abnormality.       Impression:         1.  Mild cardiomegaly and pulmonary vascular congestion but no chavo  edema.           This report was signed and finalized on 5/22/2024 2:13 PM by Dr. Bon Tracey MD.             LAB RESULTS:      Lab 05/22/24  1400   WBC 4.44   HEMOGLOBIN 8.1*   HEMATOCRIT 26.3*   PLATELETS 175   NEUTROS ABS 3.32   IMMATURE GRANS (ABS) 0.01   LYMPHS ABS 0.57*   MONOS ABS 0.46   EOS ABS 0.07   MCV 96.7         Lab 05/26/24  0333 05/25/24  0320 05/24/24  0501 05/23/24  0451 05/22/24  1907 05/22/24  1400   SODIUM 137 139 140 140 138 137   POTASSIUM 4.8 5.0 4.9 4.9 5.2 5.5*   CHLORIDE 104 105 106 105 104 102   CO2 19.0* 20.0* 19.0* 21.0* 20.0* 19.0*   ANION GAP 14.0 14.0 15.0 14.0 14.0 16.0*   BUN 91* 96* 98* 102* 100* 102*   CREATININE 2.98* 3.33* 3.40* 3.89* 3.67* 3.79*   EGFR 14.6* 12.7* 12.4* 10.6* 11.3* 10.9*   GLUCOSE 162* 161* 106* 80 138* 187*   CALCIUM 8.5*  8.8 9.1 9.2 9.0 8.8   MAGNESIUM  --   --   --   --   --  2.8*         Lab 05/25/24  0320 05/24/24  0501 05/23/24  0451 05/22/24  1907 05/22/24  1400   TOTAL PROTEIN 6.8 6.9 6.6 6.9 6.7   ALBUMIN 3.7 3.8 3.7 3.8 3.7   GLOBULIN 3.1 3.1 2.9 3.1 3.0   ALT (SGPT) 16 15 13 14 15   AST (SGOT) 26 25 23 25 31   BILIRUBIN 0.5 0.6 0.5 0.5 0.5   ALK PHOS 66 60 68 81 86         Lab 05/22/24  1606 05/22/24  1400   PROBNP  --  9,331.0*   HSTROP T 66* 65*                 Brief Urine Lab Results  (Last result in the past 365 days)        Color   Clarity   Blood   Leuk Est   Nitrite   Protein   CREAT   Urine HCG        05/02/24 0536 Yellow   Clear   Small (1+)   Negative   Positive   30 mg/dL (1+)                 Microbiology Results (last 10 days)       ** No results found for the last 240 hours. **          Brief history:  Patient was recently hospitalized from May 1 to May 6 with acute on chronic kidney injury, chronic heart failure with preserved ejection fraction.  Patient upon discharge at the nursing facility reportedly has more swelling.  Her diuretics were adjusted.  She came in with creatinine at 3.79 (2.49 on May 13)  Her potassium was 5.5 while bicarb is 19.  Her troponin is flat.  Her proBNP is slightly elevated compared to prior hospitalization.  Her EF is normal at 36 to 60% but has severe elevation of RV systolic pressure greater than 55.    I gave her fluids and held of some of the medications.    Hospital Course:   Son Wagner at bedside and states that she has been doing good in the past 2 days.  He feels that patient can go back to skilled nursing facility if [facility] can take her back today.  I spoke to social service on call Annalise.  Looking at feasibility of discharge plan today.    Patient's renal function slowly going back to her normal after fluid despite significant fluid retention.  We held back on diuretic.  Depending on their preferred goal of care, may reinstitute diuretic.  Discussion been made  "regarding palliative approach.  We gave them hospice information.  Patient is a DO NOT RESUSCITATE DO NOT INTUBATE.  She has no interest on dialysis or cardioversion.    Comorbidities:  Pulmonary hypertension based on echocardiogram-patient has significant fluid retention which I think this is contributing to it.  Surprisingly, she is not requiring any oxygen.  Chronic heart failure with preserved ejection fraction-she did not appear to have acute exacerbation during this admission  Moderate malnutrition ?  Chronic anticoagulation for persistent atrial fibrillation  Hypertension-some of the medications were discontinued either because of her renal dysfunction/hyperkalemia.  I will defer to primary care provider decision to resume medication.  With current regimen her blood pressure is anywhere from 109-132 systolic.  Diabetes mellitus type 2-her hemoglobin A1c is less than 6%.  I recommend continuing dapagliflozin however the glipizide I discontinued as she is at increased risk for hypoglycemia.  Consider sliding scale insulin.  Elevated troponin and BMP in the setting of urinary hypertension and acute kidney injury.    I reviewed her medications based on her clinical profile  During this hospitalization, palliative care had seen patient in consult.    Physical Exam on Discharge:  /67 (BP Location: Left arm, Patient Position: Lying)   Pulse 74   Temp 97.7 °F (36.5 °C) (Oral)   Resp 16   Ht 157.5 cm (62\")   Wt 75.8 kg (167 lb)   LMP  (LMP Unknown)   SpO2 97%   BMI 30.54 kg/m²   Physical Exam  Looks well in general  No distress  Laying comfortably on bed  Not on supplemental oxygen  O2 saturation in the mid to upper 90s.   GEN:  in NAD  HEENT: Atraumatic, Trachea midline  Lungs: CTAB, no wheezing/rales/rhonchi  Heart: Irregularly irregular +S1/s2, no gross murmur   ABD: soft, nt/nd, +BS, no guarding/rebound; presence of ventral hernia  Extremities: atraumatic, no cyanosis, significant pitting edema of " extremities; osteoarthritic changes of fingers noted  Skin: no rashes or lesions  Psych: normal mood & affect       Condition on Discharge: Stable    Discharge Disposition:  Rehab Facility or Unit (DC - External)    Discharge Medications:     Discharge Medications        Continue These Medications        Instructions Start Date   albuterol (2.5 MG/3ML) 0.083% nebulizer solution  Commonly known as: PROVENTIL   2.5 mg, Nebulization, 4 Times Daily - RT      apixaban 2.5 MG tablet tablet  Commonly known as: ELIQUIS   2.5 mg, Oral, 2 Times Daily      carvedilol 6.25 MG tablet  Commonly known as: Coreg   6.25 mg, Oral, 2 Times Daily With Meals      cetirizine 10 MG tablet  Commonly known as: zyrTEC   10 mg, Oral, Daily      cholecalciferol 25 MCG (1000 UT) tablet  Commonly known as: VITAMIN D3   1,000 Units, Oral, Daily      Cyanocobalamin 1000 MCG/ML kit   1 mL, Injection, Weekly, For 3 weeks then monthly thereafter. End date 5/28/24 On Tuesdays      dapagliflozin Propanediol 10 MG tablet  Commonly known as: Farxiga   10 mg, Oral, Daily      dextrose 40 % gel  Commonly known as: GLUTOSE   15 g, Oral, Every 1 Hour PRN      docusate sodium 100 MG capsule  Commonly known as: COLACE   100 mg, Oral, Daily      fenofibrate 145 MG tablet  Commonly known as: TRICOR   145 mg, Oral, Daily      ferrous sulfate 325 (65 FE) MG tablet   325 mg, Oral, Daily With Breakfast      glucagon 1 MG injection  Commonly known as: GLUCAGEN   1 mg, Subcutaneous, Once As Needed      hydrALAZINE 100 MG tablet  Commonly known as: APRESOLINE   100 mg, Oral, 3 Times Daily      ipratropium-albuterol 0.5-2.5 mg/3 ml nebulizer  Commonly known as: DUO-NEB   3 mL, Nebulization, Every 6 Hours PRN      magnesium oxide 400 (241.3 Mg) MG tablet tablet  Commonly known as: MAGOX   400 mg, Oral, Daily      MiraLax 17 g packet  Generic drug: polyethylene glycol   17 g, Oral, Daily      omeprazole 40 MG capsule  Commonly known as: priLOSEC   40 mg, Oral, Daily       simvastatin 40 MG tablet  Commonly known as: ZOCOR   40 mg, Oral, Nightly      sodium bicarbonate 650 MG tablet   650 mg, Oral, Daily      vitamin C 500 MG tablet  Commonly known as: ASCORBIC ACID   500 mg, Oral, Daily             Stop These Medications      furosemide 40 MG tablet  Commonly known as: LASIX     glipizide 5 MG ER tablet  Commonly known as: GLUCOTROL XL     losartan 100 MG tablet  Commonly known as: COZAAR     NIFEdipine XL 90 MG 24 hr tablet  Commonly known as: PROCARDIA XL     spironolactone 25 MG tablet  Commonly known as: ALDACTONE              This patient has current or prior documentation of an left ventricular ejection fraction (LVEF) of less than or equal to 40%.-Not applicable.  EF is normal  Reconsidered in some other aspect ARB however because of patient's hyperkalemia, I did not resume it.  Neither did I resume spironolactone.  Patient is on carvedilol.    Discharge Diet:   Diet Instructions       Diet: Renal Diets; Low Potassium, Low Sodium (2-3g); Regular (IDDSI 7); Thin (IDDSI 0)      Discharge Diet: Renal Diets    Renal Diet:  Low Potassium  Low Sodium (2-3g)       Texture: Regular (IDDSI 7)    Fluid Consistency: Thin (IDDSI 0)            Activity at Discharge:   Activity Instructions       Gradually Increase Activity Until at Pre-Hospitalization Level              Follow-up Appointments:   PCP within 24 to 48 hours.  Future Appointments   Date Time Provider Department [place]   9/30/2024  9:45 AM Tone Coello MD [facility] CD PAD PAD       Test Results Pending at Discharge: None    Electronically signed by Tato Story MD, 05/26/24, 10:01 CDT.    Time: Rater than 30 minutes.           Electronically signed by Tato Story MD at 05/26/24 1008

## 2024-05-26 NOTE — PROGRESS NOTES
Assessment tool to be used for patients with existing breathing treatments ordered by hospitalist                               Respiratory Therapist Driven Protocol - RT to Assess and Treat Algorithm    Item 0 Points 1 Point 2 Points 3 Points 4 Points Subtotal   Mental Status Alert, orientated, cooperative Lethargic, follows commands Confused, not following commands Obtunded or Somnolent Comatose 0   Respiratory Pattern Regular RR  8-16 breaths/minute Increased RR  18-25 breaths/minute Dyspnea on exertion, irregular RR  26-30/minute Shortness of breath,  RR 31-35/minute Accessory muscle use, severe SOB  RR > 35/minute 0   Breath Sounds Clear Decreased unilaterally Decreased bilaterally Basilar crackles Wheezing and/or rhonchi 2   Cough Strong, spontaneous non-productive Strong productive Weak, non-productive Weak productive or weak with rhonchi Absent or may require suctioning 0   Pulmonary Status Nonsmoker or no previous history > 1 year quit < 1 PPD  < 1 year quit >  or = 1 PPD Diagnosed pulmonary disease (severe or chronic) Severe or chronic pulmonary disease with exacerbation 0   Surgical Status None General surgery (non-abdominal or non-thoracic) Lower abdominal Thoracic or upper abdominal Thoracic with pulmonary disease 0   Chest X-ray Clear Chronic changes Infiltrates, atelectasis or pleural effusion Infiltrates > 1 lobe Diffuse infiltrates and atelectasis and/or effusions 0   Activity level Ambulatory Ambulatory with assistance Non-ambulatory Paraplegic Quadriplegic      1                Total Score 3   Score    Drug Therapy Frequency  20 or >    Q4 Duoneb & Q3 Albuterol PRN 15 - 19     Q6 Duoneb & Q4 Albuterol PRN 10 - 14    QID Duoneb & Q4 Albuterol PRN 5 - 9    TID Duoneb & Q6 Albuterol PRN 0 - 4    Q4 PRN Duoneb or Q4 PRN Albuterol    Incentive Spirometry - Initial RT instruct    Lung Expansion Therapy (PEP) Bronchopulmonary Hygiene (CPT)   Q4 & PRN - Severe atelectasis, poor  oxygenation Q4 - copious secretions, dyspnea, unable to sleep, mucus plugging   QID - High risk for persistent atelectasis, existence of atelectasis QID & Q4 PRN - Moderate secretion production   TID - At risk for developing atelectasis TID - small amounts of secretions with poor cough   BID - prevention of atelectasis BID - unable to breathe deeply and cough spontaneously   *RT Protocol patients will be re-assessed/re-evaluated every 48 hours.    *Patients who are home nebulizer treatments will be protocoled to no less than their home regimen and will remain     on their home regimen with re-evaluations as needed with changes in patient condition.    RT Comments/Recommendations:  Pt assessed and chart reviewed. Pt does not have home pulmonary regimen. Duoneb made PRN. Will reassess in 48 hours

## 2024-05-26 NOTE — PLAN OF CARE
Goal Outcome Evaluation:              Outcome Evaluation: A&O. Room air. VSS. Denies pain. Denies nausea. IV IID. Purewick. Incontinent. Tolerating diet. Glucommander. Son at bedside. Eliquis for VTE prevention. Call light within reach, safety maintained.

## 2024-05-26 NOTE — PLAN OF CARE
Goal Outcome Evaluation:  Plan of Care Reviewed With: patient        Progress: no change  Outcome Evaluation: Discharge orders in place. Report given to Finleykamilah. IV and tele removed. VSS

## 2024-05-26 NOTE — THERAPY DISCHARGE NOTE
Acute Care - Physical Therapy Discharge Summary  [facility]       Patient Name: Libia Perales  : 1934  MRN: [id number]    Today's Date: 2024                 Admit Date: 2024      PT Recommendation and Plan    Visit Dx:    ICD-10-CM ICD-9-CM   1. Acute kidney injury  N17.9 584.9   2. Congestive heart failure, unspecified HF chronicity, unspecified heart failure type  I50.9 428.0   3. Hyperkalemia  E87.5 276.7   4. Hypermagnesemia  E83.41 275.2   5. Impaired mobility [Z74.09]  Z74.09 799.89        Outcome Measures       Row Name 24 1000             How much help from another person do you currently need...    Turning from your back to your side while in flat bed without using bedrails? 3  -AE      Moving from lying on back to sitting on the side of a flat bed without bedrails? 3  -AE      Moving to and from a bed to a chair (including a wheelchair)? 3  -AE      Standing up from a chair using your arms (e.g., wheelchair, bedside chair)? 3  -AE      Climbing 3-5 steps with a railing? 2  -AE      To walk in hospital room? 3  -AE      AM-PAC 6 Clicks Score (PT) 17  -AE      Highest Level of Mobility Goal 5 --> Static standing  -AE         Functional Assessment    Outcome Measure Options AM-PAC 6 Clicks Basic Mobility (PT)  -AE                User Key  (r) = Recorded By, (t) = Taken By, (c) = Cosigned By      Initials Name Provider Type    AE Rosy Schaeffer, PTA Physical Therapist Assistant                     PT Charges       Row Name 24 1030             Time Calculation    Start Time 0945  -AE      Stop Time 1008  -AE      Time Calculation (min) 23 min  -AE      PT Received On 24  -AE      PT Goal Re-Cert Due Date 24  -AE         Time Calculation- PT    Total Timed Code Minutes- PT 23 minute(s)  -AE         Timed Charges    59914 - PT Therapeutic Exercise Minutes 8  -AE      18775 - Gait Training Minutes  15  -AE         Total Minutes    Timed Charges Total Minutes 23  -AE        Total Minutes 23  -AE                User Key  (r) = Recorded By, (t) = Taken By, (c) = Cosigned By      Initials Name Provider Type    Rosy Cutler, GYPSY Physical Therapist Assistant                     PT Rehab Goals       Row Name 05/26/24 1448             Bed Mobility Goal 1 (PT)    Activity/Assistive Device (Bed Mobility Goal 1, PT) bed mobility activities, all  -AH      [place] Level/Cues Needed (Bed Mobility Goal 1, PT) independent  -AH      Time Frame (Bed Mobility Goal 1, PT) long term goal (LTG);by discharge  -      Progress/Outcomes (Bed Mobility Goal 1, PT) goal not met  -AH         Transfer Goal 1 (PT)    Activity/Assistive Device (Transfer Goal 1, PT) sit-to-stand/stand-to-sit;bed-to-chair/chair-to-bed;walker, rolling  -AH      [place] Level/Cues Needed (Transfer Goal 1, PT) standby assist  -AH      Time Frame (Transfer Goal 1, PT) long term goal (LTG);by discharge  -      Progress/Outcome (Transfer Goal 1, PT) goal not met  -AH         Gait Training Goal 1 (PT)    Activity/Assistive Device (Gait Training Goal 1, PT) gait (walking locomotion);assistive device use;decrease fall risk;diminish gait deviation;increase endurance/gait distance;improve balance and speed;walker, rolling  -AH      [place] Level (Gait Training Goal 1, PT) modified independence  -AH      Distance (Gait Training Goal 1, PT) 75ft maintaining SpO2 90% or greater  -AH      Time Frame (Gait Training Goal 1, PT) long term goal (LTG);by discharge  -      Progress/Outcome (Gait Training Goal 1, PT) goal not met  -                User Key  (r) = Recorded By, (t) = Taken By, (c) = Cosigned By      Initials Name Provider Type April Sun PTA Physical Therapist Assistant PT                        PT Discharge Summary  Reason for Discharge: Discharge from facility  Outcomes Achieved: Refer to plan of care for updates on goals achieved  Discharge Destination: SNF      April Freitas,  PTA   5/26/2024

## 2024-05-26 NOTE — DISCHARGE SUMMARY
[facility] Medicine Services  DISCHARGE SUMMARY       Date of Admission: 5/22/2024  Date of Discharge:  5/26/2024  Primary Care Physician: Gume Ann DO    Presenting Problem/History of Present Illness:  Swelling and shortness of breath    Final Discharge Diagnoses:    Anasarca  Acute kidney injury (slowly improving) on chronic kidney disease  Rucohbfuwleb-kyogqjhy-dy bicarb tablets.    Pulmonary hypertension based on echocardiogram  Chronic heart failure with preserved ejection fraction  Moderate malnutrition  Chronic anticoagulation for atrial fibrillation (persistent)  Hypertension  Diabetes mellitus.  Elevated troponin/BNP in the setting of pulmonary hypertension and acute kidney injury      Consults:  Palliative care    Procedures Performed: None    Pertinent Test Results:   Results for orders placed during the hospital encounter of 01/09/24    Adult Transthoracic Echo Complete W/ Cont if Necessary Per Protocol    Interpretation Summary    Left ventricular systolic function is normal. Left ventricular ejection fraction appears to be 56 - 60%.    The right ventricular cavity is mildly dilated. Borderline low right ventricular systolic function noted.    Biatrial enlargement is noted.    Mild to moderate tricuspid valve regurgitation is present. Estimated right ventricular systolic pressure from tricuspid regurgitation is markedly elevated (>55 mmHg).      Imaging Results (All)       Procedure Component Value Units Date/Time    US Venous Doppler Upper Extremity Right (duplex) [[id number]] Collected: 05/22/24 1539     Updated: 05/22/24 1542    Narrative:      History: Pain and swelling       Impression:      Impression: There is no evidence of deep venous thrombosis or  superficial thrombophlebitis of the right upper extremity.     Comments: Right upper extremity venous duplex exam was performed using  color Doppler flow, Doppler wave form analysis, and grayscale  imaging,  with and without compression. There is no evidence of deep venous  thrombosis of the internal jugular, subclavian, axillary, brachial,  radial, and ulnar veins. There is no evidence of superficial  thrombophlebitis involving the cephalic or basilic veins.         This report was signed and finalized on 5/22/2024 3:39 PM by Dr. Williams Lopez MD.       XR Chest 1 View [[id number]] Collected: 05/22/24 1412     Updated: 05/22/24 1416    Narrative:      EXAMINATION: XR CHEST 1 VW-  5/22/2024 2:12 PM 1 view     HISTORY: dyspnea     COMPARISON: 5/13/2024     TECHNIQUE: A single frontal view of the chest was obtained.     FINDINGS:  Mild pulmonary vascular congestion and mild cardiomegaly. No chavo  edema, large effusion, airspace consolidation, or pneumothorax.  Calcified lymph nodes in the mediastinum on the LEFT. The osseous  structures and surrounding soft tissues demonstrate no acute  abnormality.       Impression:         1.  Mild cardiomegaly and pulmonary vascular congestion but no chavo  edema.           This report was signed and finalized on 5/22/2024 2:13 PM by Dr. Bon Tracey MD.             LAB RESULTS:      Lab 05/22/24  1400   WBC 4.44   HEMOGLOBIN 8.1*   HEMATOCRIT 26.3*   PLATELETS 175   NEUTROS ABS 3.32   IMMATURE GRANS (ABS) 0.01   LYMPHS ABS 0.57*   MONOS ABS 0.46   EOS ABS 0.07   MCV 96.7         Lab 05/26/24  0333 05/25/24  0320 05/24/24  0501 05/23/24  0451 05/22/24  1907 05/22/24  1400   SODIUM 137 139 140 140 138 137   POTASSIUM 4.8 5.0 4.9 4.9 5.2 5.5*   CHLORIDE 104 105 106 105 104 102   CO2 19.0* 20.0* 19.0* 21.0* 20.0* 19.0*   ANION GAP 14.0 14.0 15.0 14.0 14.0 16.0*   BUN 91* 96* 98* 102* 100* 102*   CREATININE 2.98* 3.33* 3.40* 3.89* 3.67* 3.79*   EGFR 14.6* 12.7* 12.4* 10.6* 11.3* 10.9*   GLUCOSE 162* 161* 106* 80 138* 187*   CALCIUM 8.5* 8.8 9.1 9.2 9.0 8.8   MAGNESIUM  --   --   --   --   --  2.8*         Lab 05/25/24  0320 05/24/24  0501 05/23/24  5501 05/22/24  2155  05/22/24  1400   TOTAL PROTEIN 6.8 6.9 6.6 6.9 6.7   ALBUMIN 3.7 3.8 3.7 3.8 3.7   GLOBULIN 3.1 3.1 2.9 3.1 3.0   ALT (SGPT) 16 15 13 14 15   AST (SGOT) 26 25 23 25 31   BILIRUBIN 0.5 0.6 0.5 0.5 0.5   ALK PHOS 66 60 68 81 86         Lab 05/22/24  1606 05/22/24  1400   PROBNP  --  9,331.0*   HSTROP T 66* 65*                 Brief Urine Lab Results  (Last result in the past 365 days)        Color   Clarity   Blood   Leuk Est   Nitrite   Protein   CREAT   Urine HCG        05/02/24 0536 Yellow   Clear   Small (1+)   Negative   Positive   30 mg/dL (1+)                 Microbiology Results (last 10 days)       ** No results found for the last 240 hours. **          Brief history:  Patient was recently hospitalized from May 1 to May 6 with acute on chronic kidney injury, chronic heart failure with preserved ejection fraction.  Patient upon discharge at the nursing facility reportedly has more swelling.  Her diuretics were adjusted.  She came in with creatinine at 3.79 (2.49 on May 13)  Her potassium was 5.5 while bicarb is 19.  Her troponin is flat.  Her proBNP is slightly elevated compared to prior hospitalization.  Her EF is normal at 36 to 60% but has severe elevation of RV systolic pressure greater than 55.    I gave her fluids and held of some of the medications.    Hospital Course:   Son Wagner at bedside and states that she has been doing good in the past 2 days.  He feels that patient can go back to skilled nursing facility if [facility] can take her back today.  I spoke to social service on call Annalise.  Looking at feasibility of discharge plan today.    Patient's renal function slowly going back to her normal after fluid despite significant fluid retention.  We held back on diuretic.  Depending on their preferred goal of care, may reinstitute diuretic.  Discussion been made regarding palliative approach.  We gave them hospice information.  Patient is a DO NOT RESUSCITATE DO NOT INTUBATE.  She has no interest on  "dialysis or cardioversion.    Comorbidities:  Pulmonary hypertension based on echocardiogram-patient has significant fluid retention which I think this is contributing to it.  Surprisingly, she is not requiring any oxygen.  Chronic heart failure with preserved ejection fraction-she did not appear to have acute exacerbation during this admission  Moderate malnutrition ?  Chronic anticoagulation for persistent atrial fibrillation  Hypertension-some of the medications were discontinued either because of her renal dysfunction/hyperkalemia.  I will defer to primary care provider decision to resume medication.  With current regimen her blood pressure is anywhere from 109-132 systolic.  Diabetes mellitus type 2-her hemoglobin A1c is less than 6%.  I recommend continuing dapagliflozin however the glipizide I discontinued as she is at increased risk for hypoglycemia.  Consider sliding scale insulin.  Elevated troponin and BMP in the setting of urinary hypertension and acute kidney injury.    I reviewed her medications based on her clinical profile  During this hospitalization, palliative care had seen patient in consult.    Physical Exam on Discharge:  /67 (BP Location: Left arm, Patient Position: Lying)   Pulse 74   Temp 97.7 °F (36.5 °C) (Oral)   Resp 16   Ht 157.5 cm (62\")   Wt 75.8 kg (167 lb)   LMP  (LMP Unknown)   SpO2 97%   BMI 30.54 kg/m²   Physical Exam  Looks well in general  No distress  Laying comfortably on bed  Not on supplemental oxygen  O2 saturation in the mid to upper 90s.   GEN:  in NAD  HEENT: Atraumatic, Trachea midline  Lungs: CTAB, no wheezing/rales/rhonchi  Heart: Irregularly irregular +S1/s2, no gross murmur   ABD: soft, nt/nd, +BS, no guarding/rebound; presence of ventral hernia  Extremities: atraumatic, no cyanosis, significant pitting edema of extremities; osteoarthritic changes of fingers noted  Skin: no rashes or lesions  Psych: normal mood & affect       Condition on Discharge: " Stable    Discharge Disposition:  Rehab Facility or Unit (DC - External)    Discharge Medications:     Discharge Medications        Continue These Medications        Instructions Start Date   albuterol (2.5 MG/3ML) 0.083% nebulizer solution  Commonly known as: PROVENTIL   2.5 mg, Nebulization, 4 Times Daily - RT      apixaban 2.5 MG tablet tablet  Commonly known as: ELIQUIS   2.5 mg, Oral, 2 Times Daily      carvedilol 6.25 MG tablet  Commonly known as: Coreg   6.25 mg, Oral, 2 Times Daily With Meals      cetirizine 10 MG tablet  Commonly known as: zyrTEC   10 mg, Oral, Daily      cholecalciferol 25 MCG (1000 UT) tablet  Commonly known as: VITAMIN D3   1,000 Units, Oral, Daily      Cyanocobalamin 1000 MCG/ML kit   1 mL, Injection, Weekly, For 3 weeks then monthly thereafter. End date 5/28/24 On Tuesdays      dapagliflozin Propanediol 10 MG tablet  Commonly known as: Farxiga   10 mg, Oral, Daily      dextrose 40 % gel  Commonly known as: GLUTOSE   15 g, Oral, Every 1 Hour PRN      docusate sodium 100 MG capsule  Commonly known as: COLACE   100 mg, Oral, Daily      fenofibrate 145 MG tablet  Commonly known as: TRICOR   145 mg, Oral, Daily      ferrous sulfate 325 (65 FE) MG tablet   325 mg, Oral, Daily With Breakfast      glucagon 1 MG injection  Commonly known as: GLUCAGEN   1 mg, Subcutaneous, Once As Needed      hydrALAZINE 100 MG tablet  Commonly known as: APRESOLINE   100 mg, Oral, 3 Times Daily      ipratropium-albuterol 0.5-2.5 mg/3 ml nebulizer  Commonly known as: DUO-NEB   3 mL, Nebulization, Every 6 Hours PRN      magnesium oxide 400 (241.3 Mg) MG tablet tablet  Commonly known as: MAGOX   400 mg, Oral, Daily      MiraLax 17 g packet  Generic drug: polyethylene glycol   17 g, Oral, Daily      omeprazole 40 MG capsule  Commonly known as: priLOSEC   40 mg, Oral, Daily      simvastatin 40 MG tablet  Commonly known as: ZOCOR   40 mg, Oral, Nightly      sodium bicarbonate 650 MG tablet   650 mg, Oral, Daily       vitamin C 500 MG tablet  Commonly known as: ASCORBIC ACID   500 mg, Oral, Daily             Stop These Medications      furosemide 40 MG tablet  Commonly known as: LASIX     glipizide 5 MG ER tablet  Commonly known as: GLUCOTROL XL     losartan 100 MG tablet  Commonly known as: COZAAR     NIFEdipine XL 90 MG 24 hr tablet  Commonly known as: PROCARDIA XL     spironolactone 25 MG tablet  Commonly known as: ALDACTONE              This patient has current or prior documentation of an left ventricular ejection fraction (LVEF) of less than or equal to 40%.-Not applicable.  EF is normal  Reconsidered in some other aspect ARB however because of patient's hyperkalemia, I did not resume it.  Neither did I resume spironolactone.  Patient is on carvedilol.    Discharge Diet:   Diet Instructions       Diet: Renal Diets; Low Potassium, Low Sodium (2-3g); Regular (IDDSI 7); Thin (IDDSI 0)      Discharge Diet: Renal Diets    Renal Diet:  Low Potassium  Low Sodium (2-3g)       Texture: Regular (IDDSI 7)    Fluid Consistency: Thin (IDDSI 0)            Activity at Discharge:   Activity Instructions       Gradually Increase Activity Until at Pre-Hospitalization Level              Follow-up Appointments:   PCP within 24 to 48 hours.  Future Appointments   Date Time Provider Department Center   9/30/2024  9:45 AM Tone Coello MD [facility] CD PAD PAD       Test Results Pending at Discharge: None    Electronically signed by Tato Story MD, 05/26/24, 10:01 CDT.    Time: Rater than 30 minutes.

## 2024-05-26 NOTE — CASE MANAGEMENT/SOCIAL WORK
Continued Stay Note  [facility]     Patient Name: Libia Perales  MRN: [id number]  Today's Date: 5/26/2024    Admit Date: 5/22/2024    Plan: [facility]   Discharge Plan       Row Name 05/26/24 0943       Plan    Plan [facility]    Plan Comments SANTIAGO spoke with wing Thuan 9 nurse at [facility], who advised patient could return today.  Nursing to call report to [phone] and ask for wing 9.  DC summary to be faxed to [phone].  SANTIAGO has faxed dc summary to [facility].    Final Discharge Disposition Code 03 - skilled nursing facility (SNF)                   Discharge Codes    No documentation.                       NI Flores

## 2024-05-26 NOTE — THERAPY TREATMENT NOTE
Acute Care - Physical Therapy Treatment Note  [facility]     Patient Name: Libia Perales  : 1934  MRN: [id number]  Today's Date: 2024      Visit Dx:     ICD-10-CM ICD-9-CM   1. Acute kidney injury  N17.9 584.9   2. Congestive heart failure, unspecified HF chronicity, unspecified heart failure type  I50.9 428.0   3. Hyperkalemia  E87.5 276.7   4. Hypermagnesemia  E83.41 275.2   5. 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    Benign essential HTN    Persistent atrial fibrillation    Asthma    Fibrocystic breast disease    Gastro-esophageal reflux    High calcium levels    OA (osteoarthritis) of ankle    Right renal mass    Vertigo, benign paroxysmal    Chronic fatigue    Chronic cough    Allergic rhinitis    Stage 4 chronic kidney disease    Strain of left rhomboid muscle    Chronic anticoagulation    Chronic diastolic (congestive) heart failure    Severe pulmonary hypertension    Acute on chronic heart failure with preserved ejection fraction (HFpEF)    Acute kidney injury superimposed on stage 4 chronic kidney disease    Digoxin toxicity, accidental or unintentional, initial encounter    Altered mental status    Acute cystitis with hematuria    Elevated troponin not due to acute coronary syndrome    Metabolic encephalopathy suspect due to elevated creatinine and abnormal urinalysis    Moderate malnutrition    Chronic heart failure with preserved ejection fraction (HFpEF)    Acute kidney injury     Past Medical History:   Diagnosis Date    Atrial fibrillation     Diabetes mellitus     Difficulty walking Approximately 2 years    GERD (gastroesophageal reflux disease)     High cholesterol     Hypertension     OA (osteoarthritis)     Pneumonia     few  years ago    Stage 4 chronic kidney disease 08/18/2023     Past Surgical History:   Procedure Laterality Date    BREAST BIOPSY Right     BREAST CYST ASPIRATION      HYSTERECTOMY      OOPHORECTOMY      TONSILLECTOMY       PT Assessment (Last 12 Hours)       PT Evaluation and Treatment       Row Name 05/26/24 0945          Physical Therapy Time and Intention    Subjective Information complains of;weakness  -AE     Document Type therapy note (daily note)  -AE     Mode of Treatment physical therapy  -AE       Row Name 05/26/24 0945          General Information    Existing Precautions/Restrictions fall  -AE       Row Name 05/26/24 0945          Pain    Pretreatment Pain Rating 0/10 - no pain  -AE     Posttreatment Pain Rating 0/10 - no pain  -AE       Row Name 05/26/24 0945          Bed Mobility    Supine-Sit Letcher (Bed Mobility) minimum assist (75% patient effort);verbal cues  -AE       Row Name 05/26/24 0945          Sit-Stand Transfer    Sit-Stand Letcher (Transfers) contact guard;verbal cues  -AE       Row Name 05/26/24 0945          Stand-Sit Transfer    Stand-Sit Letcher (Transfers) contact guard  -AE       Row Name 05/26/24 0945          Gait/Stairs (Locomotion)    Letcher Level (Gait) contact guard;standby assist  -AE     Assistive Device (Gait) walker, front-wheeled  -AE     Distance in Feet (Gait) 40  -AE     Deviations/Abnormal Patterns (Gait) gait speed decreased  -AE     Bilateral Gait Deviations forward flexed posture  -AE       Row Name 05/26/24 0945          Hip (Therapeutic Exercise)    Hip (Therapeutic Exercise) AROM (active range of motion)  -AE     Hip AROM (Therapeutic Exercise) 20 repititions;flexion  -AE       Row Name 05/26/24 0945          Knee (Therapeutic Exercise)    Knee (Therapeutic Exercise) AROM (active range of motion)  -AE     Knee AROM (Therapeutic Exercise) LAQ (long arc quad);20 repititions  -AE       Row Name 05/26/24 0945          Ankle (Therapeutic Exercise)     Ankle (Therapeutic Exercise) AROM (active range of motion)  -AE     Ankle AROM (Therapeutic Exercise) 20 repititions;dorsiflexion;plantarflexion  -AE       Row Name 05/26/24 0945          Plan of Care Review    Plan of Care Reviewed With patient;family  -AE     Progress improving  -AE     Outcome Evaluation Pt agrees to PT with no c/o pain. She was min x1 supine to sit and cga to stand.She walked 40ft with RW cga with a slow pace.Pt would benefit from continued PT at [facility].  -AE       Row Name 05/26/24 0945          Vital Signs    Pre SpO2 (%) 94  -AE     O2 Delivery Pre Treatment room air  -AE     O2 Delivery Intra Treatment room air  -AE     Post SpO2 (%) 93  -AE     O2 Delivery Post Treatment room air  -AE       Row Name 05/26/24 0945          Positioning and Restraints    Pre-Treatment Position in bed  -AE     Post Treatment Position chair  -AE     In Chair sitting;call light within reach  -AE               User Key  (r) = Recorded By, (t) = Taken By, (c) = Cosigned By      Initials Name Provider Type    AE Rosy Schaeffer, PTA Physical Therapist Assistant                    Physical Therapy Education       Title: PT OT SLP Therapies (In Progress)       Topic: Physical Therapy (In Progress)       Point: Mobility training (Done)       Learning Progress Summary             Patient Eager, E, VU by AE at 5/26/2024 1029    Comment: ex's, gait    Acceptance, E, VU by MS at 5/23/2024 1125    Comment: role of PT in her care   Family Eager, E, VU by AE at 5/26/2024 1029    Comment: ex's, gait                         Point: Home exercise program (Done)       Learning Progress Summary             Patient Eager, E, VU by AE at 5/26/2024 1029    Comment: ex's, gait   Family Eager, E, VU by AE at 5/26/2024 1029    Comment: ex's, gait                         Point: Body mechanics (Not Started)       Learner Progress:  Not documented in this visit.              Point: Precautions (Not Started)       Learner Progress:  Not  documented in this visit.                              User Key       Initials Effective Dates Name Provider Type Discipline    AE 02/03/23 -  Rosy Schaeffer PTA Physical Therapist Assistant PT    MS 07/11/23 -  Danielle Benjamin, PT, DPT, NCS Physical Therapist PT                  PT Recommendation and Plan     Plan of Care Reviewed With: patient, family  Progress: improving  Outcome Evaluation: Pt agrees to PT with no c/o pain. She was min x1 supine to sit and cga to stand.She walked 40ft with RW cga with a slow pace.Pt would benefit from continued PT at SNF.   Outcome Measures       Row Name 05/26/24 1000             How much help from another person do you currently need...    Turning from your back to your side while in flat bed without using bedrails? 3  -AE      Moving from lying on back to sitting on the side of a flat bed without bedrails? 3  -AE      Moving to and from a bed to a chair (including a wheelchair)? 3  -AE      Standing up from a chair using your arms (e.g., wheelchair, bedside chair)? 3  -AE      Climbing 3-5 steps with a railing? 2  -AE      To walk in hospital room? 3  -AE      AM-PAC 6 Clicks Score (PT) 17  -AE      Highest Level of Mobility Goal 5 --> Static standing  -AE         Functional Assessment    Outcome Measure Options AM-PAC 6 Clicks Basic Mobility (PT)  -AE                User Key  (r) = Recorded By, (t) = Taken By, (c) = Cosigned By      Initials Name Provider Type    AE Rosy Schaeffer PTA Physical Therapist Assistant                     Time Calculation:    PT Charges       Row Name 05/26/24 1030             Time Calculation    Start Time 0945  -AE      Stop Time 1008  -AE      Time Calculation (min) 23 min  -AE      PT Received On 05/26/24  -AE      PT Goal Re-Cert Due Date 06/02/24  -AE         Time Calculation- PT    Total Timed Code Minutes- PT 23 minute(s)  -AE         Timed Charges    40128 - PT Therapeutic Exercise Minutes 8  -AE      92891 - Gait Training Minutes   15  -AE         Total Minutes    Timed Charges Total Minutes 23  -AE       Total Minutes 23  -AE                User Key  (r) = Recorded By, (t) = Taken By, (c) = Cosigned By      Initials Name Provider Type    AE Rosy Schaeffer PTA Physical Therapist Assistant                  Therapy Charges for Today       Code Description Service Date Service Provider Modifiers Qty    02191800152 HC GAIT TRAINING EA 15 MIN 5/26/2024 Rosy Schaeffer PTA GP 1    12505086899 HC PT THER PROC EA 15 MIN 5/26/2024 Rosy Schaeffer PTA GP 1            PT G-Codes  Outcome Measure Options: AM-PAC 6 Clicks Basic Mobility (PT)  AM-PAC 6 Clicks Score (PT): 17  AM-PAC 6 Clicks Score (OT): 19    Rosy Schaeffer PTA  5/26/2024

## 2024-05-26 NOTE — THERAPY DISCHARGE NOTE
Acute Care - Occupational Therapy Discharge Summary  [facility]     Patient Name: Libia Perales  : 1934  MRN: [id number]    Today's Date: 2024                 Admit Date: 2024        OT Recommendation and Plan    Visit Dx:    ICD-10-CM ICD-9-CM   1. Acute kidney injury  N17.9 584.9   2. Congestive heart failure, unspecified HF chronicity, unspecified heart failure type  I50.9 428.0   3. Hyperkalemia  E87.5 276.7   4. Hypermagnesemia  E83.41 275.2   5. Impaired mobility [Z74.09]  Z74.09 799.89          Time Calculation- OT       Row Name 24 1030             Timed Charges    69280 - Gait Training Minutes  15  -AE         Total Minutes    Timed Charges Total Minutes 15  -AE       Total Minutes 15  -AE                User Key  (r) = Recorded By, (t) = Taken By, (c) = Cosigned By      Initials Name Provider Type    Rosy Cutler, PTA Physical Therapist Assistant                       OT Rehab Goals       Row Name 24 1500             Transfer Goal 1 (OT)    Activity/Assistive Device (Transfer Goal 1, OT) transfers, all  -AC      Purdy Level/Cues Needed (Transfer Goal 1, OT) supervision required  -AC      Time Frame (Transfer Goal 1, OT) long term goal (LTG);by discharge  -AC      Progress/Outcome (Transfer Goal 1, OT) goal not met  -AC         Dressing Goal 1 (OT)    Activity/Device (Dressing Goal 1, OT) lower body dressing  -AC      Purdy/Cues Needed (Dressing Goal 1, OT) minimum assist (75% or more patient effort)  -AC      Time Frame (Dressing Goal 1, OT) long term goal (LTG);by discharge  -AC      Progress/Outcome (Dressing Goal 1, OT) goal not met  -AC         Toileting Goal 1 (OT)    Activity/Device (Toileting Goal 1, OT) toileting skills, all;adjust/manage clothing;perform perineal hygiene;commode  -AC      Purdy Level/Cues Needed (Toileting Goal 1, OT) contact guard required  -AC      Time Frame (Toileting Goal 1, OT) long term goal (LTG);by discharge   -AC      Progress/Outcome (Toileting Goal 1, OT) goal not met  -AC                User Key  (r) = Recorded By, (t) = Taken By, (c) = Cosigned By      Initials Name Provider Type Discipline    AC Handy Matute OTR/L, CNT Occupational Therapist OT                     Outcome Measures       Row Name 05/26/24 1000             How much help from another person do you currently need...    Turning from your back to your side while in flat bed without using bedrails? 3  -AE      Moving from lying on back to sitting on the side of a flat bed without bedrails? 3  -AE      Moving to and from a bed to a chair (including a wheelchair)? 3  -AE      Standing up from a chair using your arms (e.g., wheelchair, bedside chair)? 3  -AE      Climbing 3-5 steps with a railing? 2  -AE      To walk in hospital room? 3  -AE      AM-PAC 6 Clicks Score (PT) 17  -AE      Highest Level of Mobility Goal 5 --> Static standing  -AE         Functional Assessment    Outcome Measure Options AM-PAC 6 Clicks Basic Mobility (PT)  -AE                User Key  (r) = Recorded By, (t) = Taken By, (c) = Cosigned By      Initials Name Provider Type    AE Rosy Schaeffer, PTA Physical Therapist Assistant                            OT Discharge Summary  Anticipated Discharge Disposition (OT): skilled nursing facility  Reason for Discharge: Discharge from facility  Outcomes Achieved: Refer to plan of care for updates on goals achieved  Discharge Destination: SNF      KAYLIN Guzmán/L, CNT  5/26/2024